# Patient Record
Sex: FEMALE | Race: WHITE | NOT HISPANIC OR LATINO | Employment: OTHER | URBAN - METROPOLITAN AREA
[De-identification: names, ages, dates, MRNs, and addresses within clinical notes are randomized per-mention and may not be internally consistent; named-entity substitution may affect disease eponyms.]

---

## 2017-01-03 DIAGNOSIS — J44.89 CHRONIC AIRWAY OBSTRUCTION, NOT ELSEWHERE CLASSIFIED: ICD-10-CM

## 2017-01-03 DIAGNOSIS — R09.89 OTHER DYSPNEA AND RESPIRATORY ABNORMALITY: ICD-10-CM

## 2017-01-03 DIAGNOSIS — R06.09 OTHER DYSPNEA AND RESPIRATORY ABNORMALITY: ICD-10-CM

## 2017-01-03 RX ORDER — LEVALBUTEROL INHALATION SOLUTION 0.31 MG/3ML
1 SOLUTION RESPIRATORY (INHALATION) 4 TIMES DAILY
Qty: 300 ML | Refills: 12 | Status: ON HOLD | OUTPATIENT
Start: 2017-01-03 | End: 2017-06-24 | Stop reason: HOSPADM

## 2017-01-26 ENCOUNTER — OFFICE VISIT (OUTPATIENT)
Dept: INTERNAL MEDICINE | Facility: CLINIC | Age: 82
End: 2017-01-26
Payer: MEDICARE

## 2017-01-26 VITALS
HEART RATE: 83 BPM | HEIGHT: 65 IN | WEIGHT: 117.06 LBS | DIASTOLIC BLOOD PRESSURE: 60 MMHG | SYSTOLIC BLOOD PRESSURE: 110 MMHG | RESPIRATION RATE: 16 BRPM | BODY MASS INDEX: 19.5 KG/M2 | TEMPERATURE: 97 F | OXYGEN SATURATION: 96 %

## 2017-01-26 DIAGNOSIS — I70.234 ATHEROSCLEROSIS OF NATIVE ARTERY OF RIGHT LOWER EXTREMITY WITH ULCERATION OF HEEL: Primary | ICD-10-CM

## 2017-01-26 PROCEDURE — 99499 UNLISTED E&M SERVICE: CPT | Mod: S$GLB,,, | Performed by: INTERNAL MEDICINE

## 2017-01-26 NOTE — MR AVS SNAPSHOT
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  Call for this free service at [phone].             Medications           Message regarding Medications     Verify the changes and/or additions to your medication regime listed below are the same as discussed with your clinician today.  If any of these changes or additions are incorrect, please notify your healthcare provider.        STOP taking these medications     acetaminophen (TYLENOL) 325 MG tablet Take 1 tablet (325 mg total) by mouth every 6 (six) hours as needed for Pain or Temperature greater than (100.5).    doxycycline (MONODOX) 100 MG capsule Take 1 capsule (100 mg total) by mouth 2 (two) times daily.    furosemide (LASIX) 20 MG tablet Take 1 tablet (20 mg total) by mouth 2 (two) times daily.           Verify that the below list of medications is an accurate representation of the medications you are currently taking.  If none reported, the list may be blank. If incorrect, please contact your healthcare provider. Carry this list with you in case of emergency.           Current  "Medications     albuterol 90 mcg/actuation inhaler Inhale 2 puffs into the lungs every 6 (six) hours as needed for Wheezing or Shortness of Breath.    alprazolam (XANAX) 0.25 MG tablet Take 1 tablet (0.25 mg total) by mouth nightly as needed for Anxiety.    ascorbic acid (VITAMIN C) 500 MG tablet Take 500 mg by mouth 2 (two) times daily.    aspirin (ECOTRIN) 81 MG EC tablet Take 1 tablet (81 mg total) by mouth once daily.    citalopram (CELEXA) 20 MG tablet TAKE 1 TABLET BY MOUTH ONCE DAILY    cyanocobalamin (VITAMIN B-12) 100 MCG tablet Take 1 tablet (100 mcg total) by mouth once daily.    cyproheptadine (PERIACTIN) 4 mg tablet Take 1 tablet (4 mg total) by mouth 3 (three) times daily as needed.    fluticasone-vilanterol (BREO) 100-25 mcg/dose diskus inhaler Inhale 1 puff into the lungs once daily.    guaifenesin (MUCINEX) 600 mg 12 hr tablet Take 2 tablets (1,200 mg total) by mouth 2 (two) times daily. Okay to dispense box of medication  as prepackaged by .    levalbuterol (XOPENEX) 0.31 mg/3 mL nebulizer solution Take 3 mLs (0.31 mg total) by nebulization 4 (four) times daily.    lisinopril (PRINIVIL,ZESTRIL) 2.5 MG tablet Take 1 tablet (2.5 mg total) by mouth once daily.    multivitamin (ONE DAILY MULTIVITAMIN) per tablet Take 1 tablet by mouth once daily.    tiotropium (SPIRIVA) 18 mcg inhalation capsule Inhale 1 capsule (18 mcg total) into the lungs once daily.    metoprolol succinate (TOPROL-XL) 25 MG 24 hr tablet Take 1 tablet (25 mg total) by mouth once daily.           Clinical Reference Information           Vital Signs - Last Recorded  Most recent update: 1/26/2017  3:14 PM by Radha Botello MA    BP Pulse Temp Resp Ht Wt    110/60 (BP Location: Right arm, Patient Position: Sitting, BP Method: Manual) 83 97.1 °F (36.2 °C) (Oral) 16 5' 5" (1.651 m) 53.1 kg (117 lb 1 oz)    LMP SpO2 BMI          (LMP Unknown) 96% 19.48 kg/m2        Blood Pressure          Most Recent Value    BP  110/60    "   Allergies as of 1/26/2017     Advair Diskus  [Fluticasone-salmeterol]    Pravastatin      Immunizations Administered on Date of Encounter - 1/26/2017     None      Orders Placed During Today's Visit      Normal Orders This Visit    WOUND SUPPLIES FOR HOME USE

## 2017-01-31 ENCOUNTER — PATIENT MESSAGE (OUTPATIENT)
Dept: FAMILY MEDICINE | Facility: CLINIC | Age: 82
End: 2017-01-31

## 2017-02-06 ENCOUNTER — HOSPITAL ENCOUNTER (OUTPATIENT)
Dept: RADIOLOGY | Facility: HOSPITAL | Age: 82
Discharge: HOME | End: 2017-02-06
Attending: PODIATRIST
Payer: MEDICARE

## 2017-02-06 ENCOUNTER — OFFICE VISIT (OUTPATIENT)
Dept: PODIATRY | Facility: CLINIC | Age: 82
End: 2017-02-06
Payer: MEDICARE

## 2017-02-06 VITALS
SYSTOLIC BLOOD PRESSURE: 113 MMHG | BODY MASS INDEX: 19.49 KG/M2 | HEART RATE: 83 BPM | DIASTOLIC BLOOD PRESSURE: 64 MMHG | HEIGHT: 65 IN | WEIGHT: 117 LBS

## 2017-02-06 DIAGNOSIS — G89.29 CHRONIC HEEL PAIN, RIGHT: ICD-10-CM

## 2017-02-06 DIAGNOSIS — M79.671 CHRONIC HEEL PAIN, RIGHT: ICD-10-CM

## 2017-02-06 DIAGNOSIS — R60.0 BILATERAL LOWER EXTREMITY EDEMA: ICD-10-CM

## 2017-02-06 DIAGNOSIS — L97.411 ULCER OF HEEL, RIGHT, LIMITED TO BREAKDOWN OF SKIN: ICD-10-CM

## 2017-02-06 DIAGNOSIS — L97.411 ULCER OF HEEL, RIGHT, LIMITED TO BREAKDOWN OF SKIN: Primary | ICD-10-CM

## 2017-02-06 PROCEDURE — 73650 X-RAY EXAM OF HEEL: CPT | Mod: TC,PO,RT

## 2017-02-06 PROCEDURE — 1157F ADVNC CARE PLAN IN RCRD: CPT | Mod: S$GLB,,, | Performed by: PODIATRIST

## 2017-02-06 PROCEDURE — 3078F DIAST BP <80 MM HG: CPT | Mod: S$GLB,,, | Performed by: PODIATRIST

## 2017-02-06 PROCEDURE — 97597 DBRDMT OPN WND 1ST 20 CM/<: CPT | Mod: RT,S$GLB,, | Performed by: PODIATRIST

## 2017-02-06 PROCEDURE — 1160F RVW MEDS BY RX/DR IN RCRD: CPT | Mod: S$GLB,,, | Performed by: PODIATRIST

## 2017-02-06 PROCEDURE — 99213 OFFICE O/P EST LOW 20 MIN: CPT | Mod: 25,S$GLB,, | Performed by: PODIATRIST

## 2017-02-06 PROCEDURE — 1125F AMNT PAIN NOTED PAIN PRSNT: CPT | Mod: S$GLB,,, | Performed by: PODIATRIST

## 2017-02-06 PROCEDURE — 3074F SYST BP LT 130 MM HG: CPT | Mod: S$GLB,,, | Performed by: PODIATRIST

## 2017-02-06 PROCEDURE — 99499 UNLISTED E&M SERVICE: CPT | Mod: S$GLB,,, | Performed by: PODIATRIST

## 2017-02-06 PROCEDURE — 1159F MED LIST DOCD IN RCRD: CPT | Mod: S$GLB,,, | Performed by: PODIATRIST

## 2017-02-06 PROCEDURE — 99999 PR PBB SHADOW E&M-EST. PATIENT-LVL III: CPT | Mod: PBBFAC,,, | Performed by: PODIATRIST

## 2017-02-06 PROCEDURE — 73650 X-RAY EXAM OF HEEL: CPT | Mod: 26,RT,, | Performed by: RADIOLOGY

## 2017-02-06 NOTE — PROGRESS NOTES
"Subjective:      Patient ID: Kaity Rebolledo is a 83 y.o. female.    Chief Complaint: Foot Ulcer (Right Heel)    Kaity is a 83 y.o. female who presents to the clinic for evaluation and treatment of high risk feet. Kaity has a past medical history of CAD (coronary artery disease) (3/14/2016); Cardiomyopathy (9/24/2015); CHF (congestive heart failure) (10/14/2015); COPD (chronic obstructive pulmonary disease); COPD exacerbation (3/14/2016); Fall; Hyperlipidemia; Hypertension; Osteoporosis; Pneumonia; and Rotator cuff injury. The patient's chief complaint is foot ulcer, right. This patient has documented high risk feet requiring routine maintenance secondary to peripheral vascular disease. Acconpanied by her daughter. She is a nursing home resident. Wound treated per nursing staff for past few weeks without significant improvement. Denies trauma.    12/8/16: Presents for wound check. Dressing remained C/D/I. No new complaints. Pain has resolved.    2/6/17: Returns complaining of persistent pain to right lateral heel overlying previous wound site. Denies trauma. Says she is able to walk short distances 10-15 feet without significant pain but if she tries to walk a lot she has moderate pain to the heel. Ambulating with slippers. NH is applying antibiotic ointment and border dressing to the foot.    PCP: Samuel Soriano MD    Date Last Seen by PCP:     Current shoe gear:  Affected Foot: Slip-on shoes     Unaffected Foot: Slip-on shoes    History of Trauma: negative  Sign of Infection: none    Hemoglobin A1C   Date Value Ref Range Status   02/28/2015 5.5 4.5 - 6.2 % Final     Vitals:    02/06/17 1526   BP: 113/64   Pulse: 83   Weight: 53.1 kg (117 lb)   Height: 5' 5" (1.651 m)   PainSc:   4   PainLoc: Foot      Past Medical History   Diagnosis Date    CAD (coronary artery disease) 3/14/2016    Cardiomyopathy 9/24/2015    CHF (congestive heart failure) 10/14/2015    COPD (chronic obstructive pulmonary disease)     COPD " exacerbation 3/14/2016    Fall      patient  in  wheel  chair    Hyperlipidemia     Hypertension     Osteoporosis     Pneumonia     Rotator cuff injury      R s/p therapy       Past Surgical History   Procedure Laterality Date    Leg surgery      Cataract extraction bilateral w/ anterior vitrectomy      Eye surgery      Fracture surgery         No family history on file.    Social History     Social History    Marital status:      Spouse name: N/A    Number of children: N/A    Years of education: N/A     Social History Main Topics    Smoking status: Former Smoker     Packs/day: 1.00     Years: 50.00     Types: Cigarettes     Quit date: 3/13/2003    Smokeless tobacco: Never Used    Alcohol use No    Drug use: No    Sexual activity: Not Currently     Birth control/ protection: Post-menopausal     Other Topics Concern    None     Social History Narrative       Current Outpatient Prescriptions   Medication Sig Dispense Refill    albuterol 90 mcg/actuation inhaler Inhale 2 puffs into the lungs every 6 (six) hours as needed for Wheezing or Shortness of Breath. 6.7 g 4    alprazolam (XANAX) 0.25 MG tablet Take 1 tablet (0.25 mg total) by mouth nightly as needed for Anxiety. 20 tablet 0    ascorbic acid (VITAMIN C) 500 MG tablet Take 500 mg by mouth 2 (two) times daily.      aspirin (ECOTRIN) 81 MG EC tablet Take 1 tablet (81 mg total) by mouth once daily.  0    citalopram (CELEXA) 20 MG tablet TAKE 1 TABLET BY MOUTH ONCE DAILY 30 tablet 2    cyanocobalamin (VITAMIN B-12) 100 MCG tablet Take 1 tablet (100 mcg total) by mouth once daily. 90 tablet 3    cyproheptadine (PERIACTIN) 4 mg tablet Take 1 tablet (4 mg total) by mouth 3 (three) times daily as needed. 30 tablet 2    fluticasone-vilanterol (BREO) 100-25 mcg/dose diskus inhaler Inhale 1 puff into the lungs once daily. 60 each 12    guaifenesin (MUCINEX) 600 mg 12 hr tablet Take 2 tablets (1,200 mg total) by mouth 2 (two) times daily.  Okay to dispense box of medication  as prepackaged by . 60 tablet 6    levalbuterol (XOPENEX) 0.31 mg/3 mL nebulizer solution Take 3 mLs (0.31 mg total) by nebulization 4 (four) times daily. 300 mL 12    lisinopril (PRINIVIL,ZESTRIL) 2.5 MG tablet Take 1 tablet (2.5 mg total) by mouth once daily. 90 tablet 3    metoprolol succinate (TOPROL-XL) 25 MG 24 hr tablet Take 1 tablet (25 mg total) by mouth once daily. 90 tablet 3    multivitamin (ONE DAILY MULTIVITAMIN) per tablet Take 1 tablet by mouth once daily.      tiotropium (SPIRIVA) 18 mcg inhalation capsule Inhale 1 capsule (18 mcg total) into the lungs once daily. 90 capsule 3     Current Facility-Administered Medications   Medication Dose Route Frequency Provider Last Rate Last Dose    zoledronic acid-mannitol & water 5 mg/100 mL infusion 5 mg  5 mg Intravenous 1 time in Clinic/HOD Yun Guillory MD           Review of patient's allergies indicates:   Allergen Reactions    Advair diskus  [fluticasone-salmeterol]      Other reaction(s): Nausea    Pravastatin      Other reaction(s): Muscle pain       Review of Systems   Constitution: Negative for chills, fever, weakness and malaise/fatigue.   Cardiovascular: Negative for chest pain, claudication and leg swelling.   Respiratory: Negative for cough and shortness of breath.    Skin: Positive for color change and poor wound healing.   Musculoskeletal: Negative for back pain, joint pain, muscle cramps and muscle weakness.   Gastrointestinal: Negative for nausea and vomiting.   Neurological: Negative for numbness and paresthesias.   Psychiatric/Behavioral: Negative for altered mental status.           Objective:      Physical Exam   Constitutional: She is oriented to person, place, and time. No distress.   Cardiovascular:   Pulses:       Dorsalis pedis pulses are 2+ on the right side, and 2+ on the left side.        Posterior tibial pulses are 2+ on the right side, and 2+ on the left side.   CFT< 3  secs all toes bilateral foot, skin temp warm bilateral foot, no digital hair growth bilateral foot, moderate pitting lower extremity edema right lower extremity and mild left lower extremity.     Musculoskeletal:        Feet:    Adequate joint range of motion without pain, limitation, nor crepitation Bilateral feet and ankle joints. Muscle strength is 5/5 in all groups bilaterally.    Cavus foot structure bilateral foot.       Neurological: She is alert and oriented to person, place, and time. She has normal strength. No sensory deficit.   Skin: Skin is warm and dry. No ecchymosis and no rash noted. She is not diaphoretic. No cyanosis or erythema. Nails show no clubbing.   Wound right lateral heel with overlying hyperkeratosis. Post debridement extends to level of dermis measuring 0.2x0.2x0.1cm with overlying biofilm, does not probe to bone or undermine or track. No active drainage, no surrounding erythema.             Assessment:       Encounter Diagnoses   Name Primary?    Ulcer of heel, right, limited to breakdown of skin Yes    Bilateral lower extremity edema     Chronic heel pain, right          Plan:       Kaity was seen today for foot ulcer.    Diagnoses and all orders for this visit:    Ulcer of heel, right, limited to breakdown of skin  -     US Lower Extremity Veins Right; Future  -     X-Ray Calcaneus 2 View Right; Future  -     Debridement    Bilateral lower extremity edema  -     US Lower Extremity Veins Right; Future  -     X-Ray Calcaneus 2 View Right; Future    Chronic heel pain, right  -     US Lower Extremity Veins Right; Future  -     X-Ray Calcaneus 2 View Right; Future      I counseled the patient on her conditions, their implications and medical management.    Debridement and dressing per attached note.Instructions for nursing to change daily with triple antibiotic ointment and wide band aid.    Discussed floating heels while in bed by placing 1-2 pillows under the leg.    Recommended  supportive, well cushioned shoes such as SAS shoes.     Venous ultrasound to assess for DVT due to increased swelling in right lower extremity.    Xray to assess for underlying osseous changes.    MRI at next visit if no significant improvement in her pain.    RTC 1 week or prn.  .

## 2017-02-06 NOTE — PROCEDURES
"Wound Debridement  Date/Time: 2/6/2017 4:42 PM  Performed by: EVELYN LARRY  Authorized by: EVELYN LARRY     Time out: Immediately prior to procedure a "time out" was called to verify the correct patient, procedure, equipment, support staff and site/side marked as required.    Consent Done?:  Yes (Verbal)    Preparation: Patient was prepped and draped in usual sterile fashion    Local anesthesia used?: No      Wound Details:    Location:  Right foot    Location:  Right Heel (Lateral)    Type of Debridement:  Excisional       Length (cm):  0.2       Area (sq cm):  0.04       Width (cm):  0.2       Percent Debrided (%):  100       Depth (cm):  0.1       Total Area Debrided (sq cm):  0.04    Depth of debridement:  Epidermis/Dermis    Tissue debrided:  Epidermis and Dermis    Devitalized tissue debrided:  Biofilm and Callus    Instruments:  Blade    Bleeding:  None  Patient tolerance:  Patient tolerated the procedure well with no immediate complications     Applied mepilex border to wound site.      "

## 2017-02-06 NOTE — MR AVS SNAPSHOT
[facility] Podiat[facility]   [facility] [address]  Phone: [phone]                  Kaity Rebolledo   2017 3:15 PM   Office Visit    Description:  Female : 1933   Provider:  Fawad Poole DPM   Department:  [facility] Podiatry           Reason for Visit     Foot Ulcer           Diagnoses this Visit        Comments    Ulcer of heel, right, limited to breakdown of skin    -  Primary     Bilateral lower extremity edema         Chronic heel pain, right                To Do List           Future Appointments        Provider Department Dept Phone    2017 4:00 PM [facility] XR1 300 LB LIMIT [facility] [phone]    2017 10:00 AM Fawad Poole DPM [facility] [phone]    2017 8:15 AM [facility] US1 [facility] [phone]      Goals (5 Years of Data)     None      Follow-Up and Disposition     Return in about 1 week (around 2017).      [facility]     [facility] On [facility] Nurse Care Line - 24/7 Assistance  Registered nurses in the [facility] On Call Center provide clinical advisement, health education, appointment booking, and other advisory services.  Call for this free service at [phone].             Medications           Message regarding Medications     Verify the changes and/or additions to your medication regime listed below are the same as discussed with your clinician today.  If any of these changes or additions are incorrect, please notify your healthcare provider.             Verify that the below list of medications is an accurate representation of the medications you are currently taking.  If none reported, the list may be blank. If incorrect, please contact your healthcare provider. Carry this list with you in case of emergency.           Current Medications     albuterol 90 mcg/actuation inhaler Inhale 2 puffs into the lungs every 6 (six) hours as needed for Wheezing or Shortness of Breath.    alprazolam  "(XANAX) 0.25 MG tablet Take 1 tablet (0.25 mg total) by mouth nightly as needed for Anxiety.    ascorbic acid (VITAMIN C) 500 MG tablet Take 500 mg by mouth 2 (two) times daily.    aspirin (ECOTRIN) 81 MG EC tablet Take 1 tablet (81 mg total) by mouth once daily.    citalopram (CELEXA) 20 MG tablet TAKE 1 TABLET BY MOUTH ONCE DAILY    cyanocobalamin (VITAMIN B-12) 100 MCG tablet Take 1 tablet (100 mcg total) by mouth once daily.    cyproheptadine (PERIACTIN) 4 mg tablet Take 1 tablet (4 mg total) by mouth 3 (three) times daily as needed.    fluticasone-vilanterol (BREO) 100-25 mcg/dose diskus inhaler Inhale 1 puff into the lungs once daily.    guaifenesin (MUCINEX) 600 mg 12 hr tablet Take 2 tablets (1,200 mg total) by mouth 2 (two) times daily. Okay to dispense box of medication  as prepackaged by .    levalbuterol (XOPENEX) 0.31 mg/3 mL nebulizer solution Take 3 mLs (0.31 mg total) by nebulization 4 (four) times daily.    lisinopril (PRINIVIL,ZESTRIL) 2.5 MG tablet Take 1 tablet (2.5 mg total) by mouth once daily.    metoprolol succinate (TOPROL-XL) 25 MG 24 hr tablet Take 1 tablet (25 mg total) by mouth once daily.    multivitamin (ONE DAILY MULTIVITAMIN) per tablet Take 1 tablet by mouth once daily.    tiotropium (SPIRIVA) 18 mcg inhalation capsule Inhale 1 capsule (18 mcg total) into the lungs once daily.           Clinical Reference Information           Your Vitals Were     BP Pulse Height Weight Last Period BMI    113/64 83 5' 5" (1.651 m) 53.1 kg (117 lb) (LMP Unknown) 19.47 kg/m2      Blood Pressure          Most Recent Value    BP  113/64      Allergies as of 2/6/2017     Advair Diskus  [Fluticasone-salmeterol]    Pravastatin      Immunizations Administered on Date of Encounter - 2/6/2017     None      Orders Placed During Today's Visit     Future Labs/Procedures Expected by Expires    US Lower Extremity Veins Right  2/6/2017 2/6/2018    X-Ray Calcaneus 2 View Right  2/6/2017 2/6/2018    "   Language Assistance Services     ATTENTION: Language assistance services are available, free of charge. Please call [phone].      ATENCIÓN: Si habla lo, tiene a spears disposición servicios gratuitos de asistencia lingüística. Llame al [phone].     CHÚ Ý: N?u b?n nói Ti?ng Vi?t, có các d?ch v? h? tr? ngôn ng? mi?n phí dành cho b?n. G?i s? [phone].         [facility] complies with applicable Federal civil rights laws and does not discriminate on the basis of race, color, national origin, age, disability, or sex.

## 2017-02-12 ENCOUNTER — PATIENT MESSAGE (OUTPATIENT)
Dept: PODIATRY | Facility: CLINIC | Age: 82
End: 2017-02-12

## 2017-02-21 ENCOUNTER — HOSPITAL ENCOUNTER (OUTPATIENT)
Dept: RADIOLOGY | Facility: HOSPITAL | Age: 82
Discharge: HOME | End: 2017-02-21
Attending: PODIATRIST
Payer: MEDICARE

## 2017-02-21 DIAGNOSIS — G89.29 CHRONIC HEEL PAIN, RIGHT: ICD-10-CM

## 2017-02-21 DIAGNOSIS — M79.671 CHRONIC HEEL PAIN, RIGHT: ICD-10-CM

## 2017-02-21 DIAGNOSIS — R60.0 BILATERAL LOWER EXTREMITY EDEMA: ICD-10-CM

## 2017-02-21 DIAGNOSIS — L97.411 ULCER OF HEEL, RIGHT, LIMITED TO BREAKDOWN OF SKIN: ICD-10-CM

## 2017-02-21 PROCEDURE — 93971 EXTREMITY STUDY: CPT | Mod: TC,RT

## 2017-02-21 PROCEDURE — 93971 EXTREMITY STUDY: CPT | Mod: 26,RT,, | Performed by: RADIOLOGY

## 2017-02-22 NOTE — PROGRESS NOTES
"Subjective:      Patient ID: Kaity Rebolledo is a 83 y.o. female.    Chief Complaint: FOOT (Pt c/o sore on heel of right foot for about eight weeks with lots of pain)    HPI: 83y/oWF presents with > 2 months of a non healing ulcer of the right heel, lateral aspect.  She had been on steroids recently for an exaccerbation of COPD, but is known to have CAD, atherosclerosis.  She has never been given a protective barrier for this tiny ulcer, nor had further work up of such.    Review of Systems   HENT: Positive for hearing loss.    Respiratory: Positive for chest tightness, shortness of breath and wheezing.    Cardiovascular: Positive for chest pain, palpitations and leg swelling.   Gastrointestinal: Negative.    Genitourinary: Positive for frequency and urgency.   Skin: Positive for wound.   Neurological: Positive for light-headedness.   Psychiatric/Behavioral: Negative.        Objective:     /60 (BP Location: Right arm, Patient Position: Sitting, BP Method: Manual)  Pulse 83  Temp 97.1 °F (36.2 °C) (Oral)   Resp 16  Ht 5' 5" (1.651 m)  Wt 53.1 kg (117 lb 1 oz)  LMP  (LMP Unknown)  SpO2 96%  BMI 19.48 kg/m2    Physical Exam   Constitutional: She appears well-developed and well-nourished.   HENT:   Head: Normocephalic and atraumatic.   Eyes: Conjunctivae are normal.   Neck: Normal range of motion. No JVD present.   Cardiovascular: S1 normal and S2 normal.  Exam reveals gallop and S4.    Murmur heard.   Systolic murmur is present with a grade of 2/6   Pulses:       Carotid pulses are 1+ on the right side, and 1+ on the left side.       Radial pulses are 1+ on the right side, and 1+ on the left side.        Femoral pulses are 1+ on the right side, and 1+ on the left side.       Popliteal pulses are 0 on the right side, and 0 on the left side.        Dorsalis pedis pulses are 0 on the right side, and 0 on the left side.        Posterior tibial pulses are 0 on the right side, and 0 on the left side.   Warm " extremities, but dusky toes   Neurological: She is alert.   Skin: Skin is warm and dry.        Nursing note and vitals reviewed.      Assessment:     1. 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  Otherwise this is not an infectious disease issue,.

## 2017-02-23 ENCOUNTER — PATIENT MESSAGE (OUTPATIENT)
Dept: PODIATRY | Facility: CLINIC | Age: 82
End: 2017-02-23

## 2017-03-02 PROBLEM — A41.9 SEPSIS: Status: ACTIVE | Noted: 2017-03-02

## 2017-03-04 PROBLEM — E87.6 HYPOKALEMIA: Status: ACTIVE | Noted: 2017-03-04

## 2017-03-04 PROBLEM — I48.91 ATRIAL FIBRILLATION WITH RVR: Status: ACTIVE | Noted: 2017-03-04

## 2017-03-07 ENCOUNTER — OUTPATIENT CASE MANAGEMENT (OUTPATIENT)
Dept: ADMINISTRATIVE | Facility: OTHER | Age: 82
End: 2017-03-07

## 2017-03-07 PROBLEM — A41.9 SEPSIS: Status: RESOLVED | Noted: 2017-03-02 | Resolved: 2017-03-07

## 2017-03-07 PROBLEM — E87.6 HYPOKALEMIA: Status: RESOLVED | Noted: 2017-03-04 | Resolved: 2017-03-07

## 2017-03-08 ENCOUNTER — OUTPATIENT CASE MANAGEMENT (OUTPATIENT)
Dept: ADMINISTRATIVE | Facility: OTHER | Age: 82
End: 2017-03-08

## 2017-03-08 NOTE — PROGRESS NOTES
Please note the following patient has been assigned to Zehra Wall RN,  with Outpatient Complex Care Mgmt for screening.    Please contact \[facility]\"" at ext [phone] with questions.    Thank you    Jing Urban ,SSC

## 2017-03-08 NOTE — PROGRESS NOTES
Patient was referred to [facility] on 3/7/2017.  This RN received referral on 3/8/2017.  CARLYN Wall, O[facility]-RN

## 2017-03-09 ENCOUNTER — TELEPHONE (OUTPATIENT)
Dept: PRIMARY CARE CLINIC | Facility: CLINIC | Age: 82
End: 2017-03-09

## 2017-03-09 ENCOUNTER — PATIENT OUTREACH (OUTPATIENT)
Dept: ADMINISTRATIVE | Facility: CLINIC | Age: 82
End: 2017-03-09
Payer: MEDICARE

## 2017-03-09 NOTE — Clinical Note
Please forward this important TCC information to your provider in order to maximize the post discharge care delivery of this patient.  C3 nurse attempted to contact Kaity NASH for a TCC post hospital discharge follow up call. No answer. C3 nurse left a voice message and OOC # given. The patient does not have a HOSFU appointment with a Provider within 7-14 days post hospital discharge date 3/7. Please contact patient and schedule follow up appointment using HOSFU visit type on or before 3/14.  Respectfully, Kayla Romero RN  [facility] C3 [email or web address]

## 2017-03-09 NOTE — TELEPHONE ENCOUNTER
Rec'd referral from Dr. Soriano's office for HOSPFU appt. Called pt and left msg for dtr Maryjane to call back to schedule appt.

## 2017-03-09 NOTE — PROGRESS NOTES
C3 nurse attempted to contact patient. The following occurred:   C3 nurse attempted to contact Kaity Rebolledo  for a TCC post hospital discharge follow up call. The patient is unable to conduct the call @ this time. The patient requested a callback.    The patient does not have a scheduled HOSFU appointment within 7-14 days post hospital discharge date 3/7. Message sent to Physician staff to assist with HOSFU appointment scheduling.

## 2017-03-09 NOTE — PATIENT INSTRUCTIONS
Discharge Instructions for Pneumonia  You have been diagnosed with pneumonia, a serious lung infection. Most cases of pneumonia are caused by bacteria. Pneumonia most often occurs in older adults, young children, and people with chronic health problems.  Home Care  Take your medication exactly as directed. Dont skip doses. Continue taking your antibiotics as directed until they are all gone--even if you start to feel better. This will prevent the pneumonia from coming back.  Drink at least 8 glasses of water daily, unless directed otherwise. This helps to loosen and thin secretions so that you can cough them up.  Use a cool-mist humidifier in your bedroom. Be sure to clean the humidifier daily.  Coughing up mucus is normal. Dont use medications to suppress your cough unless your cough is dry, painful, or interferes with your sleep. You may use an expectorant if ordered by your doctor.  Warm compresses or a moist heating pad on the lowest setting can be used to relieve chest discomfort. Use several times a day for 15-20 minutes at a time. (To prevent injuring your skin, be sure the temperature of the compress or heating pad is warm, not hot.)  Get plenty of rest until your fever, shortness of breath, and chest pain go away.  Plan to get a flu shot every year.  Ask your doctor about a pneumonia vaccination.  Follow-Up  Make a follow-up appointment as directed by our staff.    When to Seek Medical Attention  Call 911 right away if you have any of the following:  Chest pain  Trouble breathing  Blue lips or fingernails  Otherwise, call your doctor if you have any of the following:  Fever above 100.4°F  Yellow, green, bloody, or smelly sputum  More than normal mucus production  Vomiting   © 8290-2645 [employer], [address] [place] [address], [place] [address]. All rights reserved. This information is not intended as a substitute for professional medical care. Always follow your healthcare professional's instructions.

## 2017-03-09 NOTE — PROGRESS NOTES
C3 nurse contacted [facility] and pt was discharged in Dec 2016. Patient's daughter thought it had been renewed.  C3 nurse left message regarding above info on daughter's voice mail.

## 2017-03-09 NOTE — TELEPHONE ENCOUNTER
----- Message from Ed Alves LPN sent at 3/8/2017  2:22 PM CST -----  Contact: Maryjane Chan [phone]  [facility]?    ----- Message -----     From: Kia Horton     Sent: 3/8/2017  12:15 PM       To: [facility] Staff    Patient is calling to schedule a hospital follow up. Please advice

## 2017-03-10 ENCOUNTER — OUTPATIENT CASE MANAGEMENT (OUTPATIENT)
Dept: ADMINISTRATIVE | Facility: OTHER | Age: 82
End: 2017-03-10

## 2017-03-10 NOTE — PROGRESS NOTES
Talked to daughter Maryjane Chan to perform initial assesment for OCPM.  Maryjane stated that her mother is currently a resident with [facility] in [place], [place].  Maryjane stated that all of her mother's needs are being met at this time and denies OPCM services at this time.   Encouraged Maryjane to call this RN if have any questions/concerns.  CARLYN Wall, OPCM-RN

## 2017-03-13 ENCOUNTER — TELEPHONE (OUTPATIENT)
Dept: PRIMARY CARE CLINIC | Facility: CLINIC | Age: 82
End: 2017-03-13

## 2017-03-13 NOTE — TELEPHONE ENCOUNTER
Call placed to daughter to try to schedule [facility] appt. 2nd attempt. Left message for daughter Severino on mobile phone, left clinic phone number for a return phone call.

## 2017-03-27 ENCOUNTER — HOSPITAL ENCOUNTER (INPATIENT)
Facility: HOSPITAL | Age: 82
LOS: 8 days | Discharge: NURSING, REHAB OR LONG-TERM CARE | DRG: 189 | End: 2017-04-05
Attending: EMERGENCY MEDICINE | Admitting: INTERNAL MEDICINE
Payer: MEDICARE

## 2017-03-27 DIAGNOSIS — J44.9 COPD, VERY SEVERE: ICD-10-CM

## 2017-03-27 DIAGNOSIS — J44.1 COPD EXACERBATION: ICD-10-CM

## 2017-03-27 DIAGNOSIS — R06.02 SOB (SHORTNESS OF BREATH): Primary | ICD-10-CM

## 2017-03-27 DIAGNOSIS — F41.8 DEPRESSION WITH ANXIETY: ICD-10-CM

## 2017-03-27 DIAGNOSIS — R79.89 ELEVATED TROPONIN: ICD-10-CM

## 2017-03-27 DIAGNOSIS — J96.22 ACUTE ON CHRONIC RESPIRATORY FAILURE WITH HYPOXIA AND HYPERCAPNIA: ICD-10-CM

## 2017-03-27 DIAGNOSIS — I27.23 PULMONARY HYPERTENSION DUE TO LUNG DISEASE: ICD-10-CM

## 2017-03-27 DIAGNOSIS — D63.8 ANEMIA, CHRONIC DISEASE: ICD-10-CM

## 2017-03-27 DIAGNOSIS — I50.30 (HFPEF) HEART FAILURE WITH PRESERVED EJECTION FRACTION: ICD-10-CM

## 2017-03-27 DIAGNOSIS — I48.0 PAROXYSMAL ATRIAL FIBRILLATION: ICD-10-CM

## 2017-03-27 DIAGNOSIS — Z86.79 HISTORY OF ATRIAL FIBRILLATION: ICD-10-CM

## 2017-03-27 DIAGNOSIS — I27.20 PULMONARY HYPERTENSION: ICD-10-CM

## 2017-03-27 DIAGNOSIS — J44.1 COPD WITH RESPIRATORY FAILURE, ACUTE: ICD-10-CM

## 2017-03-27 DIAGNOSIS — R33.9 URINARY RETENTION: ICD-10-CM

## 2017-03-27 DIAGNOSIS — I10 ESSENTIAL HYPERTENSION: ICD-10-CM

## 2017-03-27 DIAGNOSIS — J96.21 ACUTE ON CHRONIC RESPIRATORY FAILURE WITH HYPOXIA AND HYPERCAPNIA: ICD-10-CM

## 2017-03-27 DIAGNOSIS — J96.00 COPD WITH RESPIRATORY FAILURE, ACUTE: ICD-10-CM

## 2017-03-27 DIAGNOSIS — E87.1 HYPONATREMIA: ICD-10-CM

## 2017-03-27 DIAGNOSIS — I25.10 CORONARY ARTERY DISEASE INVOLVING NATIVE CORONARY ARTERY OF NATIVE HEART WITHOUT ANGINA PECTORIS: ICD-10-CM

## 2017-03-27 DIAGNOSIS — E78.5 DYSLIPIDEMIA: ICD-10-CM

## 2017-03-27 DIAGNOSIS — F32.A ANXIETY AND DEPRESSION: ICD-10-CM

## 2017-03-27 DIAGNOSIS — J44.9 STAGE 4 VERY SEVERE COPD BY GOLD CLASSIFICATION: ICD-10-CM

## 2017-03-27 DIAGNOSIS — I50.32 CHRONIC DIASTOLIC HEART FAILURE: ICD-10-CM

## 2017-03-27 DIAGNOSIS — D63.8 ANEMIA OF CHRONIC DISEASE: ICD-10-CM

## 2017-03-27 DIAGNOSIS — F41.9 ANXIETY AND DEPRESSION: ICD-10-CM

## 2017-03-27 LAB
ALBUMIN SERPL BCP-MCNC: 3.6 G/DL
ALP SERPL-CCNC: 55 U/L
ALT SERPL W/O P-5'-P-CCNC: 11 U/L
ANION GAP SERPL CALC-SCNC: 12 MMOL/L
AST SERPL-CCNC: 22 U/L
BASOPHILS # BLD AUTO: 0.01 K/UL
BASOPHILS NFR BLD: 0.1 %
BILIRUB SERPL-MCNC: 0.2 MG/DL
BNP SERPL-MCNC: 576 PG/ML
BUN SERPL-MCNC: 12 MG/DL
CALCIUM SERPL-MCNC: 8.8 MG/DL
CHLORIDE SERPL-SCNC: 89 MMOL/L
CO2 SERPL-SCNC: 31 MMOL/L
CREAT SERPL-MCNC: 0.9 MG/DL
DIFFERENTIAL METHOD: ABNORMAL
EOSINOPHIL # BLD AUTO: 0 K/UL
EOSINOPHIL NFR BLD: 0 %
ERYTHROCYTE [DISTWIDTH] IN BLOOD BY AUTOMATED COUNT: 13.3 %
EST. GFR  (AFRICAN AMERICAN): >60 ML/MIN/1.73 M^2
EST. GFR  (NON AFRICAN AMERICAN): 59 ML/MIN/1.73 M^2
GLUCOSE SERPL-MCNC: 145 MG/DL
HCT VFR BLD AUTO: 27.1 %
HGB BLD-MCNC: 8.5 G/DL
LYMPHOCYTES # BLD AUTO: 1.1 K/UL
LYMPHOCYTES NFR BLD: 14.3 %
MCH RBC QN AUTO: 30.2 PG
MCHC RBC AUTO-ENTMCNC: 31.4 %
MCV RBC AUTO: 96 FL
MONOCYTES # BLD AUTO: 0.2 K/UL
MONOCYTES NFR BLD: 3.1 %
NEUTROPHILS # BLD AUTO: 6.2 K/UL
NEUTROPHILS NFR BLD: 82.4 %
PLATELET # BLD AUTO: 239 K/UL
PMV BLD AUTO: 10 FL
POTASSIUM SERPL-SCNC: 3.8 MMOL/L
PROT SERPL-MCNC: 6.7 G/DL
RBC # BLD AUTO: 2.81 M/UL
SODIUM SERPL-SCNC: 132 MMOL/L
TROPONIN I SERPL DL<=0.01 NG/ML-MCNC: 0.01 NG/ML
WBC # BLD AUTO: 7.53 K/UL

## 2017-03-27 PROCEDURE — 96365 THER/PROPH/DIAG IV INF INIT: CPT

## 2017-03-27 PROCEDURE — 27000190 HC CPAP FULL FACE MASK W/VALVE

## 2017-03-27 PROCEDURE — 84484 ASSAY OF TROPONIN QUANT: CPT | Mod: 91

## 2017-03-27 PROCEDURE — 85025 COMPLETE CBC W/AUTO DIFF WBC: CPT | Mod: 91

## 2017-03-27 PROCEDURE — 99291 CRITICAL CARE FIRST HOUR: CPT | Mod: 25

## 2017-03-27 PROCEDURE — 94660 CPAP INITIATION&MGMT: CPT

## 2017-03-27 PROCEDURE — 93005 ELECTROCARDIOGRAM TRACING: CPT

## 2017-03-27 PROCEDURE — 27000221 HC OXYGEN, UP TO 24 HOURS

## 2017-03-27 PROCEDURE — 93010 ELECTROCARDIOGRAM REPORT: CPT | Mod: ,,, | Performed by: INTERNAL MEDICINE

## 2017-03-27 PROCEDURE — 96366 THER/PROPH/DIAG IV INF ADDON: CPT

## 2017-03-27 PROCEDURE — 83880 ASSAY OF NATRIURETIC PEPTIDE: CPT

## 2017-03-27 PROCEDURE — 83735 ASSAY OF MAGNESIUM: CPT

## 2017-03-27 PROCEDURE — 80053 COMPREHEN METABOLIC PANEL: CPT | Mod: 91

## 2017-03-27 PROCEDURE — G0378 HOSPITAL OBSERVATION PER HR: HCPCS

## 2017-03-27 RX ORDER — IPRATROPIUM BROMIDE AND ALBUTEROL SULFATE 2.5; .5 MG/3ML; MG/3ML
3 SOLUTION RESPIRATORY (INHALATION) EVERY 4 HOURS
Status: DISCONTINUED | OUTPATIENT
Start: 2017-03-28 | End: 2017-04-05 | Stop reason: HOSPADM

## 2017-03-27 RX ORDER — ALPRAZOLAM 0.25 MG/1
0.25 TABLET ORAL ONCE
Status: COMPLETED | OUTPATIENT
Start: 2017-03-28 | End: 2017-03-27

## 2017-03-27 RX ADMIN — IPRATROPIUM BROMIDE AND ALBUTEROL SULFATE 3 ML: .5; 3 SOLUTION RESPIRATORY (INHALATION) at 11:03

## 2017-03-27 RX ADMIN — ALPRAZOLAM 0.25 MG: 0.25 TABLET ORAL at 11:03

## 2017-03-27 NOTE — IP AVS SNAPSHOT
[facility]  [address]  [place] [address]  Phone: [phone]           Patient Discharge Instructions   Our goal is to set you up for success. This packet includes information on your condition, medications, and your home care.  It will help you care for yourself to prevent having to return to the hospital.     Please ask your nurse if you have any questions.      There are many details to remember when preparing to leave the hospital. Here is what you will need to do:    1. Take your medicine. If you are prescribed medications, review your Medication List on the following pages. You may have new medications to  at the pharmacy and others that you'll need to stop taking. Review the instructions for how and when to take your medications. Talk with your doctor or nurses if you are unsure of what to do.     2. Go to your follow-up appointments. Specific follow-up information is listed in the following pages. Your may be contacted by a nurse or clinical provider about future appointments. Be sure we have all of the phone numbers to reach you. Please contact your provider's office if you are unable to make an appointment.     3. Watch for warning signs. Your doctor or nurse will give you detailed warning signs to watch for and when to call for assistance. These instructions may also include educational information about your condition. If you experience any of warning signs to your health, call your doctor.               ** Verify the list of medication(s) below is accurate and up to date. Carry this with you in case of emergency. If your medications have changed, please notify your healthcare provider.             Medication List      START taking these medications        Additional Info                      lisinopril 2.5 MG tablet   Commonly known as:  PRINIVIL,ZESTRIL   Quantity:  30 tablet   Refills:  4   Dose:  2.5 mg    Last time this was given:  2.5 mg on 4/5/2017  8:59 AM    Instructions:  Take 1 tablet (2.5 mg total) by mouth once daily.     Begin Date    AM    Noon    PM    Bedtime         CONTINUE taking these medications        Additional Info                      albuterol 90 mcg/actuation inhaler   Quantity:  6.7 g   Refills:  4   Dose:  2 puff   Comments:  OK FOR GENERIC    Instructions:  Inhale 2 puffs into the lungs every 6 (six) hours as needed for Wheezing or Shortness of Breath.     Begin Date    AM    Noon    PM    Bedtime       alprazolam 0.25 MG tablet   Commonly known as:  XANAX   Quantity:  20 tablet   Refills:  0   Dose:  0.25 mg    Last time this was given:  0.25 mg on 3/28/2017 11:20 AM   Instructions:  Take 1 tablet (0.25 mg total) by mouth nightly as needed for Anxiety.     Begin Date    AM    Noon    PM    Bedtime       apixaban 2.5 mg Tab   Quantity:  60 tablet   Refills:  1   Dose:  2.5 mg    Last time this was given:  2.5 mg on 4/5/2017  8:59 AM   Instructions:  Take 1 tablet (2.5 mg total) by mouth 2 (two) times daily.     Begin Date    AM    Noon    PM    Bedtime       aspirin 81 MG EC tablet   Commonly known as:  ECOTRIN   Refills:  0   Dose:  81 mg    Last time this was given:  81 mg on 4/5/2017  8:58 AM   Instructions:  Take 1 tablet (81 mg total) by mouth once daily.     Begin Date    AM    Noon    PM    Bedtime       citalopram 20 MG tablet   Commonly known as:  CELEXA   Quantity:  30 tablet   Refills:  2   Comments:  This prescription was filled today. Any refills authorized will be placed on file.    Last time this was given:  20 mg on 4/5/2017  8:58 AM   Instructions:  TAKE 1 TABLET BY MOUTH ONCE DAILY     Begin Date    AM    Noon    PM    Bedtime       cyanocobalamin 100 MCG tablet   Commonly known as:  VITAMIN B-12   Quantity:  90 tablet   Refills:  3   Dose:  100 mcg    Last time this was given:  100 mcg on 4/5/2017  8:59 AM   Instructions:  Take 1 tablet (100 mcg total) by mouth once daily.     Begin Date    AM    Noon    PM    Bedtime        cyproheptadine 4 mg tablet   Commonly known as:  PERIACTIN   Quantity:  30 tablet   Refills:  2   Dose:  4 mg   Comments:  This prescription was filled today. Any refills authorized will be placed on file.    Instructions:  Take 1 tablet (4 mg total) by mouth 3 (three) times daily as needed.     Begin Date    AM    Noon    PM    Bedtime       fluticasone-vilanterol 100-25 mcg/dose diskus inhaler   Commonly known as:  BREO   Quantity:  60 each   Refills:  12   Dose:  1 puff    Last time this was given:  1 puff on 4/5/2017  8:43 AM   Instructions:  Inhale 1 puff into the lungs once daily.     Begin Date    AM    Noon    PM    Bedtime       furosemide 20 MG tablet   Commonly known as:  LASIX   Quantity:  30 tablet   Refills:  1   Dose:  20 mg    Last time this was given:  20 mg on 4/5/2017  8:58 AM   Instructions:  Take 1 tablet (20 mg total) by mouth once daily.     Begin Date    AM    Noon    PM    Bedtime       guaifenesin 600 mg 12 hr tablet   Commonly known as:  MUCINEX   Quantity:  60 tablet   Refills:  6   Dose:  1200 mg    Last time this was given:  600 mg on 4/5/2017  8:59 AM   Instructions:  Take 2 tablets (1,200 mg total) by mouth 2 (two) times daily. Okay to dispense box of medication  as prepackaged by .     Begin Date    AM    Noon    PM    Bedtime       levalbuterol 0.31 mg/3 mL nebulizer solution   Commonly known as:  XOPENEX   Quantity:  300 mL   Refills:  12   Dose:  1 ampule    Instructions:  Take 3 mLs (0.31 mg total) by nebulization 4 (four) times daily.     Begin Date    AM    Noon    PM    Bedtime       metoprolol succinate 50 MG 24 hr tablet   Commonly known as:  TOPROL-XL   Quantity:  90 tablet   Refills:  0   Dose:  50 mg    Last time this was given:  50 mg on 4/5/2017  8:58 AM   Instructions:  Take 1 tablet (50 mg total) by mouth once daily.     Begin Date    AM    Noon    PM    Bedtime       ONE DAILY MULTIVITAMIN per tablet   Refills:  0   Dose:  1 tablet   Generic drug:   multivitamin    Instructions:  Take 1 tablet by mouth once daily.     Begin Date    AM    Noon    PM    Bedtime       VITAMIN C 500 MG tablet   Refills:  0   Dose:  500 mg   Generic drug:  ascorbic acid (vitamin C)    Instructions:  Take 500 mg by mouth 2 (two) times daily.     Begin Date    AM    Noon    PM    Bedtime         STOP taking these medications     levoFLOXacin 750 MG tablet   Commonly known as:  LEVAQUIN       predniSONE 20 MG tablet   Commonly known as:  DELTASONE         ASK your doctor about these medications        Additional Info                      oseltamivir 30 MG capsule   Commonly known as:  TAMIFLU   Quantity:  4 capsule   Refills:  0   Dose:  30 mg   Ask about: Should I take this medication?    Last time this was given:  30 mg on 4/2/2017  9:48 PM   Instructions:  Take 1 capsule (30 mg total) by mouth 2 (two) times daily.     Begin Date    AM    Noon    PM    Bedtime            Where to Get Your Medications      You can get these medications from any pharmacy     Bring a paper prescription for each of these medications     lisinopril 2.5 MG tablet    oseltamivir 30 MG capsule                  Please bring to all follow up appointments:    1. A copy of your discharge instructions.  2. All medicines you are currently taking in their original bottles.  3. Identification and insurance card.    Please arrive 15 minutes ahead of scheduled appointment time.    Please call 24 hours in advance if you must reschedule your appointment and/or time.        Your Scheduled Appointments     Apr 12, 2017  1:00 PM T   Hospital Follow Up with Fernanda Saldana MD   [place] [facility]    [address]  [facility] [id number] [phone]              Follow-up Information     Follow up with Fernanda Saldana MD On 4/12/2017.    Specialty:  Hospitalist    Why:  @12:30 for lab appointment the with Dr. Saldana.    Contact information:    [address] [place] [address]  [address]  [address]  [place] [address]  [phone]          Follow up with Samuel Soriano MD.    Specialty:  Internal Medicine    Contact information:    [address]  [address]  [place] [address]  [phone]          Follow up with [facility].    Specialties:  Nursing Home Agency, SNF Agency    Why:  SNF    Contact information:    [address]  [place] [address]  [phone]        Referrals     Future Orders    Ambulatory referral to Outpatient Case Management     Questions:    Does the patient have a chronic or uncontrolled disease process?:      Does the patient have a new diagnosis of a catastrophic or life altering illness/treatment?:      Does the patient have any psycho-social issues that may affect their ability to adhere to treatment plan?:      Does patient have any behaviors or circumstances that may impede ability to adhere to treatment plan?:      Is patient at risk for admission/readmission?:          Discharge References/Attachments     HEART FAILURE, DISCHARGE INSTRUCTIONS FOR (ENGLISH)    HEART FAILURE, WHAT IS (ENGLISH)    HEART FAILURE: EVALUATING YOUR HEART (ENGLISH)    HEART FAILURE: MAKING CHANGES TO YOUR DIET (ENGLISH)    HEART FAILURE: TRACKING YOUR WEIGHT (ENGLISH)    HEART FAILURE: WARNING SIGNS OF A FLARE-UP (ENGLISH)    SHORTNESS OF BREATH (DYSPNEA) (ENGLISH)    OXYGEN, USING AT HOME (ENGLISH)    OXYGEN, USING AT HOME: DISCHARGE INSTRUCTIONS (ENGLISH)        Primary Diagnosis     Your primary diagnosis was:  Acute On Chronic Respiratory Failure With Hypoxia And Hypercapnia      Admission Information     Date & Time Provider Department CSN    3/27/2017  8:05 PM Rolly Conway MD [facility] [id number]      Care Providers     Provider Role Specialty Primary office phone    Rolly Conway MD Attending Provider Internal Medicine [phone]      Important Medicare Message          Most Recent Value    Important Message from Medicare Regarding Discharge Appeal  "Rights  Given to patient/caregiver, Explained to patient/caregiver, Signed/date by patient/caregiver yes 04/04/2017 1503      Your Vitals Were     BP Pulse Temp Resp Height Weight    121/61 (BP Location: Right arm, Patient Position: Lying, BP Method: Automatic) 85 98.9 °F (37.2 °C) (Oral) 18 5' 5" (1.651 m) 52.5 kg (115 lb 11.9 oz)    Last Period SpO2 BMI          (LMP Unknown) 95% 19.26 kg/m2        Recent Lab Values        2/28/2015 3/28/2017                        5:06 AM  2:13 AM          A1C 5.5 5.3          Comment for A1C at  2:13 AM on 3/28/2017:  According to ADA guidelines, hemoglobin A1C <7.0% represents  optimal control in non-pregnant diabetic patients.  Different  metrics may apply to specific populations.   Standards of Medical Care in Diabetes - 2016.  For the purpose of screening for the presence of diabetes:  <5.7%     Consistent with the absence of diabetes  5.7-6.4%  Consistent with increasing risk for diabetes   (prediabetes)  >or=6.5%  Consistent with diabetes  Currently no consensus exists for use of hemoglobin A1C  for diagnosis of diabetes for children.        Allergies as of 4/5/2017        Reactions    Advair Diskus  [Fluticasone-salmeterol]     Other reaction(s): Nausea    Morphine Hallucinations    Pravastatin     Other reaction(s): Muscle pain      [facility]     [facility] Nurse Care Line - 24/7 Assistance  Unless otherwise directed by your provider, please contact [facility], our nurse care line that is available for 24/7 assistance.     Registered nurses in the [facility] provide clinical advisement, health education, appointment booking, and other advisory services.  Call for this free service at [phone].        Advance Directives     An advance directive is a document which, in the event you are no longer able to make decisions for yourself, tells your healthcare team what kind of treatment you do or do not want to receive, or who you would like to " make those decisions for you.  If you do not currently have an advance directive, [facility] encourages you to create one.  For more information call:  [phone] ([phone]), [phone] ([phone]),  or log on to [email or web address].        Smoking Cessation     If you would like to quit smoking:   You may be eligible for free services if you are a [place] resident and started smoking cigarettes before September 1, 1988.  Call the Smoking Cessation Trust (SCT) toll free at (841) 567-5595 or (656) 477-5191.   Call 4-052-QUIT-NOW if you do not meet the above criteria.   Contact us via email: [email or web address]   View our website for more information: [email or web address]        Language Assistance Services     ATTENTION: Language assistance services are available, free of charge. Please call [phone].      ATENCIÓN: Si habla español, tiene a spears disposición servicios gratuitos de asistencia lingüística. Llame al [phone].     CHÚ Ý: N?u b?n nói Ti?ng Vi?t, có các d?ch v? h? tr? ngôn ng? mi?n phí dành cho b?n. G?i s? [phone].        Heart Failure Education       Heart Failure: Being Active  You have a condition called heart failure. Being active doesnt mean that you have to wear yourself out. Even a little movement each day helps to strengthen your heart. If you cant get out to exercise, you can do simple stretching and strengthening exercises at home. These are good ways to keep you well-conditioned and prevent you and your heart from becoming excessively weak.    Ideas to get you started  · Add a little movement to things you do now. Walk to mail letters. Park your car at the far end of the parking lot and walk to the store. Walk up a flight of stairs instead of taking the elevator.  · Choose activities you enjoy. You might walk, swim, or ride an exercise bike. Things like gardening and washing the car count, too. Other possibilities include: washing dishes, walking  the dog, walking around the mall, and doing aerobic activities with friends.  · Join a group exercise program at a [facility] or [facility], a senior center, or a community center. Or look into a hospital cardiac rehabilitation program. Ask your doctor if you qualify.  Tips to keep you going  · Get up and get dressed each day. Go to a coffee shop and read a newspaper or go somewhere that you'll be in the presence of other active people. Youll feel more like being active.  · Make a plan. Choose one or more activities that you enjoy and that you can easily do. Then plan to do at least one each day. You might write your plan on a calendar.  · Go with a friend or a group if you like company. This can help you feel supported and stay motivated, too.  · Plan social events that you enjoy. This will keep you mentally engaged as well as physically motivated to do things you find pleasure in.  For your safety  · Talk with your healthcare provider before starting an exercise program.  · Exercise indoors when its too hot or too cold outside, or when the air quality is poor. Try walking at a shopping mall.  · Wear socks and sturdy shoes to maintain your balance and prevent falls.  · Start slowly. Do a few minutes several times a day at first. Increase your time and speed little by little.  · Stop and rest whenever you feel tired or get short of breath.  · Dont push yourself on days when you dont feel well.  Date Last Reviewed: 3/20/2016  © 5113-7333 [employer]. [address] [place] [address], [place] [address]. All rights reserved. This information is not intended as a substitute for professional medical care. Always follow your healthcare professional's instructions.              Heart Failure: Evaluating Your Heart  You have a condition called heart failure. To evaluate your condition, your doctor will examine you, ask questions, and do some tests. Along with looking for signs of heart failure, the doctor looks for any other  health problems that may have led to heart failure. The results of your evaluation will help your doctor form a treatment plan.  Health history and physical exam  Your visit will start with a health history. Tell the doctor about any symptoms youve noticed and about all medicines you take. Then youll have a physical exam. This includes listening to your heartbeat and breathing. Youll also be checked for swelling (edema) in your legs and neck. When you have fluid buildup or fluid in the lungs, it may be called congestive heart failure.  Diagnosing heart failure     During an echocardiogram, sound waves bounce off the heart. These are converted into a picture on the screen.   The following may be done to help your doctor form a diagnosis:  · X-rays show the size and shape of your heart. These pictures can also show fluid in your lungs.  · An electrocardiogram (ECG or EKG) shows the pattern of your heartbeat. Small pads (electrodes) are placed on your chest, arms, and legs. Wires connect the pads to the ECG machine, which records your hearts electrical signals. This can give the doctor information about heart function.  · An echocardiogram uses ultrasound waves to show the structure and movement of your heart muscle. This shows how well the heart pumps. It also shows the thickness of the heart walls, and if the heart is enlarged. It is one of the most useful, non-invasive tests as it provides information about the heart's general function. This helps your doctor make treatment decisions.  · Lab tests evaluate small amounts of blood or urine for signs of problems. A BNP lab test can help diagnose and evaluate heart failure. BNP stands for B-type natriuretic peptide. The ventricles secrete more BNP when heart failure worsens. Lab tests can also provide information about metabolic dysfunction or heart dysfunction.  Your treatment plan  Based on the results of your evaluation and tests, your doctor will develop a  treatment plan. This plan is designed to relieve some of your heart failure symptoms and help make you more comfortable. Your treatment plan may include:  · Medicine to help your heart work better and improve your quality of life  · Changes in what you eat and drink to help prevent fluid from backing up in your body  · Daily monitoring of your weight and heart failure symptoms to see how well your treatment plan is working  · Exercise to help you stay healthy  · Help with quitting smoking  · Emotional and psychological support to help adjust to the changes  · Referrals to other specialists to make sure you are being treated comprehensively  Date Last Reviewed: 3/21/2016  © 1785-5841 [employer]. [address] [place] [address], [place] [address]. All rights reserved. This information is not intended as a substitute for professional medical care. Always follow your healthcare professional's instructions.              Heart Failure: Making Changes to Your Diet  You have a condition called heart failure. When you have heart failure, excess fluid is more likely to build up in your body because your heart isn't working well. This makes the heart work harder to pump blood. Fluid buildup causes symptoms such as shortness of breath and swelling (edema). This is often referred to as congestive heart failure or CHF. Controlling the amount of salt (sodium) you eat may help stop fluid from building up. Your doctor may also tell you to reduce the amount of fluid you drink.  Reading food labels    Your healthcare provider will tell you how much sodium you can eat each day. Read food labels to keep track. Keep in mind that certain foods are high in salt. These include canned, frozen, and processed foods. Check the amount of sodium in each serving. Watch out for high-sodium ingredients. These include MSG (monosodium glutamate), baking soda, and sodium phosphate.   Eating less salt  Give yourself time to get used to eating less  salt. It may take a little while. Here are some tips to help:  · Take the saltshaker off the table. Replace it with salt-free herb mixes and spices.  · Eat fresh or plain frozen vegetables. These have much less salt than canned vegetables.  · Choose low-sodium snacks like sodium-free pretzels, crackers, or air-popped popcorn.  · Dont add salt to your food when youre cooking. Instead, season your foods with pepper, lemon, garlic, or onion.  · When you eat out, ask that your food be cooked without added salt.  · Avoid eating fried foods as these often have a great deal of salt.  If youre told to limit fluids  You may need to limit how much fluid you have to help prevent swelling. This includes anything that is liquid at room temperature, such as ice cream and soup. If your doctor tells you to limit fluid, try these tips:  · Measure drinks in a measuring cup before you drink them. This will help you meet daily goals.  · Chill drinks to make them more refreshing.  · Suck on frozen lemon wedges to quench thirst.  · Only drink when youre thirsty.  · Chew sugarless gum or suck on hard candy to keep your mouth moist.  · Weigh yourself daily to know if your body's fluid content is rising.  My sodium goal  Your healthcare provider may give you a sodium goal to meet each day. This includes sodium found in food as well as salt that you add. My goal is to eat no more than ___________ mg of sodium per day.     When to call your doctor  Call your doctor right away if you have any symptoms of worsening heart failure. These can include:  · Sudden weight gain  · Increased swelling of your legs or ankles  · Trouble breathing when youre resting or at night  · Increase in the number of pillows you have to sleep on  · Chest pain, pressure, discomfort, or pain in the jaw, neck, or back   Date Last Reviewed: 3/21/2016  © 4889-7235 [employer]. [address] [place] [address], [place] [address]. All rights reserved. This  information is not intended as a substitute for professional medical care. Always follow your healthcare professional's instructions.              Heart Failure: Medicines to Help Your Heart    You have a condition called heart failure (also known as congestive heart failure, or CHF). Your doctor will likely prescribe medicines for heart failure and any underlying health problems you have. Most heart failure patients take one or more types of medicinen. Your healthcare provider will work to find the combination of medicines that works best for you.  Heart failure medicines  Here are the most common heart failure medicines:  · ACE inhibitors lower blood pressure and decrease strain on the heart. This makes it easier for the heart to pump. Angiotensin receptor blockers have similar effects. These are prescribed for some patients instead of ACE inhibitors.  · Beta-blockers relieve stress on the heart. They also improve symptoms. They may also improve the heart's pumping action over time.  · Diuretics (also called water pills) help rid your body of excess water. This can help rid your body of swelling (edema). Having less fluid to pump means your heart doesnt have to work as hard. Some diuretics make your body lose a mineral called potassium. Your doctor will tell you if you need to take supplements or eat more foods high in potassium.  · Digoxin helps your heart pump with more strength. This helps your heart pump more blood with each beat. So, more oxygen-rich blood travels to the rest of the body.  · Aldosterone antagonists help alter hormones and decrease strain on the heart.  · Hydralazine and nitrates are two separate medicines used together to treat heart failure. They may come in one combination pill. They lower blood pressure and decrease how hard the heart has to pump.  Medicines for related conditions  Controlling other heart problems helps keep heart failure under control, too. Depending on other heart  problems you have, medicines may be prescribed to:  · Lower blood pressure (antihypertensives).  · Lower cholesterol levels (statins).  · Prevent blood clots (anticoagulants or aspirin).  · Keep the heartbeat steady (antiarrhythmics).  Date Last Reviewed: 3/5/2016  © 9588-5237 [employer]. [address] [place] [address], [place] [address]. All rights reserved. This information is not intended as a substitute for professional medical care. Always follow your healthcare professional's instructions.              Heart Failure: Procedures That May Help    The heart is a muscle that pumps oxygen-rich blood to all parts of the body. When you have heart failure, the heart is not able to pump as well as it should. Blood and fluid may back up into the lungs (congestive heart failure), and some parts of the body dont get enough oxygen-rich blood to work normally. These problems lead to the symptoms of heart failure.     Certain procedures may help the heart pump better in some cases of heart failure. Some procedures are done to treat health problems that may have caused the heart failure such as coronary artery disease or heart rhythm problems. For more serious heart failure, other options are available.  Treating artery and valve problems  If you have coronary artery disease or valve disease, procedures may be done to improve blood flow. This helps the heart pump better, which can improve heart failure symptoms. First, your doctor may do a cardiac catheterization to help detect clogged blood vessels or valve damage. During this procedure, a  thin tube (catheter) in inserted into a blood vessel and guided to the heart. There a dye is injected and a special type of X-ray (angiogram) is taken of the blood vessels. Procedures to open a blocked artery or fix damaged valves can also be done using catheterization.  · Angioplasty uses a balloon-tipped instrument at the end of the catheter. The balloon is inflated to widen the  narrowed artery. In many cases, a stent is expanded to further support the narrowed artery. A stent is a metal mesh tube.  · Valve surgery repairs or replacement of faulty valves can also be done during catheterization so blood can flow properly through the chambers of the heart.  Bypass surgery is another option to help treat blocked arteries. It uses a healthy blood vessel from elsewhere in the body. The healthy blood vessel is attached above and below the blocked area so that blood can flow around the blocked artery.  Treating heart rhythm problems  A device may be placed in the chest to help a weak heart maintain a healthy, heartbeat so the heart can pump more effectively:  · Pacemaker. A pacemaker is an implanted device that regulates your heartbeat electronically. It monitors your heart's rhythm and generates a painless electric impulse that helps the heart beat in a regular rhythm. A pacemaker is programmed to meet your specific heart rhythm needs.  · Biventricular pacing/cardiac resynchronization therapy. A type of pacemaker that paces both pumping chambers of the heart at the same time to coordinate contractions and to improve the heart's function. Some people with heart failure are candidates for this therapy.  · Implantable cardioverter defibrillator. A device similar to a pacemaker that senses when the heart is beating too fast and delivers an electrical shock to convert the fast rhythm to a normal rhythm. This can be a life saving device.  In severe cases  In more serious cases of heart failure when other treatments no longer work, other options may include:  · Ventricular assist devices (VADs). These are mechanical devices used to take over the pumping function for one or both of the heart's ventricles, or pumping chambers. A VAD may be necessary when heart failure progresses to the point that medicines and other treatments no longer help. In some cases, a VAD may be used as a bridge to  transplant.  · Heart transplant. This is replacing the diseased heart with a healthy one from a donor. This is an option for a few people who are very sick. A heart transplant is very serious and not an option for all patients. Your doctor can tell you more.  Date Last Reviewed: 3/20/2016  © 9803-7796 [employer]. [address] [place] [address], [place] [address]. All rights reserved. This information is not intended as a substitute for professional medical care. Always follow your healthcare professional's instructions.              Heart Failure: Tracking Your Weight  You have a condition called heart failure. When you have heart failure, a sudden weight gain or a steady rise in weight is a warning sign that your body is retaining too much water and salt. This could mean your heart failure is getting worse. If left untreated, it can cause problems for your lungs and result in shortness of breath. Weighing yourself each day is the best way to know if youre retaining water. If your weight goes up quickly, call your doctor. You will be given instructions on how to get rid of the excess water. You will likely need medicines and to avoid salt. This will help your heart work better.  Call your doctor if you gain more than 2 pounds in 1 day, more than 5 pounds in 1 week, or whatever weight gain you were told to report by your doctor. This is often a sign of worsening heart failure and needs to be evaluated and treated. Your doctor will tell you what to do next.   Tips for weighing yourself    · Weigh yourself at the same time each morning, wearing the same clothes. Weigh yourself after urinating and before eating.  · Use the same scale each day. Make sure the numbers are easy to read. Put the scale on a flat, hard surface -- not on a rug or carpet.  · Do not stop weighing yourself. If you forget one day, weigh again the next morning.  How to use your weight chart  · Keep your weight chart near the scale. Write  your weight on the chart as soon as you get off the scale.  · Fill in the month and the start date on the chart. Then write down your weight each day. Your chart will look like this:    · If you miss a day, leave the space blank. Weigh yourself the next day and write your weight in the next space.  · Take your weight chart with you when you go to see your doctor.  Date Last Reviewed: 3/20/2016  © 9223-7954 [employer]. [address] [place] [address]. All rights reserved. This information is not intended as a substitute for professional medical care. Always follow your healthcare professional's instructions.              Heart Failure: Warning Signs of a Flare-Up  You have a condition called heart failure. Once you have heart failure, flare-ups can happen. Below are signs that can mean your heart failure is getting worse. If you notice any of these warning signs, call your healthcare provider.  Swelling    · Your feet, ankles, or lower legs get puffier.  · You notice skin changes on your lower legs.  · Your shoes feel too tight.  · Your clothes are tighter in the waist.  · You have trouble getting rings on or off your fingers.  Shortness of breath  · You have to breathe harder even when youre doing your normal activities or when youre resting.  · You are short of breath walking up stairs or even short distances.  · You wake up at night short of breath or coughing.  · You need to use more pillows or sit up to sleep.  · You wake up tired or restless.  Other warning signs  · You feel weaker, dizzy, or more tired.  · You have chest pain or changes in your heartbeat.  · You have a cough that wont go away.  · You cant remember things or dont feel like eating.  Tracking your weight  Gaining weight is often the first warning sign that heart failure is getting worse. Gaining even a few pounds can be a sign that your body is retaining excess water and salt. Weighing yourself each day in the  morning after you urinate and before you eat, is the best way to know if you're retaining water. Get a scale that is easy to read and make sure you wear the same clothes and use the same scale every time you weigh. Your healthcare provider will show you how to track your weight. Call your doctor if you gain more than 2 pounds in 1 day, 5 pounds in 1 week, or whatever weight gain you were told to report by your doctor. This is often a sign of worsening heart failure and needs to be evaluated and treated before it compromises your breathing. Your doctor will tell you what to do next.    Date Last Reviewed: 3/15/2016  © 7725-2910 [employer]. [address] [place] [address], [place] [address]. All rights reserved. This information is not intended as a substitute for professional medical care. Always follow your healthcare professional's instructions.              Pneumonmia Discharge Instructions                [facility] complies with applicable Federal civil rights laws and does not discriminate on the basis of race, color, national origin, age, disability, or sex.

## 2017-03-28 PROBLEM — I27.20 PULMONARY HYPERTENSION: Status: ACTIVE | Noted: 2017-03-28

## 2017-03-28 PROBLEM — Z86.79 HISTORY OF ATRIAL FIBRILLATION: Status: ACTIVE | Noted: 2017-03-28

## 2017-03-28 PROBLEM — J44.9 COPD, VERY SEVERE: Status: ACTIVE | Noted: 2017-03-27

## 2017-03-28 PROBLEM — E87.1 HYPONATREMIA: Status: ACTIVE | Noted: 2017-03-28

## 2017-03-28 PROBLEM — R06.02 SOB (SHORTNESS OF BREATH): Status: ACTIVE | Noted: 2017-03-28

## 2017-03-28 PROBLEM — J96.21 ACUTE ON CHRONIC RESPIRATORY FAILURE WITH HYPOXIA AND HYPERCAPNIA: Status: ACTIVE | Noted: 2017-03-28

## 2017-03-28 PROBLEM — R33.9 URINARY RETENTION: Status: ACTIVE | Noted: 2017-03-28

## 2017-03-28 PROBLEM — J96.22 ACUTE ON CHRONIC RESPIRATORY FAILURE WITH HYPOXIA AND HYPERCAPNIA: Status: ACTIVE | Noted: 2017-03-28

## 2017-03-28 LAB
ALBUMIN SERPL BCP-MCNC: 3.4 G/DL
ALLENS TEST: ABNORMAL
ALP SERPL-CCNC: 52 U/L
ALT SERPL W/O P-5'-P-CCNC: 15 U/L
ANION GAP SERPL CALC-SCNC: 12 MMOL/L
AST SERPL-CCNC: 33 U/L
BASOPHILS # BLD AUTO: 0.01 K/UL
BASOPHILS NFR BLD: 0.1 %
BILIRUB SERPL-MCNC: 0.2 MG/DL
BUN SERPL-MCNC: 16 MG/DL
CALCIUM SERPL-MCNC: 9 MG/DL
CHLORIDE SERPL-SCNC: 90 MMOL/L
CHOLEST/HDLC SERPL: 2.7 {RATIO}
CO2 SERPL-SCNC: 32 MMOL/L
CREAT SERPL-MCNC: 1 MG/DL
DELSYS: ABNORMAL
DIFFERENTIAL METHOD: ABNORMAL
EOSINOPHIL # BLD AUTO: 0 K/UL
EOSINOPHIL NFR BLD: 0 %
ERYTHROCYTE [DISTWIDTH] IN BLOOD BY AUTOMATED COUNT: 13.5 %
EST. GFR  (AFRICAN AMERICAN): >60 ML/MIN/1.73 M^2
EST. GFR  (NON AFRICAN AMERICAN): 52 ML/MIN/1.73 M^2
ESTIMATED AVG GLUCOSE: 105 MG/DL
FERRITIN SERPL-MCNC: 123 NG/ML
FLUAV AG SPEC QL IA: NEGATIVE
FLUBV AG SPEC QL IA: NEGATIVE
FOLATE SERPL-MCNC: 18.1 NG/ML
GLUCOSE SERPL-MCNC: 93 MG/DL
HBA1C MFR BLD HPLC: 5.3 %
HCO3 UR-SCNC: 37.9 MMOL/L (ref 24–28)
HCT VFR BLD AUTO: 28.1 %
HDL/CHOLESTEROL RATIO: 36.8 %
HDLC SERPL-MCNC: 223 MG/DL
HDLC SERPL-MCNC: 82 MG/DL
HGB BLD-MCNC: 8.7 G/DL
IRON SERPL-MCNC: 21 UG/DL
LDLC SERPL CALC-MCNC: 132 MG/DL
LYMPHOCYTES # BLD AUTO: 0.7 K/UL
LYMPHOCYTES NFR BLD: 8.7 %
MAGNESIUM SERPL-MCNC: 1.7 MG/DL
MCH RBC QN AUTO: 30.3 PG
MCHC RBC AUTO-ENTMCNC: 31 %
MCV RBC AUTO: 98 FL
MONOCYTES # BLD AUTO: 0.8 K/UL
MONOCYTES NFR BLD: 8.9 %
NEUTROPHILS # BLD AUTO: 6.9 K/UL
NEUTROPHILS NFR BLD: 81.9 %
NONHDLC SERPL-MCNC: 141 MG/DL
PCO2 BLDA: 59.6 MMHG (ref 35–45)
PH SMN: 7.41 [PH] (ref 7.35–7.45)
PLATELET # BLD AUTO: 232 K/UL
PMV BLD AUTO: 10.4 FL
PO2 BLDA: 61 MMHG (ref 80–100)
POC BE: 13 MMOL/L
POC SATURATED O2: 91 % (ref 95–100)
POC TCO2: 40 MMOL/L (ref 23–27)
POTASSIUM SERPL-SCNC: 4.7 MMOL/L
PROCALCITONIN SERPL IA-MCNC: <0.09 NG/ML
PROT SERPL-MCNC: 7 G/DL
RBC # BLD AUTO: 2.87 M/UL
SAMPLE: ABNORMAL
SATURATED IRON: 6 %
SITE: ABNORMAL
SODIUM SERPL-SCNC: 134 MMOL/L
SPECIMEN SOURCE: NORMAL
TOTAL IRON BINDING CAPACITY: 330 UG/DL
TRANSFERRIN SERPL-MCNC: 223 MG/DL
TRIGL SERPL-MCNC: 45 MG/DL
TROPONIN I SERPL DL<=0.01 NG/ML-MCNC: 0.05 NG/ML
TROPONIN I SERPL DL<=0.01 NG/ML-MCNC: 0.36 NG/ML
TROPONIN I SERPL DL<=0.01 NG/ML-MCNC: 0.44 NG/ML
TROPONIN I SERPL DL<=0.01 NG/ML-MCNC: 0.45 NG/ML
TSH SERPL DL<=0.005 MIU/L-ACNC: 2.07 UIU/ML
VIT B12 SERPL-MCNC: 810 PG/ML
WBC # BLD AUTO: 8.41 K/UL

## 2017-03-28 PROCEDURE — 93005 ELECTROCARDIOGRAM TRACING: CPT

## 2017-03-28 PROCEDURE — 63600175 PHARM REV CODE 636 W HCPCS: Performed by: HOSPITALIST

## 2017-03-28 PROCEDURE — 83540 ASSAY OF IRON: CPT

## 2017-03-28 PROCEDURE — 84484 ASSAY OF TROPONIN QUANT: CPT | Mod: 91

## 2017-03-28 PROCEDURE — 82803 BLOOD GASES ANY COMBINATION: CPT

## 2017-03-28 PROCEDURE — 93010 ELECTROCARDIOGRAM REPORT: CPT | Mod: ,,, | Performed by: INTERNAL MEDICINE

## 2017-03-28 PROCEDURE — 80061 LIPID PANEL: CPT

## 2017-03-28 PROCEDURE — 93010 ELECTROCARDIOGRAM REPORT: CPT | Mod: 76,,, | Performed by: INTERNAL MEDICINE

## 2017-03-28 PROCEDURE — 63600175 PHARM REV CODE 636 W HCPCS: Performed by: STUDENT IN AN ORGANIZED HEALTH CARE EDUCATION/TRAINING PROGRAM

## 2017-03-28 PROCEDURE — 83036 HEMOGLOBIN GLYCOSYLATED A1C: CPT

## 2017-03-28 PROCEDURE — 25000242 PHARM REV CODE 250 ALT 637 W/ HCPCS: Performed by: STUDENT IN AN ORGANIZED HEALTH CARE EDUCATION/TRAINING PROGRAM

## 2017-03-28 PROCEDURE — 20000000 HC ICU ROOM

## 2017-03-28 PROCEDURE — 82728 ASSAY OF FERRITIN: CPT

## 2017-03-28 PROCEDURE — 25000003 PHARM REV CODE 250: Performed by: HOSPITALIST

## 2017-03-28 PROCEDURE — 94761 N-INVAS EAR/PLS OXIMETRY MLT: CPT

## 2017-03-28 PROCEDURE — 94640 AIRWAY INHALATION TREATMENT: CPT

## 2017-03-28 PROCEDURE — 84145 PROCALCITONIN (PCT): CPT

## 2017-03-28 PROCEDURE — 82746 ASSAY OF FOLIC ACID SERUM: CPT

## 2017-03-28 PROCEDURE — 84443 ASSAY THYROID STIM HORMONE: CPT

## 2017-03-28 PROCEDURE — 84484 ASSAY OF TROPONIN QUANT: CPT

## 2017-03-28 PROCEDURE — 87400 INFLUENZA A/B EACH AG IA: CPT | Mod: 59

## 2017-03-28 PROCEDURE — 82607 VITAMIN B-12: CPT

## 2017-03-28 PROCEDURE — 94664 DEMO&/EVAL PT USE INHALER: CPT

## 2017-03-28 PROCEDURE — 27000221 HC OXYGEN, UP TO 24 HOURS

## 2017-03-28 PROCEDURE — 84466 ASSAY OF TRANSFERRIN: CPT

## 2017-03-28 PROCEDURE — 80053 COMPREHEN METABOLIC PANEL: CPT

## 2017-03-28 PROCEDURE — 36415 COLL VENOUS BLD VENIPUNCTURE: CPT

## 2017-03-28 PROCEDURE — 25000003 PHARM REV CODE 250: Performed by: STUDENT IN AN ORGANIZED HEALTH CARE EDUCATION/TRAINING PROGRAM

## 2017-03-28 PROCEDURE — 93010 ELECTROCARDIOGRAM REPORT: CPT | Mod: 77,,, | Performed by: INTERNAL MEDICINE

## 2017-03-28 PROCEDURE — 85025 COMPLETE CBC W/AUTO DIFF WBC: CPT

## 2017-03-28 PROCEDURE — 36600 WITHDRAWAL OF ARTERIAL BLOOD: CPT

## 2017-03-28 RX ORDER — GUAIFENESIN 600 MG/1
600 TABLET, EXTENDED RELEASE ORAL 2 TIMES DAILY
Status: DISCONTINUED | OUTPATIENT
Start: 2017-03-28 | End: 2017-04-05 | Stop reason: HOSPADM

## 2017-03-28 RX ORDER — PNV NO.95/FERROUS FUM/FOLIC AC 28MG-0.8MG
100 TABLET ORAL DAILY
Status: DISCONTINUED | OUTPATIENT
Start: 2017-03-28 | End: 2017-04-05 | Stop reason: HOSPADM

## 2017-03-28 RX ORDER — MOXIFLOXACIN HYDROCHLORIDE 400 MG/250ML
400 INJECTION, SOLUTION INTRAVENOUS
Status: DISCONTINUED | OUTPATIENT
Start: 2017-03-28 | End: 2017-03-29

## 2017-03-28 RX ORDER — PREDNISONE 20 MG/1
40 TABLET ORAL DAILY
Status: DISCONTINUED | OUTPATIENT
Start: 2017-03-28 | End: 2017-04-05

## 2017-03-28 RX ORDER — LORAZEPAM 2 MG/ML
1 INJECTION INTRAMUSCULAR ONCE
Status: COMPLETED | OUTPATIENT
Start: 2017-03-28 | End: 2017-03-28

## 2017-03-28 RX ORDER — IBUPROFEN 200 MG
16 TABLET ORAL
Status: DISCONTINUED | OUTPATIENT
Start: 2017-03-28 | End: 2017-04-05 | Stop reason: HOSPADM

## 2017-03-28 RX ORDER — MOXIFLOXACIN HYDROCHLORIDE 400 MG/250ML
400 INJECTION, SOLUTION INTRAVENOUS
Status: DISCONTINUED | OUTPATIENT
Start: 2017-03-29 | End: 2017-03-28

## 2017-03-28 RX ORDER — FUROSEMIDE 20 MG/1
20 TABLET ORAL DAILY
Status: DISCONTINUED | OUTPATIENT
Start: 2017-03-28 | End: 2017-04-05 | Stop reason: HOSPADM

## 2017-03-28 RX ORDER — LISINOPRIL 2.5 MG/1
2.5 TABLET ORAL DAILY
Status: DISCONTINUED | OUTPATIENT
Start: 2017-03-28 | End: 2017-04-05 | Stop reason: HOSPADM

## 2017-03-28 RX ORDER — LORAZEPAM 2 MG/ML
1 INJECTION INTRAMUSCULAR EVERY 6 HOURS PRN
Status: DISCONTINUED | OUTPATIENT
Start: 2017-03-28 | End: 2017-03-29

## 2017-03-28 RX ORDER — CITALOPRAM 20 MG/1
20 TABLET, FILM COATED ORAL DAILY
Status: DISCONTINUED | OUTPATIENT
Start: 2017-03-28 | End: 2017-04-05 | Stop reason: HOSPADM

## 2017-03-28 RX ORDER — METOPROLOL SUCCINATE 50 MG/1
50 TABLET, EXTENDED RELEASE ORAL DAILY
Status: DISCONTINUED | OUTPATIENT
Start: 2017-03-28 | End: 2017-04-05 | Stop reason: HOSPADM

## 2017-03-28 RX ORDER — ALPRAZOLAM 0.25 MG/1
0.5 TABLET ORAL ONCE
Status: COMPLETED | OUTPATIENT
Start: 2017-03-28 | End: 2017-03-28

## 2017-03-28 RX ORDER — GLUCAGON 1 MG
1 KIT INJECTION
Status: DISCONTINUED | OUTPATIENT
Start: 2017-03-28 | End: 2017-04-05 | Stop reason: HOSPADM

## 2017-03-28 RX ORDER — ACETAMINOPHEN 500 MG
500 TABLET ORAL EVERY 6 HOURS PRN
Status: DISCONTINUED | OUTPATIENT
Start: 2017-03-28 | End: 2017-04-02

## 2017-03-28 RX ORDER — IBUPROFEN 200 MG
24 TABLET ORAL
Status: DISCONTINUED | OUTPATIENT
Start: 2017-03-28 | End: 2017-04-05 | Stop reason: HOSPADM

## 2017-03-28 RX ORDER — DEXMEDETOMIDINE HYDROCHLORIDE 4 UG/ML
0.2 INJECTION, SOLUTION INTRAVENOUS CONTINUOUS
Status: DISCONTINUED | OUTPATIENT
Start: 2017-03-28 | End: 2017-03-28

## 2017-03-28 RX ORDER — ASPIRIN 81 MG/1
81 TABLET ORAL DAILY
Status: DISCONTINUED | OUTPATIENT
Start: 2017-03-28 | End: 2017-04-05 | Stop reason: HOSPADM

## 2017-03-28 RX ORDER — OSELTAMIVIR PHOSPHATE 75 MG/1
75 CAPSULE ORAL 2 TIMES DAILY
Status: DISCONTINUED | OUTPATIENT
Start: 2017-03-28 | End: 2017-03-29

## 2017-03-28 RX ADMIN — IPRATROPIUM BROMIDE AND ALBUTEROL SULFATE 3 ML: .5; 3 SOLUTION RESPIRATORY (INHALATION) at 07:03

## 2017-03-28 RX ADMIN — IPRATROPIUM BROMIDE AND ALBUTEROL SULFATE 3 ML: .5; 3 SOLUTION RESPIRATORY (INHALATION) at 08:03

## 2017-03-28 RX ADMIN — APIXABAN 2.5 MG: 2.5 TABLET, FILM COATED ORAL at 09:03

## 2017-03-28 RX ADMIN — ALPRAZOLAM 0.25 MG: 0.25 TABLET ORAL at 11:03

## 2017-03-28 RX ADMIN — ACETAMINOPHEN 500 MG: 500 TABLET ORAL at 07:03

## 2017-03-28 RX ADMIN — IPRATROPIUM BROMIDE AND ALBUTEROL SULFATE 3 ML: .5; 3 SOLUTION RESPIRATORY (INHALATION) at 04:03

## 2017-03-28 RX ADMIN — CITALOPRAM HYDROBROMIDE 20 MG: 20 TABLET ORAL at 09:03

## 2017-03-28 RX ADMIN — IPRATROPIUM BROMIDE AND ALBUTEROL SULFATE 3 ML: .5; 3 SOLUTION RESPIRATORY (INHALATION) at 11:03

## 2017-03-28 RX ADMIN — MOXIFLOXACIN HYDROCHLORIDE 400 MG: 400 INJECTION, SOLUTION INTRAVENOUS at 09:03

## 2017-03-28 RX ADMIN — PANTOPRAZOLE SODIUM 600 MG: 40 TABLET, DELAYED RELEASE ORAL at 09:03

## 2017-03-28 RX ADMIN — FUROSEMIDE 20 MG: 20 TABLET ORAL at 09:03

## 2017-03-28 RX ADMIN — METOPROLOL SUCCINATE 50 MG: 50 TABLET, EXTENDED RELEASE ORAL at 09:03

## 2017-03-28 RX ADMIN — ASPIRIN 81 MG: 81 TABLET, COATED ORAL at 09:03

## 2017-03-28 RX ADMIN — PREDNISONE 40 MG: 20 TABLET ORAL at 09:03

## 2017-03-28 RX ADMIN — IPRATROPIUM BROMIDE AND ALBUTEROL SULFATE 3 ML: .5; 3 SOLUTION RESPIRATORY (INHALATION) at 03:03

## 2017-03-28 RX ADMIN — ACETAMINOPHEN 500 MG: 500 TABLET ORAL at 02:03

## 2017-03-28 RX ADMIN — LORAZEPAM 1 MG: 2 INJECTION, SOLUTION INTRAMUSCULAR; INTRAVENOUS at 07:03

## 2017-03-28 NOTE — ED NOTES
Pt presents to ed with c/o shortness of breath starting this morning. HX of COPD, pt was seen at [facility] today and discharged, SOB has worsened since. Pt is tachypnic and anxious upon arrival, sinus tach on monitor. Inspiratory and expiratory wheezes auscultated in all lung fields. Pt is aaox4, neuro intact, family at bedside. Will continue to monitor.

## 2017-03-28 NOTE — CONSULTS
Consult Note  Pulmonary & Critical Care Medicine    SUBJECTIVE     Reason for consult: COPD requiring BiPAP    History of Present Illness  Patient is a 84yo woman with PMH of anxiety, COPD, recent dx of Afib, HTN, HLD, CAD and osteoporosis who was admitted for COPD exacerbation. At baseline, she has COPD, requiring 3L Ox, wheelchair bound and dependent on all her ADLs at assisted-living facility. Patient reports that over the last 2 years, she has been having recurrent episodes of PNAs, 4 total, with the last one in beginning of Mar. She was admitted for 5 days for R lobe PNA and discharged to complete 7-day course of Levaquin. Per her report, she never completely recovered to baseline but did feel better. Two days ago, SOB worsened and visited ED at [facility] and discharged from ED with prednisone. However, that evening, patient felt SOB did not improve and returned to ED for this admission. Endorses chronic dry cough but has been having clear sputum with some green intermittently in the past 2 days. Denies fevers, chills, N/V, dysuria, CP. Also reports sick contacts at assisted living facility as residents there were getting sick in the last 2 weeks. Patient usually has COPD exacerbation due to PNAs in the past but never been intubated.      In ED, patient required BiPAP, tachycardic in 110s, tachypneic RR 20-30. ABG on BiPAP 7.411/59.6/61, initial procal negative, trop 0.047-0.442, normal WBC, CXR showed hyperextended lungs with emphysematous changes. Pulm was consulted for recs on COPD requiring BiPAP.        Past Medical History:   Diagnosis Date    Atrial fibrillation with RVR 3/4/2017    CAD (coronary artery disease) 3/14/2016    Cardiomyopathy 9/24/2015    CHF (congestive heart failure) 10/14/2015    COPD (chronic obstructive pulmonary disease)     COPD exacerbation 3/14/2016    Fall     patient  in  wheel  chair    Hyperlipidemia     Hypertension     Osteoporosis     Pneumonia      Rotator cuff injury     R s/p therapy       Past Surgical History:   Procedure Laterality Date    CATARACT EXTRACTION BILATERAL W/ ANTERIOR VITRECTOMY      EYE SURGERY      FRACTURE SURGERY      LEG SURGERY         History reviewed. No pertinent family history.    Social History     Social History    Marital status:      Spouse name: N/A    Number of children: N/A    Years of education: N/A     Social History Main Topics    Smoking status: Former Smoker     Packs/day: 1.00     Years: 50.00     Types: Cigarettes     Quit date: 3/13/2003    Smokeless tobacco: Never Used    Alcohol use No    Drug use: No    Sexual activity: Not Currently     Birth control/ protection: Post-menopausal     Other Topics Concern    None     Social History Narrative       Current Facility-Administered Medications   Medication Dose Route Frequency Provider Last Rate Last Dose    albuterol-ipratropium 2.5mg-0.5mg/3mL nebulizer solution 3 mL  3 mL Nebulization Q4H Sheela Cabezas MD   3 mL at 03/28/17 0803    apixaban tablet 2.5 mg  2.5 mg Oral BID Sheela Cabezas MD   2.5 mg at 03/28/17 0939    aspirin EC tablet 81 mg  81 mg Oral Daily Sheela Cabezas MD   81 mg at 03/28/17 0940    citalopram tablet 20 mg  20 mg Oral Daily Sheela Cabezas MD   20 mg at 03/28/17 0940    cyanocobalamin tablet 100 mcg  100 mcg Oral Daily Sheela Cabezas MD        dextrose 50% injection 12.5 g  12.5 g Intravenous PRN Sheela Cabezas MD        dextrose 50% injection 25 g  25 g Intravenous PRN Sheela Cabezas MD        furosemide tablet 20 mg  20 mg Oral Daily Sheela Cabezas MD   20 mg at 03/28/17 0940    glucagon (human recombinant) injection 1 mg  1 mg Intramuscular PRN Sheela Cabezas MD        glucose chewable tablet 16 g  16 g Oral PRN Sheela Cabezas MD        glucose chewable tablet 24 g  24 g Oral PRN Sheela Cabezas MD        guaifenesin 12 hr  tablet 600 mg  600 mg Oral BID Sheela Cabezas MD   600 mg at 03/28/17 0939    lisinopril tablet 2.5 mg  2.5 mg Oral Daily Sheela Cabezas MD        metoprolol succinate (TOPROL-XL) 24 hr tablet 50 mg  50 mg Oral Daily Sheela Cabezas MD   50 mg at 03/28/17 0940    moxifloxacin 400 mg/250 mL IVPB 400 mg  400 mg Intravenous Q24H Sheela Cabezas  mL/hr at 03/28/17 0941 400 mg at 03/28/17 0941    oseltamivir capsule 75 mg  75 mg Oral BID Sheela Cabezas MD        predniSONE tablet 40 mg  40 mg Oral Daily Sheela Cabezas MD   40 mg at 03/28/17 0939     Current Outpatient Prescriptions   Medication Sig Dispense Refill    albuterol 90 mcg/actuation inhaler Inhale 2 puffs into the lungs every 6 (six) hours as needed for Wheezing or Shortness of Breath. 6.7 g 4    alprazolam (XANAX) 0.25 MG tablet Take 1 tablet (0.25 mg total) by mouth nightly as needed for Anxiety. 20 tablet 0    apixaban 2.5 mg Tab Take 1 tablet (2.5 mg total) by mouth 2 (two) times daily. 60 tablet 1    ascorbic acid (VITAMIN C) 500 MG tablet Take 500 mg by mouth 2 (two) times daily.      aspirin (ECOTRIN) 81 MG EC tablet Take 1 tablet (81 mg total) by mouth once daily.  0    citalopram (CELEXA) 20 MG tablet TAKE 1 TABLET BY MOUTH ONCE DAILY 30 tablet 2    cyanocobalamin (VITAMIN B-12) 100 MCG tablet Take 1 tablet (100 mcg total) by mouth once daily. 90 tablet 3    cyproheptadine (PERIACTIN) 4 mg tablet Take 1 tablet (4 mg total) by mouth 3 (three) times daily as needed. 30 tablet 2    fluticasone-vilanterol (BREO) 100-25 mcg/dose diskus inhaler Inhale 1 puff into the lungs once daily. 60 each 12    furosemide (LASIX) 20 MG tablet Take 1 tablet (20 mg total) by mouth once daily. 30 tablet 1    guaifenesin (MUCINEX) 600 mg 12 hr tablet Take 2 tablets (1,200 mg total) by mouth 2 (two) times daily. Okay to dispense box of medication  as prepackaged by . 60 tablet 6    levalbuterol  (XOPENEX) 0.31 mg/3 mL nebulizer solution Take 3 mLs (0.31 mg total) by nebulization 4 (four) times daily. 300 mL 12    levoFLOXacin (LEVAQUIN) 750 MG tablet Take 1 tablet (750 mg total) by mouth once daily. 4 tablet 0    metoprolol succinate (TOPROL-XL) 50 MG 24 hr tablet Take 1 tablet (50 mg total) by mouth once daily. 90 tablet 0    multivitamin (ONE DAILY MULTIVITAMIN) per tablet Take 1 tablet by mouth once daily.      predniSONE (DELTASONE) 20 MG tablet Take 2 tablets (40 mg total) by mouth once daily. 8 tablet 0       Review of patient's allergies indicates:   Allergen Reactions    Advair diskus  [fluticasone-salmeterol]      Other reaction(s): Nausea    Morphine Hallucinations    Pravastatin      Other reaction(s): Muscle pain         Review of Systems  As above. Otherwise all other systems negative.       OBJECTIVE:     Vital Signs (Most Recent)  Vitals:    03/28/17 0950   BP: 118/64   Pulse: (!) 115   Resp: (!) 26   Temp:        Physical Exam  GEN: thin/frail woman, mild increased WOB when talking, on BiPAP  HENT: NCAT, PERRLA, EOMI, unable to fully assess OP due to BiPAP  Lungs: diffuse expiratory wheezes, bibasilar crackles  CV: tachycardic in 110s, regular rhythm, difficult to appreicate heart sounds due to BiPAP  Abd: soft, NT/ND, active BS  Ext: No cyanosis/edema  Neuro: A&Ox3, moving all extremities, normal sensation b/l to light touch     Laboratory  No results for input(s): INR in the last 168 hours.    Recent Labs  Lab 03/27/17  0808 03/27/17 2023 03/28/17  0740   WBC 7.69 7.53 8.41   HGB 9.7* 8.5* 8.7*   HCT 31.7* 27.1* 28.1*    239 232     Lab Results   Component Value Date    TSH 2.071 03/28/2017     Recent Labs  Lab 03/27/17  0808 03/27/17 2023 03/28/17  0740    132* 134*   K 4.0 3.8 4.7   CL 95 89* 90*   CO2 43* 31* 32*   BUN 11 12 16   CREATININE 0.77 0.9 1.0   CALCIUM 9.3 8.8 9.0   PROT 7.6 6.7 7.0   BILITOT 0.5 0.2 0.2   ALKPHOS 75 55 52*   ALT 21 11 15   AST 27 22 33      Recent Labs  Lab 03/28/17  0740   TROPONINI 0.442*   ABG  Hemoglobin A1C   Date Value Ref Range Status   03/28/2017 5.3 4.5 - 6.2 % Final     Comment:     According to ADA guidelines, hemoglobin A1C <7.0% represents  optimal control in non-pregnant diabetic patients.  Different  metrics may apply to specific populations.   Standards of Medical Care in Diabetes - 2016.  For the purpose of screening for the presence of diabetes:  <5.7%     Consistent with the absence of diabetes  5.7-6.4%  Consistent with increasing risk for diabetes   (prediabetes)  >or=6.5%  Consistent with diabetes  Currently no consensus exists for use of hemoglobin A1C  for diagnosis of diabetes for children.     02/28/2015 5.5 4.5 - 6.2 % Final       Recent Labs  Lab 03/28/17  0845   PH 7.411   PO2 61*   PCO2 59.6*   HCO3 37.9*   BE 13         Diagnostic Results  Reviewed      Assessment     Patient is a 84yo woman with PMH of anxiety, COPD, recent dx of Afib, HTN, HLD, CAD and osteoporosis who was admitted for COPD exacerbation. Pulm was consulted for COPD requiring BiPAP.    Recommendations     COPD exacerbation   - likely from sick contacts at assisted living, recently treated with PNAs, mild increased with BNP  - possibly component of anxiety   - repeat CXR, keep Ox sat >88%, wean off BiPAP as patient full sat on BiPAP  - Flu negative, pending Resp Cx  On  Lasix, agree with Moxifloxacin/Tamiflu/prednisone    H/o of Afib   - recently dx on Toprol and Eliquis  - consider card eval given troponemia       Patient seen and discussed with staff and fellow.     Naseem Bonner, PGY-I  [facility] - Pulmonary Service Team

## 2017-03-28 NOTE — PROGRESS NOTES
"[facility] Resident HO-III Progress Note    Subjective:      Last night, attempted trial off of BiPAP yet patient developed acute worsening of her shortness of breath with respiratory distress.  Continued to repeat "help me" and appeared restless in bed.  At the time, she reported difficulty breathing and leg pain.  Required Xanax 0.25mg (home prn dose) and replaced BiPAP with significant improvement in respiratory status.  Daughter remained at bedside overnight and states patient slept comfortably from midnight until this morning.  Patient states she feels much better this morning and denies chest pain, shortness of breath, leg pain, fever, chills, abdominal pain, or nausea.  Again attempted trial off of BiPAP and she developed acute shortness of breath with difficulty completing sentences.  BiPAP restarted, patient reported improvement, and appeared comfortable.            Objective:   Last 24 Hour Vital Signs:  BP  Min: 96/60  Max: 154/72  Temp  Av.3 °F (36.8 °C)  Min: 98.3 °F (36.8 °C)  Max: 98.3 °F (36.8 °C)  Pulse  Av.1  Min: 89  Max: 118  Resp  Av.6  Min: 19  Max: 30  SpO2  Av.3 %  Min: 96 %  Max: 100 %       Physical Examination:  General:  Resting comfortably with BiPAP mask in place, alert, cooperative  HEENT:  NCAT, EOMI, PERRL, dry lips, neck supple  CV:  RRR, normal S1/S2, no audible murmur  Resp:  Decreased breath sounds with expiratory wheezing throughout, increased work of breathing while on nasal cannula but improved with BiPAP, difficulty completing sentences while on NC but also improved with BiPAP  Abd:  Soft, NT/ND, normoactive bowel sounds  Ext:  No edema, 2+ pulses bilatearlly  Skin:  Warm and dry, no rashes         Laboratory:  Laboratory Data Reviewed: yes  Pertinent Findings:    Recent Labs  Lab 17  0808 17  2023 03/28/17  0740   WBC 7.69 7.53 8.41   HGB 9.7* 8.5* 8.7*   HCT 31.7* 27.1* 28.1*    239 232   * 96 98   RDW 13.2 13.3 13.5 "    132*  --    K 4.0 3.8  --    CL 95 89*  --    CO2 43* 31*  --    BUN 11 12  --    CREATININE 0.77 0.9  --    * 145*  --    PROT 7.6 6.7  --    ALBUMIN 4.2 3.6  --    BILITOT 0.5 0.2  --    AST 27 22  --    ALKPHOS 75 55  --    ALT 21 11  --      Troponin: 0.009 > 0.047     Microbiology Data Reviewed: yes  Pertinent Findings:  None     Other Results:  EKG (my interpretation): sinus tachycardia, right atrial enlargement, ST depression in II, III, aVF    Radiology Data Reviewed: yes  Pertinent Findings:  No new imaging     Current Medications:     Infusions:        Scheduled:   albuterol-ipratropium 2.5mg-0.5mg/3mL  3 mL Nebulization Q4H    apixaban  2.5 mg Oral BID    aspirin  81 mg Oral Daily    citalopram  20 mg Oral Daily    cyanocobalamin  100 mcg Oral Daily    furosemide  20 mg Oral Daily    guaifenesin  600 mg Oral BID    lisinopril  2.5 mg Oral Daily    metoprolol succinate  50 mg Oral Daily    predniSONE  40 mg Oral Daily        PRN:  dextrose 50%, dextrose 50%, glucagon (human recombinant), glucose, glucose    Antibiotics and Day Number of Therapy:  Moxifloxacin 400mg IV daily (started 3/28)  s/p Levaquin 3/27    Lines and Day Number of Therapy:  PIV    Assessment:     Kaity Rebolledo is a 83 y.o.female with  Patient Active Problem List    Diagnosis Date Noted    Pulmonary hypertension 03/28/2017    History of atrial fibrillation 03/28/2017    (HFpEF) heart failure with preserved ejection fraction 03/27/2017    COPD with respiratory failure, acute 03/27/2017    Atrial fibrillation with RVR 03/04/2017    Low oxygen saturation 12/27/2016    Iron deficiency anemia due to chronic blood loss 10/10/2016    Pneumonia 09/02/2016    Chronic diastolic CHF (congestive heart failure), NYHA class 2 09/02/2016    Chronic obstructive pulmonary disease 06/16/2016    Coronary artery disease involving native coronary artery without angina pectoris 06/16/2016    Greater trochanter fracture  05/15/2016    Hip fracture 05/15/2016    Closed bilateral fracture of pubic rami 03/18/2016    Osteoporosis 03/18/2016    Arthralgia of left hip 03/17/2016    Hip pain 03/17/2016    Anemia, chronic disease 03/17/2016    HLD (hyperlipidemia) 03/17/2016    Uterine prolapse 03/16/2016    CAD (coronary artery disease) 03/14/2016    Chronic obstructive pulmonary disease with acute exacerbation 03/14/2016    Macrocytic anemia 03/14/2016    Closed fracture of ramus of right pubis 02/06/2016    Chronic hypoxemic respiratory failure 02/06/2016    Anemia of chronic disease 12/16/2015    Cardiomyopathy 09/24/2015    Chronic systolic heart failure 09/10/2015    Chronic anemia 08/04/2015    Physical deconditioning 03/05/2015    Cystocele 08/06/2013    Essential hypertension 08/06/2013    OP (osteoporosis) 08/06/2013    Dyslipidemia 08/06/2013    COPD (chronic obstructive pulmonary disease) 09/11/2012        Plan:     Acute Hypoxic Respiratory Failure 2/2 COPD Exacerbation  - SOB despite IV steroids and multiple Duo-nebs treatments  - On arrival, desats to low 80s on 4L NC while talking but appeared comfortable on BiPAP with sats in the mid 90s  - CXR suggestive of COPD/emphysema, no focal consolidation  - Patient remained on BiPAP overnight.  Attempted trial off of BiPAP this morning yet patient developed acute worsening of respiratory distress with difficulty completing sentences.  Replaced BiPAP and appeared more comfortable.   - Continue Duo-nebs q4 hours with Mucinex 600mg BID and Prednisone 40mg daily.  - Procal negative at 0.09.  Will continue Moxifloxacin 400mg IV daily as patient with no improvement overnight.  Will repeat procal in AM.  - Family requesting Pulmonary consult as she has not seen her Pulmonologist in many years.    - Will order ABG this morning as patient continues to require BiPAP.    CAD  - Cath done 10/2015 - RCA 40% stenosis, LAD 20% stenosis  - Continue ASA 81mg daily and  Toprol-xl 50mg daily  - Lipid panel:  Chol 223, TG 45, HDL 82,   - Hgb A1c 5.3%  - Trop 0.009 > 0.047 and EKG with some ST depression in inferior leads.  Third EKG with no significant changes and repeat troponin pending.  Currently without chest pain.     Chronic Diastolic Congestive Heart Failure / Pulmonary Hypertension  - TTE 3/3/17: EF 55%, diastolic dysfunction present, PAP 51  - Wheezes appreciated but no crackles on exam  -  but patient appears euvolemic  - Continue home lasix 20 mg daily     HTN  - Remains normotensive   - Continue toprol-xl and lisinopril     Dyslipidemia  - Lipid panel:  Chol 223, TG 45, HDL 82,   - Patient not currently on lipid lowering therapy.  Pravastatin previously discontinued due to muscle pain.      Anemia of Chronic Disease  - Hgb 8.5, MCV 96 on admission, at baseline  - iron and TIBC pending, ferritin 123, folate 18, vit B12 810    - Repeat H/H remains stable at 8.7/28.1     Hx of A-fib with RVR  - Diagnosed as new onset in recent hospitalization 3/2017  - Cardiology consulted at that hospitalization: patient started on eliquis and metoprolol increased for better rate control  - Patient currently in sinus rhythm  - Continue Eliquis 2.5 mg BID and Metoprolol   - will need outpatient Cardiology follow up      Anxiety / Depression  - No acute issues  - Continue home celexa, hold home xanax and order as needed     F: None  E: Hypochloremic and hyponatremic, stable on AM labs, will continue to monitor  N: Cardiac Diet     PPX: Eliquis    Code:  Full - discussed with patient and family at time of admission     Dispo:  Pending improvement in respiratory status    Sheela Cabezas  [facility] Internal [facility] HO-III  [facility] Service Team A    [facility] Medicine Hospitalist Pager numbers:   [facility] Hospitalist Medicine Team A (Man/Shashank): [phone]  [facility] Hospitalist Medicine Team B (Zak/[place]):  [phone]

## 2017-03-28 NOTE — PLAN OF CARE
Pt transferred from [facility] and was also recently discharged from there with PNA. Pt lives at [facility] and is current with hh but can not recall name of agency at this time. Pt also has home oxygen ([employer]) , RW, and W/C. [place] to continue to follow for needs.     03/28/17 1302   Discharge Assessment   Assessment Type Discharge Planning Assessment   Confirmed/corrected address and phone number on facesheet? Yes   Assessment information obtained from? Patient   Communicated expected length of stay with patient/caregiver yes   Type of Healthcare Directive Received Durable power of  for health care   Prior to hospitilization cognitive status: Alert/Oriented   Prior to hospitalization functional status: Independent   Current cognitive status: Alert/Oriented   Current Functional Status: Needs Assistance   Arrived From assisted living;home health  ([facility])   Lives With facility resident   Able to Return to Prior Arrangements yes   Is patient able to care for self after discharge? Unable to determine at this time (comments)   Does the patient have family/friends to help with healtcare needs after discharge? yes   How many people do you have in your home that can help with your care after discharge? 1   Who are your caregiver(s) and their phone number(s)? Maryjane Chan (daughter/POA) [phone]   Patient's perception of discharge disposition assisted living;home or selfcare   Patient currently being followed by outpatient case management? No   Patient currently receives home health services? Yes   Does the patient currently use HME? Yes   Name and contact number for HME provider: Nini   Patient currently receives private duty nursing? No   Patient currently receives any other outside agency services? No   Equipment Currently Used at Home oxygen;walker, rolling;wheelchair   Do you have any problems affording any of your prescribed medications? No   Is the patient taking  medications as prescribed? yes   Do you have any financial concerns preventing you from receiving the healthcare you need? No   Does the patient have transportation to healthcare appointments? Yes   Transportation Available family or friend will provide   On Dialysis? No   Does the patient receive services at the [facility]? No   Are there any open cases? No   Discharge Plan A Assisted Living;Home Health   Discharge Plan B Skilled Nursing Facility   Patient/Family In Agreement With Plan yes

## 2017-03-28 NOTE — H&P
[facility] Internal Medicine History and Physical - Resident Note    Admitting Team: Team A  Attending Physician: Dr. Encarnacion  Resident: Raulito  Interns: Tha    Date of Admit: 3/27/2017    Chief Complaint     SOB for one day.    Subjective:      History of Present Illness:  Kaity Rebolledo is a 83 y.o.  female who  has a past medical history of Atrial fibrillation with RVR (3/4/2017); CAD (coronary artery disease) (3/14/2016); Cardiomyopathy (9/24/2015); CHF (congestive heart failure) (10/14/2015); COPD (chronic obstructive pulmonary disease); COPD exacerbation (3/14/2016); Fall; Hyperlipidemia; Hypertension; Osteoporosis; Pneumonia; and Rotator cuff injury..     The patient was in their usual state of health until this morning when she started feeling more short of breath. She called EMS and was transported to [facility] ED where she received IV steroids and breathing treatments, and was then discharged home. Her SOB worsened, so she called EMS again and was transported to [facility]. In the ED was continued to be SOB, so was placed on BiPAP, and her breathing much improved.     She endorses cough productive of white/clear sputum, and endorses feeling more weak. She also endorses a brief episode of non-radiating chest tightness she believes is secondary to coughing and working to breath, no other recent chest pain, no nausea, no diaphoresis. She denies recent fevers, weight loss, night sweats, chills, and body aches. She denies sinus congestion or rhinorrhea. She denies, vomiting, diarrhea or blood in stool.     She is on 3L O2 NC at home, and has never been on BiPAP and has never been intubated. She was discharged from [facility] after a 5 day stay for pneumonia on 3/7/17, and never completely returned to baseline. She endorses numerous sick contacts at her assisted living facility.    Past Medical History:  Past Medical History:   Diagnosis Date    Atrial fibrillation with RVR 3/4/2017    CAD  (coronary artery disease) 3/14/2016    Cardiomyopathy 9/24/2015    CHF (congestive heart failure) 10/14/2015    COPD (chronic obstructive pulmonary disease)     COPD exacerbation 3/14/2016    Fall     patient  in  wheel  chair    Hyperlipidemia     Hypertension     Osteoporosis     Pneumonia     Rotator cuff injury     R s/p therapy       Past Surgical History:  Past Surgical History:   Procedure Laterality Date    CATARACT EXTRACTION BILATERAL W/ ANTERIOR VITRECTOMY      EYE SURGERY      FRACTURE SURGERY      LEG SURGERY         Allergies:  Review of patient's allergies indicates:   Allergen Reactions    Advair diskus  [fluticasone-salmeterol]      Other reaction(s): Nausea    Morphine Hallucinations    Pravastatin      Other reaction(s): Muscle pain       Home Medications:  Prior to Admission medications    Medication Sig Start Date End Date Taking? Authorizing Provider   albuterol 90 mcg/actuation inhaler Inhale 2 puffs into the lungs every 6 (six) hours as needed for Wheezing or Shortness of Breath. 1/28/16   Samuel Soriano MD   alprazolam (XANAX) 0.25 MG tablet Take 1 tablet (0.25 mg total) by mouth nightly as needed for Anxiety. 6/22/16   Samuel Soriano MD   apixaban 2.5 mg Tab Take 1 tablet (2.5 mg total) by mouth 2 (two) times daily. 3/7/17   Pallavi Sunkara, MD   ascorbic acid (VITAMIN C) 500 MG tablet Take 500 mg by mouth 2 (two) times daily.    Historical Provider, MD   aspirin (ECOTRIN) 81 MG EC tablet Take 1 tablet (81 mg total) by mouth once daily. 9/10/15   Brenden Morrell MD   citalopram (CELEXA) 20 MG tablet TAKE 1 TABLET BY MOUTH ONCE DAILY 12/17/15   Samuel Soriano MD   cyanocobalamin (VITAMIN B-12) 100 MCG tablet Take 1 tablet (100 mcg total) by mouth once daily. 6/22/16   Samuel Soriano MD   cyproheptadine (PERIACTIN) 4 mg tablet Take 1 tablet (4 mg total) by mouth 3 (three) times daily as needed. 12/13/16   Samuel Soriano MD   fluticasone-vilanterol (BREO) 100-25 mcg/dose  diskus inhaler Inhale 1 puff into the lungs once daily. 4/26/16   Fernanda Saldana MD   furosemide (LASIX) 20 MG tablet Take 1 tablet (20 mg total) by mouth once daily. 3/7/17 3/7/18  Pallavi Sunkara, MD   guaifenesin (MUCINEX) 600 mg 12 hr tablet Take 2 tablets (1,200 mg total) by mouth 2 (two) times daily. Okay to dispense box of medication  as prepackaged by . 10/20/16   Fernanda Saldana MD   levalbuterol (XOPENEX) 0.31 mg/3 mL nebulizer solution Take 3 mLs (0.31 mg total) by nebulization 4 (four) times daily. 1/3/17 1/3/18  Samuel Soriano MD   levoFLOXacin (LEVAQUIN) 750 MG tablet Take 1 tablet (750 mg total) by mouth once daily. 3/28/17 4/1/17  Gabriele Russell MD   metoprolol succinate (TOPROL-XL) 50 MG 24 hr tablet Take 1 tablet (50 mg total) by mouth once daily. 3/7/17 3/7/18  Pallavi Sunkara, MD   multivitamin (ONE DAILY MULTIVITAMIN) per tablet Take 1 tablet by mouth once daily.    Historical Provider, MD   predniSONE (DELTASONE) 20 MG tablet Take 2 tablets (40 mg total) by mouth once daily. 3/28/17 4/1/17  Gabriele Russell MD       Family History:  History reviewed. No pertinent family history.    Social History:  Social History   Substance Use Topics    Smoking status: Former Smoker     Packs/day: 1.00     Years: 50.00     Types: Cigarettes     Quit date: 3/13/2003    Smokeless tobacco: Never Used    Alcohol use No   Lives at [facility].On 3L O2. Uses a wheelchair to get around due to having O2 tank, but is able to walk. Former smoker, 30 pack years.     Review of Systems:  Pertinent items are noted in HPI. All other systems are reviewed and are negative.    Health Maintaince :   Primary Care Physician: Dr. Soriano  Immunizations:   TDap is up to date,  Influenza is up to date,   Pneumovax is up to date,   Cancer Screening:  PAP: is up to date.   Mammogram: is up to date.   Colonoscopy: is up to date. No concerns last C-scope < 10 years ago.     Objective:  "  Last 24 Hour Vital Signs:  BP  Min: 111/54  Max: 148/76  Temp  Av.3 °F (36.8 °C)  Min: 98.2 °F (36.8 °C)  Max: 98.3 °F (36.8 °C)  Pulse  Av.9  Min: 89  Max: 117  Resp  Av.8  Min: 19  Max: 30  SpO2  Av.3 %  Min: 94 %  Max: 100 %  Height  Av' 5" (165.1 cm)  Min: 5' 5" (165.1 cm)  Max: 5' 5" (165.1 cm)  Weight  Av.8 kg (112 lb)  Min: 50.8 kg (112 lb)  Max: 50.8 kg (112 lb)  There is no height or weight on file to calculate BMI.       Physical Examination:    BP (!) 111/54  Pulse (!) 117  Temp 98.3 °F (36.8 °C) (Oral)   Resp (!) 25  LMP  (LMP Unknown)  SpO2 100%    General Appearance:    Alert, cooperative, appears stated age, no distress initially just after removing BiPAP, but developed respiratory difficulty after interview and exam   Head:    Normocephalic, without obvious abnormality, atraumatic   Eyes:    PERRL, conjunctiva/corneas clear, EOM's intact   Nose:   Nares normal, septum midline, mucosa normal, no drainage    or sinus tenderness   Throat:   Lips, mucosa, and tongue normal   Neck:   Supple, symmetrical, trachea midline, no adenopathy;     thyroid:  no enlargement/tenderness/nodules; no JVD   Back:     Symmetric, ROM normal, no CVA tenderness   Lungs:     Diffuse wheezes throughout all lung fields bilaterally, anteriorly and posteriorly. Wheezing inferior > superior shah, L > R.   Chest Wall:    No tenderness or deformity    Heart:    Sinus tachycardic, no murmur, rub or gallop   Abdomen:     Soft, non-tender, bowel sounds active all four quadrants,     no masses, no organomegaly   Extremities:   Extremities normal, atraumatic, no cyanosis or edema   Pulses:   2+ and symmetric all extremities   Skin:   Skin color, texture, turgor normal, no rashes or lesions   Lymph nodes:   Cervical, supraclavicular, and axillary nodes normal   Neurologic:   CNII-XII intact, normal strength, sensation throughout       Laboratory:  Most Recent Data:  CBC:   Lab Results   Component " Value Date    WBC 7.53 03/27/2017    HGB 8.5 (L) 03/27/2017    HCT 27.1 (L) 03/27/2017     03/27/2017    MCV 96 03/27/2017    RDW 13.3 03/27/2017     BMP:   Lab Results   Component Value Date     (L) 03/27/2017    K 3.8 03/27/2017    CL 89 (L) 03/27/2017    CO2 31 (H) 03/27/2017    BUN 12 03/27/2017    CREATININE 0.9 03/27/2017     (H) 03/27/2017    CALCIUM 8.8 03/27/2017    MG 1.7 03/27/2017    PHOS 4.5 05/17/2016     LFTs:   Lab Results   Component Value Date    PROT 6.7 03/27/2017    ALBUMIN 3.6 03/27/2017    BILITOT 0.2 03/27/2017    AST 22 03/27/2017    ALKPHOS 55 03/27/2017    ALT 11 03/27/2017     Coags:   Lab Results   Component Value Date    INR 0.9 05/15/2016     FLP:   Lab Results   Component Value Date    CHOL 245 (H) 05/15/2016    HDL 68 05/15/2016    LDLCALC 153.6 05/15/2016    TRIG 117 05/15/2016    CHOLHDL 27.8 05/15/2016     DM:   Lab Results   Component Value Date    HGBA1C 5.5 02/28/2015    LDLCALC 153.6 05/15/2016    CREATININE 0.9 03/27/2017     Thyroid:   Lab Results   Component Value Date    TSH 0.775 09/02/2016     Anemia:   Lab Results   Component Value Date    IRON 63 12/08/2016    TIBC 280 12/08/2016    FERRITIN 123 03/28/2017    LPDEAKPV76 731 10/03/2016    FOLATE 15.3 10/03/2016     Cardiac:   Lab Results   Component Value Date    TROPONINI 0.009 03/27/2017     (H) 03/27/2017     Urinalysis:   Lab Results   Component Value Date    LABURIN No significant growth 03/02/2017    COLORU Yellow 03/02/2017    SPECGRAV 1.015 03/02/2017    NITRITE Negative 03/02/2017    KETONESU Negative 03/02/2017    UROBILINOGEN Negative 03/02/2017    WBCUA 3 03/02/2017       Trended Lab Data:    Recent Labs  Lab 03/27/17  0808 03/27/17 2023   WBC 7.69 7.53   HGB 9.7* 8.5*   HCT 31.7* 27.1*    239   * 96   RDW 13.2 13.3    132*   K 4.0 3.8   CL 95 89*   CO2 43* 31*   BUN 11 12   CREATININE 0.77 0.9   * 145*   PROT 7.6 6.7   ALBUMIN 4.2 3.6   BILITOT 0.5 0.2    AST 27 22   ALKPHOS 75 55   ALT 21 11       Trended Cardiac Data:    Recent Labs  Lab 03/27/17  0808 03/27/17 2023   TROPONINI <0.012 0.009   BNP  --  576*       Other Results:  EKG (my interpretation): Sinus tachycardia, atrial enlargement, right heart strain pattern  Radiology:  Imaging Results         X-Ray Chest 1 View (Final result) Result time:  03/27/17 21:57:59    Final result by Barrington David MD (03/27/17 21:57:59)    Impression:      Findings suggesting COPD/emphysema, with possible superimposed mild interstitial edema.    Rounded focus projected over the right upper lung zone is likely external to the patient however correlation of the site clinically is recommended to exclude nodule.        Electronically signed by: BARRINGTON DAVID MD  Date:     03/27/17  Time:    21:57     Narrative:    Chest AP portable    Indication:Shortness of breath    Comparison:3/27/2017    Findings: Patient's chin obscures view of the bilateral apices. The cardiomediastinal silhouette is not enlarged, noting calcified tortuous aorta, and mild prominence of the right hilar region, similar to the previous exam.  There is no pleural effusion.  The trachea is midline.  The lungs are symmetrically hyperexpanded, suggesting underlying COPD/emphysema, with coarse interstitial attenuation bilaterally, grossly similar to the previous exam.  A rounded focus projects over the right upper lung zone, suspected to be external to the patient however underlying nodule is not excluded, correlation of the site clinically is recommended. No large focal consolidation seen.  There is no pneumothorax.  There is bilateral basilar subsegmental atelectasis or scarring The osseous structures are unremarkable.                 Assessment:     Kaity Rebolledo is a 83 y.o. female with:   Acute hypoxic Respiratory Failure 2/2 COPD Exacerbation  CAD  Chronic Diastolic Congestive Heart Failure  Pulmonary Hypertension  Hypertension  Dyslipidemia  Anemia  of Chronic Disease  Hx of A-fib with RVR  Anxiety / Depression    Plan:     Acute Hypoxic Respiratory Failure 2/2 COPD Exacerbation  Hx:  - SOB despite IV steroids, multiple Duo-nebs treatments  - De-sats to low 80s on 4L NC while talking  - Comfortable on BiPAP, sats mid 90s  Tx:  - Admit to ICU  - BiPAP overnight, will reassess in AM  - Duo-nebs q4 hours  - Procal ordered, TSH ordered  - Trop negative X1, will order 1 more w/ Hx chest tightness and d/c if normal  - Mucinex 600 mg BID  - Prednisone 40 mg  - Moxifloxacin, will discontinue if procal negative    CAD  - Cath done 10/2015 - RCA 40% stenosis, LAD 20% stenosis  - Continue ASA 81, Toprol-xl 50  - Lipids, Hgb A1C ordered    Chronic Diastolic Congestive Heart Failure / Pulmonary Hypertension  - TTE 3/3/17: EF 55%, diastolic dysfunction present, PAP 51  - Wheezes but no crackles on exam  -  but patient euvolemic on exam  - Continue home lasix 20 mg daily    HTN  - Normotensive in ED  - Continue toprol-xl, lisinopril    Dyslipidemia  - Cholesterol 245 5/2016  - Repeat lipid panel ordered  - Patient not currently on lipid lowering therapy    Anemia of Chronic Disease  - Hgb 8.5, MCV 96 on admission, at baseline  - 12/8/16: Iron 63, ferritin 101, TIBC 280  - Will repeat iron panel, B12, folate    Hx of A-fib with RVR  - Diagnosed as new onset in recent hospitalization 3/2017  - Cardiology consulted at that hospitalization: patient started on eliquis  - Patient currently in sinus rhythm  - Continue eliquis 2.5 mg BID, will instruct patient to follow up as outpatient to discuss    Anxiety / Depression  - No acute issues  - Continue home celexa, hold home xanax      F: None  E: Hypochloremic, hyponatremic, will monitor  N: Cardiac Diet    PPX: eliquis      Code Status:     FULL    Jerson Blackburn  [facility]ospit[facility] Internal Medicine HO-1  [facility]ospit[facility] Hospital Medicine Service    hospitals Medicine Hospitalist Pager numbers:   hospitals Hospitalist Medicine Team A  (Man/Shashank): [phone]  [facility] Hospitalist Medicine Team B (Zak/[place]):  [phone]

## 2017-03-28 NOTE — ED PROVIDER NOTES
Encounter Date: 3/27/2017       History     Chief Complaint   Patient presents with    Shortness of Breath     worsening sob all day. seen earlier at [facility] hosp. earlier and discharged aftger treatment. pt. was given duoneb by ems en route.  pt. is in moderate resp. distress on arrival.      Review of patient's allergies indicates:   Allergen Reactions    Advair diskus  [fluticasone-salmeterol]      Other reaction(s): Nausea    Morphine Hallucinations    Pravastatin      Other reaction(s): Muscle pain     HPI Comments: 83F with HTN, CAD, CHF, afib, and COPD was brought in by EMS for SOB.  Her SOB started this morning and she went to [facility] ER for evaluation.  She was given IV steroids, breathing treatment and had labs and CXR.  She was discharged with levaquin and steroids.  Since going home, she has done 2 breathing treatments.  Her SOB has gotten worse.  Associated cough.  No chest pain or fever.  She called EMS tonight when her symptoms got worse.  They gave her a neb in route and she feels no improvement.  EMRs show she was admitted here earlier this month for pneumonia.    The history is provided by the patient and the EMS personnel.     Past Medical History:   Diagnosis Date    Atrial fibrillation with RVR 3/4/2017    CAD (coronary artery disease) 3/14/2016    Cardiomyopathy 9/24/2015    CHF (congestive heart failure) 10/14/2015    COPD (chronic obstructive pulmonary disease)     COPD exacerbation 3/14/2016    Fall     patient  in  wheel  chair    Hyperlipidemia     Hypertension     Osteoporosis     Pneumonia     Rotator cuff injury     R s/p therapy     Past Surgical History:   Procedure Laterality Date    CATARACT EXTRACTION BILATERAL W/ ANTERIOR VITRECTOMY      EYE SURGERY      FRACTURE SURGERY      LEG SURGERY       History reviewed. No pertinent family history.  Social History   Substance Use Topics    Smoking status: Former Smoker     Packs/day: 1.00     Years: 50.00      Types: Cigarettes     Quit date: 3/13/2003    Smokeless tobacco: Never Used    Alcohol use No     Review of Systems   Constitutional: Negative for fever.   Respiratory: Positive for cough and shortness of breath.    Cardiovascular: Negative for chest pain.   Gastrointestinal: Negative for nausea and vomiting.   All other systems reviewed and are negative.      Physical Exam   Initial Vitals   BP Pulse Resp Temp SpO2   03/27/17 2014 03/27/17 2014 03/27/17 2014 03/27/17 2014 03/27/17 2028   132/57 113 30 98.3 °F (36.8 °C) 100 %     Physical Exam    Nursing note and vitals reviewed.  Constitutional: She appears well-developed and well-nourished. No distress.   HENT:   Head: Normocephalic and atraumatic.   Mouth/Throat: Oropharynx is clear and moist.   Eyes: Conjunctivae are normal.   Neck: Normal range of motion.   Cardiovascular: Tachycardia present.    No murmur heard.  Pulmonary/Chest: Accessory muscle usage present. Tachypnea noted. She is in respiratory distress. She has wheezes.   Abdominal: Soft. Bowel sounds are normal. She exhibits no distension. There is no tenderness.   Musculoskeletal: Normal range of motion. She exhibits no edema.   Neurological: She is alert and oriented to person, place, and time.   Skin: Skin is warm and dry.   Psychiatric: She has a normal mood and affect. Her behavior is normal.         ED Course   Procedures  Labs Reviewed   CBC W/ AUTO DIFFERENTIAL - Abnormal; Notable for the following:        Result Value    RBC 2.81 (*)     Hemoglobin 8.5 (*)     Hematocrit 27.1 (*)     MCHC 31.4 (*)     Mono # 0.2 (*)     Gran% 82.4 (*)     Lymph% 14.3 (*)     Mono% 3.1 (*)     All other components within normal limits   COMPREHENSIVE METABOLIC PANEL - Abnormal; Notable for the following:     Sodium 132 (*)     Chloride 89 (*)     CO2 31 (*)     Glucose 145 (*)     eGFR if non  59 (*)     All other components within normal limits   B-TYPE NATRIURETIC PEPTIDE - Abnormal; Notable  for the following:      (*)     All other components within normal limits   TROPONIN I     EKG Readings: (Independently Interpreted)   Initial Reading: No STEMI. Rhythm: Sinus Tachycardia. Heart Rate: 108. Ectopy: PVCs. Conduction: Normal. Clinical Impression: Sinus Tachycardia with PVCs          Medical Decision Making:   History:   I obtained history from: someone other than patient and EMS provider.  Old Medical Records: I decided to obtain old medical records.  Old Records Summarized: records from another hospital.       <> Summary of Records: As per HPI  Independently Interpreted Test(s):   I have ordered and independently interpreted EKG Reading(s) - see prior notes  Clinical Tests:   Lab Tests: Ordered and Reviewed  Radiological Study: Ordered and Reviewed  Medical Tests: Ordered and Reviewed  ED Management:  Worsening SOB - no improvement with steroids/nebs/abx at home.  Arrived in respiratory distress - placed on BiPap and looks much more comfortable.    No acute lab abnormalities.  BNP slightly elevated.  Last EF 55%.    I will admit to [facility] for SOB due to COPD exacerbation.    Family arrived and told me pt is supposed to do neb treatments at home 4 times a day but they do not think she does them correctly.  She has not seen her pulmonologist, Dr Cornejo in quite a while.  Other:   I have discussed this case with another health care provider.                   ED Course     Clinical Impression:   The primary encounter diagnosis was SOB (shortness of breath). A diagnosis of COPD exacerbation was also pertinent to this visit.          Christi Smart MD  03/27/17 2151       Christi Smart MD  03/27/17 2152

## 2017-03-28 NOTE — PLAN OF CARE
Pt having trouble urinating placed on bedpan x's 2 with no OP, bladder scanned showed 506 urine in bladder, notified Dr Cabezas, new orders for guzman, guzman placed with immediate clear yellow urine, tolerated well, will continue to monitor, family and pt educated on need for guzman

## 2017-03-28 NOTE — NURSING
Transferred from ED via stretcher & RN, placed in ICU bed and on ICU monitor, SOB noted, family updated on transfer, no c/o pain/discomfort, safety maintianed side rails up x's 3, call light in reach, bed low & locked will continue to monitor

## 2017-03-28 NOTE — PLAN OF CARE
Nursing reported poor urinary output. Bladder scan showed 506 cc of urine. Ordered guzman catheter.

## 2017-03-28 NOTE — ED NOTES
Removed pts bipap to possibly wean. Pt did not tolerate 4 LPM NC well. Pt became very SOB and increased RR 30's with pursed lip breathing noted. O2 sats noted to be 100%. Attending resident Dr. Cabezas at bedside. Bipap replaced and pt began to slow her breathing and became unlabored. NAD noted. Will cont to monitor.

## 2017-03-29 PROBLEM — R79.89 ELEVATED TROPONIN: Status: ACTIVE | Noted: 2017-03-29

## 2017-03-29 LAB
ALBUMIN SERPL BCP-MCNC: 3.4 G/DL
ALP SERPL-CCNC: 50 U/L
ALT SERPL W/O P-5'-P-CCNC: 27 U/L
ANION GAP SERPL CALC-SCNC: 9 MMOL/L
AST SERPL-CCNC: 45 U/L
BASOPHILS # BLD AUTO: 0 K/UL
BASOPHILS NFR BLD: 0 %
BILIRUB SERPL-MCNC: 0.2 MG/DL
BUN SERPL-MCNC: 21 MG/DL
CALCIUM SERPL-MCNC: 9.1 MG/DL
CHLORIDE SERPL-SCNC: 88 MMOL/L
CO2 SERPL-SCNC: 34 MMOL/L
CREAT SERPL-MCNC: 1 MG/DL
DIFFERENTIAL METHOD: ABNORMAL
EOSINOPHIL # BLD AUTO: 0 K/UL
EOSINOPHIL NFR BLD: 0 %
ERYTHROCYTE [DISTWIDTH] IN BLOOD BY AUTOMATED COUNT: 13.3 %
EST. GFR  (AFRICAN AMERICAN): >60 ML/MIN/1.73 M^2
EST. GFR  (NON AFRICAN AMERICAN): 52 ML/MIN/1.73 M^2
GLUCOSE SERPL-MCNC: 95 MG/DL
HCT VFR BLD AUTO: 27.8 %
HGB BLD-MCNC: 8.5 G/DL
LYMPHOCYTES # BLD AUTO: 0.9 K/UL
LYMPHOCYTES NFR BLD: 16.4 %
MCH RBC QN AUTO: 29.9 PG
MCHC RBC AUTO-ENTMCNC: 30.6 %
MCV RBC AUTO: 98 FL
MONOCYTES # BLD AUTO: 0.7 K/UL
MONOCYTES NFR BLD: 13.5 %
NEUTROPHILS # BLD AUTO: 3.6 K/UL
NEUTROPHILS NFR BLD: 69.9 %
PLATELET # BLD AUTO: 216 K/UL
PMV BLD AUTO: 11.1 FL
POTASSIUM SERPL-SCNC: 4.5 MMOL/L
PROCALCITONIN SERPL IA-MCNC: 0.68 NG/ML
PROT SERPL-MCNC: 6.7 G/DL
RBC # BLD AUTO: 2.84 M/UL
SODIUM SERPL-SCNC: 131 MMOL/L
WBC # BLD AUTO: 5.19 K/UL

## 2017-03-29 PROCEDURE — 63600175 PHARM REV CODE 636 W HCPCS: Performed by: STUDENT IN AN ORGANIZED HEALTH CARE EDUCATION/TRAINING PROGRAM

## 2017-03-29 PROCEDURE — 93010 ELECTROCARDIOGRAM REPORT: CPT | Mod: ,,, | Performed by: INTERNAL MEDICINE

## 2017-03-29 PROCEDURE — 20000000 HC ICU ROOM

## 2017-03-29 PROCEDURE — 94640 AIRWAY INHALATION TREATMENT: CPT

## 2017-03-29 PROCEDURE — 27000221 HC OXYGEN, UP TO 24 HOURS

## 2017-03-29 PROCEDURE — 94660 CPAP INITIATION&MGMT: CPT

## 2017-03-29 PROCEDURE — 25000003 PHARM REV CODE 250: Performed by: STUDENT IN AN ORGANIZED HEALTH CARE EDUCATION/TRAINING PROGRAM

## 2017-03-29 PROCEDURE — 25000003 PHARM REV CODE 250: Performed by: HOSPITALIST

## 2017-03-29 PROCEDURE — 85025 COMPLETE CBC W/AUTO DIFF WBC: CPT

## 2017-03-29 PROCEDURE — 80053 COMPREHEN METABOLIC PANEL: CPT

## 2017-03-29 PROCEDURE — 93005 ELECTROCARDIOGRAM TRACING: CPT

## 2017-03-29 PROCEDURE — 25000003 PHARM REV CODE 250: Performed by: INTERNAL MEDICINE

## 2017-03-29 PROCEDURE — 84145 PROCALCITONIN (PCT): CPT

## 2017-03-29 PROCEDURE — 25000242 PHARM REV CODE 250 ALT 637 W/ HCPCS: Performed by: STUDENT IN AN ORGANIZED HEALTH CARE EDUCATION/TRAINING PROGRAM

## 2017-03-29 PROCEDURE — 36415 COLL VENOUS BLD VENIPUNCTURE: CPT

## 2017-03-29 PROCEDURE — 93010 ELECTROCARDIOGRAM REPORT: CPT | Mod: 76,,, | Performed by: INTERNAL MEDICINE

## 2017-03-29 PROCEDURE — 94761 N-INVAS EAR/PLS OXIMETRY MLT: CPT

## 2017-03-29 RX ORDER — LORAZEPAM 2 MG/ML
INJECTION INTRAMUSCULAR
Status: DISPENSED
Start: 2017-03-29 | End: 2017-03-30

## 2017-03-29 RX ORDER — OSELTAMIVIR PHOSPHATE 30 MG/1
30 CAPSULE ORAL 2 TIMES DAILY
Status: COMPLETED | OUTPATIENT
Start: 2017-03-29 | End: 2017-04-02

## 2017-03-29 RX ORDER — LORAZEPAM 2 MG/ML
1 INJECTION INTRAMUSCULAR ONCE
Status: COMPLETED | OUTPATIENT
Start: 2017-03-29 | End: 2017-03-29

## 2017-03-29 RX ORDER — RAMELTEON 8 MG/1
8 TABLET ORAL NIGHTLY PRN
Status: DISCONTINUED | OUTPATIENT
Start: 2017-03-30 | End: 2017-04-05 | Stop reason: HOSPADM

## 2017-03-29 RX ORDER — MOXIFLOXACIN HYDROCHLORIDE 400 MG/1
400 TABLET ORAL DAILY
Status: COMPLETED | OUTPATIENT
Start: 2017-03-30 | End: 2017-03-31

## 2017-03-29 RX ADMIN — LORAZEPAM 1 MG: 2 INJECTION, SOLUTION INTRAMUSCULAR; INTRAVENOUS at 11:03

## 2017-03-29 RX ADMIN — ACETAMINOPHEN 500 MG: 500 TABLET ORAL at 10:03

## 2017-03-29 RX ADMIN — IPRATROPIUM BROMIDE AND ALBUTEROL SULFATE 3 ML: .5; 3 SOLUTION RESPIRATORY (INHALATION) at 12:03

## 2017-03-29 RX ADMIN — PANTOPRAZOLE SODIUM 600 MG: 40 TABLET, DELAYED RELEASE ORAL at 07:03

## 2017-03-29 RX ADMIN — LISINOPRIL 2.5 MG: 2.5 TABLET ORAL at 07:03

## 2017-03-29 RX ADMIN — LORAZEPAM 1 MG: 2 INJECTION, SOLUTION INTRAMUSCULAR; INTRAVENOUS at 06:03

## 2017-03-29 RX ADMIN — OSELTAMIVIR PHOSPHATE 75 MG: 75 CAPSULE ORAL at 07:03

## 2017-03-29 RX ADMIN — OSELTAMIVIR PHOSPHATE 30 MG: 30 CAPSULE ORAL at 09:03

## 2017-03-29 RX ADMIN — IPRATROPIUM BROMIDE AND ALBUTEROL SULFATE 3 ML: .5; 3 SOLUTION RESPIRATORY (INHALATION) at 07:03

## 2017-03-29 RX ADMIN — IPRATROPIUM BROMIDE AND ALBUTEROL SULFATE 3 ML: .5; 3 SOLUTION RESPIRATORY (INHALATION) at 03:03

## 2017-03-29 RX ADMIN — ASPIRIN 81 MG: 81 TABLET, COATED ORAL at 07:03

## 2017-03-29 RX ADMIN — PREDNISONE 40 MG: 20 TABLET ORAL at 07:03

## 2017-03-29 RX ADMIN — IPRATROPIUM BROMIDE AND ALBUTEROL SULFATE 3 ML: .5; 3 SOLUTION RESPIRATORY (INHALATION) at 11:03

## 2017-03-29 RX ADMIN — APIXABAN 2.5 MG: 2.5 TABLET, FILM COATED ORAL at 09:03

## 2017-03-29 RX ADMIN — VITAM B12 100 MCG: 100 TAB at 07:03

## 2017-03-29 RX ADMIN — CITALOPRAM HYDROBROMIDE 20 MG: 20 TABLET ORAL at 07:03

## 2017-03-29 RX ADMIN — MOXIFLOXACIN HYDROCHLORIDE 400 MG: 400 INJECTION, SOLUTION INTRAVENOUS at 07:03

## 2017-03-29 RX ADMIN — PANTOPRAZOLE SODIUM 600 MG: 40 TABLET, DELAYED RELEASE ORAL at 09:03

## 2017-03-29 RX ADMIN — FUROSEMIDE 20 MG: 20 TABLET ORAL at 07:03

## 2017-03-29 RX ADMIN — METOPROLOL SUCCINATE 50 MG: 50 TABLET, EXTENDED RELEASE ORAL at 07:03

## 2017-03-29 RX ADMIN — IPRATROPIUM BROMIDE AND ALBUTEROL SULFATE 3 ML: .5; 3 SOLUTION RESPIRATORY (INHALATION) at 08:03

## 2017-03-29 RX ADMIN — APIXABAN 2.5 MG: 2.5 TABLET, FILM COATED ORAL at 07:03

## 2017-03-29 NOTE — PLAN OF CARE
Problem: Patient Care Overview  Goal: Plan of Care Review  Outcome: Ongoing (interventions implemented as appropriate)  Pt on 35%  BiPAP with 95% SATs. Patient given aerosol treatment with no adverse reactions noted.  Patient instructed on proper use. Will continue to monitor.

## 2017-03-29 NOTE — PLAN OF CARE
PLAN OF CARE    Had family meeting with entire family at around 10pm on 3/28 regarding patient's current situation, patient's desires, code status and plan of care. After answering all questions, family asked me to step aside for further discussion regarding code status for Mrs. Rebolledo.    At 110AM, called to bedside by daughter to update team regarding patient's code status following extensive discussion with family. Decision made by POA (Mrs. Maryjane Chan) and daughter (Joy Gramajo) to change code status to partial code (ONLY INTUBATION, no chemical resuscitation, no chest compressions, no external pacing, no cardioversion/defibrillation)    Informed family that code status can be changed at any time.    Please call with questions.    Say Salomon, DO  [facility] Internal Medicine-Pediatrics -II

## 2017-03-29 NOTE — PROGRESS NOTES
Lady with resp is here with Resp treatment and reassurance and informed the family on the care given.

## 2017-03-29 NOTE — PROGRESS NOTES
Resp called to come and assess patient with NC. Patient have become more anxious and SOB, reassurance given. BiPap placed, patient was only

## 2017-03-29 NOTE — PROGRESS NOTES
Received care from off going nurse, Ranjana. Patient sitting High Nguyễn position in bed with her daughterat bs with support and comfort. Patient is moving around and appear to be very anxious. All VS monitoring noted O2 Sats are on the low side. Patient and daughter informed on the need to replace BiPap, patient agrees to place the BiPap on . All detailed assessment per flow record.  1855 Patient is beginning to get uncomfortable and requesting to have it removed. Her daughter is  Becoming anxious as well. Reassurance  Given and BiPap is removed and NC replaced at 3.5 Liter. Joshua is draining clear urine yet it is a small amount.

## 2017-03-29 NOTE — PROGRESS NOTES
Progress Note  Pulmonary & Critical Care Medicine      SUBJECTIVE:     Reason for consult: COPD requiring BiPAP    LOS: 1 days    Interval history  In the evening, patient had anxiety requiring a dose of Ativan. Patient reports still feeling SOB with exertion and anxious with BiPAP. Denies fevers, chills, N/V, CP, diarrhea.     Scheduled Medications   albuterol-ipratropium 2.5mg-0.5mg/3mL  3 mL Nebulization Q4H    apixaban  2.5 mg Oral BID    aspirin  81 mg Oral Daily    citalopram  20 mg Oral Daily    cyanocobalamin  100 mcg Oral Daily    furosemide  20 mg Oral Daily    guaifenesin  600 mg Oral BID    lisinopril  2.5 mg Oral Daily    metoprolol succinate  50 mg Oral Daily    moxifloxacin  400 mg Intravenous Q24H    oseltamivir  75 mg Oral BID    predniSONE  40 mg Oral Daily       Medications PRN  acetaminophen, dextrose 50%, dextrose 50%, glucagon (human recombinant), glucose, glucose, lorazepam      OBJECTIVE:     Temp:  [97.2 °F (36.2 °C)-98.9 °F (37.2 °C)]   Pulse:  []   Resp:  [16-55]   BP: ()/(44-86)   SpO2:  [72 %-100 %]     Vitals:    03/29/17 0743   BP:    Pulse: 101   Resp: (!) 26   Temp:          I & O (Last 24H):  I/O last 3 completed shifts:  In: 475 [P.O.:225; IV Piggyback:250]  Out: 1205 [Urine:1205]       Physical Exam:  GEN: thin/frail woman, still with increased WOB when talking  HENT: NCAT, PERRLA, EOMI, OP clear when BiPAP taken off and on NC  Lungs: still with basilar crackles, no wheezes appreciated today, better air movement   CV: tachycardic in 100s, regular rhythm, no murmurs/gallops appreciated  Abd: soft, NT/ND, active BS  Ext: No cyanosis/edema  Neuro: A&Ox3, moving all extremities, normal sensation b/l to light touch     Laboratory:  No results for input(s): INR in the last 168 hours.    Recent Labs  Lab 03/27/17 2023 03/28/17  0740 03/29/17  0405   WBC 7.53 8.41 5.19   HGB 8.5* 8.7* 8.5*   HCT 27.1* 28.1* 27.8*    232 216     Lab Results   Component Value  Date    TSH 2.071 03/28/2017     Recent Labs  Lab 03/27/17  2023 03/28/17  0740 03/29/17  0404   * 134* 131*   K 3.8 4.7 4.5   CL 89* 90* 88*   CO2 31* 32* 34*   BUN 12 16 21   CREATININE 0.9 1.0 1.0   CALCIUM 8.8 9.0 9.1   PROT 6.7 7.0 6.7   BILITOT 0.2 0.2 0.2   ALKPHOS 55 52* 50*   ALT 11 15 27   AST 22 33 45*       Recent Labs  Lab 03/27/17 2023   MG 1.7     Lab Results   Component Value Date    CALCIUM 9.1 03/29/2017    PHOS 4.5 05/17/2016     Hemoglobin A1C   Date Value Ref Range Status   03/28/2017 5.3 4.5 - 6.2 % Final     Comment:     According to ADA guidelines, hemoglobin A1C <7.0% represents  optimal control in non-pregnant diabetic patients.  Different  metrics may apply to specific populations.   Standards of Medical Care in Diabetes - 2016.  For the purpose of screening for the presence of diabetes:  <5.7%     Consistent with the absence of diabetes  5.7-6.4%  Consistent with increasing risk for diabetes   (prediabetes)  >or=6.5%  Consistent with diabetes  Currently no consensus exists for use of hemoglobin A1C  for diagnosis of diabetes for children.     02/28/2015 5.5 4.5 - 6.2 % Final         Recent Labs  Lab 03/28/17  1807   TROPONINI 0.359*       ABG    Recent Labs  Lab 03/28/17  0845   PH 7.411   PO2 61*   PCO2 59.6*   HCO3 37.9*   BE 13       Diagnostic Results:  Reviewed      Assessment     Patient is a 82yo woman with PMH of anxiety, COPD, recent dx of Afib, HTN, HLD, CAD and osteoporosis who was admitted for COPD exacerbation. Pulm was consulted for COPD requiring BiPAP.    Recommendations     COPD exacerbation   - likely from sick contacts at assisted living, recently treated with PNAs, mild increased with BNP  - possibly component of anxiety   - repeat CXR unchanged  keep Ox sat >88%, ok to wean off BiPAP as patient full sat on BiPAP   - Flu negative, pending Resp Cx  On Lasix, agree with Moxifloxacin/Tamiflu/prednisone  - would avoid benzo's due to resp depression  would rec  trial of duoneb, and either low-dose fentanyl IV or precedex gtt      H/o of Afib   - recently dx on Toprol and Eliquis  - troponin trended down, rate controlled and on NOAC         Discussed with staff and fellow.      Naseem Bonner, PGY-I  [facility] IM - Pulmonary Service Team

## 2017-03-29 NOTE — PROGRESS NOTES
Many family members are here and asking to speak to the Doctors concerning the patient .  2110 MD Deal is here to have a discussion with the family in the waiting Room.

## 2017-03-29 NOTE — PLAN OF CARE
Problem: Patient Care Overview  Goal: Plan of Care Review  Outcome: Ongoing (interventions implemented as appropriate)  Pt c/o pain 5/10, resolved with PRN tylenol. Lorazepam given for anxiety while on BIPAP. Placed on nasal canula this a.m., tolerated for 2 hours. 02 sats remained good, but Respiratory work increased, RR 35-39. Resting comfortable at this time. Daughters are at bedside. Patient has a poor appetite, has not consumed any countable portion of meals this shift. Repositioning frequently to avoid pressure injury.

## 2017-03-29 NOTE — PROGRESS NOTES
"[facility] Internal Medicine Resident HO-III Progress Note    Subjective:      Overnight, patient required Ativan due to anxiety and for compliance with BiPAP.  She was transitioned to nasal cannula last night and this morning but did not tolerate it well and was placed back on BiPAP.  BiPAP was started ~10 minutes ago and patient still complaining of shortness of breath, but it is improving with the BiPAP.  Shortness of breath worse with any movement and with speaking.  Daughter at bedside states patient complained of some leg pain overnight but continues to deny chest pain.  Denies fever, nausea, or abdominal pain.  Of note, guzman catheter placed yesterday for urinary retention.           Objective:   Last 24 Hour Vital Signs:  BP  Min: 81/49  Max: 151/68  Temp  Av.8 °F (36.6 °C)  Min: 97.2 °F (36.2 °C)  Max: 98.9 °F (37.2 °C)  Pulse  Av.8  Min: 75  Max: 115  Resp  Av.9  Min: 16  Max: 55  SpO2  Av.1 %  Min: 72 %  Max: 100 %  Height  Av' 5" (165.1 cm)  Min: 5' 5" (165.1 cm)  Max: 5' 5" (165.1 cm)  Weight  Av.6 kg (115 lb 15.4 oz)  Min: 50.8 kg (112 lb)  Max: 54.4 kg (119 lb 14.9 oz)  I/O last 3 completed shifts:  In: 475 [P.O.:225; IV Piggyback:250]  Out: 1205 [Urine:1205]    Physical Examination:  General:  BiPAP mask in place, alert, cooperative, difficulty completing full sentences   HEENT:  NCAT, EOMI, PERRL  CV:  Mild tachycardia, normal S1/S2, no audible murmur  Resp:  Improved breath sounds since previous exam but remains decreased, faint expiratory wheezing appreciated, increased work of breathing with use of accessory muscles   Abd:  Soft, NT/ND, normoactive bowel sounds  Ext:  No edema, 2+ pulses bilatearlly  Skin:  Warm and dry, no rashes         Laboratory:  Laboratory Data Reviewed: yes  Pertinent Findings:    Recent Labs  Lab 17  0808 17  0740 17  0405   WBC 7.69 7.53 8.41 5.19   HGB 9.7* 8.5* 8.7* 8.5*   HCT 31.7* 27.1* 28.1* 27.8*    239 " 232 216   * 96 98 98   RDW 13.2 13.3 13.5 13.3    132* 134*  --    K 4.0 3.8 4.7  --    CL 95 89* 90*  --    CO2 43* 31* 32*  --    BUN 11 12 16  --    CREATININE 0.77 0.9 1.0  --    * 145* 93  --    PROT 7.6 6.7 7.0  --    ALBUMIN 4.2 3.6 3.4*  --    BILITOT 0.5 0.2 0.2  --    AST 27 22 33  --    ALKPHOS 75 55 52*  --    ALT 21 11 15  --      Troponin: 0.009 > 0.047 > 0.442 > 0.445 > 0.359    Microbiology Data Reviewed: yes  Pertinent Findings:  None     Other Results:  Radiology Data Reviewed: yes  Pertinent Findings:  CXR:  Flattening of diaphragms suggesting COPD, mild interstitial prominence of upper lungs     Current Medications:     Infusions:        Scheduled:   albuterol-ipratropium 2.5mg-0.5mg/3mL  3 mL Nebulization Q4H    apixaban  2.5 mg Oral BID    aspirin  81 mg Oral Daily    citalopram  20 mg Oral Daily    cyanocobalamin  100 mcg Oral Daily    furosemide  20 mg Oral Daily    guaifenesin  600 mg Oral BID    lisinopril  2.5 mg Oral Daily    metoprolol succinate  50 mg Oral Daily    moxifloxacin  400 mg Intravenous Q24H    oseltamivir  75 mg Oral BID    predniSONE  40 mg Oral Daily        PRN:  acetaminophen, dextrose 50%, dextrose 50%, glucagon (human recombinant), glucose, glucose, lorazepam    Antibiotics and Day Number of Therapy:  Moxifloxacin 400mg IV daily (started 3/28)  s/p Levaquin 3/27    Lines and Day Number of Therapy:  PIV    Assessment:     Kaity Rebolledo is a 83 y.o.female with  Patient Active Problem List    Diagnosis Date Noted    Elevated troponin 03/29/2017    Pulmonary hypertension 03/28/2017    History of atrial fibrillation 03/28/2017    SOB (shortness of breath) 03/28/2017    Acute on chronic respiratory failure with hypoxia and hypercapnia 03/28/2017    Hyponatremia 03/28/2017    Urinary retention 03/28/2017    Paroxysmal atrial fibrillation     Depression with anxiety     (HFpEF) heart failure with preserved ejection fraction 03/27/2017     COPD, very severe 03/27/2017    Atrial fibrillation with RVR 03/04/2017    Low oxygen saturation 12/27/2016    Iron deficiency anemia due to chronic blood loss 10/10/2016    Pneumonia 09/02/2016    Chronic diastolic CHF (congestive heart failure), NYHA class 2 09/02/2016    Chronic obstructive pulmonary disease 06/16/2016    Coronary artery disease involving native coronary artery without angina pectoris 06/16/2016    Greater trochanter fracture 05/15/2016    Hip fracture 05/15/2016    Closed bilateral fracture of pubic rami 03/18/2016    Osteoporosis 03/18/2016    Arthralgia of left hip 03/17/2016    Hip pain 03/17/2016    Anemia, chronic disease 03/17/2016    HLD (hyperlipidemia) 03/17/2016    Uterine prolapse 03/16/2016    CAD (coronary artery disease) 03/14/2016    COPD exacerbation 03/14/2016    Macrocytic anemia 03/14/2016    Closed fracture of ramus of right pubis 02/06/2016    Chronic hypoxemic respiratory failure 02/06/2016    Anemia of chronic disease 12/16/2015    Cardiomyopathy 09/24/2015    Chronic systolic heart failure 09/10/2015    Chronic anemia 08/04/2015    Physical deconditioning 03/05/2015    Cystocele 08/06/2013    Essential hypertension 08/06/2013    OP (osteoporosis) 08/06/2013    Dyslipidemia 08/06/2013    COPD (chronic obstructive pulmonary disease) 09/11/2012        Plan:     Acute Hypoxic Respiratory Failure 2/2 COPD Exacerbation  - Presented with SOB despite IV steroids and multiple Duo-nebs treatments  - On arrival, desats to low 80s on 4L NC while talking but appeared comfortable on BiPAP with sats in the mid 90s  - CXR suggestive of COPD/emphysema, no focal consolidation  - Patient has remained on BiPAP throughout majority of her stay.  When placed on BiPAP, develops acute dyspnea and increased work of breathing.  Likely component of anxiety, with improvement noted after benzodiazepines.  Will continue to try transitioning to nasal cannula throughout  the day.  - Continue Duo-nebs q4 hours with Mucinex 600mg BID and Prednisone 40mg daily.  - Procal negative at 0.09, repeat pending.  Will continue Moxifloxacin 400mg IV daily at this time.    - ABG 7.411/59/61/37.9/13, similar to previous ABG 1 year ago.  - Pulmonary consulted, appreciate recommendations.      CAD  - Cath done 10/2015 - RCA 40% stenosis, LAD 20% stenosis  - Continue ASA 81mg daily and Toprol-xl 50mg daily  - Lipid panel:  Chol 223, TG 45, HDL 82,   - Hgb A1c 5.3%  - Trop peaked at 0.445 and has since trended down. EKG with some ST depression in inferior leads, likely secondary to demand ischemia.  Continues to deny chest pain.     Chronic Diastolic Congestive Heart Failure / Pulmonary Hypertension  - TTE 3/3/17: EF 55%, diastolic dysfunction present, PAP 51  - Wheezes appreciated but no crackles on exam  -  but patient appears euvolemic  - Continue home lasix 20 mg daily.  Will continue to monitor and if patient develops crackles or appears volume overloaded, will attempt IV diuresis.     HTN  - BP remains well controlled  - Continue toprol-xl and lisinopril     Dyslipidemia  - Lipid panel:  Chol 223, TG 45, HDL 82,   - Patient not currently on lipid lowering therapy.  Pravastatin previously discontinued due to muscle pain.      Anemia of Chronic Disease  - Hgb 8.5, MCV 96 on admission, at baseline  - iron and TIBC pending, ferritin 123, folate 18, vit B12 810    - Repeat H/H remains stable at 8.7/28.1     Hx of A-fib with RVR  - Diagnosed as new onset in recent hospitalization 3/2017  - Cardiology consulted at that hospitalization: patient started on eliquis and metoprolol increased for better rate control  - Remains in sinus rhythm  - Continue Eliquis 2.5 mg BID and Metoprolol   - Will need outpatient Cardiology follow up      Anxiety / Depression  - No acute issues  - Continue home celexa, hold home xanax and order as needed     F: None  E: AM CMP penidng  N: Cardiac  Diet     PPX: Eliquis    Code:  Partial - no compressions or chemical resuscitation, intubation only     Dispo:  Pending improvement in respiratory status, continue ICU care at this time as patient is requiring BiPAP    Sheela Cabezas  [facility] Internal Medicine HO-III  [facility] Internal Medicine Service Team A    [facility] Medicine Hospitalist Pager numbers:   [facility] Hospitalist Medicine Team A (Man/Shashank): [phone]  [facility] Hospitalist Medicine Team B (Zak/[place]):  [phone]

## 2017-03-29 NOTE — PROGRESS NOTES
Patient is resting with  Family at bedside, resp Lady is at bs with treatments. Patient daughter informs Mrs. Narayanan on her Code status plans for her mother, which include only Intubation. The patient Code status as of now is a Full status. Team paged to inform them of her wishes. The daughter is requesting to sign some paper to display her wishes.   0053 Return call received, I was informed by MD Cabezas that he will notify MD Salomon .  0103 MD Salomon is here talking with staff on the outcome on the conversation that was held earlier in the waiting room.   0108 The family member Joy  here with her is calling her sister, Maryjane who is (POA) to confirm their mother's wishes.The two of them decided and form  was filled out , with the openness to make adjustments as needed. Code Status changed to PARTIAL Status.

## 2017-03-29 NOTE — PROGRESS NOTES
MD Cabezas at bedside with assessment.  1929 MD encouraged patient to wear the BiPap. 1930 BiPap replaced, pt is keeping it on. MD Cabezas informed patient and daughter without it, the patient will not be able to breathe. They agree and it is replaced.  1933 Patient is beginning to get anxious and agitated and requesting that it be remove. MD Cabezas made aware of it. Suggested for him to order something to help her relax.  1940 Ativan ordered, for the patient , and is given by my Charge Nurse, Sandra.  1950 Medication is effective, pt is relaxed and keeping on the BiPap, daughter is at the bedside with support.  2006 Patient is resting at present .

## 2017-03-30 PROBLEM — I27.23 PULMONARY HYPERTENSION DUE TO LUNG DISEASE: Status: ACTIVE | Noted: 2017-03-30

## 2017-03-30 LAB
ALBUMIN SERPL BCP-MCNC: 3.3 G/DL
ALP SERPL-CCNC: 47 U/L
ALT SERPL W/O P-5'-P-CCNC: 37 U/L
ANION GAP SERPL CALC-SCNC: 6 MMOL/L
AST SERPL-CCNC: 52 U/L
BASOPHILS # BLD AUTO: 0.01 K/UL
BASOPHILS NFR BLD: 0.1 %
BILIRUB SERPL-MCNC: 0.2 MG/DL
BUN SERPL-MCNC: 21 MG/DL
CALCIUM SERPL-MCNC: 9 MG/DL
CHLORIDE SERPL-SCNC: 83 MMOL/L
CO2 SERPL-SCNC: 36 MMOL/L
CREAT SERPL-MCNC: 0.9 MG/DL
DIFFERENTIAL METHOD: ABNORMAL
EOSINOPHIL # BLD AUTO: 0 K/UL
EOSINOPHIL NFR BLD: 0 %
ERYTHROCYTE [DISTWIDTH] IN BLOOD BY AUTOMATED COUNT: 12.9 %
EST. GFR  (AFRICAN AMERICAN): >60 ML/MIN/1.73 M^2
EST. GFR  (NON AFRICAN AMERICAN): 59 ML/MIN/1.73 M^2
GLUCOSE SERPL-MCNC: 94 MG/DL
HCT VFR BLD AUTO: 26.3 %
HGB BLD-MCNC: 8.5 G/DL
LYMPHOCYTES # BLD AUTO: 1.5 K/UL
LYMPHOCYTES NFR BLD: 20.7 %
MCH RBC QN AUTO: 30.6 PG
MCHC RBC AUTO-ENTMCNC: 32.3 %
MCV RBC AUTO: 95 FL
MONOCYTES # BLD AUTO: 1.1 K/UL
MONOCYTES NFR BLD: 14.5 %
NEUTROPHILS # BLD AUTO: 4.8 K/UL
NEUTROPHILS NFR BLD: 64.4 %
PLATELET # BLD AUTO: 228 K/UL
PMV BLD AUTO: 10.4 FL
POTASSIUM SERPL-SCNC: 4.3 MMOL/L
PROT SERPL-MCNC: 6.4 G/DL
RBC # BLD AUTO: 2.78 M/UL
SODIUM SERPL-SCNC: 125 MMOL/L
UUN UR-MCNC: 237 MG/DL
WBC # BLD AUTO: 7.44 K/UL

## 2017-03-30 PROCEDURE — 25000003 PHARM REV CODE 250: Performed by: STUDENT IN AN ORGANIZED HEALTH CARE EDUCATION/TRAINING PROGRAM

## 2017-03-30 PROCEDURE — 20000000 HC ICU ROOM

## 2017-03-30 PROCEDURE — 63600175 PHARM REV CODE 636 W HCPCS: Performed by: STUDENT IN AN ORGANIZED HEALTH CARE EDUCATION/TRAINING PROGRAM

## 2017-03-30 PROCEDURE — 36415 COLL VENOUS BLD VENIPUNCTURE: CPT

## 2017-03-30 PROCEDURE — 84540 ASSAY OF URINE/UREA-N: CPT

## 2017-03-30 PROCEDURE — 25000003 PHARM REV CODE 250: Performed by: HOSPITALIST

## 2017-03-30 PROCEDURE — 25000003 PHARM REV CODE 250: Performed by: INTERNAL MEDICINE

## 2017-03-30 PROCEDURE — 94761 N-INVAS EAR/PLS OXIMETRY MLT: CPT

## 2017-03-30 PROCEDURE — 27000221 HC OXYGEN, UP TO 24 HOURS

## 2017-03-30 PROCEDURE — 80053 COMPREHEN METABOLIC PANEL: CPT

## 2017-03-30 PROCEDURE — 85025 COMPLETE CBC W/AUTO DIFF WBC: CPT

## 2017-03-30 PROCEDURE — 25000242 PHARM REV CODE 250 ALT 637 W/ HCPCS: Performed by: STUDENT IN AN ORGANIZED HEALTH CARE EDUCATION/TRAINING PROGRAM

## 2017-03-30 PROCEDURE — 63600175 PHARM REV CODE 636 W HCPCS

## 2017-03-30 PROCEDURE — 94640 AIRWAY INHALATION TREATMENT: CPT

## 2017-03-30 RX ORDER — SODIUM CHLORIDE 9 MG/ML
INJECTION, SOLUTION INTRAVENOUS CONTINUOUS
Status: DISCONTINUED | OUTPATIENT
Start: 2017-03-30 | End: 2017-03-31

## 2017-03-30 RX ORDER — FENTANYL 12.5 UG/1
1 PATCH TRANSDERMAL
Status: DISCONTINUED | OUTPATIENT
Start: 2017-03-30 | End: 2017-03-30

## 2017-03-30 RX ORDER — FENTANYL CITRATE 50 UG/ML
10 INJECTION, SOLUTION INTRAMUSCULAR; INTRAVENOUS ONCE
Status: COMPLETED | OUTPATIENT
Start: 2017-03-30 | End: 2017-03-30

## 2017-03-30 RX ORDER — TIOTROPIUM BROMIDE 18 UG/1
1 CAPSULE ORAL; RESPIRATORY (INHALATION) DAILY
Status: DISCONTINUED | OUTPATIENT
Start: 2017-03-30 | End: 2017-04-05 | Stop reason: HOSPADM

## 2017-03-30 RX ORDER — ONDANSETRON 2 MG/ML
4 INJECTION INTRAMUSCULAR; INTRAVENOUS EVERY 8 HOURS PRN
Status: DISCONTINUED | OUTPATIENT
Start: 2017-03-30 | End: 2017-04-05 | Stop reason: HOSPADM

## 2017-03-30 RX ORDER — FLUTICASONE FUROATE AND VILANTEROL 100; 25 UG/1; UG/1
1 POWDER RESPIRATORY (INHALATION) DAILY
Status: DISCONTINUED | OUTPATIENT
Start: 2017-03-30 | End: 2017-04-05 | Stop reason: HOSPADM

## 2017-03-30 RX ORDER — FENTANYL CITRATE 50 UG/ML
INJECTION, SOLUTION INTRAMUSCULAR; INTRAVENOUS
Status: COMPLETED
Start: 2017-03-30 | End: 2017-03-30

## 2017-03-30 RX ORDER — TRAMADOL HYDROCHLORIDE 50 MG/1
50 TABLET ORAL ONCE
Status: COMPLETED | OUTPATIENT
Start: 2017-03-30 | End: 2017-03-30

## 2017-03-30 RX ADMIN — APIXABAN 2.5 MG: 2.5 TABLET, FILM COATED ORAL at 09:03

## 2017-03-30 RX ADMIN — CITALOPRAM HYDROBROMIDE 20 MG: 20 TABLET ORAL at 08:03

## 2017-03-30 RX ADMIN — MOXIFLOXACIN HYDROCHLORIDE 400 MG: 400 TABLET, FILM COATED ORAL at 08:03

## 2017-03-30 RX ADMIN — ACETAMINOPHEN 500 MG: 500 TABLET ORAL at 12:03

## 2017-03-30 RX ADMIN — IPRATROPIUM BROMIDE AND ALBUTEROL SULFATE 3 ML: .5; 3 SOLUTION RESPIRATORY (INHALATION) at 03:03

## 2017-03-30 RX ADMIN — IPRATROPIUM BROMIDE AND ALBUTEROL SULFATE 3 ML: .5; 3 SOLUTION RESPIRATORY (INHALATION) at 04:03

## 2017-03-30 RX ADMIN — LISINOPRIL 2.5 MG: 2.5 TABLET ORAL at 08:03

## 2017-03-30 RX ADMIN — SODIUM CHLORIDE: 0.9 INJECTION, SOLUTION INTRAVENOUS at 02:03

## 2017-03-30 RX ADMIN — PREDNISONE 40 MG: 20 TABLET ORAL at 08:03

## 2017-03-30 RX ADMIN — PANTOPRAZOLE SODIUM 600 MG: 40 TABLET, DELAYED RELEASE ORAL at 08:03

## 2017-03-30 RX ADMIN — FENTANYL CITRATE 10 MCG: 50 INJECTION, SOLUTION INTRAMUSCULAR; INTRAVENOUS at 05:03

## 2017-03-30 RX ADMIN — ASPIRIN 81 MG: 81 TABLET, COATED ORAL at 08:03

## 2017-03-30 RX ADMIN — FUROSEMIDE 20 MG: 20 TABLET ORAL at 08:03

## 2017-03-30 RX ADMIN — APIXABAN 2.5 MG: 2.5 TABLET, FILM COATED ORAL at 08:03

## 2017-03-30 RX ADMIN — TRAMADOL HYDROCHLORIDE 50 MG: 50 TABLET, FILM COATED ORAL at 08:03

## 2017-03-30 RX ADMIN — RAMELTEON 8 MG: 8 TABLET, FILM COATED ORAL at 12:03

## 2017-03-30 RX ADMIN — PANTOPRAZOLE SODIUM 600 MG: 40 TABLET, DELAYED RELEASE ORAL at 09:03

## 2017-03-30 RX ADMIN — IPRATROPIUM BROMIDE AND ALBUTEROL SULFATE 3 ML: .5; 3 SOLUTION RESPIRATORY (INHALATION) at 11:03

## 2017-03-30 RX ADMIN — VITAM B12 100 MCG: 100 TAB at 08:03

## 2017-03-30 RX ADMIN — OSELTAMIVIR PHOSPHATE 30 MG: 30 CAPSULE ORAL at 08:03

## 2017-03-30 RX ADMIN — OSELTAMIVIR PHOSPHATE 30 MG: 30 CAPSULE ORAL at 09:03

## 2017-03-30 RX ADMIN — IPRATROPIUM BROMIDE AND ALBUTEROL SULFATE 3 ML: .5; 3 SOLUTION RESPIRATORY (INHALATION) at 12:03

## 2017-03-30 RX ADMIN — IPRATROPIUM BROMIDE AND ALBUTEROL SULFATE 3 ML: .5; 3 SOLUTION RESPIRATORY (INHALATION) at 07:03

## 2017-03-30 RX ADMIN — IPRATROPIUM BROMIDE AND ALBUTEROL SULFATE 3 ML: .5; 3 SOLUTION RESPIRATORY (INHALATION) at 08:03

## 2017-03-30 RX ADMIN — METOPROLOL SUCCINATE 50 MG: 50 TABLET, EXTENDED RELEASE ORAL at 08:03

## 2017-03-30 NOTE — PROGRESS NOTES
Patient restless, anxious, tachypneic again. She is on Bipap, calling out for help, writhing in the bed, attempts to reassure her unsuccessful so far. Dr Blackburn notified, MD to come see patient.     04:13- Dr Blackburn at bedside.

## 2017-03-30 NOTE — PROGRESS NOTES
"Progress Note  Pulmonary & Critical Care Medicine      SUBJECTIVE:     Reason for consult: COPD requiring BiPAP    LOS: 2 days    Interval history  Early in AM, patient became agitated, tachypneic. Responded to fentanyl x1 per nurse and daughter. Patient reports this AM that her breathing is still same, slept "ok," reporting that her arm R arm is hurting when the blood pressure cuff went off. Patient also reports not eating well, otherwise no complaints.        Scheduled Medications   albuterol-ipratropium 2.5mg-0.5mg/3mL  3 mL Nebulization Q4H    apixaban  2.5 mg Oral BID    aspirin  81 mg Oral Daily    citalopram  20 mg Oral Daily    cyanocobalamin  100 mcg Oral Daily    furosemide  20 mg Oral Daily    guaifenesin  600 mg Oral BID    lisinopril  2.5 mg Oral Daily    metoprolol succinate  50 mg Oral Daily    moxifloxacin  400 mg Oral Daily    oseltamivir  30 mg Oral BID    predniSONE  40 mg Oral Daily       Medications PRN  acetaminophen, dextrose 50%, dextrose 50%, glucagon (human recombinant), glucose, glucose, ramelteon      OBJECTIVE:     Temp:  [97.8 °F (36.6 °C)-99.3 °F (37.4 °C)]   Pulse:  []   Resp:  [18-51]   BP: ()/()   SpO2:  [90 %-100 %]     Vitals:    03/30/17 0741   BP:    Pulse: 98   Resp: (!) 27   Temp:          I & O (Last 24H):  I/O last 3 completed shifts:  In: 685 [P.O.:485; I.V.:200]  Out: 1892 [Urine:1892]       Physical Exam:  GEN: thin/frail woman, nurse and daughter at bedside  HENT: NCAT, PERRLA, EOMI, OP clear  Lungs: still with bibasilar crackles, no wheezes appreciated   CV: RRR, no murmurs/gallops appreciated  Abd: soft, NT/ND, active BS  Ext: No cyanosis/edema  Neuro: A&Ox3, moving all extremities, normal sensation b/l to light touch     Laboratory:  No results for input(s): INR in the last 168 hours.    Recent Labs  Lab 03/28/17  0740 03/29/17  0405 03/30/17  0332   WBC 8.41 5.19 7.44   HGB 8.7* 8.5* 8.5*   HCT 28.1* 27.8* 26.3*    216 228     Lab " Results   Component Value Date    TSH 2.071 03/28/2017       Recent Labs  Lab 03/28/17  0740 03/29/17  0404 03/30/17  0332   * 131* 125*   K 4.7 4.5 4.3   CL 90* 88* 83*   CO2 32* 34* 36*   BUN 16 21 21   CREATININE 1.0 1.0 0.9   CALCIUM 9.0 9.1 9.0   PROT 7.0 6.7 6.4   BILITOT 0.2 0.2 0.2   ALKPHOS 52* 50* 47*   ALT 15 27 37   AST 33 45* 52*       Recent Labs  Lab 03/27/17  2023   MG 1.7     Lab Results   Component Value Date    CALCIUM 9.0 03/30/2017    PHOS 4.5 05/17/2016     Hemoglobin A1C   Date Value Ref Range Status   03/28/2017 5.3 4.5 - 6.2 % Final     Comment:     According to ADA guidelines, hemoglobin A1C <7.0% represents  optimal control in non-pregnant diabetic patients.  Different  metrics may apply to specific populations.   Standards of Medical Care in Diabetes - 2016.  For the purpose of screening for the presence of diabetes:  <5.7%     Consistent with the absence of diabetes  5.7-6.4%  Consistent with increasing risk for diabetes   (prediabetes)  >or=6.5%  Consistent with diabetes  Currently no consensus exists for use of hemoglobin A1C  for diagnosis of diabetes for children.     02/28/2015 5.5 4.5 - 6.2 % Final         Recent Labs  Lab 03/28/17  1807   TROPONINI 0.359*       ABG    Recent Labs  Lab 03/28/17  0845   PH 7.411   PO2 61*   PCO2 59.6*   HCO3 37.9*   BE 13       Diagnostic Results:  Reviewed      Assessment     Patient is a 84yo woman with PMH of anxiety, COPD, recent dx of Afib, HTN, HLD, CAD and osteoporosis who was admitted for COPD exacerbation. Pulm was consulted for COPD requiring BiPAP.    Recommendations     COPD exacerbation   - likely from sick contacts at assisted living, recently treated with PNAs, mild increased with BNP  - possibly component of anxiety when in resp distress   - repeat CXR unchanged  keep Ox sat >88%, ok to wean off BiPAP as patient full sat on BiPAP   - Flu negative, Resp Cx pending collection  On Lasix, agree with  Moxifloxacin/Tamiflu/prednisone  - would avoid benzo's due to resp depression  would rec trial of duoneb, and either low-dose fentanyl IV or precedex gtt   - will try tramadol today to see response       H/o of Afib   - recently dx on Toprol and Eliquis  - troponin trended down, rate controlled and on NOAC         Will discuss with staff and fellow.      Naseem Bonner, PGY-I  [facility] - Pulmonary Service Team

## 2017-03-30 NOTE — PROGRESS NOTES
She is very anxious, working hard to breathe, RR 30s-40s. She is pulling on Bipap mask and needs to be frequently redirected. Spo2 93-97% on Bipap. Dr Arora updated on pt's status, no further orders at this time, MD to come see patient.     2010-Family is at bedside, patient is resting comfortably at this time. Lung sounds with stridor and coarse crackles in lower bases. Spo2 99% on Bipap, RR 19, HR NSR 85 with infrequent PVCs, /61.

## 2017-03-30 NOTE — PLAN OF CARE
Recommendations     Recommendation/Intervention:  1. Boost plus at lunch   2. [place] patient preferences as able   3. Monitor weight weekly   4. RD to monitor     Goals: Patient to meet >85% EEN  Nutrition Goal Status: new  Communication of RD Recs: reviewed with RN     Continuum of Care Plan     Referral to Outpatient Services: (D/C planning: Cardiac diet with supplements as needed)

## 2017-03-30 NOTE — PLAN OF CARE
Problem: Patient Care Overview  Goal: Plan of Care Review  Outcome: Ongoing (interventions implemented as appropriate)  Monitor SpO2, adjust oxygen to keep saturation greater than or equal to 92%. Encourage periodic deep breathing. Continue BIPAP as ordered.

## 2017-03-30 NOTE — PROGRESS NOTES
[facility] Resident HO-III Progress Note    Subjective:      Patient remained anxious and restless overnight.  At times, she was redirectable but eventually received Fentanyl 10mcg IV x1.  Daughter states the Fentanyl worked well and she was able to remove the BiPAP mask this morning.  Has been on nasal cannula x ~15 minutes.  Currently, patient denies chest pain, leg pain, or abdominal pain.  She reports nausea and states she is hungry.  Also notes that she is exhausted and worn out.     Objective:   Last 24 Hour Vital Signs:  BP  Min: 96/53  Max: 165/88  Temp  Av.4 °F (36.9 °C)  Min: 97.6 °F (36.4 °C)  Max: 99.3 °F (37.4 °C)  Pulse  Av.1  Min: 73  Max: 108  Resp  Av  Min: 18  Max: 51  SpO2  Av.5 %  Min: 90 %  Max: 100 %  Weight  Av.9 kg (121 lb 0.5 oz)  Min: 54.9 kg (121 lb 0.5 oz)  Max: 54.9 kg (121 lb 0.5 oz)  I/O last 3 completed shifts:  In: 685 [P.O.:485; I.V.:200]  Out:  [Urine:189]    Physical Examination:  General:  alert, cooperative, speaking in full sentences   HEENT:  NCAT, EOMI, PERRL, MMM, 3L NC in place  CV:  tachycardia, regular rhythm, normal S1/S2, no audible murmur  Resp:  Relatively unchanged exam, continues to have faint expiratory wheezes throughout with increased dyspnea on exertion/moving in bed    Abd:  Soft, NT/ND, normoactive bowel sounds  Ext:  No edema, 2+ pulses bilatearlly  Skin:  Warm and dry, no rashes         Laboratory:  Laboratory Data Reviewed: yes  Pertinent Findings:    Recent Labs  Lab 17  0740 17  0404 17  0405 17  0332   WBC 8.41  --  5.19 7.44   HGB 8.7*  --  8.5* 8.5*   HCT 28.1*  --  27.8* 26.3*     --  216 228   MCV 98  --  98 95   RDW 13.5  --  13.3 12.9   * 131*  --  125*   K 4.7 4.5  --  4.3   CL 90* 88*  --  83*   CO2 32* 34*  --  36*   BUN 16 21  --  21   CREATININE 1.0 1.0  --  0.9   GLU 93 95  --  94   PROT 7.0 6.7  --  6.4   ALBUMIN 3.4* 3.4*  --  3.3*   BILITOT 0.2 0.2  --  0.2   AST  33 45*  --  52*   ALKPHOS 52* 50*  --  47*   ALT 15 27  --  37     Troponin: 0.009 > 0.047 > 0.442 > 0.445 > 0.359    Microbiology Data Reviewed: yes  Pertinent Findings:  Respiratory culture pending     Other Results:  Radiology Data Reviewed: yes  Pertinent Findings:  No new imaging     Current Medications:     Infusions:        Scheduled:   albuterol-ipratropium 2.5mg-0.5mg/3mL  3 mL Nebulization Q4H    apixaban  2.5 mg Oral BID    aspirin  81 mg Oral Daily    citalopram  20 mg Oral Daily    cyanocobalamin  100 mcg Oral Daily    furosemide  20 mg Oral Daily    guaifenesin  600 mg Oral BID    lisinopril  2.5 mg Oral Daily    metoprolol succinate  50 mg Oral Daily    moxifloxacin  400 mg Oral Daily    oseltamivir  30 mg Oral BID    predniSONE  40 mg Oral Daily        PRN:  acetaminophen, dextrose 50%, dextrose 50%, glucagon (human recombinant), glucose, glucose, ramelteon    Antibiotics and Day Number of Therapy:  Moxifloxacin 400mg IV daily (started 3/28)  s/p Levaquin 3/27    Lines and Day Number of Therapy:  PIV    Assessment:     Kaity Rebolledo is a 83 y.o.female with  Patient Active Problem List    Diagnosis Date Noted    Elevated troponin 03/29/2017    Pulmonary hypertension 03/28/2017    History of atrial fibrillation 03/28/2017    SOB (shortness of breath) 03/28/2017    Acute on chronic respiratory failure with hypoxia and hypercapnia 03/28/2017    Hyponatremia 03/28/2017    Urinary retention 03/28/2017    Paroxysmal atrial fibrillation     Depression with anxiety     (HFpEF) heart failure with preserved ejection fraction 03/27/2017    COPD, very severe 03/27/2017    Atrial fibrillation with RVR 03/04/2017    Low oxygen saturation 12/27/2016    Iron deficiency anemia due to chronic blood loss 10/10/2016    Pneumonia 09/02/2016    Chronic diastolic CHF (congestive heart failure), NYHA class 2 09/02/2016    Chronic obstructive pulmonary disease 06/16/2016    Coronary artery  disease involving native coronary artery without angina pectoris 06/16/2016    Greater trochanter fracture 05/15/2016    Hip fracture 05/15/2016    Closed bilateral fracture of pubic rami 03/18/2016    Osteoporosis 03/18/2016    Arthralgia of left hip 03/17/2016    Hip pain 03/17/2016    Anemia, chronic disease 03/17/2016    HLD (hyperlipidemia) 03/17/2016    Uterine prolapse 03/16/2016    Coronary artery disease involving native coronary artery of native heart without angina pectoris 03/14/2016    COPD exacerbation 03/14/2016    Macrocytic anemia 03/14/2016    Closed fracture of ramus of right pubis 02/06/2016    Chronic hypoxemic respiratory failure 02/06/2016    Anemia of chronic disease 12/16/2015    Cardiomyopathy 09/24/2015    Chronic systolic heart failure 09/10/2015    Chronic anemia 08/04/2015    Physical deconditioning 03/05/2015    Cystocele 08/06/2013    Essential hypertension 08/06/2013    OP (osteoporosis) 08/06/2013    Dyslipidemia 08/06/2013    COPD (chronic obstructive pulmonary disease) 09/11/2012        Plan:     Acute Hypoxic Respiratory Failure 2/2 COPD Exacerbation  - Presented with SOB despite IV steroids and multiple Duo-nebs treatments  - On arrival, desats to low 80s on 4L NC while talking but appeared comfortable on BiPAP with sats in the mid 90s  - CXR suggestive of COPD/emphysema, no focal consolidation  - Continue Duo-nebs q4 hours with Mucinex 600mg BID and Prednisone 40mg daily.  - Procal negative at 0.09 yet repeat elevated at 0.68.  Will continue Moxifloxacin 400mg IV daily for at least 5 days.    - Influenza screen negative but will continue empiric Tamiflu due to sick contacts.   - ABG 7.411/59/61/37.9/13, similar to previous ABG 1 year ago.  - Pulmonary consulted, appreciate recommendations.    - Patient has remained on BiPAP throughout majority of her stay.  Currently on baseline 3L NC.      - When transitioned to nasal cannula, she develops increased  dyspnea and anxiety.  Responded well to both Xanax and Fentanyl.  Discussed anxiety with Pulmonary team and will try Tramadol this morning.  Can also consider Fentanyl patch as patient previously had an adverse reaction to Morphine.     Hyponatremia:  -Sodium has continued to trend down to 125, likely secondary to poor PO intake during her stay.    -If patient remains on nasal cannula today, diet will likely improve.  If not, will consider starting IVF and continue to closely monitor.     CAD  - Cath done 10/2015 - RCA 40% stenosis, LAD 20% stenosis  - Continue ASA 81mg daily and Toprol-xl 50mg daily  - Lipid panel:  Chol 223, TG 45, HDL 82,   - Hgb A1c 5.3%  - Trop peaked at 0.445 and has since trended down. EKG with some ST depression in inferior leads, likely secondary to demand ischemia.  Continues to deny chest pain.     Chronic Diastolic Congestive Heart Failure / Pulmonary Hypertension  - TTE 3/3/17: EF 55%, diastolic dysfunction present, PAP 51  - Wheezes appreciated but no crackles on exam  -  but patient appeared euvolemic at time of admission.  Low suspicion for hypervolemic hyponatremia at this time.     - Continue home lasix 20 mg daily.  Net negative 1.5L since admission.         HTN  - BP elevated this morning, likely related to agitation/anxiety.  - Continue toprol-xl and lisinopril.  Will titrate doses if needed.     Dyslipidemia  - Lipid panel:  Chol 223, TG 45, HDL 82,   - Patient not currently on lipid lowering therapy.  Pravastatin previously discontinued due to muscle pain.      Anemia of Chronic Disease  - Hgb 8.5, MCV 96 on admission, at baseline  - iron and TIBC pending, ferritin 123, folate 18, vit B12 810    - Repeat H/H remains stable at 8.7/28.1     Hx of A-fib with RVR  - Diagnosed as new onset in recent hospitalization 3/2017  - Cardiology consulted at that hospitalization: patient started on eliquis and metoprolol increased for better rate control  - Remains in  sinus rhythm  - Continue Eliquis 2.5 mg BID and Metoprolol   - Will need outpatient Cardiology follow up      Anxiety / Depression  - No acute issues  - Continue home celexa, hold home xanax and order as needed     F: None  E: hyponatremia, hypochloremia likely secondary to poor PO intake   N: Cardiac Diet     PPX: Eliquis    Code:  Partial - no compressions or chemical resuscitation, intubation only     Dispo:  Pending improvement in respiratory status and ability to wean to home 3L NC, continue ICU care at this time as patient is requiring BiPAP    Sheela Cabezas  [facility] Internal Medicine -III  [facility] Internal Medicine Service Team A    [facility] Medicine Hospitalist Pager numbers:   [facility] Hospitalist Medicine Team A (Man/Shashank): [phone]  [facility] Hospitalist Medicine Team B (Zak/[place]):  [phone]

## 2017-03-30 NOTE — PROGRESS NOTES
22:25-Patient Awake, requesting that Bipap be taken off. Placed on O2 at 3L/NC.    23:05- Patient is very anxious, repeatedly asking for help, states she cant breathe.   Lung sounds are diminished with crackles. Placed back on Bipap. VS as below.   Also UOP for last 2 consecutive hrs 25 ccs and 10 ccs respectively. Urine is pink tinged.      03/29/17 2305   Vital Signs   Pulse 101   Resp (!) 36   SpO2 99 %   BP (!) 153/88   MAP (mmHg) 114     Dr Blackburn notified of pt's status; MD to come see patient.     23:30-Dr Hurley and Dr Blackburn at bedside to assess patient. She is anxious, restless, states she cant breathe. Verbal order for 8mg PO ramelteon PRN QHS. Patient states she takes advil at home to help her relax, verbal order per MD to give her PRN Tylenol.     MD to come back to see patient.

## 2017-03-30 NOTE — PLAN OF CARE
Problem: Patient Care Overview  Goal: Plan of Care Review  Outcome: Ongoing (interventions implemented as appropriate)  Mrs Rebolledo remains free of falls/trauma/injuries this shift.  Neuro- She is frequently anxious, complaining of being unable to breathe.  Managed with fentanyl, ramelteon, tylenol and breathing treatments overnight.   She remains afebrile.   Resp- Maintained on BiPAP, episodes of dyspnea, breathlessness and respiratory distress.   Spo2 %.    Cardio-Remained in NSR/ST with PVCs.   BP 100s-150s/70s-100s.   GI- BS  Hypoactive, no BMs overnight.   - Joshua maintained, total of 342 ccs UOP overnight.   Skin- General skin remains intact, PIV intact x1.   All other interventions ongoing.   Pulmonary and primary care updated on pt's status this am.   General POC reviewed with patient and daughter, they verbalized understanding.

## 2017-03-30 NOTE — CONSULTS
[facility]  Adult Nutrition  Consult Note    SUMMARY     Recommendations    Recommendation/Intervention:  1. Boost plus at lunch   2. [place] patient preferences as able   3. Monitor weight weekly   4. RD to monitor    Goals: Patient to meet >85% EEN  Nutrition Goal Status: new  Communication of RD Recs: reviewed with RN    Continuum of Care Plan    Referral to Outpatient Services:  (D/C planning: Cardiac diet with supplements as needed)    Reason for Assessment    Reason for Assessment: nurse/nurse practitioner consult  Diagnosis:  (acute on chronic resp failure)  Relevent Medical History: HLD, COPD, HTN, CHF, CAD   Interdisciplinary Rounds: did not attend     General Information Comments: Spoke with daughter. Reports patient weight was stable PTA. No issues chewing or swallowing. currently with poor appetite. Normally does not like supplements but daughter said to add one with lunch to try and encourage po intake (chocolate). Encouraged daughter to aid with menu selection to help with increased po intake. Obtained preferences.    Nutrition Prescription Ordered    Current Diet Order: Cardiac     Evaluation of Received Nutrients/Fluid Intake        % Intake of Estimated needs: 0-25%   % Intake of Meals: 25%    I/O: 660/1257      Nutrition Risk Screen     Nutrition Risk Screen: no indicators present    Nutrition/Diet History     Food Preferences: No Rastafarian, ethnic related food preferences.      Factors Affecting Nutritional Intake: decreased appetite     Labs/Tests/Procedures/Meds     Pertinent Labs Reviewed: reviewed  Pertinent Medications Reviewed: reviewed       Physical Findings    Overall Physical Appearance:  (thin)  Oral/Mouth Cavity: WDL  Skin: intact (Carlos 13)    Anthropometrics     Height (inches): 65 in  Weight Method: Bed Scale  Weight (kg): 54.9 kg     Ideal Body Weight (IBW), Female: 125 lb     % Ideal Body Weight, Female (lb): 96.82 lb  BMI (kg/m2): 20.14  BMI Grade: 17 - 18.4  protein-energy malnutrition grade I     Estimated/Assessed Needs    Weight Used For Calorie Calculations: 54.9 kg (121 lb 0.5 oz)   Height (cm): 165.1 cm     Energy Need Method: [place]-St Jeor (6940-1386 kcal)     RMR ([place]-St. Jeor Equation): 1010.97      Weight Used For Protein Calculations: 54.9 kg (121 lb 0.5 oz)  Protein Requirements: 55-65 g    Fluid Need Method: RDA Method     Nutrition Diagnosis    Problem: Inadequate Energy Intake  Etiology: SOB  As Evidenced by: 25% intake with meals  Nutrition Diagnosis Status: New     Monitor and Evaluation    Food and Nutrient Intake: energy intake, food and beverage intake  Food and Nutrient Adminstration: diet order  Anthropometric Measurements: weight, weight change  Biochemical Data, Medical Tests and Procedures: electrolyte and renal panel, lipid profile  Nutrition-Focused Physical Findings: overall appearance    Nutrition Risk    Level of Risk:  (2 x week)    Nutrition Follow-Up    RD Follow-up?: Yes    Assessment and Plan    No new Assessment & Plan notes have been filed under this hospital service since the last note was generated.  Service: Nutrition

## 2017-03-31 ENCOUNTER — TELEPHONE (OUTPATIENT)
Dept: ADMINISTRATIVE | Facility: HOSPITAL | Age: 82
End: 2017-03-31

## 2017-03-31 LAB
ALBUMIN SERPL BCP-MCNC: 3.1 G/DL
ALP SERPL-CCNC: 47 U/L
ALT SERPL W/O P-5'-P-CCNC: 32 U/L
ANION GAP SERPL CALC-SCNC: 8 MMOL/L
AST SERPL-CCNC: 35 U/L
BASOPHILS # BLD AUTO: 0.01 K/UL
BASOPHILS NFR BLD: 0.2 %
BILIRUB SERPL-MCNC: 0.2 MG/DL
BUN SERPL-MCNC: 19 MG/DL
CALCIUM SERPL-MCNC: 8.8 MG/DL
CHLORIDE SERPL-SCNC: 88 MMOL/L
CO2 SERPL-SCNC: 35 MMOL/L
CREAT SERPL-MCNC: 0.9 MG/DL
DIFFERENTIAL METHOD: ABNORMAL
EOSINOPHIL # BLD AUTO: 0 K/UL
EOSINOPHIL NFR BLD: 0 %
ERYTHROCYTE [DISTWIDTH] IN BLOOD BY AUTOMATED COUNT: 12.9 %
EST. GFR  (AFRICAN AMERICAN): >60 ML/MIN/1.73 M^2
EST. GFR  (NON AFRICAN AMERICAN): 59 ML/MIN/1.73 M^2
GLUCOSE SERPL-MCNC: 100 MG/DL
HCT VFR BLD AUTO: 28.1 %
HGB BLD-MCNC: 9 G/DL
LYMPHOCYTES # BLD AUTO: 1.2 K/UL
LYMPHOCYTES NFR BLD: 22.5 %
MCH RBC QN AUTO: 29.9 PG
MCHC RBC AUTO-ENTMCNC: 32 %
MCV RBC AUTO: 93 FL
MONOCYTES # BLD AUTO: 1 K/UL
MONOCYTES NFR BLD: 20.3 %
NEUTROPHILS # BLD AUTO: 2.9 K/UL
NEUTROPHILS NFR BLD: 56.8 %
PLATELET # BLD AUTO: 237 K/UL
PMV BLD AUTO: 10.3 FL
POTASSIUM SERPL-SCNC: 4 MMOL/L
PROT SERPL-MCNC: 6.1 G/DL
RBC # BLD AUTO: 3.01 M/UL
SODIUM SERPL-SCNC: 131 MMOL/L
WBC # BLD AUTO: 5.12 K/UL

## 2017-03-31 PROCEDURE — 85025 COMPLETE CBC W/AUTO DIFF WBC: CPT

## 2017-03-31 PROCEDURE — 25000003 PHARM REV CODE 250: Performed by: HOSPITALIST

## 2017-03-31 PROCEDURE — 27000221 HC OXYGEN, UP TO 24 HOURS

## 2017-03-31 PROCEDURE — 80053 COMPREHEN METABOLIC PANEL: CPT

## 2017-03-31 PROCEDURE — 25000003 PHARM REV CODE 250: Performed by: STUDENT IN AN ORGANIZED HEALTH CARE EDUCATION/TRAINING PROGRAM

## 2017-03-31 PROCEDURE — 94640 AIRWAY INHALATION TREATMENT: CPT

## 2017-03-31 PROCEDURE — 25000003 PHARM REV CODE 250: Performed by: INTERNAL MEDICINE

## 2017-03-31 PROCEDURE — 11000001 HC ACUTE MED/SURG PRIVATE ROOM

## 2017-03-31 PROCEDURE — 25000242 PHARM REV CODE 250 ALT 637 W/ HCPCS: Performed by: STUDENT IN AN ORGANIZED HEALTH CARE EDUCATION/TRAINING PROGRAM

## 2017-03-31 PROCEDURE — 63600175 PHARM REV CODE 636 W HCPCS: Performed by: STUDENT IN AN ORGANIZED HEALTH CARE EDUCATION/TRAINING PROGRAM

## 2017-03-31 PROCEDURE — 94660 CPAP INITIATION&MGMT: CPT

## 2017-03-31 PROCEDURE — 94761 N-INVAS EAR/PLS OXIMETRY MLT: CPT

## 2017-03-31 PROCEDURE — 36415 COLL VENOUS BLD VENIPUNCTURE: CPT

## 2017-03-31 RX ORDER — PREDNISONE 20 MG/1
40 TABLET ORAL DAILY
Qty: 2 TABLET | Refills: 0 | Status: SHIPPED | OUTPATIENT
Start: 2017-03-31 | End: 2017-03-31 | Stop reason: HOSPADM

## 2017-03-31 RX ORDER — DOCUSATE SODIUM 100 MG/1
100 CAPSULE, LIQUID FILLED ORAL DAILY
Status: DISCONTINUED | OUTPATIENT
Start: 2017-03-31 | End: 2017-04-05 | Stop reason: HOSPADM

## 2017-03-31 RX ORDER — LISINOPRIL 2.5 MG/1
2.5 TABLET ORAL DAILY
Qty: 30 TABLET | Refills: 4 | Status: ON HOLD | OUTPATIENT
Start: 2017-03-31 | End: 2017-05-01 | Stop reason: HOSPADM

## 2017-03-31 RX ORDER — OSELTAMIVIR PHOSPHATE 30 MG/1
30 CAPSULE ORAL 2 TIMES DAILY
Qty: 4 CAPSULE | Refills: 0 | Status: SHIPPED | OUTPATIENT
Start: 2017-03-31 | End: 2017-04-02

## 2017-03-31 RX ADMIN — PANTOPRAZOLE SODIUM 600 MG: 40 TABLET, DELAYED RELEASE ORAL at 08:03

## 2017-03-31 RX ADMIN — IPRATROPIUM BROMIDE AND ALBUTEROL SULFATE 3 ML: .5; 3 SOLUTION RESPIRATORY (INHALATION) at 03:03

## 2017-03-31 RX ADMIN — OSELTAMIVIR PHOSPHATE 30 MG: 30 CAPSULE ORAL at 09:03

## 2017-03-31 RX ADMIN — MOXIFLOXACIN HYDROCHLORIDE 400 MG: 400 TABLET, FILM COATED ORAL at 09:03

## 2017-03-31 RX ADMIN — IPRATROPIUM BROMIDE AND ALBUTEROL SULFATE 3 ML: .5; 3 SOLUTION RESPIRATORY (INHALATION) at 08:03

## 2017-03-31 RX ADMIN — FLUTICASONE FUROATE AND VILANTEROL TRIFENATATE 1 PUFF: 100; 25 POWDER RESPIRATORY (INHALATION) at 08:03

## 2017-03-31 RX ADMIN — LISINOPRIL 2.5 MG: 2.5 TABLET ORAL at 09:03

## 2017-03-31 RX ADMIN — ACETAMINOPHEN 500 MG: 500 TABLET ORAL at 06:03

## 2017-03-31 RX ADMIN — PREDNISONE 40 MG: 20 TABLET ORAL at 09:03

## 2017-03-31 RX ADMIN — IPRATROPIUM BROMIDE AND ALBUTEROL SULFATE 3 ML: .5; 3 SOLUTION RESPIRATORY (INHALATION) at 12:03

## 2017-03-31 RX ADMIN — CITALOPRAM HYDROBROMIDE 20 MG: 20 TABLET ORAL at 09:03

## 2017-03-31 RX ADMIN — APIXABAN 2.5 MG: 2.5 TABLET, FILM COATED ORAL at 08:03

## 2017-03-31 RX ADMIN — ACETAMINOPHEN 500 MG: 500 TABLET ORAL at 09:03

## 2017-03-31 RX ADMIN — VITAM B12 100 MCG: 100 TAB at 09:03

## 2017-03-31 RX ADMIN — IPRATROPIUM BROMIDE AND ALBUTEROL SULFATE 3 ML: .5; 3 SOLUTION RESPIRATORY (INHALATION) at 11:03

## 2017-03-31 RX ADMIN — FUROSEMIDE 20 MG: 20 TABLET ORAL at 09:03

## 2017-03-31 RX ADMIN — PANTOPRAZOLE SODIUM 600 MG: 40 TABLET, DELAYED RELEASE ORAL at 09:03

## 2017-03-31 RX ADMIN — OSELTAMIVIR PHOSPHATE 30 MG: 30 CAPSULE ORAL at 08:03

## 2017-03-31 RX ADMIN — TIOTROPIUM BROMIDE 18 MCG: 18 CAPSULE ORAL; RESPIRATORY (INHALATION) at 08:03

## 2017-03-31 RX ADMIN — ASPIRIN 81 MG: 81 TABLET, COATED ORAL at 09:03

## 2017-03-31 RX ADMIN — IPRATROPIUM BROMIDE AND ALBUTEROL SULFATE 3 ML: .5; 3 SOLUTION RESPIRATORY (INHALATION) at 04:03

## 2017-03-31 RX ADMIN — IPRATROPIUM BROMIDE AND ALBUTEROL SULFATE 3 ML: .5; 3 SOLUTION RESPIRATORY (INHALATION) at 07:03

## 2017-03-31 RX ADMIN — DOCUSATE SODIUM 100 MG: 100 CAPSULE, LIQUID FILLED ORAL at 08:03

## 2017-03-31 RX ADMIN — APIXABAN 2.5 MG: 2.5 TABLET, FILM COATED ORAL at 09:03

## 2017-03-31 RX ADMIN — METOPROLOL SUCCINATE 50 MG: 50 TABLET, EXTENDED RELEASE ORAL at 09:03

## 2017-03-31 RX ADMIN — ACETAMINOPHEN 500 MG: 500 TABLET ORAL at 03:03

## 2017-03-31 NOTE — PLAN OF CARE
Problem: Patient Care Overview  Goal: Plan of Care Review  Outcome: Ongoing (interventions implemented as appropriate)  Mrs Rebolledo remains free of falls/trauma/injuries this shift.  Neuro- No anxiety episodes overnight, able to communicate needs calmly and clearly.   Pain managed with PRN Tylenol with full relief.   She remains afebrile.   Resp- Spo2 % on 3L/NC.   Cardio-Remained in NSR/ST with frequent PVCs (occasionally bigeminy/trigeminy, unsustained).   BP 90s-140s/60s-70s.  GI- no BMs overnight, tolerated oral liquids well.   - Joshua maintained, total of 1085 UOP overnight.   Skin- General skin remains intact, PIV intact x1.   All other interventions ongoing.   Patient pending possible transfer to floor this am.   General POC reviewed with patient and daughter at bedside, they verbalized understanding.

## 2017-03-31 NOTE — PROGRESS NOTES
Patient arrived to  via w/c with ICU RN and escort service. Patient AAO x4. Slight EASON noted with transfer to bed. O2 at 3l/nc in use. IV site c/d/i. Tele monitor applied as per orders. Breath sounds coarse with crackles. Patient has productive loose cough. Will cont to monitor pt and intervene prn.

## 2017-03-31 NOTE — PROGRESS NOTES
At 1330:  1st void post-cath removal=200cc clear yellow urine via bedside commode. No difficulties noted. Will cont to monitor pt and intervene prn.

## 2017-03-31 NOTE — PLAN OF CARE
Problem: Patient Care Overview  Goal: Plan of Care Review  Outcome: Ongoing (interventions implemented as appropriate)  Pt's SpO2 100% on 3 lpm NC. No adverse reactions to aerosol tx or MDI. No respiratory distress noted. Continue to monitor SpO2.

## 2017-03-31 NOTE — NURSING
Patient's daughter came to the unit and informed her that her mother had just been transferred to  484 on the fourth floor.

## 2017-03-31 NOTE — NURSING TRANSFER
Nursing Transfer Note      3/31/2017     Transfer To:[facility]  484     Transfer via wheelchair    Transfer with  to O2, cardiac monitoring    Transported by Transport and Nurse      Medicines sent: no    Chart send with patient: Yes    Notified: daughter    Patient reassessed at: 3/31/17 @ 1009    Upon arrival to floor: cardiac monitor applied, patient oriented to room, call bell in reach and bed in lowest position    Report called to Belinda on 4B for Rm 484, waiting on transport top transfer pt.

## 2017-03-31 NOTE — PROGRESS NOTES
"Progress Note  Pulmonary & Critical Care Medicine      SUBJECTIVE:     Reason for consult: COPD requiring BiPAP    LOS: 3 days    Interval history  Nurse, family and patient report that patient has been doing better, less agitation and respiratory with much improvement. This AM, patient reports her breathing is better and "I feel better" and she is in great mood. Some improvement in PO intake, "urinating a lot." Otherwise, she is without other complaints.     Scheduled Medications   albuterol-ipratropium 2.5mg-0.5mg/3mL  3 mL Nebulization Q4H    apixaban  2.5 mg Oral BID    aspirin  81 mg Oral Daily    citalopram  20 mg Oral Daily    cyanocobalamin  100 mcg Oral Daily    docusate sodium  100 mg Oral Daily    fluticasone-vilanterol  1 puff Inhalation Daily    furosemide  20 mg Oral Daily    guaifenesin  600 mg Oral BID    lisinopril  2.5 mg Oral Daily    metoprolol succinate  50 mg Oral Daily    moxifloxacin  400 mg Oral Daily    oseltamivir  30 mg Oral BID    predniSONE  40 mg Oral Daily    tiotropium  1 capsule Inhalation Daily       Medications PRN  acetaminophen, dextrose 50%, dextrose 50%, glucagon (human recombinant), glucose, glucose, ondansetron, ramelteon      OBJECTIVE:     Temp:  [97.2 °F (36.2 °C)-98.3 °F (36.8 °C)]   Pulse:  []   Resp:  [15-40]   BP: ()/(51-88)   SpO2:  [92 %-100 %]     Vitals:    03/31/17 0700   BP: (!) 147/68   Pulse: 82   Resp: 17   Temp:          I & O (Last 24H):  I/O last 3 completed shifts:  In: 2940 [P.O.:1920; I.V.:1020]  Out: 3917 [Urine:3917]       Physical Exam:  GEN: thin/frail woman, nurse and daughter at bedside  HENT: NCAT, PERRLA, EOMI, OP clear  Lungs: minimal crackles, diffused expiratory wheezes with much better movement   CV: RRR, no murmurs/gallops appreciated  Abd: soft, NT/ND, active BS  : guzman in place with light yellow urine  Ext: No cyanosis/edema  Neuro: A&Ox3, moving all extremities, normal sensation b/l to light touch "     Laboratory:  No results for input(s): INR in the last 168 hours.    Recent Labs  Lab 03/29/17  0405 03/30/17  0332 03/31/17  0315   WBC 5.19 7.44 5.12   HGB 8.5* 8.5* 9.0*   HCT 27.8* 26.3* 28.1*    228 237     Lab Results   Component Value Date    TSH 2.071 03/28/2017       Recent Labs  Lab 03/29/17  0404 03/30/17  0332 03/31/17  0315   * 125* 131*   K 4.5 4.3 4.0   CL 88* 83* 88*   CO2 34* 36* 35*   BUN 21 21 19   CREATININE 1.0 0.9 0.9   CALCIUM 9.1 9.0 8.8   PROT 6.7 6.4 6.1   BILITOT 0.2 0.2 0.2   ALKPHOS 50* 47* 47*   ALT 27 37 32   AST 45* 52* 35       Recent Labs  Lab 03/27/17 2023   MG 1.7     Lab Results   Component Value Date    CALCIUM 8.8 03/31/2017    PHOS 4.5 05/17/2016     Hemoglobin A1C   Date Value Ref Range Status   03/28/2017 5.3 4.5 - 6.2 % Final     Comment:     According to ADA guidelines, hemoglobin A1C <7.0% represents  optimal control in non-pregnant diabetic patients.  Different  metrics may apply to specific populations.   Standards of Medical Care in Diabetes - 2016.  For the purpose of screening for the presence of diabetes:  <5.7%     Consistent with the absence of diabetes  5.7-6.4%  Consistent with increasing risk for diabetes   (prediabetes)  >or=6.5%  Consistent with diabetes  Currently no consensus exists for use of hemoglobin A1C  for diagnosis of diabetes for children.     02/28/2015 5.5 4.5 - 6.2 % Final         Recent Labs  Lab 03/28/17  1807   TROPONINI 0.359*       ABG    Recent Labs  Lab 03/28/17  0845   PH 7.411   PO2 61*   PCO2 59.6*   HCO3 37.9*   BE 13       Diagnostic Results:  Reviewed      Assessment     Patient is a 82yo woman with PMH of anxiety, COPD, recent dx of Afib, HTN, HLD, CAD and osteoporosis who was admitted for COPD exacerbation. Pulm was consulted for COPD requiring BiPAP.    Recommendations     COPD exacerbation   - likely from sick contacts at assisted living, recently treated with PNAs, mild increased with BNP  - possibly component  of anxiety when in resp distress   - repeat CXR unchanged  keep Ox sat >88%, ok to wean off BiPAP as patient full sat on BiPAP   - Flu negative, Resp Cx pending collection  On Lasix, agree with Moxifloxacin/Tamiflu/prednisone  - would avoid benzo's due to resp depression  would rec trial of duoneb, and either low-dose fentanyl IV or precedex gtt, consider low-dose fentanyl patch if needed   - patient clinically much better today, can step-down from pulmonary standpoint       H/o of Afib   - recently dx on Toprol and Eliquis  - troponin trended down, rate controlled and on NOAC         Will discuss with staff and fellow. Will sign off.       Naseem Bonner, PGY-I  [facility] - Pulmonary Service Team

## 2017-03-31 NOTE — PROGRESS NOTES
U Internal Medicine Resident HO-II Progress Note    Subjective:   Patient did well overnight. Did not require bipap or any anxiolytics.   Denies any issues this am, besides some chronic leg pain.       Objective:   Last 24 Hour Vital Signs:  BP  Min: 93/51  Max: 155/84  Temp  Av.9 °F (36.6 °C)  Min: 97.2 °F (36.2 °C)  Max: 98.3 °F (36.8 °C)  Pulse  Av.1  Min: 73  Max: 105  Resp  Av.2  Min: 15  Max: 40  SpO2  Av.5 %  Min: 92 %  Max: 100 %  Weight  Av.5 kg (124 lb 9 oz)  Min: 56.4 kg (124 lb 5.4 oz)  Max: 56.6 kg (124 lb 12.5 oz)  I/O last 3 completed shifts:  In: 2940 [P.O.:1920; I.V.:1020]  Out: 3917 [Urine:3917]    Physical Examination:  General:  alert, cooperative, speaking in full sentences   HEENT:  NCAT, EOMI, PERRL, MMM, 3L NC in place  CV:  tachycardia, regular rhythm, normal S1/S2, no audible murmur  Resp:  Relatively unchanged exam, continues to have faint expiratory wheezes throughout with increased dyspnea on exertion/moving in bed    Abd:  Soft, NT/ND, normoactive bowel sounds  Ext:  No edema, 2+ pulses bilatearlly  Skin:  Warm and dry, no rashes         Laboratory:  Laboratory Data Reviewed: yes  Pertinent Findings:    Recent Labs  Lab 17  0404 17  0405 17  0332 17  0315   WBC  --  5.19 7.44 5.12   HGB  --  8.5* 8.5* 9.0*   HCT  --  27.8* 26.3* 28.1*   PLT  --  216 228 237   MCV  --  98 95 93   RDW  --  13.3 12.9 12.9   *  --  125* 131*   K 4.5  --  4.3 4.0   CL 88*  --  83* 88*   CO2 34*  --  36* 35*   BUN 21  --  21 19   CREATININE 1.0  --  0.9 0.9   GLU 95  --  94 100   PROT 6.7  --  6.4 6.1   ALBUMIN 3.4*  --  3.3* 3.1*   BILITOT 0.2  --  0.2 0.2   AST 45*  --  52* 35   ALKPHOS 50*  --  47* 47*   ALT 27  --  37 32     Troponin: 0.009 > 0.047 > 0.442 > 0.445 > 0.359    Microbiology Data Reviewed: yes  Pertinent Findings:  Respiratory culture - not collected     Other Results:  Radiology Data Reviewed: yes  Pertinent Findings:  No new imaging      Current Medications:     Infusions:        Scheduled:   albuterol-ipratropium 2.5mg-0.5mg/3mL  3 mL Nebulization Q4H    apixaban  2.5 mg Oral BID    aspirin  81 mg Oral Daily    citalopram  20 mg Oral Daily    cyanocobalamin  100 mcg Oral Daily    docusate sodium  100 mg Oral Daily    fluticasone-vilanterol  1 puff Inhalation Daily    furosemide  20 mg Oral Daily    guaifenesin  600 mg Oral BID    lisinopril  2.5 mg Oral Daily    metoprolol succinate  50 mg Oral Daily    moxifloxacin  400 mg Oral Daily    oseltamivir  30 mg Oral BID    predniSONE  40 mg Oral Daily    tiotropium  1 capsule Inhalation Daily        PRN:  acetaminophen, dextrose 50%, dextrose 50%, glucagon (human recombinant), glucose, glucose, ondansetron, ramelteon    Antibiotics and Day Number of Therapy:  Moxifloxacin 400mg IV daily (started 3/28)  s/p Levaquin 3/27    Lines and Day Number of Therapy:  PIV    Assessment:     Kaity Rebolledo is a 83 y.o.female with  Patient Active Problem List    Diagnosis Date Noted    Pulmonary hypertension due to lung disease 03/30/2017    Elevated troponin 03/29/2017    Pulmonary hypertension 03/28/2017    History of atrial fibrillation 03/28/2017    SOB (shortness of breath) 03/28/2017    Acute on chronic respiratory failure with hypoxia and hypercapnia 03/28/2017    Hyponatremia 03/28/2017    Urinary retention 03/28/2017    Paroxysmal atrial fibrillation     Anxiety and depression     (HFpEF) heart failure with preserved ejection fraction 03/27/2017    Stage 4 very severe COPD by GOLD classification 03/27/2017    Atrial fibrillation with RVR 03/04/2017    Low oxygen saturation 12/27/2016    Iron deficiency anemia due to chronic blood loss 10/10/2016    Pneumonia 09/02/2016    Chronic diastolic heart failure 09/02/2016    Chronic obstructive pulmonary disease 06/16/2016    Coronary artery disease involving native coronary artery without angina pectoris 06/16/2016    Greater  trochanter fracture 05/15/2016    Hip fracture 05/15/2016    Closed bilateral fracture of pubic rami 03/18/2016    Osteoporosis 03/18/2016    Arthralgia of left hip 03/17/2016    Hip pain 03/17/2016    Anemia, chronic disease 03/17/2016    HLD (hyperlipidemia) 03/17/2016    Uterine prolapse 03/16/2016    Coronary artery disease involving native coronary artery of native heart without angina pectoris 03/14/2016    COPD exacerbation 03/14/2016    Macrocytic anemia 03/14/2016    Closed fracture of ramus of right pubis 02/06/2016    Chronic hypoxemic respiratory failure 02/06/2016    Anemia of chronic disease 12/16/2015    Cardiomyopathy 09/24/2015    Chronic systolic heart failure 09/10/2015    Chronic anemia 08/04/2015    Physical deconditioning 03/05/2015    Cystocele 08/06/2013    Essential hypertension 08/06/2013    OP (osteoporosis) 08/06/2013    Dyslipidemia 08/06/2013    COPD (chronic obstructive pulmonary disease) 09/11/2012        Plan:     Acute Hypoxic Respiratory Failure 2/2 COPD Exacerbation  - Presented with SOB despite IV steroids and multiple Duo-nebs treatments  - On arrival, desats to low 80s on 4L NC while talking but appeared comfortable on BiPAP with sats in the mid 90s  - CXR suggestive of COPD/emphysema, no focal consolidation  - Continue Duo-nebs q4 hours with Mucinex 600mg BID and Prednisone 40mg daily.  - Procal negative at 0.09 yet repeat elevated at 0.68.  Will continue Moxifloxacin 400mg IV daily for at least 5 days.    - Influenza screen negative but will continue empiric Tamiflu due to sick contacts.   - ABG 7.411/59/61/37.9/13, similar to previous ABG 1 year ago.  - Pulmonary consulted, appreciate recommendations.    - Patient previously required frequent or continuous bipap ; now on 3L NC and has remained stable overnight   - anxiety component may be playing into her respiratory issues as well; previously responded well to xanax and fentanyl ; did not require  anxiolytics overnight.      Hyponatremia, resolved   -125 yesterday, 131 today; s/p IVF yesterday; Na normalized today     CAD  - Cath done 10/2015 - RCA 40% stenosis, LAD 20% stenosis  - Continue ASA 81mg daily and Toprol-xl 50mg daily  - Lipid panel:  Chol 223, TG 45, HDL 82,   - Hgb A1c 5.3%  - Trop peaked at 0.445 and has since trended down. EKG with some ST depression in inferior leads, likely secondary to demand ischemia.  Continues to deny chest pain.     Chronic Diastolic Congestive Heart Failure / Pulmonary Hypertension  - TTE 3/3/17: EF 55%, diastolic dysfunction present, PAP 51  - Wheezes appreciated but no crackles on exam  -  but patient appeared euvolemic at time of admission.  Low suspicion for hypervolemic hyponatremia at this time.     - Continue home lasix 20 mg daily.  Net negative 2.8L since admission.         HTN  - BP normotensive this morning; higher BP's likely related to agitation/anxiety.  - Continue toprol-xl and lisinopril.  Will titrate doses if needed.     Dyslipidemia  - Lipid panel:  Chol 223, TG 45, HDL 82,   - Patient not currently on lipid lowering therapy.  Pravastatin previously discontinued due to muscle pain.      Anemia of Chronic Disease  - Hgb 8.5, MCV 96 on admission, at baseline  - iron 21, TIBC 330, ferritin 123, folate 18, vit B12 810    - Repeat H/H remains stable at 8.7/28.1     Hx of A-fib with RVR  - Diagnosed as new onset in recent hospitalization 3/2017  - Cardiology consulted at that hospitalization: patient started on eliquis and metoprolol increased for better rate control  - Remains in sinus rhythm  - Continue Eliquis 2.5 mg BID and Metoprolol   - Will need outpatient Cardiology follow up      Anxiety / Depression  - No acute issues  - Continue home celexa, hold home xanax and order as needed     F: None  E: hyponatremia, hypochloremia likely secondary to poor PO intake   N: Cardiac Diet     PPX: Eliquis (2.5)    Code:  Partial - no  compressions or chemical resuscitation, intubation only     Dispo:  Pending improvement in respiratory status ; may need home O2    Nilson Lynn  [facility] Internal Medicine HO-II  U Internal Medicine Service Team A    [facility] Medicine Hospitalist Pager numbers:   U Hospitalist Medicine Team A (Man/Shashank): [phone]  [facility] Hospitalist Medicine Team B (Zak/[place]):  [phone]

## 2017-03-31 NOTE — PHYSICIAN QUERY
PT Name: Kaity Rebolledo  MR #: [id number]    Physician Query Form - Nutrition Clarification     CDS/: Gisel Shultz               Contact information: [email or web address]    This form is a permanent document in the medical record.     Query Date: March 31, 2017    By submitting this query, we are merely seeking further clarification of documentation.. Please utilize your independent clinical judgment when addressing the question(s) below.    The Medical record contains the following:   Indicators  Supporting Clinical Findings Location in Medical Record   x % of Estimated Energy Intake over a time frame from p.o., TF, or TPN % Intake of Estimated needs: 0-25%   Nutrition CN 3/30    Weight Status over a time frame      Subcutaneous Fat and/or Muscle Loss      Fluid Accumulation or Edema      Reduced  Strength     x Wt / BMI / Usual Body Weight BMI = 20    WT = 56.4kg  (124lb)   Anthropometrics 3/28    Delayed Wound Healing / Failure to Thrive     x Acute or Chronic Illness Acute Hypoxic Respiratory Failure 2/2 COPD Exacerbation    Chronic Diastolic Congestive Heart Failure / Pulmonary Hypertension       PN 3/31    Medication      Treatment     x Other 1. Boost plus at lunch   2. [place] patient preferences as able   Nutrition CN 3/30     AND / ASPEN Clinical Characteristics (October 2011)  A minimum of two characteristics is recommended for diagnosing either moderate or severe malnutrition   Mild Malnutrition Moderate Malnutrition Severe Malnutrition   Energy Intake from p.o., TF or TPN. < 75% intake of estimated energy needs for less than 7 days < 75% intake of estimated energy needs for greater than 7 days < 50% intake of estimated energy needs for > 5 days   Weight Loss 1-2% in 1 month  5% in 3 months  7.5% in 6 months  10% in 1 year 1-2 % in 1 week  5% in 1 month  7.5% in 3 months  10% in 6 months  20% in 1 year > 2% in 1 week  > 5% in 1 month  > 7.5% in 3 months  > 10% in 6 months  > 20% in 1 year    Physical Findings     None *Mild subcutaneous fat and/or muscle loss  *Mild fluid accumulation  *Stage II decubitus  *Surgical wound or non-healing wound *Mod/severe subcutaneous fat and/or muscle loss  *Mod/severe fluid accumulation  *Stage III or IV decubitus  *Non-healing surgical wound     Provider, please specify diagnosis or diagnoses associated with above clinical findings.    [x ] Mild Protein-Calorie Malnutrition  [ ] Cachexia  [ ] Anorexia  [ ] Abnormal Weight Loss  [ ] Underweight  [ ] Other Nutritional Diagnosis (please specify): ____________________________________  [ ] Other: ________________________________  [ ] Clinically Undetermined    Please document in your progress notes daily for the duration of treatment until resolved and include in your discharge summary.

## 2017-03-31 NOTE — TELEPHONE ENCOUNTER
----- Message from Julia Villasenor sent at 3/31/2017 12:03 PM CDT -----  Jesus, case management, called.   No.  [phone]   Patient needs a hospital follow up.  He is being discharged on Saturday, 4/1/17.  He was in the hospital with respiratory failure.   Please call Jesus to schedule.

## 2017-03-31 NOTE — PLAN OF CARE
Follow-up With  Details  Why  Contact Info   Fernanda Saldana  On 4/12/2017  @12:30 for lab appointment the with Dr. Saldana.  [address]  [address]  [place] [id number] [phone]          TN sent notes/clinicals to [facility], daughter at bedside, state they will have her portable O2 ready for when she is ready for DC tomorrow.  Expect family will bring her in vehicle to her Assisted [facility].       03/31/17 1248   Final Note   Assessment Type Final Discharge Note   Discharge Disposition Home   Discharge planning education complete? Yes   What phone number can be called within the next 1-3 days to see how you are doing after discharge? [phone]   Hospital Follow Up  Appt(s) scheduled? Yes   Discharge plans and expectations educations in teach back method with documentation complete? Yes   Offered [facility] -- Bedside Delivery? Yes   Discharge/Hospital Encounter Summary to (non-[facility]) PCP Yes   Referral to Outpatient Case Management complete? Yes   Referral to / orders for Home Health Complete? n/a   30 day supply of medicines given at discharge, if documented non-compliance / non-adherence? No   Any social issues identified prior to discharge? No   Did you assess the readiness or willingness of the family or caregiver to support self management of care? Yes   Right Care Referral Info   Post Acute Recommendation No Care   Jared Miles RN  Transitional Navigator/Case Management  [phone]

## 2017-03-31 NOTE — PROGRESS NOTES
TN faxed scripts to [facility] and notified daughter this was done.  Fax .  Jared Miles RN  Transitional Navigator/Case Management  [phone]

## 2017-03-31 NOTE — PROGRESS NOTES
[facility]    FACILITY TRANSFER ORDERS      Patient Name: Kaity Rebolledo  YOB: 1933    PCP: Samuel Soriano MD   PCP Address: [address] / [place] [address]  PCP Phone Number: [phone]  PCP Fax: [phone]    Encounter Date: 03/31/2017    Admit to: [facility]    Vital Signs:  Routine    Diagnoses:   Active Hospital Problems    Diagnosis  POA    *Acute on chronic respiratory failure with hypoxia and hypercapnia [J96.21, J96.22]  Yes    Pulmonary hypertension due to lung disease [I27.2, J98.4]  Yes    Elevated troponin [R79.89]  Yes    Pulmonary hypertension [I27.2]  Yes    History of atrial fibrillation [Z86.79]  Not Applicable    SOB (shortness of breath) [R06.02]  Yes    Hyponatremia [E87.1]  Yes    Urinary retention [R33.9]  Yes    (HFpEF) heart failure with preserved ejection fraction [I50.30]  Yes    Stage 4 very severe COPD by GOLD classification [J44.9]  Yes    Coronary artery disease involving native coronary artery of native heart without angina pectoris [I25.10]  Yes    COPD exacerbation [J44.1]  Yes    Anemia of chronic disease [D63.8]  Yes    Essential hypertension [I10]  Yes    Dyslipidemia [E78.5]  Yes      Resolved Hospital Problems    Diagnosis Date Resolved POA   No resolved problems to display.       Allergies:  Review of patient's allergies indicates:   Allergen Reactions    Advair diskus  [fluticasone-salmeterol]      Other reaction(s): Nausea    Morphine Hallucinations    Pravastatin      Other reaction(s): Muscle pain       Diet: cardiac diet; Boost/Ensure supplements PRN    Activities: Activity as tolerated    Nursing: Vital as protocol     CONSULTS:    None    MISCELLANEOUS CARE:  None    WOUND CARE ORDERS  None    Medications: Review discharge medications with patient and family and provide education.      Current Discharge Medication List      START taking these medications    Details   lisinopril (PRINIVIL,ZESTRIL) 2.5  MG tablet Take 1 tablet (2.5 mg total) by mouth once daily.  Qty: 30 tablet, Refills: 4      oseltamivir (TAMIFLU) 30 MG capsule Take 1 capsule (30 mg total) by mouth 2 (two) times daily.  Qty: 4 capsule, Refills: 0         CONTINUE these medications which have NOT CHANGED    Details   albuterol 90 mcg/actuation inhaler Inhale 2 puffs into the lungs every 6 (six) hours as needed for Wheezing or Shortness of Breath.  Qty: 6.7 g, Refills: 4    Comments: OK FOR GENERIC  Associated Diagnoses: Chronic obstructive pulmonary disease, unspecified COPD type      alprazolam (XANAX) 0.25 MG tablet Take 1 tablet (0.25 mg total) by mouth nightly as needed for Anxiety.  Qty: 20 tablet, Refills: 0    Associated Diagnoses: Anxiety      apixaban 2.5 mg Tab Take 1 tablet (2.5 mg total) by mouth 2 (two) times daily.  Qty: 60 tablet, Refills: 1      ascorbic acid (VITAMIN C) 500 MG tablet Take 500 mg by mouth 2 (two) times daily.      aspirin (ECOTRIN) 81 MG EC tablet Take 1 tablet (81 mg total) by mouth once daily.  Refills: 0    Associated Diagnoses: Chronic systolic heart failure      citalopram (CELEXA) 20 MG tablet TAKE 1 TABLET BY MOUTH ONCE DAILY  Qty: 30 tablet, Refills: 2    Comments: This prescription was filled today. Any refills authorized will be placed on file.      cyanocobalamin (VITAMIN B-12) 100 MCG tablet Take 1 tablet (100 mcg total) by mouth once daily.  Qty: 90 tablet, Refills: 3    Associated Diagnoses: Macrocytic anemia      cyproheptadine (PERIACTIN) 4 mg tablet Take 1 tablet (4 mg total) by mouth 3 (three) times daily as needed.  Qty: 30 tablet, Refills: 2    Comments: This prescription was filled today. Any refills authorized will be placed on file.      fluticasone-vilanterol (BREO) 100-25 mcg/dose diskus inhaler Inhale 1 puff into the lungs once daily.  Qty: 60 each, Refills: 12    Associated Diagnoses: Chronic obstructive pulmonary disease, unspecified COPD type      furosemide (LASIX) 20 MG tablet Take 1  tablet (20 mg total) by mouth once daily.  Qty: 30 tablet, Refills: 1      guaifenesin (MUCINEX) 600 mg 12 hr tablet Take 2 tablets (1,200 mg total) by mouth 2 (two) times daily. Okay to dispense box of medication  as prepackaged by .  Qty: 60 tablet, Refills: 6    Associated Diagnoses: Thick sputum      levalbuterol (XOPENEX) 0.31 mg/3 mL nebulizer solution Take 3 mLs (0.31 mg total) by nebulization 4 (four) times daily.  Qty: 300 mL, Refills: 12    Associated Diagnoses: Chronic airway obstruction, not elsewhere classified; Other dyspnea and respiratory abnormality      metoprolol succinate (TOPROL-XL) 50 MG 24 hr tablet Take 1 tablet (50 mg total) by mouth once daily.  Qty: 90 tablet, Refills: 0    Associated Diagnoses: Chronic systolic heart failure      multivitamin (ONE DAILY MULTIVITAMIN) per tablet Take 1 tablet by mouth once daily.         STOP taking these medications       levoFLOXacin (LEVAQUIN) 750 MG tablet Comments:   Reason for Stopping:         predniSONE (DELTASONE) 20 MG tablet Comments:   Reason for Stopping:                    _________________________________  Harrison Rivera MD  03/31/2017

## 2017-03-31 NOTE — PLAN OF CARE
Problem: Patient Care Overview  Goal: Plan of Care Review  Outcome: Ongoing (interventions implemented as appropriate)  Monitor SpO2, adjust oxygen to keep saturation greater than or equal to 92%. Encourage periodic deep breathing. Continue treatments as ordered.

## 2017-03-31 NOTE — TELEPHONE ENCOUNTER
Returned Jesus's call, lm advising that the pt is scheduled to see priority clinic on 4.12.17 as hospital f/u

## 2017-04-01 PROBLEM — J44.1 COPD WITH RESPIRATORY FAILURE, ACUTE: Status: ACTIVE | Noted: 2017-04-01

## 2017-04-01 PROBLEM — J96.00 COPD WITH RESPIRATORY FAILURE, ACUTE: Status: ACTIVE | Noted: 2017-04-01

## 2017-04-01 PROBLEM — F41.8 DEPRESSION WITH ANXIETY: Status: ACTIVE | Noted: 2017-04-01

## 2017-04-01 LAB
ALBUMIN SERPL BCP-MCNC: 3.1 G/DL
ALP SERPL-CCNC: 42 U/L
ALT SERPL W/O P-5'-P-CCNC: 28 U/L
ANION GAP SERPL CALC-SCNC: 7 MMOL/L
AST SERPL-CCNC: 29 U/L
BASOPHILS # BLD AUTO: 0.01 K/UL
BASOPHILS NFR BLD: 0.1 %
BILIRUB SERPL-MCNC: 0.2 MG/DL
BUN SERPL-MCNC: 14 MG/DL
CALCIUM SERPL-MCNC: 9.1 MG/DL
CHLORIDE SERPL-SCNC: 91 MMOL/L
CO2 SERPL-SCNC: 39 MMOL/L
CREAT SERPL-MCNC: 0.8 MG/DL
DIFFERENTIAL METHOD: ABNORMAL
EOSINOPHIL # BLD AUTO: 0 K/UL
EOSINOPHIL NFR BLD: 0 %
ERYTHROCYTE [DISTWIDTH] IN BLOOD BY AUTOMATED COUNT: 13.2 %
EST. GFR  (AFRICAN AMERICAN): >60 ML/MIN/1.73 M^2
EST. GFR  (NON AFRICAN AMERICAN): >60 ML/MIN/1.73 M^2
GLUCOSE SERPL-MCNC: 86 MG/DL
HCT VFR BLD AUTO: 28.4 %
HGB BLD-MCNC: 9.1 G/DL
LYMPHOCYTES # BLD AUTO: 1.9 K/UL
LYMPHOCYTES NFR BLD: 26.9 %
MCH RBC QN AUTO: 30.1 PG
MCHC RBC AUTO-ENTMCNC: 32 %
MCV RBC AUTO: 94 FL
MONOCYTES # BLD AUTO: 1.4 K/UL
MONOCYTES NFR BLD: 19.8 %
NEUTROPHILS # BLD AUTO: 3.7 K/UL
NEUTROPHILS NFR BLD: 52.9 %
PLATELET # BLD AUTO: 248 K/UL
PMV BLD AUTO: 10.4 FL
POTASSIUM SERPL-SCNC: 3.5 MMOL/L
PROT SERPL-MCNC: 5.9 G/DL
RBC # BLD AUTO: 3.02 M/UL
SODIUM SERPL-SCNC: 137 MMOL/L
WBC # BLD AUTO: 6.92 K/UL

## 2017-04-01 PROCEDURE — 94761 N-INVAS EAR/PLS OXIMETRY MLT: CPT

## 2017-04-01 PROCEDURE — 27000221 HC OXYGEN, UP TO 24 HOURS

## 2017-04-01 PROCEDURE — 80053 COMPREHEN METABOLIC PANEL: CPT

## 2017-04-01 PROCEDURE — G8979 MOBILITY GOAL STATUS: HCPCS | Mod: CJ | Performed by: PHYSICAL THERAPIST

## 2017-04-01 PROCEDURE — 25000003 PHARM REV CODE 250: Performed by: HOSPITALIST

## 2017-04-01 PROCEDURE — 97116 GAIT TRAINING THERAPY: CPT | Performed by: PHYSICAL THERAPIST

## 2017-04-01 PROCEDURE — 97162 PT EVAL MOD COMPLEX 30 MIN: CPT | Performed by: PHYSICAL THERAPIST

## 2017-04-01 PROCEDURE — G8978 MOBILITY CURRENT STATUS: HCPCS | Mod: CK | Performed by: PHYSICAL THERAPIST

## 2017-04-01 PROCEDURE — 11000001 HC ACUTE MED/SURG PRIVATE ROOM

## 2017-04-01 PROCEDURE — 25000003 PHARM REV CODE 250: Performed by: INTERNAL MEDICINE

## 2017-04-01 PROCEDURE — 25000003 PHARM REV CODE 250: Performed by: STUDENT IN AN ORGANIZED HEALTH CARE EDUCATION/TRAINING PROGRAM

## 2017-04-01 PROCEDURE — 94640 AIRWAY INHALATION TREATMENT: CPT

## 2017-04-01 PROCEDURE — 85025 COMPLETE CBC W/AUTO DIFF WBC: CPT

## 2017-04-01 PROCEDURE — 36415 COLL VENOUS BLD VENIPUNCTURE: CPT

## 2017-04-01 PROCEDURE — 25000242 PHARM REV CODE 250 ALT 637 W/ HCPCS: Performed by: STUDENT IN AN ORGANIZED HEALTH CARE EDUCATION/TRAINING PROGRAM

## 2017-04-01 PROCEDURE — 63600175 PHARM REV CODE 636 W HCPCS: Performed by: STUDENT IN AN ORGANIZED HEALTH CARE EDUCATION/TRAINING PROGRAM

## 2017-04-01 RX ADMIN — ACETAMINOPHEN 500 MG: 500 TABLET ORAL at 01:04

## 2017-04-01 RX ADMIN — ASPIRIN 81 MG: 81 TABLET, COATED ORAL at 09:04

## 2017-04-01 RX ADMIN — IPRATROPIUM BROMIDE AND ALBUTEROL SULFATE 3 ML: .5; 3 SOLUTION RESPIRATORY (INHALATION) at 03:04

## 2017-04-01 RX ADMIN — IPRATROPIUM BROMIDE AND ALBUTEROL SULFATE 3 ML: .5; 3 SOLUTION RESPIRATORY (INHALATION) at 08:04

## 2017-04-01 RX ADMIN — TIOTROPIUM BROMIDE 18 MCG: 18 CAPSULE ORAL; RESPIRATORY (INHALATION) at 09:04

## 2017-04-01 RX ADMIN — METOPROLOL SUCCINATE 50 MG: 50 TABLET, EXTENDED RELEASE ORAL at 09:04

## 2017-04-01 RX ADMIN — FUROSEMIDE 20 MG: 20 TABLET ORAL at 09:04

## 2017-04-01 RX ADMIN — LISINOPRIL 2.5 MG: 2.5 TABLET ORAL at 09:04

## 2017-04-01 RX ADMIN — DOCUSATE SODIUM 100 MG: 100 CAPSULE, LIQUID FILLED ORAL at 09:04

## 2017-04-01 RX ADMIN — FLUTICASONE FUROATE AND VILANTEROL TRIFENATATE 1 PUFF: 100; 25 POWDER RESPIRATORY (INHALATION) at 09:04

## 2017-04-01 RX ADMIN — IPRATROPIUM BROMIDE AND ALBUTEROL SULFATE 3 ML: .5; 3 SOLUTION RESPIRATORY (INHALATION) at 02:04

## 2017-04-01 RX ADMIN — APIXABAN 2.5 MG: 2.5 TABLET, FILM COATED ORAL at 09:04

## 2017-04-01 RX ADMIN — APIXABAN 2.5 MG: 2.5 TABLET, FILM COATED ORAL at 08:04

## 2017-04-01 RX ADMIN — OSELTAMIVIR PHOSPHATE 30 MG: 30 CAPSULE ORAL at 09:04

## 2017-04-01 RX ADMIN — IPRATROPIUM BROMIDE AND ALBUTEROL SULFATE 3 ML: .5; 3 SOLUTION RESPIRATORY (INHALATION) at 12:04

## 2017-04-01 RX ADMIN — VITAM B12 100 MCG: 100 TAB at 09:04

## 2017-04-01 RX ADMIN — PREDNISONE 40 MG: 20 TABLET ORAL at 09:04

## 2017-04-01 RX ADMIN — PANTOPRAZOLE SODIUM 600 MG: 40 TABLET, DELAYED RELEASE ORAL at 08:04

## 2017-04-01 RX ADMIN — CITALOPRAM HYDROBROMIDE 20 MG: 20 TABLET ORAL at 09:04

## 2017-04-01 RX ADMIN — PANTOPRAZOLE SODIUM 600 MG: 40 TABLET, DELAYED RELEASE ORAL at 09:04

## 2017-04-01 RX ADMIN — ACETAMINOPHEN 500 MG: 500 TABLET ORAL at 07:04

## 2017-04-01 RX ADMIN — OSELTAMIVIR PHOSPHATE 30 MG: 30 CAPSULE ORAL at 08:04

## 2017-04-01 RX ADMIN — ACETAMINOPHEN 500 MG: 500 TABLET ORAL at 08:04

## 2017-04-01 NOTE — PLAN OF CARE
Problem: Patient Care Overview  Goal: Plan of Care Review  Outcome: Ongoing (interventions implemented as appropriate)  Reviewed plan of care with patient and daughters at bedside. Patient verbalized understanding. AAOx3. Pt on 3L nasal cannula; sats 95-97%. Up to bedside commode w/ one person assist. Pt on cardiac diet; tolerates PO meds well. PT evaluation done this afternoon. PRN tylenol given throughout shift per orders for complaints of pain to bilateral lower extremities. Mild relied obtained. Patient on continuous tele monitoring; NSR-ST; HR 80s - low 100s. No true red alarms or ectopy noted. Bed alarm set, bed in lowest position, call bell in reach. Daughter at bedside. Will continue to monitor.

## 2017-04-01 NOTE — PLAN OF CARE
Problem: Patient Care Overview  Goal: Plan of Care Review  Outcome: Ongoing (interventions implemented as appropriate)  No true red alarms noted on telemetry.NSR noted with frequent PVC's. HR noted in the 70's- 80's. Pt on O23L/nc. SpO2 94%. Use Bi-Pap for a short time last night.

## 2017-04-01 NOTE — PLAN OF CARE
Problem: Patient Care Overview  Goal: Plan of Care Review  Outcome: Ongoing (interventions implemented as appropriate)  Pt received on NC at 3 LPM. Pt SpO2 99%. No respiratory distress. Will continue to monitor.

## 2017-04-01 NOTE — PLAN OF CARE
Problem: Breathing Pattern Ineffective (Adult)  Goal: Effective Oxygenation/Ventilation  Patient will demonstrate the desired outcomes by discharge/transition of care.   Outcome: Ongoing (interventions implemented as appropriate)  O2 3L/nc in use. SpO2 94% will continue to monitor.

## 2017-04-01 NOTE — PT/OT/SLP EVAL
Physical Therapy  Evaluation    Kaity Rebolledo   MRN: [id number]   Admitting Diagnosis: Acute on chronic respiratory failure with hypoxia and hypercapnia    PT Received On: 04/01/17  PT Start Time: 1333     PT Stop Time: 1404    PT Total Time (min): 31 min       Billable Minutes:  Evaluation 15 and Gait [id number]    Diagnosis: Acute on chronic respiratory failure with hypoxia and hypercapnia.The primary encounter diagnosis was SOB (shortness of breath). Diagnoses of COPD exacerbation, COPD with respiratory failure, acute, Acute on chronic respiratory failure with hypoxia and hypercapnia, COPD, very severe, Pulmonary hypertension, Hyponatremia, (HFpEF) heart failure with preserved ejection fraction, Anemia of chronic disease, Coronary artery disease involving native coronary artery of native heart without angina pectoris, Dyslipidemia, Essential hypertension, Paroxysmal atrial fibrillation, Depression with anxiety, Urinary retention, Elevated troponin, Stage 4 very severe COPD by GOLD classification, Pulmonary hypertension due to lung disease, Chronic diastolic heart failure, Anemia, chronic disease, Anxiety and depression, and History of atrial fibrillation were also pertinent to this visit.    Chief Complaint: difficulty breathing while walking        Past Medical History:   Diagnosis Date    Atrial fibrillation with RVR 3/4/2017    CAD (coronary artery disease) 3/14/2016    Cardiomyopathy 9/24/2015    CHF (congestive heart failure) 10/14/2015    COPD (chronic obstructive pulmonary disease)     COPD exacerbation 3/14/2016    Fall     patient  in  wheel  chair    Hyperlipidemia     Hypertension     Osteoporosis     Pneumonia     Rotator cuff injury     R s/p therapy      Past Surgical History:   Procedure Laterality Date    CATARACT EXTRACTION BILATERAL W/ ANTERIOR VITRECTOMY      EYE SURGERY      FRACTURE SURGERY      LEG SURGERY         Referring physician: Dr. Conway  Date referred to PT:  2017      General Precautions: Standard, fall  Orthopedic Precautions: Full weight bearing   Braces:         Do you have any cultural, spiritual, Religion conflicts, given your current situation?:  (none)    Patient History:  Lives With:  ([facility] in [place])  Living Arrangements:  (Ambulated with and without RW and 2 Lit O2 in Apartment. Was pushed in w/c to dining area, 24/7 O2.)  Transportation Available: family or friend will provide  Equipment Currently Used at Home: walker, rolling, walker, standard, bedside commode  DME owned (not currently used): rolling walker and wheelchair    Previous Level of Function:  Ambulation Skills: needs device  Transfer Skills: needs device  ADL Skills: needs device  Work/Leisure Activity: needs device (Had assistance for bathing from care partner)    Subjective:  Communicated with nurse prior to session.  Chief Complaint: difficulty breathing while ambulating  Patient goals: get strong enough to go back to assisted living    Pain Ratin10   Location - Side: Left     Location: hip  Pain Addressed: Pre-medicate for activity  Pain Rating Post-Intervention: 1/10    Objective:         Cognitive Exam:  Oriented to: Person, Place, Time and Situation    Follows Commands/attention: Follows one-step commands  Communication: clear/fluent  Safety awareness/insight to disability: intact    Physical Exam:  Postural examination/scapula alignment: Rounded shoulder, Head forward, Posterior pelvic tilt and Kyphosis    Skin integrity: Visible skin intact  Edema: None noted     Sensation:   Intact    Upper Extremity Range of Motion:  Right Upper Extremity: WFL  Left Upper Extremity: WFL    Upper Extremity Strength:  Right Upper Extremity: WFL  Left Upper Extremity: WFL    Lower Extremity Range of Motion:  Right Lower Extremity: WFL  Left Lower Extremity: WFL    Lower Extremity Strength:  Right Lower Extremity: WFL except 3+/5 at hips  Left Lower Extremity: WFL except 3+/5 at  hips     Fine motor coordination:  NT    Gross motor coordination: WFL    Functional Mobility:  Bed Mobility:  Rolling/Turning to Left: Stand by assistance  Rolling/Turning Right: Stand by assistance  Supine to Sit: Stand by Assistance  Sit to Supine: Stand by Assistance    Transfers:  Sit <> Stand Assistance: Stand By Assistance  Sit <> Stand Assistive Device: Rolling Walker  Bed <> Chair Technique: Stand Pivot    Gait:   Gait Distance: Pt ambulated 8' x 3 with RW on 2 Lit O2 with short steps, limited by SOB, wheezing.      Stairs:  NT    Balance:   Static Sit: GOOD-: Takes MODERATE challenges from all directions but inconsistently  Dynamic Sit: FAIR+: Maintains balance through MINIMAL excursions of active trunk motion  Static Stand: FAIR+: Takes MINIMAL challenges from all directions with RW  Dynamic stand: FAIR: Needs CONTACT GUARD during gait with RW    AM-PAC 6 CLICK MOBILITY  How much help from another person does this patient currently need?   1 = Unable, Total/Dependent Assistance  2 = A lot, Maximum/Moderate Assistance  3 = A little, Minimum/Contact Guard/Supervision  4 = None, Modified [place]/Independent    Turning over in bed (including adjusting bedclothes, sheets and blankets)?: 4  Sitting down on and standing up from a chair with arms (e.g., wheelchair, bedside commode, etc.): 3  Moving from lying on back to sitting on the side of the bed?: 3  Moving to and from a bed to a chair (including a wheelchair)?: 3  Need to walk in hospital room?: 3  Climbing 3-5 steps with a railing?: 3  Total Score: 19     AM-PAC Raw Score CMS G-Code Modifier Level of Impairment Assistance   6 % Total / Unable   7 - 9 CM 80 - 100% Maximal Assist   10 - 14 CL 60 - 80% Moderate Assist   15 - 19 CK 40 - 60% Moderate Assist   20 - 22 CJ 20 - 40% Minimal Assist   23 CI 1-20% SBA / CGA   24 CH 0% Independent/ Mod I     Patient left HOB elevated with call button in reach, bed alarm on and nurse notified and  present.    Assessment:   Kaity Rebolledo is a 83 y.o. female with a medical diagnosis of Acute on chronic respiratory failure with hypoxia and hypercapnia and presents with decreased endurance and strength with resulting decline in functional mobility.    Rehab identified problem list/impairments: Rehab identified problem list/impairments: weakness, gait instability, impaired cardiopulmonary response to activity, impaired endurance, impaired balance, impaired self care skills, pain, impaired functional mobilty    Rehab potential is fair.    Activity tolerance: Fair    Discharge recommendations: Discharge Facility/Level Of Care Needs:  (SNF to get stronger prior to return to [facility].)     Barriers to discharge:      Equipment recommendations: Equipment Needed After Discharge: none     GOALS:   Physical Therapy Goals        Problem: Physical Therapy Goal    Goal Priority Disciplines Outcome Goal Variances Interventions   Physical Therapy Goal     PT/OT, PT      Description:  1.  Ambulate 40' with RW with 2 Lit O2.  2.  Sit in chair 1 1/2 hours.  3.  Perform 10 x 3 BLE exercises sitting.              PLAN:    Patient to be seen 6 x/week to address the above listed problems via gait training, therapeutic activities, therapeutic exercises, neuromuscular re-education  Plan of Care expires: 05/01/17  Plan of Care reviewed with: patient (daughter, Sheela Michele)    Functional Assessment Tool Used: Ampac  Score: 19  Functional Limitation: Mobility: Walking and moving around  Mobility: Walking and Moving Around Current Status (): CK  Mobility: Walking and Moving Around Goal Status (): CARLOS Leiva, PT  04/01/2017

## 2017-04-01 NOTE — PLAN OF CARE
Problem: Fall Risk (Adult)  Goal: Absence of Falls  Patient will demonstrate the desired outcomes by discharge/transition of care.   Outcome: Ongoing (interventions implemented as appropriate)  Bed alarm on and bed in lowest position. Call bell in reach. Instructed to call for assistance before trying to get out of bed. Verbalized understanding. Daughter at bedside.

## 2017-04-02 LAB
ALBUMIN SERPL BCP-MCNC: 3.2 G/DL
ALP SERPL-CCNC: 41 U/L
ALT SERPL W/O P-5'-P-CCNC: 24 U/L
ANION GAP SERPL CALC-SCNC: 8 MMOL/L
AST SERPL-CCNC: 22 U/L
BASOPHILS # BLD AUTO: 0.01 K/UL
BASOPHILS NFR BLD: 0.1 %
BILIRUB SERPL-MCNC: 0.2 MG/DL
BUN SERPL-MCNC: 12 MG/DL
CALCIUM SERPL-MCNC: 8.9 MG/DL
CHLORIDE SERPL-SCNC: 90 MMOL/L
CO2 SERPL-SCNC: 40 MMOL/L
CREAT SERPL-MCNC: 0.8 MG/DL
DIFFERENTIAL METHOD: ABNORMAL
EOSINOPHIL # BLD AUTO: 0 K/UL
EOSINOPHIL NFR BLD: 0.1 %
ERYTHROCYTE [DISTWIDTH] IN BLOOD BY AUTOMATED COUNT: 13.6 %
EST. GFR  (AFRICAN AMERICAN): >60 ML/MIN/1.73 M^2
EST. GFR  (NON AFRICAN AMERICAN): >60 ML/MIN/1.73 M^2
GLUCOSE SERPL-MCNC: 87 MG/DL
HCT VFR BLD AUTO: 26.8 %
HGB BLD-MCNC: 8.6 G/DL
LYMPHOCYTES # BLD AUTO: 2 K/UL
LYMPHOCYTES NFR BLD: 28.8 %
MCH RBC QN AUTO: 30.6 PG
MCHC RBC AUTO-ENTMCNC: 32.1 %
MCV RBC AUTO: 95 FL
MONOCYTES # BLD AUTO: 1.3 K/UL
MONOCYTES NFR BLD: 18 %
NEUTROPHILS # BLD AUTO: 3.7 K/UL
NEUTROPHILS NFR BLD: 52.7 %
PLATELET # BLD AUTO: 243 K/UL
PMV BLD AUTO: 10.3 FL
POTASSIUM SERPL-SCNC: 3.6 MMOL/L
PROT SERPL-MCNC: 5.9 G/DL
RBC # BLD AUTO: 2.81 M/UL
SODIUM SERPL-SCNC: 138 MMOL/L
WBC # BLD AUTO: 7.05 K/UL

## 2017-04-02 PROCEDURE — 25000003 PHARM REV CODE 250: Performed by: STUDENT IN AN ORGANIZED HEALTH CARE EDUCATION/TRAINING PROGRAM

## 2017-04-02 PROCEDURE — 94640 AIRWAY INHALATION TREATMENT: CPT

## 2017-04-02 PROCEDURE — 25000242 PHARM REV CODE 250 ALT 637 W/ HCPCS: Performed by: STUDENT IN AN ORGANIZED HEALTH CARE EDUCATION/TRAINING PROGRAM

## 2017-04-02 PROCEDURE — 36415 COLL VENOUS BLD VENIPUNCTURE: CPT

## 2017-04-02 PROCEDURE — G8988 SELF CARE GOAL STATUS: HCPCS | Mod: CJ

## 2017-04-02 PROCEDURE — 27000221 HC OXYGEN, UP TO 24 HOURS

## 2017-04-02 PROCEDURE — 25000003 PHARM REV CODE 250: Performed by: INTERNAL MEDICINE

## 2017-04-02 PROCEDURE — G8987 SELF CARE CURRENT STATUS: HCPCS | Mod: CK

## 2017-04-02 PROCEDURE — 80053 COMPREHEN METABOLIC PANEL: CPT

## 2017-04-02 PROCEDURE — 25000003 PHARM REV CODE 250

## 2017-04-02 PROCEDURE — 11000001 HC ACUTE MED/SURG PRIVATE ROOM

## 2017-04-02 PROCEDURE — 94761 N-INVAS EAR/PLS OXIMETRY MLT: CPT

## 2017-04-02 PROCEDURE — 85025 COMPLETE CBC W/AUTO DIFF WBC: CPT

## 2017-04-02 PROCEDURE — 97530 THERAPEUTIC ACTIVITIES: CPT

## 2017-04-02 PROCEDURE — 63600175 PHARM REV CODE 636 W HCPCS: Performed by: STUDENT IN AN ORGANIZED HEALTH CARE EDUCATION/TRAINING PROGRAM

## 2017-04-02 PROCEDURE — 25000003 PHARM REV CODE 250: Performed by: HOSPITALIST

## 2017-04-02 PROCEDURE — 97165 OT EVAL LOW COMPLEX 30 MIN: CPT

## 2017-04-02 RX ORDER — TRAMADOL HYDROCHLORIDE 50 MG/1
50 TABLET ORAL EVERY 6 HOURS PRN
Status: DISCONTINUED | OUTPATIENT
Start: 2017-04-02 | End: 2017-04-05 | Stop reason: HOSPADM

## 2017-04-02 RX ORDER — ACETAMINOPHEN 500 MG
1000 TABLET ORAL 3 TIMES DAILY
Status: DISCONTINUED | OUTPATIENT
Start: 2017-04-02 | End: 2017-04-05 | Stop reason: HOSPADM

## 2017-04-02 RX ADMIN — PREDNISONE 40 MG: 20 TABLET ORAL at 08:04

## 2017-04-02 RX ADMIN — ACETAMINOPHEN 1000 MG: 500 TABLET ORAL at 09:04

## 2017-04-02 RX ADMIN — ACETAMINOPHEN 1000 MG: 500 TABLET ORAL at 11:04

## 2017-04-02 RX ADMIN — CITALOPRAM HYDROBROMIDE 20 MG: 20 TABLET ORAL at 08:04

## 2017-04-02 RX ADMIN — PANTOPRAZOLE SODIUM 600 MG: 40 TABLET, DELAYED RELEASE ORAL at 09:04

## 2017-04-02 RX ADMIN — DOCUSATE SODIUM 100 MG: 100 CAPSULE, LIQUID FILLED ORAL at 08:04

## 2017-04-02 RX ADMIN — ACETAMINOPHEN 500 MG: 500 TABLET ORAL at 06:04

## 2017-04-02 RX ADMIN — FUROSEMIDE 20 MG: 20 TABLET ORAL at 08:04

## 2017-04-02 RX ADMIN — IPRATROPIUM BROMIDE AND ALBUTEROL SULFATE 3 ML: .5; 3 SOLUTION RESPIRATORY (INHALATION) at 11:04

## 2017-04-02 RX ADMIN — OSELTAMIVIR PHOSPHATE 30 MG: 30 CAPSULE ORAL at 08:04

## 2017-04-02 RX ADMIN — PANTOPRAZOLE SODIUM 600 MG: 40 TABLET, DELAYED RELEASE ORAL at 08:04

## 2017-04-02 RX ADMIN — VITAM B12 100 MCG: 100 TAB at 08:04

## 2017-04-02 RX ADMIN — APIXABAN 2.5 MG: 2.5 TABLET, FILM COATED ORAL at 09:04

## 2017-04-02 RX ADMIN — OSELTAMIVIR PHOSPHATE 30 MG: 30 CAPSULE ORAL at 09:04

## 2017-04-02 RX ADMIN — FLUTICASONE FUROATE AND VILANTEROL TRIFENATATE 1 PUFF: 100; 25 POWDER RESPIRATORY (INHALATION) at 07:04

## 2017-04-02 RX ADMIN — METOPROLOL SUCCINATE 50 MG: 50 TABLET, EXTENDED RELEASE ORAL at 08:04

## 2017-04-02 RX ADMIN — IPRATROPIUM BROMIDE AND ALBUTEROL SULFATE 3 ML: .5; 3 SOLUTION RESPIRATORY (INHALATION) at 08:04

## 2017-04-02 RX ADMIN — TRAMADOL HYDROCHLORIDE 50 MG: 50 TABLET, COATED ORAL at 03:04

## 2017-04-02 RX ADMIN — IPRATROPIUM BROMIDE AND ALBUTEROL SULFATE 3 ML: .5; 3 SOLUTION RESPIRATORY (INHALATION) at 12:04

## 2017-04-02 RX ADMIN — LISINOPRIL 2.5 MG: 2.5 TABLET ORAL at 08:04

## 2017-04-02 RX ADMIN — IPRATROPIUM BROMIDE AND ALBUTEROL SULFATE 3 ML: .5; 3 SOLUTION RESPIRATORY (INHALATION) at 07:04

## 2017-04-02 RX ADMIN — TIOTROPIUM BROMIDE 18 MCG: 18 CAPSULE ORAL; RESPIRATORY (INHALATION) at 08:04

## 2017-04-02 RX ADMIN — IPRATROPIUM BROMIDE AND ALBUTEROL SULFATE 3 ML: .5; 3 SOLUTION RESPIRATORY (INHALATION) at 04:04

## 2017-04-02 RX ADMIN — ASPIRIN 81 MG: 81 TABLET, COATED ORAL at 08:04

## 2017-04-02 RX ADMIN — IPRATROPIUM BROMIDE AND ALBUTEROL SULFATE 3 ML: .5; 3 SOLUTION RESPIRATORY (INHALATION) at 03:04

## 2017-04-02 RX ADMIN — APIXABAN 2.5 MG: 2.5 TABLET, FILM COATED ORAL at 08:04

## 2017-04-02 NOTE — PLAN OF CARE
Problem: Occupational Therapy Goal  Goal: Occupational Therapy Goal  Goals to be met by: 5/2/17     Patient will increase functional independence with ADLs by performing:    LE Dressing with Modified [place].  Grooming with Modified [place].  Toileting from toilet with supervision for hygiene and clothing management.   Stand pivot transfers with supervision.  Toilet transfer to toilet with Supervision.  Increased functional strength to WFL for self care skills and functional .  Upper extremity exercise program x10 reps per handout, with independence.  Outcome: Ongoing (interventions implemented as appropriate)  Pt would benefit from cont OT services in order to maximize functional independence. Recommending SNF at d/c

## 2017-04-02 NOTE — PLAN OF CARE
Problem: Patient Care Overview  Goal: Plan of Care Review  SpO2   97% on  3 lpm NC. No apparent distress noted. Will continue to monitor.

## 2017-04-02 NOTE — PLAN OF CARE
Problem: Patient Care Overview  Goal: Plan of Care Review  Outcome: Ongoing (interventions implemented as appropriate)  Bed alarm on to maintain safety. Assist with shifting weight Q 2 hrs. No SOB noted. Remains on O2@ 3l/nc SpO2 between 92-94%.Tylenol 500 mg given Q6 hrs PRN for leg pain. No true red alarm noted on telemetry. Sinus Tach- Sinus Rhythm noted 104-90bpm. Will continue to monitor.

## 2017-04-02 NOTE — PLAN OF CARE
Problem: Patient Care Overview  Goal: Plan of Care Review  Outcome: Ongoing (interventions implemented as appropriate)  Reviewed plan of care with patient and family members. Patient verbalized understanding. AAOx3. Pt up in chair for meals. Pt complains of pain to bilateral lower extremities and right arm pain; relief obtained with PRN pain medication. Pt on 3L NC; sats 97%. Pt worked with OT this afternoon. Pt on cardiac diet; tolerates PO meds well. Seen by MD on rounds; POC to be d/c to SNF.  Patient on continuous tele monitoring; NSR-ST; 90s-low 100s. No true red alarms or ectopy noted. Slip resistant socks on, call bell in reach. Will continue to monitor.

## 2017-04-02 NOTE — PT/OT/SLP EVAL
Occupational Therapy  Evaluation/Treatment     Kaity Rebolledo   MRN: [id number]   Admitting Diagnosis: Acute on chronic respiratory failure with hypoxia and hypercapnia    OT Date of Treatment: 04/02/17   OT Start Time: 1120  OT Stop Time: 1141  OT Total Time (min): 21 min    Billable Minutes:  Evaluation 10  Therapeutic Activity 11    Diagnosis: Acute on chronic respiratory failure with hypoxia and hypercapnia   The primary encounter diagnosis was SOB (shortness of breath). Diagnoses of COPD exacerbation, COPD with respiratory failure, acute, Acute on chronic respiratory failure with hypoxia and hypercapnia, COPD, very severe, Pulmonary hypertension, Hyponatremia, (HFpEF) heart failure with preserved ejection fraction, Anemia of chronic disease, Coronary artery disease involving native coronary artery of native heart without angina pectoris, Dyslipidemia, Essential hypertension, Paroxysmal atrial fibrillation, Depression with anxiety, Urinary retention, Elevated troponin, Stage 4 very severe COPD by GOLD classification, Pulmonary hypertension due to lung disease, Chronic diastolic heart failure, Anemia, chronic disease, Anxiety and depression, and History of atrial fibrillation were also pertinent to this visit.      Past Medical History:   Diagnosis Date    Atrial fibrillation with RVR 3/4/2017    CAD (coronary artery disease) 3/14/2016    Cardiomyopathy 9/24/2015    CHF (congestive heart failure) 10/14/2015    COPD (chronic obstructive pulmonary disease)     COPD exacerbation 3/14/2016    Fall     patient  in  wheel  chair    Hyperlipidemia     Hypertension     Osteoporosis     Pneumonia     Rotator cuff injury     R s/p therapy      Past Surgical History:   Procedure Laterality Date    CATARACT EXTRACTION BILATERAL W/ ANTERIOR VITRECTOMY      EYE SURGERY      FRACTURE SURGERY      LEG SURGERY           General Precautions: Standard, fall, respiratory  Orthopedic Precautions: N/A  Braces:         "    Patient History:  Living Environment  Lives With: alone  Living Arrangements: assisted living  Living Environment Comment: Resident of assisted living facility; ambulates with RW PRN in apartment, w/c to dining hallway with assist to propel; assist bathing from caretaker with shower chair; Has BSc near bed at night; uses regular toilet during the day; Mod I dsg/toielting/grooming; 3L o2 a yulissa   Equipment Currently Used at Home: walker, rolling, bedside commode, shower chair, wheelchair, oxygen    Prior level of function:   Bed Mobility/Transfers: needs device  Grooming: independent  Bathing: needs device and assist  Upper Body Dressing: independent  Lower Body Dressing: independent  Toileting: needs device  Driving License: No     Dominant hand: right    Subjective:  Communicated with nsg prior to session.  Pt states, ' I don't know how much I could do right now."   Chief Complaint: SOB  Patient/Family stated goals: return to PLOF    Pain Ratin/10        Location:  (reports generalized body pain )  Pain Addressed: Pre-medicate for activity       Objective:       Cognitive Exam:  Oriented to: Person, Place, Time and Situation  Follows Commands/attention: Follows multistep  commands  Communication: clear/fluent  Memory:  No Deficits noted  Safety awareness/insight to disability: intact  Coping skills/emotional control: Appropriate to situation    Visual/perceptual:  Intact    Physical Exam:  Postural examination/scapula alignment: Rounded shoulder and Head forward  Skin integrity: Visible skin intact  Edema: Mild BLEs    Sensation:   Intact    Upper Extremity Range of Motion:  Right Upper Extremity: Deficits: limtied shoulder function 2/2 previous torn rotator cuff   Left Upper Extremity: WFL    Upper Extremity Strength:  Right Upper Extremity: Deficits: decreased throughout   Left Upper Extremity: Deficits: 3+/5    Strength: good     Fine motor coordination:   Intact    Gross motor coordination: " "WFL    Functional Mobility:  Bed Mobility: Pt found UIC        Transfers:  Sit <> Stand Assistance: Minimum Assistance  Sit <> Stand Assistive Device: Rolling Walker    Functional Ambulation: CGA/Min A with RW     Activities of Daily Living:    LE Dressing Level of Assistance: Maximum assistance      Balance:   Static Sit: GOOD: Takes MODERATE challenges from all directions  Dynamic Sit: GOOD: Maintains balance through MODERATE excursions of active trunk movement  Static Stand: FAIR: Maintains without assist but unable to take challenges  Dynamic stand: FAIR: Needs CONTACT GUARD during gait    Therapeutic Activities and Exercises:  Pt requiring increased time for all axs due to SOB with minimal exertion; performance of pursed lip breathing while seated and LBD; educated on figure 4 dsg technique rather than bending at waist to melinda/doff socks; Functional transition form seated position(b/s chair) and short distance ambulation     AM-PAC 6 CLICK ADL  How much help from another person does this patient currently need?  1 = Unable, Total/Dependent Assistance  2 = A lot, Maximum/Moderate Assistance  3 = A little, Minimum/Contact Guard/Supervision  4 = None, Modified [place]/Independent    Putting on and taking off regular lower body clothing? : 2  Bathing (including washing, rinsing, drying)?: 2  Toileting, which includes using toilet, bedpan, or urinal? : 2  Putting on and taking off regular upper body clothing?: 4  Taking care of personal grooming such as brushing teeth?: 4  Eating meals?: 4  Total Score: 18    AM-PAC Raw Score CMS "G-Code Modifier Level of Impairment Assistance   6 % Total / Unable   7 - 9 CM 80 - 100% Maximal Assist   10 - 14 CL 60 - 80% Moderate Assist   15 - 19 CK 40 - 60% Moderate Assist   20 - 22 CJ 20 - 40% Minimal Assist   23 CI 1-20% SBA / CGA   24 CH 0% Independent/ Mod I       Patient left up in chair with all lines intact, call button in reach, nsg notified and PCT and dghtr  " present    Assessment:  Kiaty Rebolledo is a 83 y.o. female with a medical diagnosis of Acute on chronic respiratory failure with hypoxia and hypercapnia and presents with dyspnea on exertion. Pt would benefit from cont OT services in order to maximize functional independence. Recommending SNF at d/c     Rehab identified problem list/impairments: Rehab identified problem list/impairments: weakness, impaired balance, impaired endurance, pain, impaired cardiopulmonary response to activity, impaired self care skills, impaired functional mobilty, gait instability, decreased upper extremity function    Rehab potential is good.    Activity tolerance: Good    Discharge recommendations: Discharge Facility/Level Of Care Needs: nursing facility, skilled     Barriers to discharge: Barriers to Discharge: None    Equipment recommendations: none     GOALS:   Occupational Therapy Goals        Problem: Occupational Therapy Goal    Goal Priority Disciplines Outcome Interventions   Occupational Therapy Goal     OT, PT/OT Ongoing (interventions implemented as appropriate)    Description:  Goals to be met by: 5/2/17     Patient will increase functional independence with ADLs by performing:    LE Dressing with Modified [place].  Grooming  with Modified [place].  Toileting from toilet with supervision for hygiene and clothing management.   Stand pivot transfers with supervision.  Toilet transfer to toilet with Supervision.  Increased functional strength to WFL for self care skills and functional .  Upper extremity exercise program x10 reps per handout, with independence.                PLAN:  Patient to be seen 5 x/week to address the above listed problems via self-care/home management, therapeutic activities, therapeutic exercises  Plan of Care expires: 05/02/17  Plan of Care reviewed with: patient, daughter    OT G-codes  Functional Assessment Tool Used: am pac   Score: 18  Functional Limitation: Self care  Self Care Current  Status (): ADRIANO  Self Care Goal Status (): CARLOS Fallon, OT  04/02/2017

## 2017-04-02 NOTE — PROGRESS NOTES
[place] spoke with pt and daughter, Lu Sharma, [phone], regarding choices for SNF placement.    1- [facility]  2- [facility]  3- [facility]

## 2017-04-02 NOTE — PROGRESS NOTES
[facility] Internal Medicine Resident BAY Progress Note    Subjective:     Patient without issues overnight. Slept well and respiratory status improved since yesterday.  Denies any issues this am, besides the same weakness and leg pain.  Patient and family aware PT recommends SNF placement.        Objective:   Last 24 Hour Vital Signs:  BP  Min: 116/62  Max: 137/70  Temp  Av.4 °F (36.9 °C)  Min: 98 °F (36.7 °C)  Max: 99.5 °F (37.5 °C)  Pulse  Av.1  Min: 61  Max: 104  Resp  Av.5  Min: 16  Max: 20  SpO2  Av %  Min: 95 %  Max: 97 %  Weight  Av.5 kg (115 lb 11.9 oz)  Min: 52.5 kg (115 lb 11.9 oz)  Max: 52.5 kg (115 lb 11.9 oz)  I/O last 3 completed shifts:  In: 930 [P.O.:930]  Out: 2225 [Urine:2225]    Physical Examination:  General:  alert, cooperative, speaking in full sentences   HEENT:  NCAT, EOMI, PERRL, MMM, 3L NC in place  CV:  regular rate and rhythm, normal S1/S2, no audible murmur  Resp:  Relatively unchanged exam, continues to have faint expiratory wheezes throughout   Abd:  Soft, NT/ND, normoactive bowel sounds  Ext:  No edema, 2+ pulses bilatearlly  Skin:  Warm and dry, no rashes         Laboratory:  Laboratory Data Reviewed: yes  Pertinent Findings:    Recent Labs  Lab 17  0315 17  0658 17  0636   WBC 5.12 6.92 7.05   HGB 9.0* 9.1* 8.6*   HCT 28.1* 28.4* 26.8*    248 243   MCV 93 94 95   RDW 12.9 13.2 13.6   * 137 138   K 4.0 3.5 3.6   CL 88* 91* 90*   CO2 35* 39* 40*   BUN 19 14 12   CREATININE 0.9 0.8 0.8    86 87   PROT 6.1 5.9* 5.9*   ALBUMIN 3.1* 3.1* 3.2*   BILITOT 0.2 0.2 0.2   AST 35 29 22   ALKPHOS 47* 42* 41*   ALT 32 28 24     Troponin: 0.009 > 0.047 > 0.442 > 0.445 > 0.359    Microbiology Data Reviewed: yes  Pertinent Findings:  Respiratory culture - not collected     Other Results:  Radiology Data Reviewed: yes  Pertinent Findings:  No new imaging     Current Medications:     Infusions:        Scheduled:   albuterol-ipratropium  2.5mg-0.5mg/3mL  3 mL Nebulization Q4H    apixaban  2.5 mg Oral BID    aspirin  81 mg Oral Daily    citalopram  20 mg Oral Daily    cyanocobalamin  100 mcg Oral Daily    docusate sodium  100 mg Oral Daily    fluticasone-vilanterol  1 puff Inhalation Daily    furosemide  20 mg Oral Daily    guaifenesin  600 mg Oral BID    lisinopril  2.5 mg Oral Daily    metoprolol succinate  50 mg Oral Daily    oseltamivir  30 mg Oral BID    predniSONE  40 mg Oral Daily    tiotropium  1 capsule Inhalation Daily        PRN:  acetaminophen, dextrose 50%, dextrose 50%, glucagon (human recombinant), glucose, glucose, ondansetron, ramelteon    Antibiotics and Day Number of Therapy:  Moxifloxacin 400mg IV daily (started 3/28)  s/p Levaquin 3/27    Lines and Day Number of Therapy:  PIV    Assessment:     Kaity Rebolledo is a 83 y.o.female with  Patient Active Problem List    Diagnosis Date Noted    COPD with respiratory failure, acute 04/01/2017    Depression with anxiety 04/01/2017    Pulmonary hypertension due to lung disease 03/30/2017    Elevated troponin 03/29/2017    Pulmonary hypertension 03/28/2017    History of atrial fibrillation 03/28/2017    SOB (shortness of breath) 03/28/2017    Acute on chronic respiratory failure with hypoxia and hypercapnia 03/28/2017    Hyponatremia 03/28/2017    Urinary retention 03/28/2017    Paroxysmal atrial fibrillation     Anxiety and depression     (HFpEF) heart failure with preserved ejection fraction 03/27/2017    Stage 4 very severe COPD by GOLD classification 03/27/2017    Atrial fibrillation with RVR 03/04/2017    Low oxygen saturation 12/27/2016    Iron deficiency anemia due to chronic blood loss 10/10/2016    Pneumonia 09/02/2016    Chronic diastolic heart failure 09/02/2016    Chronic obstructive pulmonary disease 06/16/2016    Coronary artery disease involving native coronary artery without angina pectoris 06/16/2016    Greater trochanter fracture  05/15/2016    Hip fracture 05/15/2016    Closed bilateral fracture of pubic rami 03/18/2016    Osteoporosis 03/18/2016    Arthralgia of left hip 03/17/2016    Hip pain 03/17/2016    Anemia, chronic disease 03/17/2016    HLD (hyperlipidemia) 03/17/2016    Uterine prolapse 03/16/2016    Coronary artery disease involving native coronary artery of native heart without angina pectoris 03/14/2016    COPD exacerbation 03/14/2016    Macrocytic anemia 03/14/2016    Closed fracture of ramus of right pubis 02/06/2016    Chronic hypoxemic respiratory failure 02/06/2016    Anemia of chronic disease 12/16/2015    Cardiomyopathy 09/24/2015    Chronic systolic heart failure 09/10/2015    Chronic anemia 08/04/2015    Physical deconditioning 03/05/2015    Cystocele 08/06/2013    Essential hypertension 08/06/2013    OP (osteoporosis) 08/06/2013    Dyslipidemia 08/06/2013    COPD (chronic obstructive pulmonary disease) 09/11/2012        Plan:     Acute Hypoxic Respiratory Failure 2/2 COPD Exacerbation  - Presented with SOB despite IV steroids and multiple Duo-nebs treatments  - On arrival, desats to low 80s on 4L NC while talking but appeared comfortable on BiPAP with sats in the mid 90s  - CXR suggestive of COPD/emphysema, no focal consolidation  - Continue Duo-nebs q4 hours with Mucinex 600mg BID and Prednisone 40mg daily.  - Procal negative at 0.09 yet repeat elevated at 0.68.  Will continue Moxifloxacin 400mg IV daily for at least 5 days.    - Influenza screen negative but will continue empiric Tamiflu due to sick contacts.   - ABG 7.411/59/61/37.9/13, similar to previous ABG 1 year ago.  - Pulmonary consulted, appreciate recommendations.    - Patient previously required frequent or continuous bipap ; now on 3L NC and has remained stable overnight   - anxiety component may be playing into her respiratory issues as well; previously responded well to xanax and fentanyl ; did not require anxiolytics overnight.    - respiratory status continues to improve     Hyponatremia, resolved   -125 yesterday, 131 today; s/p IVF yesterday; Na normalized today     CAD  - Cath done 10/2015 - RCA 40% stenosis, LAD 20% stenosis  - Continue ASA 81mg daily and Toprol-xl 50mg daily  - Lipid panel:  Chol 223, TG 45, HDL 82,   - Hgb A1c 5.3%  - Trop peaked at 0.445 and has since trended down. EKG with some ST depression in inferior leads, likely secondary to demand ischemia.  Continues to deny chest pain.     Chronic Diastolic Congestive Heart Failure / Pulmonary Hypertension  - TTE 3/3/17: EF 55%, diastolic dysfunction present, PAP 51  - Wheezes appreciated but no crackles on exam  -  but patient appeared euvolemic at time of admission.  Low suspicion for hypervolemic hyponatremia at this time.     - Continue home lasix 20 mg daily.        HTN  - BP normotensive this morning; higher BP's likely related to agitation/anxiety.  - Continue toprol-xl and lisinopril.  Will titrate doses if needed.     Dyslipidemia  - Lipid panel:  Chol 223, TG 45, HDL 82,   - Patient not currently on lipid lowering therapy.  Pravastatin previously discontinued due to muscle pain.      Anemia of Chronic Disease  - Hgb 8.5, MCV 96 on admission, at baseline  - iron 21, TIBC 330, ferritin 123, folate 18, vit B12 810    - Repeat H/H remains stable      Hx of A-fib with RVR  - Diagnosed as new onset in recent hospitalization 3/2017  - Cardiology consulted at that hospitalization: patient started on eliquis and metoprolol increased for better rate control  - Remains in sinus rhythm  - Continue Eliquis 2.5 mg BID and Metoprolol   - Will need outpatient Cardiology follow up      Anxiety / Depression  - No acute issues  - Continue home celexa, hold home xanax and order as needed     F: None  E: hyponatremia, hypochloremia likely secondary to poor PO intake   N: Cardiac Diet     PPX: Eliquis (2.5)    Code:  Partial - no compressions or chemical  resuscitation, intubation only     Dispo: SNF placement    Yang A [place]  [facility] Internal Medicine HO-I  [facility] Internal Medicine Service Team A    [facility] Medicine Hospitalist Pager numbers:   [facility] Hospitalist Medicine Team A (Man/Shashank): [phone]  [facility] Hospitalist Medicine Team B (Zak/[place]):  [phone]

## 2017-04-03 ENCOUNTER — OUTPATIENT CASE MANAGEMENT (OUTPATIENT)
Dept: ADMINISTRATIVE | Facility: OTHER | Age: 82
End: 2017-04-03

## 2017-04-03 PROCEDURE — 97116 GAIT TRAINING THERAPY: CPT

## 2017-04-03 PROCEDURE — 97530 THERAPEUTIC ACTIVITIES: CPT

## 2017-04-03 PROCEDURE — 25000003 PHARM REV CODE 250: Performed by: STUDENT IN AN ORGANIZED HEALTH CARE EDUCATION/TRAINING PROGRAM

## 2017-04-03 PROCEDURE — 97110 THERAPEUTIC EXERCISES: CPT

## 2017-04-03 PROCEDURE — 94640 AIRWAY INHALATION TREATMENT: CPT

## 2017-04-03 PROCEDURE — 25000003 PHARM REV CODE 250

## 2017-04-03 PROCEDURE — 99900035 HC TECH TIME PER 15 MIN (STAT)

## 2017-04-03 PROCEDURE — 94060 EVALUATION OF WHEEZING: CPT

## 2017-04-03 PROCEDURE — 94761 N-INVAS EAR/PLS OXIMETRY MLT: CPT

## 2017-04-03 PROCEDURE — 27000221 HC OXYGEN, UP TO 24 HOURS

## 2017-04-03 PROCEDURE — 63600175 PHARM REV CODE 636 W HCPCS: Performed by: STUDENT IN AN ORGANIZED HEALTH CARE EDUCATION/TRAINING PROGRAM

## 2017-04-03 PROCEDURE — 25000242 PHARM REV CODE 250 ALT 637 W/ HCPCS: Performed by: STUDENT IN AN ORGANIZED HEALTH CARE EDUCATION/TRAINING PROGRAM

## 2017-04-03 PROCEDURE — 97535 SELF CARE MNGMENT TRAINING: CPT

## 2017-04-03 PROCEDURE — 11000001 HC ACUTE MED/SURG PRIVATE ROOM

## 2017-04-03 RX ADMIN — PREDNISONE 40 MG: 20 TABLET ORAL at 09:04

## 2017-04-03 RX ADMIN — VITAM B12 100 MCG: 100 TAB at 09:04

## 2017-04-03 RX ADMIN — CITALOPRAM HYDROBROMIDE 20 MG: 20 TABLET ORAL at 09:04

## 2017-04-03 RX ADMIN — APIXABAN 2.5 MG: 2.5 TABLET, FILM COATED ORAL at 08:04

## 2017-04-03 RX ADMIN — METOPROLOL SUCCINATE 50 MG: 50 TABLET, EXTENDED RELEASE ORAL at 09:04

## 2017-04-03 RX ADMIN — IPRATROPIUM BROMIDE AND ALBUTEROL SULFATE 3 ML: .5; 3 SOLUTION RESPIRATORY (INHALATION) at 08:04

## 2017-04-03 RX ADMIN — IPRATROPIUM BROMIDE AND ALBUTEROL SULFATE 3 ML: .5; 3 SOLUTION RESPIRATORY (INHALATION) at 11:04

## 2017-04-03 RX ADMIN — DOCUSATE SODIUM 100 MG: 100 CAPSULE, LIQUID FILLED ORAL at 09:04

## 2017-04-03 RX ADMIN — RAMELTEON 8 MG: 8 TABLET, FILM COATED ORAL at 08:04

## 2017-04-03 RX ADMIN — APIXABAN 2.5 MG: 2.5 TABLET, FILM COATED ORAL at 09:04

## 2017-04-03 RX ADMIN — LISINOPRIL 2.5 MG: 2.5 TABLET ORAL at 09:04

## 2017-04-03 RX ADMIN — ASPIRIN 81 MG: 81 TABLET, COATED ORAL at 09:04

## 2017-04-03 RX ADMIN — PANTOPRAZOLE SODIUM 600 MG: 40 TABLET, DELAYED RELEASE ORAL at 09:04

## 2017-04-03 RX ADMIN — FLUTICASONE FUROATE AND VILANTEROL TRIFENATATE 1 PUFF: 100; 25 POWDER RESPIRATORY (INHALATION) at 09:04

## 2017-04-03 RX ADMIN — IPRATROPIUM BROMIDE AND ALBUTEROL SULFATE 3 ML: .5; 3 SOLUTION RESPIRATORY (INHALATION) at 12:04

## 2017-04-03 RX ADMIN — TRAMADOL HYDROCHLORIDE 50 MG: 50 TABLET, COATED ORAL at 09:04

## 2017-04-03 RX ADMIN — ACETAMINOPHEN 1000 MG: 500 TABLET ORAL at 10:04

## 2017-04-03 RX ADMIN — IPRATROPIUM BROMIDE AND ALBUTEROL SULFATE 3 ML: .5; 3 SOLUTION RESPIRATORY (INHALATION) at 04:04

## 2017-04-03 RX ADMIN — ACETAMINOPHEN 1000 MG: 500 TABLET ORAL at 06:04

## 2017-04-03 RX ADMIN — IPRATROPIUM BROMIDE AND ALBUTEROL SULFATE 3 ML: .5; 3 SOLUTION RESPIRATORY (INHALATION) at 01:04

## 2017-04-03 RX ADMIN — TIOTROPIUM BROMIDE 18 MCG: 18 CAPSULE ORAL; RESPIRATORY (INHALATION) at 09:04

## 2017-04-03 RX ADMIN — PANTOPRAZOLE SODIUM 600 MG: 40 TABLET, DELAYED RELEASE ORAL at 08:04

## 2017-04-03 RX ADMIN — ACETAMINOPHEN 1000 MG: 500 TABLET ORAL at 02:04

## 2017-04-03 RX ADMIN — FUROSEMIDE 20 MG: 20 TABLET ORAL at 09:04

## 2017-04-03 NOTE — PROGRESS NOTES
Noted that patient is currently admitted in the hospital. This RN CCM reviewed the chart. This RN called [facility] at [phone] and asked to speak with Jared Miles. Transferred to IPCM RN phone, voice message left on voice mail to the IPCM RN asking to be notified of discharge plan and also asked for IPCM RN to notify patient/caregiver that this RN will be contacting patient/caregiver once patient is discharged. Partially completed initial assessment for OPCM via chart review. Will follow up with patient/caregiver once discharged from the hospital.  Patient was referred to OPCM on 3/31/2017.  This RN received referral on 4/3/2017.  CARLYN Wall, OPCM-RN

## 2017-04-03 NOTE — PLAN OF CARE
Problem: Occupational Therapy Goal  Goal: Occupational Therapy Goal  Goals to be met by: 5/2/17     Patient will increase functional independence with ADLs by performing:    LE Dressing with Modified [place].  Grooming with Modified [place].  Toileting from toilet with supervision for hygiene and clothing management.   Stand pivot transfers with supervision.  Toilet transfer to toilet with Supervision.  Increased functional strength to WFL for self care skills and functional .  Upper extremity exercise program x10 reps per handout, with independence.   Outcome: Ongoing (interventions implemented as appropriate)  Patient remains limited by decreased strength and endurance and will benefit from skilled OT to improve performance in self-care tasks.

## 2017-04-03 NOTE — PLAN OF CARE
Problem: Physical Therapy Goal  Goal: Physical Therapy Goal  1. Ambulate 40 with RW with 2 Lit O2.  2. Sit in chair 1 1/2 hours.  3. Perform 10 x 3 BLE exercises sitting.   Outcome: Ongoing (interventions implemented as appropriate)  Continue working toward goals.

## 2017-04-03 NOTE — PROGRESS NOTES
[facility] Internal Medicine Resident HO-IV Progress Note    Subjective:     Patient without issues overnight.  Continues to c/o cough.     Objective:   Last 24 Hour Vital Signs:  BP  Min: 112/62  Max: 133/67  Temp  Av.1 °F (36.7 °C)  Min: 97.9 °F (36.6 °C)  Max: 98.4 °F (36.9 °C)  Pulse  Av.6  Min: 20  Max: 104  Resp  Av.5  Min: 18  Max: 20  SpO2  Av.7 %  Min: 96 %  Max: 99 %  Weight  Av.2 kg (117 lb 4.6 oz)  Min: 53.2 kg (117 lb 4.6 oz)  Max: 53.2 kg (117 lb 4.6 oz)  I/O last 3 completed shifts:  In: 1445 [P.O.:1445]  Out: 1415 [Urine:1415]    Physical Examination:  General: Oriented to person, place, and time. Appears well-developed and well-nourished.   Head: Normocephalic and atraumatic.   Eyes: Conjunctivae are pink, moist. Pupils are equal, round, and reactive to light. Extraocular movements intact.  Cardiovascular: Normal rate, regular rhythm. No murmurs, rub, or extra heart sounds appreciated.   Pulmonary/Chest: Effort normal and breath sounds normal with diffuse mild end expiratory wheezing, no crackles or rhonchi.   Abdominal: Soft, bowel sounds are normoactive.  No tenderness, guarding, or rebound.  Musculoskeletal: Normal range of motion, 5/5 strength to gross flexion and extension to upper and lower extremities, bilaterally.  Extremities: 2+ radial pulses, CR <2s, no cyanosis or edema.  Skin: Skin is warm and dry.     Laboratory:  Laboratory Data Reviewed: yes  Pertinent Findings:    Recent Labs  Lab 17  0315 17  0658 17  0636   WBC 5.12 6.92 7.05   HGB 9.0* 9.1* 8.6*   HCT 28.1* 28.4* 26.8*    248 243   MCV 93 94 95   RDW 12.9 13.2 13.6   * 137 138   K 4.0 3.5 3.6   CL 88* 91* 90*   CO2 35* 39* 40*   BUN 19 14 12   CREATININE 0.9 0.8 0.8    86 87   PROT 6.1 5.9* 5.9*   ALBUMIN 3.1* 3.1* 3.2*   BILITOT 0.2 0.2 0.2   AST 35 29 22   ALKPHOS 47* 42* 41*   ALT 32 28 24     Troponin: 0.009 > 0.047 > 0.442 > 0.445 > 0.359    Microbiology Data Reviewed:  yes  Pertinent Findings:  Respiratory culture - not collected     Other Results:  Radiology Data Reviewed: yes  Pertinent Findings:  No new imaging     Current Medications:     Infusions:        Scheduled:   acetaminophen  1,000 mg Oral TID    albuterol-ipratropium 2.5mg-0.5mg/3mL  3 mL Nebulization Q4H    apixaban  2.5 mg Oral BID    aspirin  81 mg Oral Daily    citalopram  20 mg Oral Daily    cyanocobalamin  100 mcg Oral Daily    docusate sodium  100 mg Oral Daily    fluticasone-vilanterol  1 puff Inhalation Daily    furosemide  20 mg Oral Daily    guaifenesin  600 mg Oral BID    lisinopril  2.5 mg Oral Daily    metoprolol succinate  50 mg Oral Daily    predniSONE  40 mg Oral Daily    tiotropium  1 capsule Inhalation Daily        PRN:  dextrose 50%, dextrose 50%, glucagon (human recombinant), glucose, glucose, ondansetron, ramelteon, tramadol    Antibiotics and Day Number of Therapy:  Moxifloxacin 400mg IV daily (started 3/28)  s/p Levaquin 3/27    Lines and Day Number of Therapy:  PIV    Assessment:     Kaity Rebolledo is a 83 y.o.female with  Patient Active Problem List    Diagnosis Date Noted    COPD with respiratory failure, acute 04/01/2017    Depression with anxiety 04/01/2017    Pulmonary hypertension due to lung disease 03/30/2017    Elevated troponin 03/29/2017    Pulmonary hypertension 03/28/2017    History of atrial fibrillation 03/28/2017    SOB (shortness of breath) 03/28/2017    Acute on chronic respiratory failure with hypoxia and hypercapnia 03/28/2017    Hyponatremia 03/28/2017    Urinary retention 03/28/2017    Paroxysmal atrial fibrillation     Anxiety and depression     (HFpEF) heart failure with preserved ejection fraction 03/27/2017    Stage 4 very severe COPD by GOLD classification 03/27/2017    Atrial fibrillation with RVR 03/04/2017    Low oxygen saturation 12/27/2016    Iron deficiency anemia due to chronic blood loss 10/10/2016    Pneumonia 09/02/2016     Chronic diastolic heart failure 09/02/2016    Chronic obstructive pulmonary disease 06/16/2016    Coronary artery disease involving native coronary artery without angina pectoris 06/16/2016    Greater trochanter fracture 05/15/2016    Hip fracture 05/15/2016    Closed bilateral fracture of pubic rami 03/18/2016    Osteoporosis 03/18/2016    Arthralgia of left hip 03/17/2016    Hip pain 03/17/2016    Anemia, chronic disease 03/17/2016    HLD (hyperlipidemia) 03/17/2016    Uterine prolapse 03/16/2016    Coronary artery disease involving native coronary artery of native heart without angina pectoris 03/14/2016    COPD exacerbation 03/14/2016    Macrocytic anemia 03/14/2016    Closed fracture of ramus of right pubis 02/06/2016    Chronic hypoxemic respiratory failure 02/06/2016    Anemia of chronic disease 12/16/2015    Cardiomyopathy 09/24/2015    Chronic systolic heart failure 09/10/2015    Chronic anemia 08/04/2015    Physical deconditioning 03/05/2015    Cystocele 08/06/2013    Essential hypertension 08/06/2013    OP (osteoporosis) 08/06/2013    Dyslipidemia 08/06/2013    COPD (chronic obstructive pulmonary disease) 09/11/2012        Plan:     Acute Hypoxic Respiratory Failure 2/2 COPD Exacerbation  - Presented with SOB despite IV steroids and multiple duo-neb treatments  - CXR suggestive of COPD/emphysema, no evidence of pneumonia  - Continue nebs q4 hours with mucinex 600mg BID and prednisone 40mg daily, moxifloxacin x 5 days.    - Influenza screen negative but will continue empiric Tamiflu due to sick contacts.   - Pulmonary consulted, appreciate recommendations    - Respiratory status continues to improve     CAD  - Cath done 10/2015 - RCA 40% stenosis, LAD 20% stenosis  - Continue ASA 81mg daily and Toprol-xl 50mg daily, off statin 2/2 intolerance, will discuss changing to new statin  - Trop peaked at 0.445 this admission, and has since trended down. EKG with some ST depression in  inferior leads, likely secondary to demand ischemia  - Continues to deny chest pain; monitor     Chronic Diastolic Congestive Heart Failure / Pulmonary Hypertension  - TTE 3/3/17: EF 55%, diastolic dysfunction present, PAP 51  - Wheezes appreciated but no crackles on exam  -  but patient appeared euvolemic at time of admission.  Low suspicion for hypervolemic hyponatremia at this time.     - Continue home lasix 20 mg daily       HTN  - BP normotensive this morning  - Continue metoprolol and lisinopril; titrate as needed     Dyslipidemia  - Lipid panel:  Chol 223, TG 45, HDL 82,   - Patient not currently on lipid lowering therapy; statin previously discontinued due to myopathy      Anemia of Chronic Disease  - Hb at baseline  - iron 21, TIBC 330, ferritin 123, folate 18, vit B12 810    - Monitor     Hx of A-fib with RVR  - Diagnosed as new onset in recent hospitalization 3/2017  - Remains in NSR this admission  - Continue Eliquis 2.5 mg BID and Metoprolol   - Will need outpatient Cardiology follow up at discharge     Anxiety / Depression  - No acute issues  - Continue home celexa     PPx   On Eliquis    Code  Partial - no compressions or chemical resuscitation, intubation only     Dispo: Pending placement to SNF      Margy Marcial  [facility] Internal Medicine-Emergency Medicine HOIV  [facility] Hospitalist Medicine Team A    [facility] Medicine Hospitalist Pager numbers:   [facility] Hospitalist Medicine Team A (Man/Shashank): [phone]  [facility] Hospitalist Medicine Team B (Zak/[place]):  [phone]

## 2017-04-03 NOTE — PT/OT/SLP PROGRESS
Physical Therapy  Treatment    Kaity Rebolledo   MRN: [id number]   Admitting Diagnosis: Acute on chronic respiratory failure with hypoxia and hypercapnia    PT Received On: 17  PT Start Time: 1330     PT Stop Time: 0200    PT Total Time (min): 750 min       Billable Minutes:  Gait Training8, Therapeutic Activity 12 and Therapeutic Exercise 10    Treatment Type: Treatment  PT/PTA: PTA     PTA Visit Number: 1       General Precautions: Standard, fall  Orthopedic Precautions: Full weight bearing   Braces:      Do you have any cultural, spiritual, Spiritism conflicts, given your current situation?:  (none)    Subjective:  Communicated with Rn  prior to session.  Pt sitting up in b/s chair upon entering the room and agreed to tx.    Pain Ratin/10              Pain Rating Post-Intervention: 0/10    Objective:   Patient found with: telemetry, oxygen    Functional Mobility:  Bed Mobility:   Sit to Supine: Stand by Assistance    Transfers:  Sit <> Stand Assistance: Minimum Assistance (from b/s chair, b/s commode, and EOB)  Sit <> Stand Assistive Device: Rolling Walker  Chair<> Mat Technique:  (b/s chair > b/s commode> EOB with 3 steps, RW and close CGA.)    Gait:   Gait Distance: 3 small steps x 2 for t/fing and 24ft x 2, all with RW and close CGA.  Seated rest between bouts of gait secondary to SOB.  Pt was on 3lpm O2 wall unit with extended line.  Assistance 1: Contact Guard Assistance  Gait Assistive Device: Rolling walker  Gait Pattern: reciprocal  Gait Deviation(s): decreased mari, decreased step length, decreased stride length, decreased toe-to-floor clearance    Stairs:    Balance:   Static Sit: GOOD: Takes MODERATE challenges from all directions  Dynamic Sit: GOOD: Maintains balance through MODERATE excursions of active trunk movement  Static Stand: FAIR: Maintains without assist but unable to take challenges  Dynamic stand: FAIR: Needs CONTACT GUARD during gait     Therapeutic Activities and  Exercises:  T/f's and gait training as above.  Seated BLE therex: Ap, LAQ,  Hip flexion/ABD/ADD and glut sets x 15 reps with 3 brief rest breaks secondary to SOB.  Static standing x 2 to 3 mins with Rw and SBA.    AM-PAC 6 CLICK MOBILITY  How much help from another person does this patient currently need?   1 = Unable, Total/Dependent Assistance  2 = A lot, Maximum/Moderate Assistance  3 = A little, Minimum/Contact Guard/Supervision  4 = None, Modified [place]/Independent    Turning over in bed (including adjusting bedclothes, sheets and blankets)?: 4  Sitting down on and standing up from a chair with arms (e.g., wheelchair, bedside commode, etc.): 3  Moving from lying on back to sitting on the side of the bed?: 3  Moving to and from a bed to a chair (including a wheelchair)?: 3  Need to walk in hospital room?: 3  Climbing 3-5 steps with a railing?: 3  Total Score: 19    AM-PAC Raw Score CMS G-Code Modifier Level of Impairment Assistance   6 % Total / Unable   7 - 9 CM 80 - 100% Maximal Assist   10 - 14 CL 60 - 80% Moderate Assist   15 - 19 CK 40 - 60% Moderate Assist   20 - 22 CJ 20 - 40% Minimal Assist   23 CI 1-20% SBA / CGA   24 CH 0% Independent/ Mod I     Patient left supine with all lines intact, call button in reach and Rn notified.    Assessment:  Kaity Rebolledo is a 83 y.o. female with a medical diagnosis of Acute on chronic respiratory failure with hypoxia and hypercapnia and presents with decreased strength, endurance, and SOB on exertion with pt on O2 @ 3 lpm.  Pt would continue to benefit from P.T. To address impairments listed below.    Rehab identified problem list/impairments: Rehab identified problem list/impairments: weakness, impaired endurance, impaired self care skills, impaired functional mobilty, gait instability, decreased lower extremity function, impaired cardiopulmonary response to activity    Rehab potential is good.    Activity tolerance: Good    Discharge recommendations:  Discharge Facility/Level Of Care Needs: nursing facility, skilled     Barriers to discharge: Barriers to Discharge: None    Equipment recommendations: Equipment Needed After Discharge: none     GOALS:   Physical Therapy Goals        Problem: Physical Therapy Goal    Goal Priority Disciplines Outcome Goal Variances Interventions   Physical Therapy Goal     PT/OT, PT Ongoing (interventions implemented as appropriate)     Description:  1.  Ambulate 40' with RW with 2 Lit O2.  2.  Sit in chair 1 1/2 hours.  3.  Perform 10 x 3 BLE exercises sitting.              PLAN:    Patient to be seen 6 x/week  to address the above listed problems via gait training, therapeutic activities, therapeutic exercises, neuromuscular re-education  Plan of Care expires: 05/01/17  Plan of Care reviewed with: patient, daughter         Ara Dugan, PTA  04/03/2017

## 2017-04-03 NOTE — PROGRESS NOTES
For your Information:    Please note the following patient has been assigned to Zehra Wall RN with Outpatient Complex Care Mgmt for screening.    Reason: High Risk; SOB (shortness of breath)  Stage 4 very severe COPD by GOLD classification    Please contact [facility] at ext [phone] with questions.    Thank you,  Meghana De La Garza, SSC

## 2017-04-03 NOTE — PROGRESS NOTES
The Sw spoke to the pt's dtr Celsa and informed her the pt has been accepted to [facility] and [facility] but she really wants [facility]. The Sw informed her the pt has been accepted to [facility] but they have a few concerns that need to be addressed before she can go. The Sw informed her the pt will probably d/c tomorrow to [facility] if she's medically stable. The pt's dtr is in agreement with the d/c plan and states [facility] is their second preference if [facility] can't accommodate the pt.

## 2017-04-03 NOTE — PROGRESS NOTES
TN spoke with Luisana Adam (daughter) and she states that she has spoken to [facility] (Hannah) and would like for her mother to go there today.  TN placed [facility] consult in computer and notified Hannah at [facility].  She stated they MAY have a bed for the patient but that she needs to see how her discharges stack up today prior to accepting this patient.  Per family, she has been at [facility] before.  Jared Miles RN  Transitional Navigator/Case Management  [phone]

## 2017-04-03 NOTE — PROGRESS NOTES
The Sw faxed the PASRR to [facility].     3:00pm The pt has been accepted to [facility] via [facility].

## 2017-04-03 NOTE — PROGRESS NOTES
The Sw faxed the pt's info to [facility] and [facility] via "[employer] Care.      11:15am The Sw spoke to the pt's dtr Celsa([phone])briefly at the nurse's station and she stated they really want the pt to go to [facility]. The Sw informed her a message has been left with Hannah at [facility] to check on bed availability and is awaiting a return call. The Sw informed her the info has been sent to [facility] and [facility] as well. The Sw informed her she will be contacted as soon as she hears something. The Sw gave her contact info to Celsa.      12:06pm The Sw spoke to Hannah at [facility] who stated her medical director has reviewed the pt's info but currently she's not medically stable. She states once the pt's medically stable they can admit her. The Sw paged the team to inform them of this info.

## 2017-04-04 LAB
ALBUMIN SERPL BCP-MCNC: 3 G/DL
ALP SERPL-CCNC: 39 U/L
ALT SERPL W/O P-5'-P-CCNC: 18 U/L
ANION GAP SERPL CALC-SCNC: 8 MMOL/L
AST SERPL-CCNC: 15 U/L
BASOPHILS # BLD AUTO: 0 K/UL
BASOPHILS NFR BLD: 0 %
BILIRUB SERPL-MCNC: 0.2 MG/DL
BUN SERPL-MCNC: 15 MG/DL
CALCIUM SERPL-MCNC: 8.6 MG/DL
CHLORIDE SERPL-SCNC: 89 MMOL/L
CO2 SERPL-SCNC: 38 MMOL/L
CREAT SERPL-MCNC: 0.8 MG/DL
DIFFERENTIAL METHOD: ABNORMAL
EOSINOPHIL # BLD AUTO: 0 K/UL
EOSINOPHIL NFR BLD: 0.4 %
ERYTHROCYTE [DISTWIDTH] IN BLOOD BY AUTOMATED COUNT: 13.8 %
EST. GFR  (AFRICAN AMERICAN): >60 ML/MIN/1.73 M^2
EST. GFR  (NON AFRICAN AMERICAN): >60 ML/MIN/1.73 M^2
GLUCOSE SERPL-MCNC: 87 MG/DL
HCT VFR BLD AUTO: 25.5 %
HGB BLD-MCNC: 8 G/DL
LYMPHOCYTES # BLD AUTO: 2.1 K/UL
LYMPHOCYTES NFR BLD: 19.6 %
MCH RBC QN AUTO: 29.6 PG
MCHC RBC AUTO-ENTMCNC: 31.4 %
MCV RBC AUTO: 94 FL
MONOCYTES # BLD AUTO: 1.3 K/UL
MONOCYTES NFR BLD: 12.2 %
NEUTROPHILS # BLD AUTO: 7.3 K/UL
NEUTROPHILS NFR BLD: 67.5 %
PLATELET # BLD AUTO: 253 K/UL
PMV BLD AUTO: 9.9 FL
POTASSIUM SERPL-SCNC: 3.6 MMOL/L
PROT SERPL-MCNC: 5.5 G/DL
RBC # BLD AUTO: 2.7 M/UL
SODIUM SERPL-SCNC: 135 MMOL/L
WBC # BLD AUTO: 10.8 K/UL

## 2017-04-04 PROCEDURE — 85025 COMPLETE CBC W/AUTO DIFF WBC: CPT

## 2017-04-04 PROCEDURE — 80053 COMPREHEN METABOLIC PANEL: CPT

## 2017-04-04 PROCEDURE — 25000003 PHARM REV CODE 250: Performed by: STUDENT IN AN ORGANIZED HEALTH CARE EDUCATION/TRAINING PROGRAM

## 2017-04-04 PROCEDURE — 97116 GAIT TRAINING THERAPY: CPT

## 2017-04-04 PROCEDURE — 93005 ELECTROCARDIOGRAM TRACING: CPT

## 2017-04-04 PROCEDURE — 36415 COLL VENOUS BLD VENIPUNCTURE: CPT

## 2017-04-04 PROCEDURE — 94761 N-INVAS EAR/PLS OXIMETRY MLT: CPT

## 2017-04-04 PROCEDURE — 11000001 HC ACUTE MED/SURG PRIVATE ROOM

## 2017-04-04 PROCEDURE — 63600175 PHARM REV CODE 636 W HCPCS: Performed by: STUDENT IN AN ORGANIZED HEALTH CARE EDUCATION/TRAINING PROGRAM

## 2017-04-04 PROCEDURE — 97530 THERAPEUTIC ACTIVITIES: CPT

## 2017-04-04 PROCEDURE — 97803 MED NUTRITION INDIV SUBSEQ: CPT

## 2017-04-04 PROCEDURE — 25000003 PHARM REV CODE 250

## 2017-04-04 PROCEDURE — 27000221 HC OXYGEN, UP TO 24 HOURS

## 2017-04-04 PROCEDURE — 97535 SELF CARE MNGMENT TRAINING: CPT

## 2017-04-04 PROCEDURE — 25000242 PHARM REV CODE 250 ALT 637 W/ HCPCS: Performed by: STUDENT IN AN ORGANIZED HEALTH CARE EDUCATION/TRAINING PROGRAM

## 2017-04-04 PROCEDURE — 94640 AIRWAY INHALATION TREATMENT: CPT

## 2017-04-04 PROCEDURE — 25000003 PHARM REV CODE 250: Performed by: HOSPITALIST

## 2017-04-04 PROCEDURE — 97110 THERAPEUTIC EXERCISES: CPT

## 2017-04-04 RX ORDER — ACETAMINOPHEN 325 MG/1
650 TABLET ORAL EVERY 6 HOURS PRN
Status: CANCELLED | OUTPATIENT
Start: 2017-04-04

## 2017-04-04 RX ORDER — PNV NO.95/FERROUS FUM/FOLIC AC 28MG-0.8MG
100 TABLET ORAL DAILY
Status: CANCELLED | OUTPATIENT
Start: 2017-04-04

## 2017-04-04 RX ORDER — GUAIFENESIN 600 MG/1
1200 TABLET, EXTENDED RELEASE ORAL 2 TIMES DAILY
Status: CANCELLED | OUTPATIENT
Start: 2017-04-04

## 2017-04-04 RX ORDER — PREDNISONE 10 MG/1
10 TABLET ORAL
Status: CANCELLED | OUTPATIENT
Start: 2017-04-05

## 2017-04-04 RX ORDER — CITALOPRAM 20 MG/1
20 TABLET, FILM COATED ORAL DAILY
Status: CANCELLED | OUTPATIENT
Start: 2017-04-04

## 2017-04-04 RX ORDER — CALCIUM CARBONATE 200(500)MG
500 TABLET,CHEWABLE ORAL ONCE
Status: COMPLETED | OUTPATIENT
Start: 2017-04-04 | End: 2017-04-04

## 2017-04-04 RX ORDER — LEVALBUTEROL INHALATION SOLUTION 0.63 MG/3ML
0.31 SOLUTION RESPIRATORY (INHALATION) 4 TIMES DAILY
Status: CANCELLED | OUTPATIENT
Start: 2017-04-04

## 2017-04-04 RX ORDER — ALBUTEROL SULFATE 90 UG/1
2 AEROSOL, METERED RESPIRATORY (INHALATION) EVERY 6 HOURS PRN
Status: CANCELLED | OUTPATIENT
Start: 2017-04-04

## 2017-04-04 RX ORDER — CYPROHEPTADINE HYDROCHLORIDE 4 MG/1
4 TABLET ORAL 3 TIMES DAILY PRN
Status: CANCELLED | OUTPATIENT
Start: 2017-04-04

## 2017-04-04 RX ORDER — TIOTROPIUM BROMIDE 18 UG/1
1 CAPSULE ORAL; RESPIRATORY (INHALATION) DAILY
Status: CANCELLED | OUTPATIENT
Start: 2017-04-05

## 2017-04-04 RX ORDER — ASPIRIN 81 MG/1
81 TABLET ORAL DAILY
Status: CANCELLED | OUTPATIENT
Start: 2017-04-04

## 2017-04-04 RX ORDER — FLUTICASONE FUROATE AND VILANTEROL 100; 25 UG/1; UG/1
1 POWDER RESPIRATORY (INHALATION) DAILY
Status: CANCELLED | OUTPATIENT
Start: 2017-04-05

## 2017-04-04 RX ORDER — FLUTICASONE FUROATE AND VILANTEROL 100; 25 UG/1; UG/1
1 POWDER RESPIRATORY (INHALATION) DAILY
Status: CANCELLED | OUTPATIENT
Start: 2017-04-04

## 2017-04-04 RX ORDER — DOCUSATE SODIUM 100 MG/1
100 CAPSULE, LIQUID FILLED ORAL DAILY
Status: CANCELLED | OUTPATIENT
Start: 2017-04-05

## 2017-04-04 RX ORDER — METOPROLOL SUCCINATE 50 MG/1
50 TABLET, EXTENDED RELEASE ORAL DAILY
Status: CANCELLED | OUTPATIENT
Start: 2017-04-04

## 2017-04-04 RX ORDER — LISINOPRIL 2.5 MG/1
2.5 TABLET ORAL DAILY
Status: CANCELLED | OUTPATIENT
Start: 2017-04-05

## 2017-04-04 RX ORDER — ASCORBIC ACID 500 MG
500 TABLET ORAL 2 TIMES DAILY
Status: CANCELLED | OUTPATIENT
Start: 2017-04-04

## 2017-04-04 RX ORDER — FUROSEMIDE 20 MG/1
20 TABLET ORAL DAILY
Status: CANCELLED | OUTPATIENT
Start: 2017-04-04

## 2017-04-04 RX ADMIN — ASPIRIN 81 MG: 81 TABLET, COATED ORAL at 09:04

## 2017-04-04 RX ADMIN — PANTOPRAZOLE SODIUM 600 MG: 40 TABLET, DELAYED RELEASE ORAL at 09:04

## 2017-04-04 RX ADMIN — TIOTROPIUM BROMIDE 18 MCG: 18 CAPSULE ORAL; RESPIRATORY (INHALATION) at 08:04

## 2017-04-04 RX ADMIN — CITALOPRAM HYDROBROMIDE 20 MG: 20 TABLET ORAL at 09:04

## 2017-04-04 RX ADMIN — APIXABAN 2.5 MG: 2.5 TABLET, FILM COATED ORAL at 09:04

## 2017-04-04 RX ADMIN — ACETAMINOPHEN 1000 MG: 500 TABLET ORAL at 02:04

## 2017-04-04 RX ADMIN — IPRATROPIUM BROMIDE AND ALBUTEROL SULFATE 3 ML: .5; 3 SOLUTION RESPIRATORY (INHALATION) at 04:04

## 2017-04-04 RX ADMIN — IPRATROPIUM BROMIDE AND ALBUTEROL SULFATE 3 ML: .5; 3 SOLUTION RESPIRATORY (INHALATION) at 11:04

## 2017-04-04 RX ADMIN — PREDNISONE 40 MG: 20 TABLET ORAL at 09:04

## 2017-04-04 RX ADMIN — LISINOPRIL 2.5 MG: 2.5 TABLET ORAL at 09:04

## 2017-04-04 RX ADMIN — FUROSEMIDE 20 MG: 20 TABLET ORAL at 09:04

## 2017-04-04 RX ADMIN — DOCUSATE SODIUM 100 MG: 100 CAPSULE, LIQUID FILLED ORAL at 09:04

## 2017-04-04 RX ADMIN — IPRATROPIUM BROMIDE AND ALBUTEROL SULFATE 3 ML: .5; 3 SOLUTION RESPIRATORY (INHALATION) at 03:04

## 2017-04-04 RX ADMIN — IPRATROPIUM BROMIDE AND ALBUTEROL SULFATE 3 ML: .5; 3 SOLUTION RESPIRATORY (INHALATION) at 08:04

## 2017-04-04 RX ADMIN — CALCIUM CARBONATE (ANTACID) CHEW TAB 500 MG 500 MG: 500 CHEW TAB at 07:04

## 2017-04-04 RX ADMIN — IPRATROPIUM BROMIDE AND ALBUTEROL SULFATE 3 ML: .5; 3 SOLUTION RESPIRATORY (INHALATION) at 07:04

## 2017-04-04 RX ADMIN — FLUTICASONE FUROATE AND VILANTEROL TRIFENATATE 1 PUFF: 100; 25 POWDER RESPIRATORY (INHALATION) at 08:04

## 2017-04-04 RX ADMIN — ACETAMINOPHEN 1000 MG: 500 TABLET ORAL at 06:04

## 2017-04-04 RX ADMIN — METOPROLOL SUCCINATE 50 MG: 50 TABLET, EXTENDED RELEASE ORAL at 09:04

## 2017-04-04 RX ADMIN — VITAM B12 100 MCG: 100 TAB at 09:04

## 2017-04-04 NOTE — PLAN OF CARE
Problem: Occupational Therapy Goal  Goal: Occupational Therapy Goal  Goals to be met by: 5/2/17     Patient will increase functional independence with ADLs by performing:    LE Dressing with Modified [place].  Grooming with Modified [place].  Toileting from toilet with supervision for hygiene and clothing management.   Stand pivot transfers with supervision.  Toilet transfer to toilet with Supervision.  Increased functional strength to [facility] for self care skills and functional .  Upper extremity exercise program x10 reps per handout, with independence.   Outcome: Ongoing (interventions implemented as appropriate)  Patient with improving mobility this date. OOB to chair multiple times today. Still limited by decreased strength and endurance. Will benefit from skilled OT to address functional deficits.

## 2017-04-04 NOTE — PROGRESS NOTES
[facility]  Adult Nutrition  Consult Note    SUMMARY     Recommendations    Recommendation/Intervention:   1. D/C supplements- patient does not drink or want   2. Encourage po intake    3. Monitor weight weekly   4. RD to monitor    Goals: Patient to meet >85% EEN  Nutrition Goal Status: progressing towards goal  Communication of RD Recs: reviewed with RN    Continuum of Care Plan    Referral to Outpatient Services:  (D/C planning: Cardiac diet with supplements as needed)    Reason for Assessment    Reason for Assessment: RD follow-up  Diagnosis:  (acute on chronic resp failure)  Relevent Medical History: HLD, COPD, HTN, CHF, CAD   Interdisciplinary Rounds: did not attend     General Information Comments: Patient eating 50% of meals. Not drinking supplements- does not like. Encouraged po intake- family at bedside for assistance.    Nutrition Prescription Ordered    Current Diet Order: Cardiac     Evaluation of Received Nutrients/Fluid Intake        % Intake of Estimated Needs: 50-75%   % Intake of Meals: 50%    I/O: 1085/1450       Nutrition Risk Screen     Nutrition Risk Screen: no indicators present    Nutrition/Diet History      Food Preferences: No Adventist, ethnic related food preferences.      Factors Affecting Nutritional Intake: decreased appetite     Labs/Tests/Procedures/Meds     Pertinent Labs Reviewed: reviewed  Pertinent Medications Reviewed: reviewed       Physical Findings    Overall Physical Appearance:  (thin)  Oral/Mouth Cavity: WDL  Skin: intact (Carlos 13)    Anthropometrics     Height (inches): 65 in  Weight Method: Bed Scale  Weight (kg): 54.9 kg     Ideal Body Weight (IBW), Female: 125 lb     % Ideal Body Weight, Female (lb): 96.82 lb  BMI (kg/m2): 20.14  BMI Grade: 17 - 18.4 protein-energy malnutrition grade I     Estimated/Assessed Needs    Weight Used For Calorie Calculations: 54.9 kg (121 lb 0.5 oz)   Height (cm): 165.1 cm     Energy Need Method: [place]-St Jeor  (4475-3872 kcal)     RMR ([place]-St. Jeor Equation): 1010.97      Weight Used For Protein Calculations: 54.9 kg (121 lb 0.5 oz)  Protein Requirements: 55-65 g    Fluid Need Method: RDA Method     Nutrition diagnosis    Problem: Inadequate Energy Intake  Etiology: Decreased appetite  As Evidenced by: 50% po intake with meals  Nutrition Diagnosis Status: Continues     Monitor and Evaluation    Food and Nutrient Intake: energy intake, food and beverage intake  Food and Nutrient Adminstration: diet order  Anthropometric Measurements: weight, weight change  Biochemical Data, Medical Tests and Procedures: electrolyte and renal panel, lipid profile  Nutrition-Focused Physical Findings: overall appearance    Nutrition Risk    Level of Risk:  (1 x week)    Nutrition Follow-Up    RD Follow-up?: Yes    Assessment and Plan    No new Assessment & Plan notes have been filed under this hospital service since the last note was generated.  Service: Nutrition

## 2017-04-04 NOTE — PLAN OF CARE
Problem: Patient Care Overview  Goal: Plan of Care Review  Outcome: Ongoing (interventions implemented as appropriate)  Pt stable, no apparent distress noted, fall and safety precautions maintained, no acute injuries noted during shift, Bed in lowest position, locked , call light within reach, side rails up x's 2, slip resistant socks on. Bed alarm on,will continue to monitor pt.     Pt given scheduled Tylenol and PRN Tramadol for c/o of bilateral leg pain, pt up to chair for lunch and dinner, mobility in bed encouraged to prevent skin breakdown, continue to monitor pt respiratory status, SOB on exertion continued, pt on oxygen , 2L via NC, report given to oncoming nurse.

## 2017-04-04 NOTE — PT/OT/SLP PROGRESS
Occupational Therapy  Treatment    Kaity Rebolledo   MRN: [id number]   Admitting Diagnosis: Acute on chronic respiratory failure with hypoxia and hypercapnia    OT Date of Treatment: 04/04/17   OT Start Time: 1352  OT Stop Time: 1414  OT Total Time (min): 22 min    Billable Minutes:  Self Care/Home Management 10 and Therapeutic Activity 12    General Precautions: Standard, fall, respiratory  Orthopedic Precautions: N/A  Braces: N/A    Subjective:  Communicated with nurseJyotsna prior to session.    Pain Rating:  (did not rate,but c/o pain in BLEs during movement)    Objective:  Patient found with: peripheral IV, telemetry, oxygen     Functional Mobility:  Bed Mobility:  Scooting/Bridging: Stand by Assistance  Supine to Sit: Stand by Assistance    Transfers:   Sit <> Stand Assistance: Contact Guard Assistance  Sit <> Stand Assistive Device: Rolling Walker  Bed <> Chair Transfer Assistance: Contact Guard Assistance  Bed <> Chair Assistive Device: Rolling Walker  Toilet Transfer Assistance: Contact Guard Assistance  Toilet Transfer Assistive Device: Rolling Walker, bedside commode    Functional Ambulation: EOB > bedside commode > bedside chair -- about 3-4 feet total    Activities of Daily Living:  Toileting Where Assessed: Bedside commode  Toileting Level of Assistance: Set-up Assistance    Balance:   Static Sit: GOOD: Takes MODERATE challenges from all directions  Dynamic Sit: FAIR+: Maintains balance through MINIMAL excursions of active trunk motion  Static Stand: FAIR+: Takes MINIMAL challenges from all directions  Dynamic stand: FAIR: Needs CONTACT GUARD during gait    Therapeutic Activities and Exercises:  Patient with bed mobility noted as above. Sit > stand using RW with CGA and VCs for hand placement. Ambulated to [facility] with CGA. Completed linda hygiene with s/u (A). Patient then ambulated to bedside chair CGA using RW. In chair, patient completed pursed lip breathing ex, x 10, with good results. Completed BUE AROM  therex, 1 x 10 reps: bicep curls, sh flexion and hand pumps, taking breathing breaks after each exercise.    AM-PAC 6 CLICK ADL   How much help from another person does this patient currently need?   1 = Unable, Total/Dependent Assistance  2 = A lot, Maximum/Moderate Assistance  3 = A little, Minimum/Contact Guard/Supervision  4 = None, Modified [place]/Independent    Putting on and taking off regular lower body clothing? : 2  Bathing (including washing, rinsing, drying)?: 2  Toileting, which includes using toilet, bedpan, or urinal? : 3  Putting on and taking off regular upper body clothing?: 3  Taking care of personal grooming such as brushing teeth?: 4  Eating meals?: 4  Total Score: 18     AM-PAC Raw Score CMS G-Code Modifier Level of Impairment Assistance   6 % Total / Unable   7 - 9 CM 80 - 100% Maximal Assist   10 - 14 CL 60 - 80% Moderate Assist   15 - 19 CK 40 - 60% Moderate Assist   20 - 22 CJ 20 - 40% Minimal Assist   23 CI 1-20% SBA / CGA   24 CH 0% Independent/ Mod I     Patient left up in chair with all lines intact, call button in reach, Jyotsna notified and daughter present    ASSESSMENT:  Kaity Rebolledo is a 83 y.o. female with a medical diagnosis of Acute on chronic respiratory failure with hypoxia and hypercapnia   Patient with improving mobility this date. OOB to chair multiple times today. Still limited by decreased strength and endurance. Will benefit from skilled OT to address functional deficits.    Rehab identified problem list/impairments: Rehab identified problem list/impairments: weakness, impaired endurance, impaired functional mobilty, impaired self care skills, impaired balance, gait instability, impaired cardiopulmonary response to activity, pain, decreased lower extremity function, decreased upper extremity function, decreased ROM    Rehab potential is good.    Activity tolerance: Good    Discharge recommendations: Discharge Facility/Level Of Care Needs: nursing  facility, skilled     Barriers to discharge: Barriers to Discharge: None    Equipment recommendations: none     GOALS:   Occupational Therapy Goals        Problem: Occupational Therapy Goal    Goal Priority Disciplines Outcome Interventions   Occupational Therapy Goal     OT, PT/OT Ongoing (interventions implemented as appropriate)    Description:  Goals to be met by: 5/2/17     Patient will increase functional independence with ADLs by performing:    LE Dressing with Modified [place].  Grooming  with Modified [place].  Toileting from toilet with supervision for hygiene and clothing management.   Stand pivot transfers with supervision.  Toilet transfer to toilet with Supervision.  Increased functional strength to [facility] for self care skills and functional .  Upper extremity exercise program x10 reps per handout, with independence.                Plan:  Patient to be seen 5 x/week to address the above listed problems via self-care/home management, therapeutic activities, therapeutic exercises  Plan of Care expires: 05/02/17  Plan of Care reviewed with: patient, daughter         Ivana DALLAS JENA Shah  04/04/2017

## 2017-04-04 NOTE — PLAN OF CARE
Problem: Patient Care Overview  Goal: Plan of Care Review  Outcome: Ongoing (interventions implemented as appropriate)  Patient on oxygen with documented flow.  Will attempt to wean per O2 order protocol. Patient given aerosol treatment with no adverse reactions noted.  Patient instructed on proper use. The proper method of use, as well as anticipated side effects, of this metered-dose inhaler are discussed and demonstrated to the patient. Will continue to monitor.

## 2017-04-04 NOTE — PROCEDURES
4/3/17  Spirometry    Severe obstruction is present on spirometry and there is no significant response to bronchodilator challenge. This does not preclude a clinical response to bronchodilator therapy, however.    Анна Peterson MD  4/4/2017  2:33 PM

## 2017-04-04 NOTE — PLAN OF CARE
Problem: Physical Therapy Goal  Goal: Physical Therapy Goal  1. Ambulate 40 with RW with 2 Lit O2.  2. Sit in chair 1 1/2 hours.  3. Perform 10 x 3 BLE exercises sitting.   Outcome: Ongoing (interventions implemented as appropriate)  Pt has achieved goal #2.  Continue working toward remaining goals.

## 2017-04-04 NOTE — PT/OT/SLP PROGRESS
Physical Therapy  Treatment    Kaity Rebolledo   MRN: [id number]   Admitting Diagnosis: Acute on chronic respiratory failure with hypoxia and hypercapnia    PT Received On: 17  PT Start Time: 1505     PT Stop Time: 1544    PT Total Time (min): 39 min       Billable Minutes:  Gait Training8, Therapeutic Activity 23 and Therapeutic Exercise 8    Treatment Type: Treatment  PT/PTA: PTA     PTA Visit Number: 2       General Precautions: Standard, fall  Orthopedic Precautions: Full weight bearing   Braces:      Do you have any cultural, spiritual, Jainism conflicts, given your current situation?:  (none)    Subjective:  Communicated with RN (Jyotsna) prior to session.  Pt agreed to tx. Pt was sitting up in b/s chair on 3lpm O2 NC wall unit upon entering the room.  Pt asked for assistance with getting dressed and to go to the b/s commode.    Pain Ratin/10  Location - Side: Bilateral  Location - Orientation: generalized  Location:  (legs)  Pain Addressed: Reposition, Nurse notified  Pain Rating Post-Intervention: /10    Objective:   Patient found with: telemetry, oxygen    Functional Mobility:  Bed Mobility:   Scooting/Bridging: Supervision (to EOB and edge of chair prior to standing)    Transfers:  Sit <> Stand Assistance: Minimum Assistance  Sit <> Stand Assistive Device: Rolling Walker  Chair<> Mat Technique:  (b/s chair > b/s commode 2 small steps with RW and CGA > EOB 5ft with Rw and CGA)  Chair<>Mat Assistance: Contact Guard Assistance  Chair <> Mat Assistive Device: Rolling Walker    Gait:   Gait Distance: 2 small steps to b/s commode, then 5ft to EOB with RW and CGA.  40ft with RW and close CGA with vc's to stay closer to RW.  Pt on O2 @3lpm wall unit with extended line.  Assistance 1: Contact Guard Assistance  Gait Assistive Device: Rolling walker  Gait Pattern: reciprocal  Gait Deviation(s): decreased amri, decreased step length, decreased stride length, decreased weight-shifting ability, decreased  toe-to-floor clearance    Stairs:    Balance:   Static Sit: FAIR+: Able to take MINIMAL challenges from all directions  Dynamic Sit: FAIR+: Maintains balance through MINIMAL excursions of active trunk motion  Static Stand: FAIR: Maintains without assist but unable to take challenges  Dynamic stand: FAIR: Needs CONTACT GUARD during gait     Therapeutic Activities and Exercises:  T/f b/s chair>b/s commode>EOB as above.  BLE therex seated: AP, LAQ, hip flexion/ABD/ADD x 15 reps with rest breaks between bouts secondary to SOB.  O2 sats monitoring during rest breaks and was 89 to 91%.  Pt instructed in purse lip breathing.  Pt was assisted with dressing:  Required assistance to put underwear and pants on and pull up to knees.  Pt stood with RW and Darling and was then able to pull underwear and pants up completely with CGA.    AMB: as above.  Pt amb as above to perform t/fs and after dressing, pt ambulated 40ft with Rw and close CGA in room on 3lpm with extended line.  Vc's to stay closer to RW and not leave Rw out to the side prior to sitting down.  VC's for increased safety with Rw use. Pt was very SOB after last bout of gait.  Sats  Were 92% and pt needed a minute to a minute and a half to regain normal breathing.    AM-PAC 6 CLICK MOBILITY  How much help from another person does this patient currently need?   1 = Unable, Total/Dependent Assistance  2 = A lot, Maximum/Moderate Assistance  3 = A little, Minimum/Contact Guard/Supervision  4 = None, Modified [place]/Independent    Turning over in bed (including adjusting bedclothes, sheets and blankets)?: 4  Sitting down on and standing up from a chair with arms (e.g., wheelchair, bedside commode, etc.): 3  Moving from lying on back to sitting on the side of the bed?: 3  Moving to and from a bed to a chair (including a wheelchair)?: 3  Need to walk in hospital room?: 3  Climbing 3-5 steps with a railing?: 3  Total Score: 19    AM-PAC Raw Score CMS G-Code Modifier  Level of Impairment Assistance   6 % Total / Unable   7 - 9 CM 80 - 100% Maximal Assist   10 - 14 CL 60 - 80% Moderate Assist   15 - 19 CK 40 - 60% Moderate Assist   20 - 22 CJ 20 - 40% Minimal Assist   23 CI 1-20% SBA / CGA   24 CH 0% Independent/ Mod I     Patient left up in chair with all lines intact, call button in reach and RN notified.    Assessment:  Kaity Rebolledo is a 83 y.o. female with a medical diagnosis of Acute on chronic respiratory failure with hypoxia and hypercapnia and presents with decreased strength, endurance, and SOB with exertion.  .    Rehab identified problem list/impairments: Rehab identified problem list/impairments: weakness, impaired endurance, impaired self care skills, impaired functional mobilty, gait instability, impaired balance, decreased lower extremity function, decreased upper extremity function, impaired cardiopulmonary response to activity, decreased ROM    Rehab potential is good.    Activity tolerance: Fair    Discharge recommendations: Discharge Facility/Level Of Care Needs: nursing facility, skilled     Barriers to discharge: Barriers to Discharge: None    Equipment recommendations: Equipment Needed After Discharge: none     GOALS:   Physical Therapy Goals        Problem: Physical Therapy Goal    Goal Priority Disciplines Outcome Goal Variances Interventions   Physical Therapy Goal     PT/OT, PT Ongoing (interventions implemented as appropriate)     Description:  1.  Ambulate 40' with RW with 2 Lit O2.  2.  Sit in chair 1 1/2 hours.  3.  Perform 10 x 3 BLE exercises sitting.              PLAN:    Patient to be seen 6 x/week  to address the above listed problems via gait training, therapeutic activities, therapeutic exercises, neuromuscular re-education  Plan of Care expires: 05/01/17  Plan of Care reviewed with: patient, daughter         Ara Dugan, PTA  04/04/2017

## 2017-04-04 NOTE — PLAN OF CARE
Pt awaiting SNF auth. Daughter stated that family will not be available in the morning of 4/5/17. If pt is to be discharged in the morning, please call and notify daughter (either Luisana Wesley, or Sheela - numbers listed on facesheet).

## 2017-04-04 NOTE — PROGRESS NOTES
The Sw updated the pt's dtr Celsa that [employer] [facility] were nall day and they're currently working on all the snf auths now. The Sw informed her Hannah at [facility] stated she's waiting on 3 auth's from [employer] and states if she receives the auth before 5pm she can accept the pt's today if not she will not be able to admit until tomorrow.

## 2017-04-04 NOTE — PROGRESS NOTES
[facility] Internal Medicine Resident HO-IV Progress Note    Subjective:     Patient reports feeling better.  Cough, weakness, myalgias improved.  Working with PT/OT; maintains good appetite.     Objective:   Last 24 Hour Vital Signs:  BP  Min: 106/53  Max: 132/62  Temp  Av.3 °F (36.8 °C)  Min: 97.3 °F (36.3 °C)  Max: 99.1 °F (37.3 °C)  Pulse  Av.6  Min: 80  Max: 101  Resp  Av.1  Min: 16  Max: 20  SpO2  Av.8 %  Min: 92 %  Max: 97 %  Weight  Av.5 kg (115 lb 11.9 oz)  Min: 52.5 kg (115 lb 11.9 oz)  Max: 52.5 kg (115 lb 11.9 oz)  I/O last 3 completed shifts:  In: 1205 [P.O.:1205]  Out: 1850 [Urine:1850]    Physical Examination:  General: Oriented to person, place, and time. Appears well-developed and well-nourished.   Head: Normocephalic and atraumatic.   Eyes: Conjunctivae are pink, moist. Pupils are equal, round, and reactive to light. Extraocular movements intact.  Cardiovascular: Normal rate, regular rhythm. No murmurs, rub, or extra heart sounds appreciated.   Pulmonary/Chest: Effort normal, with diffuse mild end expiratory wheezing, no crackles or rhonchi.   Abdominal: Soft, bowel sounds are normoactive.  No tenderness, guarding, or rebound.  Musculoskeletal: Normal range of motion, 5/5 strength to gross flexion and extension to upper and lower extremities, bilaterally.  Extremities: 2+ radial pulses, CR <2s, no cyanosis or edema.  Skin: Skin is warm and dry.     Laboratory:  Laboratory Data Reviewed: yes  Pertinent Findings:    Recent Labs  Lab 17  0658 17  0636 17  0633   WBC 6.92 7.05 10.80   HGB 9.1* 8.6* 8.0*   HCT 28.4* 26.8* 25.5*    243 253   MCV 94 95 94   RDW 13.2 13.6 13.8    138 135*   K 3.5 3.6 3.6   CL 91* 90* 89*   CO2 39* 40* 38*   BUN 14 12 15   CREATININE 0.8 0.8 0.8   GLU 86 87 87   PROT 5.9* 5.9* 5.5*   ALBUMIN 3.1* 3.2* 3.0*   BILITOT 0.2 0.2 0.2   AST 29 22 15   ALKPHOS 42* 41* 39*   ALT 28 24 18     Troponin: 0.009 > 0.047 > 0.442 > 0.445 >  0.359    Microbiology Data Reviewed: yes  Pertinent Findings:  Respiratory culture - not collected     Other Results:  Radiology Data Reviewed: yes  Pertinent Findings:  No new imaging     Current Medications:     Infusions:        Scheduled:   acetaminophen  1,000 mg Oral TID    albuterol-ipratropium 2.5mg-0.5mg/3mL  3 mL Nebulization Q4H    apixaban  2.5 mg Oral BID    aspirin  81 mg Oral Daily    citalopram  20 mg Oral Daily    cyanocobalamin  100 mcg Oral Daily    docusate sodium  100 mg Oral Daily    fluticasone-vilanterol  1 puff Inhalation Daily    furosemide  20 mg Oral Daily    guaifenesin  600 mg Oral BID    lisinopril  2.5 mg Oral Daily    metoprolol succinate  50 mg Oral Daily    predniSONE  40 mg Oral Daily    tiotropium  1 capsule Inhalation Daily        PRN:  dextrose 50%, dextrose 50%, glucagon (human recombinant), glucose, glucose, ondansetron, ramelteon, tramadol    Antibiotics and Day Number of Therapy:  Moxifloxacin 400mg IV daily (started 3/28)  s/p Levaquin 3/27    Lines and Day Number of Therapy:  PIV    Assessment:     Kaity Rebolledo is a 83 y.o.female with  Patient Active Problem List    Diagnosis Date Noted    COPD with respiratory failure, acute 04/01/2017    Depression with anxiety 04/01/2017    Pulmonary hypertension due to lung disease 03/30/2017    Elevated troponin 03/29/2017    Pulmonary hypertension 03/28/2017    History of atrial fibrillation 03/28/2017    SOB (shortness of breath) 03/28/2017    Acute on chronic respiratory failure with hypoxia and hypercapnia 03/28/2017    Hyponatremia 03/28/2017    Urinary retention 03/28/2017    Paroxysmal atrial fibrillation     Anxiety and depression     (HFpEF) heart failure with preserved ejection fraction 03/27/2017    Stage 4 very severe COPD by GOLD classification 03/27/2017    Atrial fibrillation with RVR 03/04/2017    Low oxygen saturation 12/27/2016    Iron deficiency anemia due to chronic blood loss  10/10/2016    Pneumonia 09/02/2016    Chronic diastolic heart failure 09/02/2016    Chronic obstructive pulmonary disease 06/16/2016    Coronary artery disease involving native coronary artery without angina pectoris 06/16/2016    Greater trochanter fracture 05/15/2016    Hip fracture 05/15/2016    Closed bilateral fracture of pubic rami 03/18/2016    Osteoporosis 03/18/2016    Arthralgia of left hip 03/17/2016    Hip pain 03/17/2016    Anemia, chronic disease 03/17/2016    HLD (hyperlipidemia) 03/17/2016    Uterine prolapse 03/16/2016    Coronary artery disease involving native coronary artery of native heart without angina pectoris 03/14/2016    COPD exacerbation 03/14/2016    Macrocytic anemia 03/14/2016    Closed fracture of ramus of right pubis 02/06/2016    Chronic hypoxemic respiratory failure 02/06/2016    Anemia of chronic disease 12/16/2015    Cardiomyopathy 09/24/2015    Chronic systolic heart failure 09/10/2015    Chronic anemia 08/04/2015    Physical deconditioning 03/05/2015    Cystocele 08/06/2013    Essential hypertension 08/06/2013    OP (osteoporosis) 08/06/2013    Dyslipidemia 08/06/2013    COPD (chronic obstructive pulmonary disease) 09/11/2012        Plan:     Acute Hypoxic Respiratory Failure 2/2 COPD Exacerbation  - Presented with SOB despite IV steroids and multiple duo-neb treatments  - CXR suggestive of COPD/emphysema, no evidence of pneumonia  - Flu negative  given tamiflu empirically  - Continue nebs q4 hours with mucinex 600mg BID and prednisone 40mg daily, moxifloxacin x 5 days.  Will receive extended prednisone taper on discharge  - Pulmonary consulted, appreciate recommendations    - Respiratory status continues to improve     CAD  - Cath done 10/2015 - RCA 40% stenosis, LAD 20% stenosis  - Continue ASA 81mg daily and Toprol-xl 50mg daily, off statin 2/2 intolerance, will discuss changing to new statin  - Trop peaked at 0.445 this admission, and has since  trended down. EKG with some ST depression in inferior leads, likely secondary to demand ischemia  - Continues to deny chest pain; monitor     Chronic Diastolic Congestive Heart Failure / Pulmonary Hypertension  - TTE 3/3/17: EF 55%, diastolic dysfunction present, PAP 51  - Wheezes appreciated but no crackles on exam  -  but patient appeared euvolemic at time of admission.   - Continue home lasix 20 mg daily       HTN  - BP normotensive this morning  - Continue metoprolol and lisinopril; titrate as needed     Dyslipidemia  - Lipid panel:  Chol 223, TG 45, HDL 82,   - Patient not currently on lipid lowering therapy; statin previously discontinued due to myopathy      Anemia of Chronic Disease  - Hb at baseline  - iron 21, TIBC 330, ferritin 123, folate 18, vit B12 810       Hx of A-fib with RVR  - Diagnosed as new onset in recent hospitalization 3/2017  - Remains in NSR this admission  - Continue Eliquis 2.5 mg BID and Metoprolol   - Will need outpatient Cardiology follow up at discharge     Anxiety / Depression  - No acute issues  - Continue home celexa     PPx   On Eliquis    Code  Partial - no compressions or chemical resuscitation, intubation only     Dispo: Pending placement to [facility]      Darlene Motta MD  [facility] Internal Medicine, HO-I  [facility] Hospitalist Medicine Team A    [facility] Medicine Hospitalist Pager numbers:   [facility] Hospitalist Medicine Team A (Man/Shashank):   [facility] Hospitalist Medicine Team B (Zak/[place]):  [phone]

## 2017-04-04 NOTE — PROGRESS NOTES
The Sw spoke to Hannah at [facility] and she states she will review the pt's notes and call the Sw back to see if they will be able to accept the pt today.     9:31am The pt's dtr states she's going to tour [facility] today but [facility]. The Sw encouraged her to go tour [facility] b/c [facility] hasn't officially accepted the pt yet.     10:20am The Sw left a message for Hannah at [facility] to return the call in reference to them accepting the pt.     11:21am The Sw spoke to Hannah at [facility] and she states Dr. Boyer has medically accepted the pt and she has submitted to [facility]. The Sw paged the team to inform them to write the d/c readmit orders for [facility]. The Sw left a message for pt's dtr Celsa on her cell phone.     11:32am The Sw spoke to the pt's dtr Celsa and informed her of the pt's acceptance to [facility] but informed her the snf auth is pending and the  pt can't go until it's received. The Sw informed she will keep her informed.     2:11pm The Sw spoke to Hannah at [facility] and she received the orders and they were correct. She states she's waiting on the snf auth from [facility]. The Sw left a message for Colby at [facility] in reference to the snf auth.

## 2017-04-04 NOTE — PLAN OF CARE
Problem: Patient Care Overview  Goal: Plan of Care Review  Outcome: Ongoing (interventions implemented as appropriate)  Reviewed plan of care with patient and family members. Patient verbalized understanding. AAOx3. Pt up in chair during meals. Pt complained of bilateral lower extremity pain during activity; pain controlled with scheduled tylenol. Pt on cardiac diet; tolerates PO meds well. Pt on 6L nasal cannula; sats 94-95%. Patient on continuous tele monitoring; NSR-ST with occasional PVCs; HR 90s - low 100s. No true red alarms or ectopy noted. Pt awaiting SNF authorization. Daughter at bedside. Pt currently up in chair. Will continue to monitor.

## 2017-04-04 NOTE — PLAN OF CARE
Problem: Patient Care Overview  Goal: Plan of Care Review  Patient tolerated therapy well.  No signs of respiratory distress noted.  Will continue to monitor.

## 2017-04-05 ENCOUNTER — HOSPITAL ENCOUNTER (INPATIENT)
Facility: HOSPITAL | Age: 82
LOS: 9 days | Discharge: HOME HEALTH | DRG: 190 | End: 2017-04-14
Attending: INTERNAL MEDICINE | Admitting: INTERNAL MEDICINE
Payer: MEDICARE

## 2017-04-05 ENCOUNTER — OUTPATIENT CASE MANAGEMENT (OUTPATIENT)
Dept: ADMINISTRATIVE | Facility: OTHER | Age: 82
End: 2017-04-05

## 2017-04-05 VITALS
SYSTOLIC BLOOD PRESSURE: 121 MMHG | HEART RATE: 85 BPM | DIASTOLIC BLOOD PRESSURE: 61 MMHG | RESPIRATION RATE: 18 BRPM | TEMPERATURE: 99 F | HEIGHT: 65 IN | BODY MASS INDEX: 19.28 KG/M2 | OXYGEN SATURATION: 95 % | WEIGHT: 115.75 LBS

## 2017-04-05 DIAGNOSIS — M79.605 LEFT LEG PAIN: ICD-10-CM

## 2017-04-05 DIAGNOSIS — J96.21 ACUTE ON CHRONIC RESPIRATORY FAILURE WITH HYPOXIA AND HYPERCAPNIA: Primary | ICD-10-CM

## 2017-04-05 DIAGNOSIS — E78.5 DYSLIPIDEMIA: ICD-10-CM

## 2017-04-05 DIAGNOSIS — J96.22 ACUTE ON CHRONIC RESPIRATORY FAILURE WITH HYPOXIA AND HYPERCAPNIA: Primary | ICD-10-CM

## 2017-04-05 DIAGNOSIS — I42.9 CARDIOMYOPATHY, UNSPECIFIED TYPE: ICD-10-CM

## 2017-04-05 DIAGNOSIS — J44.9 COPD, VERY SEVERE: ICD-10-CM

## 2017-04-05 DIAGNOSIS — D64.9 CHRONIC ANEMIA: ICD-10-CM

## 2017-04-05 DIAGNOSIS — I50.32 CHRONIC DIASTOLIC HEART FAILURE: ICD-10-CM

## 2017-04-05 DIAGNOSIS — R06.02 SOB (SHORTNESS OF BREATH): ICD-10-CM

## 2017-04-05 DIAGNOSIS — I25.10 CORONARY ARTERY DISEASE INVOLVING NATIVE CORONARY ARTERY OF NATIVE HEART WITHOUT ANGINA PECTORIS: ICD-10-CM

## 2017-04-05 DIAGNOSIS — I50.22 CHRONIC SYSTOLIC HEART FAILURE: Chronic | ICD-10-CM

## 2017-04-05 DIAGNOSIS — M81.0 OSTEOPOROSIS, UNSPECIFIED OSTEOPOROSIS TYPE, UNSPECIFIED PATHOLOGICAL FRACTURE PRESENCE: ICD-10-CM

## 2017-04-05 DIAGNOSIS — F32.A ANXIETY AND DEPRESSION: ICD-10-CM

## 2017-04-05 DIAGNOSIS — F41.9 ANXIETY AND DEPRESSION: ICD-10-CM

## 2017-04-05 DIAGNOSIS — Z79.01 ANTICOAGULANT LONG-TERM USE: ICD-10-CM

## 2017-04-05 DIAGNOSIS — E87.6 HYPOKALEMIA: ICD-10-CM

## 2017-04-05 DIAGNOSIS — R07.9 CHEST PAIN: ICD-10-CM

## 2017-04-05 DIAGNOSIS — R53.81 DEBILITY: ICD-10-CM

## 2017-04-05 DIAGNOSIS — I10 ESSENTIAL HYPERTENSION: ICD-10-CM

## 2017-04-05 DIAGNOSIS — I48.0 PAROXYSMAL ATRIAL FIBRILLATION: ICD-10-CM

## 2017-04-05 LAB
ANION GAP SERPL CALC-SCNC: 8 MMOL/L
BASOPHILS # BLD AUTO: 0.01 K/UL
BASOPHILS NFR BLD: 0.1 %
BUN SERPL-MCNC: 13 MG/DL
CALCIUM SERPL-MCNC: 9.2 MG/DL
CHLORIDE SERPL-SCNC: 90 MMOL/L
CO2 SERPL-SCNC: 39 MMOL/L
CREAT SERPL-MCNC: 0.9 MG/DL
DIFFERENTIAL METHOD: ABNORMAL
EOSINOPHIL # BLD AUTO: 0 K/UL
EOSINOPHIL NFR BLD: 0.1 %
ERYTHROCYTE [DISTWIDTH] IN BLOOD BY AUTOMATED COUNT: 13.8 %
EST. GFR  (AFRICAN AMERICAN): >60 ML/MIN/1.73 M^2
EST. GFR  (NON AFRICAN AMERICAN): 59 ML/MIN/1.73 M^2
GLUCOSE SERPL-MCNC: 109 MG/DL
HCT VFR BLD AUTO: 25.8 %
HGB BLD-MCNC: 8.3 G/DL
LYMPHOCYTES # BLD AUTO: 1.6 K/UL
LYMPHOCYTES NFR BLD: 9.4 %
MCH RBC QN AUTO: 30.4 PG
MCHC RBC AUTO-ENTMCNC: 32.2 %
MCV RBC AUTO: 95 FL
MONOCYTES # BLD AUTO: 1.5 K/UL
MONOCYTES NFR BLD: 8.9 %
NEUTROPHILS # BLD AUTO: 13.6 K/UL
NEUTROPHILS NFR BLD: 81.2 %
PLATELET # BLD AUTO: 266 K/UL
PMV BLD AUTO: 9.8 FL
POTASSIUM SERPL-SCNC: 3.1 MMOL/L
RBC # BLD AUTO: 2.73 M/UL
SODIUM SERPL-SCNC: 137 MMOL/L
WBC # BLD AUTO: 16.77 K/UL

## 2017-04-05 PROCEDURE — 27000221 HC OXYGEN, UP TO 24 HOURS

## 2017-04-05 PROCEDURE — 25000003 PHARM REV CODE 250

## 2017-04-05 PROCEDURE — 63600175 PHARM REV CODE 636 W HCPCS: Performed by: STUDENT IN AN ORGANIZED HEALTH CARE EDUCATION/TRAINING PROGRAM

## 2017-04-05 PROCEDURE — 94640 AIRWAY INHALATION TREATMENT: CPT

## 2017-04-05 PROCEDURE — 36415 COLL VENOUS BLD VENIPUNCTURE: CPT

## 2017-04-05 PROCEDURE — 25000242 PHARM REV CODE 250 ALT 637 W/ HCPCS: Performed by: STUDENT IN AN ORGANIZED HEALTH CARE EDUCATION/TRAINING PROGRAM

## 2017-04-05 PROCEDURE — 25000003 PHARM REV CODE 250: Performed by: STUDENT IN AN ORGANIZED HEALTH CARE EDUCATION/TRAINING PROGRAM

## 2017-04-05 PROCEDURE — 85025 COMPLETE CBC W/AUTO DIFF WBC: CPT

## 2017-04-05 PROCEDURE — 25000003 PHARM REV CODE 250: Performed by: HOSPITALIST

## 2017-04-05 PROCEDURE — 80048 BASIC METABOLIC PNL TOTAL CA: CPT

## 2017-04-05 PROCEDURE — 94761 N-INVAS EAR/PLS OXIMETRY MLT: CPT

## 2017-04-05 PROCEDURE — 11000004 HC SNF PRIVATE

## 2017-04-05 RX ORDER — GUAIFENESIN 600 MG/1
1200 TABLET, EXTENDED RELEASE ORAL 2 TIMES DAILY
Status: DISCONTINUED | OUTPATIENT
Start: 2017-04-05 | End: 2017-04-14 | Stop reason: HOSPADM

## 2017-04-05 RX ORDER — POTASSIUM CHLORIDE 20 MEQ/1
20 TABLET, EXTENDED RELEASE ORAL
Status: DISCONTINUED | OUTPATIENT
Start: 2017-04-05 | End: 2017-04-05 | Stop reason: HOSPADM

## 2017-04-05 RX ORDER — PREDNISONE 10 MG/1
10 TABLET ORAL DAILY
Status: DISCONTINUED | OUTPATIENT
Start: 2017-04-12 | End: 2017-04-13

## 2017-04-05 RX ORDER — PREDNISONE 10 MG/1
10 TABLET ORAL 3 TIMES DAILY
Status: COMPLETED | OUTPATIENT
Start: 2017-04-06 | End: 2017-04-08

## 2017-04-05 RX ORDER — PREDNISONE 10 MG/1
10 TABLET ORAL 2 TIMES DAILY
Status: COMPLETED | OUTPATIENT
Start: 2017-04-09 | End: 2017-04-11

## 2017-04-05 RX ORDER — CYPROHEPTADINE HYDROCHLORIDE 4 MG/1
4 TABLET ORAL 3 TIMES DAILY PRN
Status: DISCONTINUED | OUTPATIENT
Start: 2017-04-05 | End: 2017-04-14 | Stop reason: HOSPADM

## 2017-04-05 RX ORDER — CYANOCOBALAMIN (VITAMIN B-12) 250 MCG
250 TABLET ORAL DAILY
Status: DISCONTINUED | OUTPATIENT
Start: 2017-04-06 | End: 2017-04-14 | Stop reason: HOSPADM

## 2017-04-05 RX ORDER — LEVALBUTEROL INHALATION SOLUTION 0.63 MG/3ML
0.31 SOLUTION RESPIRATORY (INHALATION) 4 TIMES DAILY
Status: DISCONTINUED | OUTPATIENT
Start: 2017-04-05 | End: 2017-04-07

## 2017-04-05 RX ORDER — ACETAMINOPHEN 325 MG/1
650 TABLET ORAL EVERY 6 HOURS PRN
Status: DISCONTINUED | OUTPATIENT
Start: 2017-04-05 | End: 2017-04-14 | Stop reason: HOSPADM

## 2017-04-05 RX ORDER — TIOTROPIUM BROMIDE 18 UG/1
1 CAPSULE ORAL; RESPIRATORY (INHALATION) DAILY
Status: DISCONTINUED | OUTPATIENT
Start: 2017-04-06 | End: 2017-04-14 | Stop reason: HOSPADM

## 2017-04-05 RX ORDER — FAMOTIDINE 20 MG/1
20 TABLET, FILM COATED ORAL 2 TIMES DAILY
Status: DISCONTINUED | OUTPATIENT
Start: 2017-04-05 | End: 2017-04-05 | Stop reason: HOSPADM

## 2017-04-05 RX ORDER — METOPROLOL SUCCINATE 50 MG/1
50 TABLET, EXTENDED RELEASE ORAL DAILY
Status: DISCONTINUED | OUTPATIENT
Start: 2017-04-06 | End: 2017-04-14 | Stop reason: HOSPADM

## 2017-04-05 RX ORDER — FUROSEMIDE 20 MG/1
20 TABLET ORAL DAILY
Status: DISCONTINUED | OUTPATIENT
Start: 2017-04-06 | End: 2017-04-14 | Stop reason: HOSPADM

## 2017-04-05 RX ORDER — FLUTICASONE FUROATE AND VILANTEROL 100; 25 UG/1; UG/1
1 POWDER RESPIRATORY (INHALATION) DAILY
Status: DISCONTINUED | OUTPATIENT
Start: 2017-04-06 | End: 2017-04-14 | Stop reason: HOSPADM

## 2017-04-05 RX ORDER — FLUTICASONE FUROATE AND VILANTEROL 100; 25 UG/1; UG/1
1 POWDER RESPIRATORY (INHALATION) DAILY
Status: DISCONTINUED | OUTPATIENT
Start: 2017-04-06 | End: 2017-04-05 | Stop reason: SDUPTHER

## 2017-04-05 RX ORDER — CITALOPRAM 20 MG/1
20 TABLET, FILM COATED ORAL DAILY
Status: DISCONTINUED | OUTPATIENT
Start: 2017-04-06 | End: 2017-04-14 | Stop reason: HOSPADM

## 2017-04-05 RX ORDER — ASCORBIC ACID 500 MG
500 TABLET ORAL 2 TIMES DAILY
Status: DISCONTINUED | OUTPATIENT
Start: 2017-04-05 | End: 2017-04-14 | Stop reason: HOSPADM

## 2017-04-05 RX ORDER — PREDNISONE 10 MG/1
10 TABLET ORAL
Status: DISCONTINUED | OUTPATIENT
Start: 2017-04-05 | End: 2017-04-05 | Stop reason: SDUPTHER

## 2017-04-05 RX ORDER — ASPIRIN 81 MG/1
81 TABLET ORAL DAILY
Status: DISCONTINUED | OUTPATIENT
Start: 2017-04-06 | End: 2017-04-14 | Stop reason: HOSPADM

## 2017-04-05 RX ORDER — LISINOPRIL 2.5 MG/1
2.5 TABLET ORAL DAILY
Status: DISCONTINUED | OUTPATIENT
Start: 2017-04-06 | End: 2017-04-14 | Stop reason: HOSPADM

## 2017-04-05 RX ORDER — ALBUTEROL SULFATE 90 UG/1
2 AEROSOL, METERED RESPIRATORY (INHALATION) EVERY 6 HOURS PRN
Status: DISCONTINUED | OUTPATIENT
Start: 2017-04-05 | End: 2017-04-14 | Stop reason: HOSPADM

## 2017-04-05 RX ORDER — DOCUSATE SODIUM 100 MG/1
100 CAPSULE, LIQUID FILLED ORAL DAILY
Status: DISCONTINUED | OUTPATIENT
Start: 2017-04-06 | End: 2017-04-06

## 2017-04-05 RX ADMIN — ASPIRIN 81 MG: 81 TABLET, COATED ORAL at 08:04

## 2017-04-05 RX ADMIN — FAMOTIDINE 20 MG: 20 TABLET, FILM COATED ORAL at 12:04

## 2017-04-05 RX ADMIN — LEVALBUTEROL 0.63 MG: 0.63 SOLUTION RESPIRATORY (INHALATION) at 11:04

## 2017-04-05 RX ADMIN — VITAM B12 100 MCG: 100 TAB at 08:04

## 2017-04-05 RX ADMIN — TIOTROPIUM BROMIDE 18 MCG: 18 CAPSULE ORAL; RESPIRATORY (INHALATION) at 08:04

## 2017-04-05 RX ADMIN — IPRATROPIUM BROMIDE AND ALBUTEROL SULFATE 3 ML: .5; 3 SOLUTION RESPIRATORY (INHALATION) at 12:04

## 2017-04-05 RX ADMIN — OXYCODONE HYDROCHLORIDE AND ACETAMINOPHEN 500 MG: 500 TABLET ORAL at 09:04

## 2017-04-05 RX ADMIN — FLUTICASONE FUROATE AND VILANTEROL TRIFENATATE 1 PUFF: 100; 25 POWDER RESPIRATORY (INHALATION) at 08:04

## 2017-04-05 RX ADMIN — METOPROLOL SUCCINATE 50 MG: 50 TABLET, EXTENDED RELEASE ORAL at 08:04

## 2017-04-05 RX ADMIN — LEVALBUTEROL 0.63 MG: 0.63 SOLUTION RESPIRATORY (INHALATION) at 06:04

## 2017-04-05 RX ADMIN — CITALOPRAM HYDROBROMIDE 20 MG: 20 TABLET ORAL at 08:04

## 2017-04-05 RX ADMIN — GUAIFENESIN 1200 MG: 600 TABLET, EXTENDED RELEASE ORAL at 09:04

## 2017-04-05 RX ADMIN — APIXABAN 2.5 MG: 2.5 TABLET, FILM COATED ORAL at 08:04

## 2017-04-05 RX ADMIN — FUROSEMIDE 20 MG: 20 TABLET ORAL at 08:04

## 2017-04-05 RX ADMIN — LISINOPRIL 2.5 MG: 2.5 TABLET ORAL at 08:04

## 2017-04-05 RX ADMIN — IPRATROPIUM BROMIDE AND ALBUTEROL SULFATE 3 ML: .5; 3 SOLUTION RESPIRATORY (INHALATION) at 04:04

## 2017-04-05 RX ADMIN — POTASSIUM CHLORIDE 20 MEQ: 20 TABLET, EXTENDED RELEASE ORAL at 12:04

## 2017-04-05 RX ADMIN — IPRATROPIUM BROMIDE AND ALBUTEROL SULFATE 3 ML: .5; 3 SOLUTION RESPIRATORY (INHALATION) at 08:04

## 2017-04-05 RX ADMIN — DOCUSATE SODIUM 100 MG: 100 CAPSULE, LIQUID FILLED ORAL at 08:04

## 2017-04-05 RX ADMIN — PANTOPRAZOLE SODIUM 600 MG: 40 TABLET, DELAYED RELEASE ORAL at 08:04

## 2017-04-05 RX ADMIN — ACETAMINOPHEN 1000 MG: 500 TABLET ORAL at 05:04

## 2017-04-05 RX ADMIN — ACETAMINOPHEN 650 MG: 325 TABLET ORAL at 09:04

## 2017-04-05 RX ADMIN — APIXABAN 2.5 MG: 2.5 TABLET, FILM COATED ORAL at 09:04

## 2017-04-05 RX ADMIN — PREDNISONE 40 MG: 20 TABLET ORAL at 08:04

## 2017-04-05 NOTE — PROGRESS NOTES
[facility] Internal Medicine Resident HO-IV Progress Note    Subjective:     Unable to transfer to SNF due to insurance approval yesterday.  Patient with complaint of indigestion overnight, described as epigastric fullness and discomfort.  Occurred ~1 hour after eating.  Resolved with tums.     Objective:   Last 24 Hour Vital Signs:  BP  Min: 107/58  Max: 139/64  Temp  Av.2 °F (36.8 °C)  Min: 97.4 °F (36.3 °C)  Max: 98.9 °F (37.2 °C)  Pulse  Av.9  Min: 51  Max: 102  Resp  Av  Min: 18  Max: 18  SpO2  Av.5 %  Min: 92 %  Max: 98 %  I/O last 3 completed shifts:  In: 125 [P.O.:125]  Out: 1000 [Urine:1000]    Physical Examination:  General: Oriented to person, place, and time. Appears well-developed and well-nourished.   Head: Normocephalic and atraumatic.   Eyes: Conjunctivae are pink, moist. Pupils are equal, round, and reactive to light. Extraocular movements intact.  Cardiovascular: Normal rate, regular rhythm. No murmurs, rub, or extra heart sounds appreciated.   Pulmonary/Chest: Effort normal, with diffuse mild end expiratory wheezing, no crackles or rhonchi.   Abdominal: Soft, bowel sounds are normoactive.  No tenderness, guarding, or rebound.  Musculoskeletal: Normal range of motion, 5/5 strength to gross flexion and extension to upper and lower extremities, bilaterally.  Extremities: 2+ radial pulses, CR <2s, no cyanosis or edema.  Skin: Skin is warm and dry.     Laboratory:  Laboratory Data Reviewed: yes  Pertinent Findings:  No labs collected this AM    Microbiology Data Reviewed: yes  Pertinent Findings:   BCx (3/2) NG final    Other Results:  Radiology Data Reviewed: yes  Pertinent Findings:  No new imaging     Current Medications:     Infusions:        Scheduled:   acetaminophen  1,000 mg Oral TID    albuterol-ipratropium 2.5mg-0.5mg/3mL  3 mL Nebulization Q4H    apixaban  2.5 mg Oral BID    aspirin  81 mg Oral Daily    citalopram  20 mg Oral Daily    cyanocobalamin  100 mcg Oral Daily     docusate sodium  100 mg Oral Daily    fluticasone-vilanterol  1 puff Inhalation Daily    furosemide  20 mg Oral Daily    guaifenesin  600 mg Oral BID    lisinopril  2.5 mg Oral Daily    metoprolol succinate  50 mg Oral Daily    predniSONE  40 mg Oral Daily    tiotropium  1 capsule Inhalation Daily        PRN:  dextrose 50%, dextrose 50%, glucagon (human recombinant), glucose, glucose, ondansetron, ramelteon, tramadol    Antibiotics and Day Number of Therapy:  Moxifloxacin 400mg IV daily (3/28 - 4/4)    Lines and Day Number of Therapy:  PIV    Assessment:     Kaity Rebolledo is a 83 y.o.female with  Patient Active Problem List    Diagnosis Date Noted    COPD with respiratory failure, acute 04/01/2017    Depression with anxiety 04/01/2017    Pulmonary hypertension due to lung disease 03/30/2017    Elevated troponin 03/29/2017    Pulmonary hypertension 03/28/2017    History of atrial fibrillation 03/28/2017    SOB (shortness of breath) 03/28/2017    Acute on chronic respiratory failure with hypoxia and hypercapnia 03/28/2017    Hyponatremia 03/28/2017    Urinary retention 03/28/2017    Paroxysmal atrial fibrillation     Anxiety and depression     (HFpEF) heart failure with preserved ejection fraction 03/27/2017    COPD, very severe 03/27/2017    Atrial fibrillation with RVR 03/04/2017    Low oxygen saturation 12/27/2016    Iron deficiency anemia due to chronic blood loss 10/10/2016    Pneumonia 09/02/2016    Chronic diastolic heart failure 09/02/2016    Chronic obstructive pulmonary disease 06/16/2016    Coronary artery disease involving native coronary artery without angina pectoris 06/16/2016    Greater trochanter fracture 05/15/2016    Hip fracture 05/15/2016    Closed bilateral fracture of pubic rami 03/18/2016    Osteoporosis 03/18/2016    Arthralgia of left hip 03/17/2016    Hip pain 03/17/2016    Anemia, chronic disease 03/17/2016    HLD (hyperlipidemia) 03/17/2016    Uterine  "prolapse 03/16/2016    Coronary artery disease involving native coronary artery of native heart without angina pectoris 03/14/2016    COPD exacerbation 03/14/2016    Macrocytic anemia 03/14/2016    Closed fracture of ramus of right pubis 02/06/2016    Chronic hypoxemic respiratory failure 02/06/2016    Anemia of chronic disease 12/16/2015    Cardiomyopathy 09/24/2015    Chronic systolic heart failure 09/10/2015    Chronic anemia 08/04/2015    Physical deconditioning 03/05/2015    Cystocele 08/06/2013    Essential hypertension 08/06/2013    OP (osteoporosis) 08/06/2013    Dyslipidemia 08/06/2013    COPD (chronic obstructive pulmonary disease) 09/11/2012        Plan:     Acute Hypoxic Respiratory Failure 2/2 COPD Exacerbation  - Presented with SOB despite IV steroids and multiple duo-neb treatments  - CXR suggestive of COPD/emphysema, no evidence of pneumonia  - Flu negative  given tamiflu empirically  s/p moxifloxacin x 5 days.    - Will receive extended prednisone taper on discharge   - Continues satting well on home 3L O2 via NC     CAD  - [facility] 10/2015 - RCA 40% stenosis, LAD 20% stenosis  - Continue ASA, toprol, no statin 2/2 myopathy  - Trop peaked at 0.445 this admission, and has since trended down. EKG with some ST depression in inferior leads, likely secondary to demand ischemia  - 4/4 reported "indigestion"  EKG unchanged from previous     Chronic Diastolic Congestive Heart Failure / Pulmonary Hypertension  - TTE 3/3/17: EF 55%, diastolic dysfunction present, PAP 51  -  but patient appears euvolemic   - Continue home lasix       HTN  - BP normotensive  continue home meds     Dyslipidemia  - Lipid panel:  Chol 223, TG 45, HDL 82,   - Patient not currently on lipid lowering therapy; statin previously discontinued due to myopathy      Anemia of Chronic Disease  - Hb at baseline  - iron 21, TIBC 330, ferritin 123, folate 18, vit B12 810       Hx of A-fib with RVR  - Diagnosed as " new onset in recent hospitalization 3/2017  - Remains in NSR this admission  - Continue Eliquis 2.5 mg BID and Metoprolol   - Will need outpatient Cardiology follow up at discharge     Anxiety / Depression  - No acute issues  - Continue home celexa     PPx   On Eliquis    Code  Partial - no compressions or chemical resuscitation, intubation only     Dispo: Pending placement to [facility]      Darlene Motta MD  [facility] Internal Medicine, -I  [facility] Hospitalist Medicine Team A    [facility] Medicine Hospitalist Pager numbers:   [facility] Hospitalist Medicine Team A (Man/Shashank): [phone]  [facility] Hospitalist Medicine Team B (Zak/[place]):  [phone]

## 2017-04-05 NOTE — PROGRESS NOTES
The Sw left a message for Hannah at [facility]. The Sw left a message for Colby at [facility] in reference to the [facility] auth for this pt.     9:48am The Sw left a message for the pt's dtr Celsa informing her of the above mentioned info b/c she left the Sw a message in reference to the d/c time.

## 2017-04-05 NOTE — PROGRESS NOTES
The Sw spoke to Colby at [facility] and she states the pt was approved to go to [facility].     12:26pm The Sw spoke to Hannah at [facility] who states the pt has been accepted and can arrive today. The Sw asked the team to update the date for the d/c readmit order. The Sw spoke to the pt's nurse to arrange transport time and gave him the contact info to call report to [phone] to the nurse that will be receiving the pt. The Sw called [facility] w/c [facility]ban([phone])spoke to Fallon and arranged the transport for 2pm to transport with O2 to [facility]. The Sw called the pt's dtr Celsa to inform her of the transport time and she was in agreement with the d/c plan and transport time. The pt choice form has been completed and [placed in the pt's chart.

## 2017-04-05 NOTE — PT/OT/SLP PROGRESS
Physical Therapy      Kaity Rebolledo  MRN: [id number]    Patient not seen today secondary to  (Pt was discharged and had left hospital.).     Ara Dugan, PTA

## 2017-04-05 NOTE — PLAN OF CARE
04/05/17 1432   Final Note   Assessment Type Final Discharge Note   Right Care Referral Info   Post Acute Recommendation SNF   Referral Type snf   Facility Name [facility]

## 2017-04-05 NOTE — PLAN OF CARE
04/05/17 1508   Final Note   Assessment Type Final Discharge Note   Discharge Disposition SNF   Discharge planning education complete? Yes   What phone number can be called within the next 1-3 days to see how you are doing after discharge? [phone]   Hospital Follow Up  Appt(s) scheduled? Yes   Discharge plans and expectations educations in teach back method with documentation complete? Yes   Offered [facility] -- Bedside Delivery? n/a   Discharge/Hospital Encounter Summary to (non-[facility]) PCP No   Referral to Outpatient Case Management complete? No   Referral to / orders for Home Health Complete? n/a   30 day supply of medicines given at discharge, if documented non-compliance / non-adherence? n/a   Any social issues identified prior to discharge? No   Did you assess the readiness or willingness of the family or caregiver to support self management of care? n/a   Right Care Referral Info   Post Acute Recommendation SNF   Jared Miles RN  Transitional Navigator/Case Management  [phone]

## 2017-04-05 NOTE — PLAN OF CARE
Problem: Patient Care Overview  Goal: Plan of Care Review  Outcome: Ongoing (interventions implemented as appropriate)  Pt on 6L NC, no distress noted sats 95%. Will continue to monitor SPO and breathing tx Q4

## 2017-04-05 NOTE — IP AVS SNAPSHOT
[facility]  [address]  [facility] [address]  Phone: [phone]           Patient Discharge Instructions   Our goal is to set you up for success. This packet includes information on your condition, medications, and your home care.  It will help you care for yourself to prevent having to return to the hospital.     Please ask your nurse if you have any questions.      There are many details to remember when preparing to leave the hospital. Here is what you will need to do:    1. Take your medicine. If you are prescribed medications, review your Medication List on the following pages. You may have new medications to  at the pharmacy and others that you'll need to stop taking. Review the instructions for how and when to take your medications. Talk with your doctor or nurses if you are unsure of what to do.     2. Go to your follow-up appointments. Specific follow-up information is listed in the following pages. Your may be contacted by a nurse or clinical provider about future appointments. Be sure we have all of the phone numbers to reach you. Please contact your provider's office if you are unable to make an appointment.     3. Watch for warning signs. Your doctor or nurse will give you detailed warning signs to watch for and when to call for assistance. These instructions may also include educational information about your condition. If you experience any of warning signs to your health, call your doctor.           [facility]  Unless otherwise directed by your provider, please   contact [facility], our nurse care line   that is available for 24/7 assistance.     [phone] (toll-free)     Registered nurses in the [facility]   provide: appointment scheduling, clinical advisement, health education, and other advisory services.                  ** Verify the list of medication(s) below is accurate and up to date. Carry this with you in case of  emergency. If your medications have changed, please notify your healthcare provider.             Medication List      START taking these medications        Additional Info                      doxycycline 100 MG tablet   Commonly known as:  VIBRA-TABS   Quantity:  16 tablet   Refills:  0   Dose:  100 mg   Indications:  bronchitis    Last time this was given:  100 mg on 4/13/2017  9:40 PM   Instructions:  Take 1 tablet (100 mg total) by mouth every 12 (twelve) hours.     Begin Date    AM    Noon    PM    Bedtime       nitroGLYCERIN 0.4 MG SL tablet   Commonly known as:  NITROSTAT   Quantity:  30 tablet   Refills:  1   Dose:  0.4 mg    Last time this was given:  10 mg on 4/11/2017  5:45 PM   Instructions:  Place 1 tablet (0.4 mg total) under the tongue every 5 (five) minutes as needed for Chest pain.     Begin Date    AM    Noon    PM    Bedtime       potassium chloride 10 MEQ Cpsr   Commonly known as:  MICRO-K   Quantity:  30 capsule   Refills:  3   Dose:  10 mEq    Last time this was given:  10 mEq on 4/13/2017  8:31 AM   Instructions:  Take 1 capsule (10 mEq total) by mouth once daily.     Begin Date    AM    Noon    PM    Bedtime       predniSONE 10 MG tablet   Commonly known as:  DELTASONE   Quantity:  15 tablet   Refills:  0    Last time this was given:  10 mg on 4/13/2017  8:32 AM   Instructions:  Take 2 tabs (20 mg) for 5 days, then 1 tab (10 mg) for 5 days then stop     Begin Date    AM    Noon    PM    Bedtime       senna-docusate 8.6-50 mg 8.6-50 mg per tablet   Commonly known as:  PERICOLACE   Refills:  0   Dose:  1 tablet    Last time this was given:  1 tablet on 4/13/2017  9:40 PM   Instructions:  Take 1 tablet by mouth 2 (two) times daily.     Begin Date    AM    Noon    PM    Bedtime         CONTINUE taking these medications        Additional Info                      albuterol 90 mcg/actuation inhaler   Quantity:  6.7 g   Refills:  4   Dose:  2 puff   Comments:  OK FOR GENERIC    Instructions:  Inhale  2 puffs into the lungs every 6 (six) hours as needed for Wheezing or Shortness of Breath.     Begin Date    AM    Noon    PM    Bedtime       alprazolam 0.25 MG tablet   Commonly known as:  XANAX   Quantity:  20 tablet   Refills:  0   Dose:  0.25 mg    Instructions:  Take 1 tablet (0.25 mg total) by mouth nightly as needed for Anxiety.     Begin Date    AM    Noon    PM    Bedtime       apixaban 2.5 mg Tab   Quantity:  60 tablet   Refills:  1   Dose:  2.5 mg    Last time this was given:  2.5 mg on 4/13/2017  9:40 PM   Instructions:  Take 1 tablet (2.5 mg total) by mouth 2 (two) times daily.     Begin Date    AM    Noon    PM    Bedtime       aspirin 81 MG EC tablet   Commonly known as:  ECOTRIN   Refills:  0   Dose:  81 mg    Last time this was given:  81 mg on 4/13/2017  8:32 AM   Instructions:  Take 1 tablet (81 mg total) by mouth once daily.     Begin Date    AM    Noon    PM    Bedtime       citalopram 20 MG tablet   Commonly known as:  CELEXA   Quantity:  30 tablet   Refills:  2   Comments:  This prescription was filled today. Any refills authorized will be placed on file.    Last time this was given:  20 mg on 4/13/2017  8:32 AM   Instructions:  TAKE 1 TABLET BY MOUTH ONCE DAILY     Begin Date    AM    Noon    PM    Bedtime       cyanocobalamin 100 MCG tablet   Commonly known as:  VITAMIN B-12   Quantity:  90 tablet   Refills:  3   Dose:  100 mcg    Last time this was given:  250 mcg on 4/13/2017  8:32 AM   Instructions:  Take 1 tablet (100 mcg total) by mouth once daily.     Begin Date    AM    Noon    PM    Bedtime       cyproheptadine 4 mg tablet   Commonly known as:  PERIACTIN   Quantity:  30 tablet   Refills:  2   Dose:  4 mg   Comments:  This prescription was filled today. Any refills authorized will be placed on file.    Instructions:  Take 1 tablet (4 mg total) by mouth 3 (three) times daily as needed.     Begin Date    AM    Noon    PM    Bedtime       fluticasone-vilanterol 100-25 mcg/dose diskus  inhaler   Commonly known as:  BREO   Quantity:  60 each   Refills:  12   Dose:  1 puff    Last time this was given:  1 puff on 4/13/2017  8:32 AM   Instructions:  Inhale 1 puff into the lungs once daily.     Begin Date    AM    Noon    PM    Bedtime       furosemide 20 MG tablet   Commonly known as:  LASIX   Quantity:  30 tablet   Refills:  1   Dose:  20 mg    Last time this was given:  20 mg on 4/13/2017  8:32 AM   Instructions:  Take 1 tablet (20 mg total) by mouth once daily.     Begin Date    AM    Noon    PM    Bedtime       guaifenesin 600 mg 12 hr tablet   Commonly known as:  MUCINEX   Quantity:  60 tablet   Refills:  6   Dose:  1200 mg    Last time this was given:  1,200 mg on 4/13/2017  9:40 PM   Instructions:  Take 2 tablets (1,200 mg total) by mouth 2 (two) times daily. Okay to dispense box of medication  as prepackaged by .     Begin Date    AM    Noon    PM    Bedtime       levalbuterol 0.31 mg/3 mL nebulizer solution   Commonly known as:  XOPENEX   Quantity:  300 mL   Refills:  12   Dose:  1 ampule    Instructions:  Take 3 mLs (0.31 mg total) by nebulization 4 (four) times daily.     Begin Date    AM    Noon    PM    Bedtime       lisinopril 2.5 MG tablet   Commonly known as:  PRINIVIL,ZESTRIL   Quantity:  30 tablet   Refills:  4   Dose:  2.5 mg    Last time this was given:  2.5 mg on 4/13/2017  8:31 AM   Instructions:  Take 1 tablet (2.5 mg total) by mouth once daily.     Begin Date    AM    Noon    PM    Bedtime       metoprolol succinate 50 MG 24 hr tablet   Commonly known as:  TOPROL-XL   Quantity:  90 tablet   Refills:  0   Dose:  50 mg    Last time this was given:  50 mg on 4/13/2017  8:32 AM   Instructions:  Take 1 tablet (50 mg total) by mouth once daily.     Begin Date    AM    Noon    PM    Bedtime       ONE DAILY MULTIVITAMIN per tablet   Refills:  0   Dose:  1 tablet   Generic drug:  multivitamin    Instructions:  Take 1 tablet by mouth once daily.     Begin Date    AM    Noon     PM    Bedtime       SPIRIVA WITH HANDIHALER 18 mcg inhalation capsule   Refills:  0   Dose:  18 mcg   Generic drug:  tiotropium    Last time this was given:  18 mcg on 4/13/2017  8:32 AM   Instructions:  Inhale 18 mcg into the lungs once daily. Controller     Begin Date    AM    Noon    PM    Bedtime       VITAMIN C 500 MG tablet   Refills:  0   Dose:  500 mg   Generic drug:  ascorbic acid (vitamin C)    Last time this was given:  500 mg on 4/13/2017  9:40 PM   Instructions:  Take 500 mg by mouth 2 (two) times daily.     Begin Date    AM    Noon    PM    Bedtime            Where to Get Your Medications      These medications were sent to [facility] [address]  [address] [address]     Phone:  [phone]     doxycycline 100 MG tablet    nitroGLYCERIN 0.4 MG SL tablet    potassium chloride 10 MEQ Cpsr    predniSONE 10 MG tablet         You can get these medications from any pharmacy     You don't need a prescription for these medications     senna-docusate 8.6-50 mg 8.6-50 mg per tablet                  Please bring to all follow up appointments:    1. A copy of your discharge instructions.  2. All medicines you are currently taking in their original bottles.  3. Identification and insurance card.    Please arrive 15 minutes ahead of scheduled appointment time.    Please call 24 hours in advance if you must reschedule your appointment and/or time.        Your Scheduled Appointments     Apr 27, 2017 10:40 AM CDT   Established Patient Visit with Samuel Soriano MD   [facility]    [address] [place] [address] [address]  [facility] [address] [phone]            Jun 29, 2017  3:00 PM CDT   Established Patient Visit with MD Cleve Luo - [facility] ([facility] )    [address]  [facility] [address] [phone]              Follow-up Information     Follow up with  Samuel Soriano MD. Go on 4/27/2017.    Specialty:  Internal Medicine    Why:  Hospital follow up    Contact information:    [address]  [address]  [facility] [id number] [phone]          Follow up with Mason Baeza MD. Go on 6/29/2017.    Specialty:  Pulmonary Disease    Why:  Follow up    Contact information:    [address]  [facility] [address]  [phone]          Follow up with [facility].    Specialties:  Home Health Services, Hospice and Palliative Medicine    Why:  Home Health Questions/Concerns    Contact information:    [address] [facility] [address]  [facility] [address]  [phone]          Discharge Instructions     Future Orders    Activity as tolerated     Call MD for:  difficulty breathing or increased cough     Call MD for:  increased confusion or weakness     Call MD for:  persistent dizziness, light-headedness, or visual disturbances     Call MD for:  persistent nausea and vomiting or diarrhea     Call MD for:  severe persistent headache     Call MD for:  severe uncontrolled pain     Call MD for:  temperature >100.4     Diet general     Questions:    Total calories:      Fat restriction, if any:      Protein restriction, if any:      Na restriction, if any:      Fluid restriction:      Additional restrictions:  Low Chol/Sat Fat        Primary Diagnosis     Your primary diagnosis was:  Debility      Admission Information     Date & Time Provider Department CSN    4/5/2017  4:04 PM Kirk Martinez MD [facility] [id number]      Care Providers     Provider Role Specialty Primary office phone    Kirk Martinez MD Attending Provider Hospitalist [phone]      Important Medicare Message          Most Recent Value    Important Message from Medicare Regarding Discharge Appeal Rights  Given to patient/caregiver, Explained to patient/caregiver, Signed/date by patient/caregiver yes 04/11/2017 1605      Your Vitals Were     BP Pulse Temp Resp Height Weight    125/65  "(BP Location: Right arm, Patient Position: Lying, BP Method: Automatic) 99 98.3 °F (36.8 °C) (Oral) 18 5' 5" (1.651 m) 48 kg (105 lb 13.1 oz)    Last Period SpO2 BMI          (LMP Unknown) 95% 17.61 kg/m2        Recent Lab Values        2/28/2015 3/28/2017                        5:06 AM  2:13 AM          A1C 5.5 5.3          Comment for A1C at  2:13 AM on 3/28/2017:  According to ADA guidelines, hemoglobin A1C <7.0% represents  optimal control in non-pregnant diabetic patients.  Different  metrics may apply to specific populations.   Standards of Medical Care in Diabetes - 2016.  For the purpose of screening for the presence of diabetes:  <5.7%     Consistent with the absence of diabetes  5.7-6.4%  Consistent with increasing risk for diabetes   (prediabetes)  >or=6.5%  Consistent with diabetes  Currently no consensus exists for use of hemoglobin A1C  for diagnosis of diabetes for children.        Allergies as of 4/14/2017        Reactions    Advair Diskus  [Fluticasone-salmeterol]     Other reaction(s): Nausea    Morphine Hallucinations    Pravastatin     Other reaction(s): Muscle pain      Advance Directives     An advance directive is a document which, in the event you are no longer able to make decisions for yourself, tells your healthcare team what kind of treatment you do or do not want to receive, or who you would like to make those decisions for you.  If you do not currently have an advance directive, [facility] encourages you to create one.  For more information call:  [phone]), [phone] ([phone]),  or log on to [email or web address].        Smoking Cessation     If you would like to quit smoking:   You may be eligible for free services if you are a [place] resident and started smoking cigarettes before September 1, 1988.  Call the Smoking Cessation Trust (SCT) toll free at (184) 826-8686 or (958) 710-4170.   Call 7-779-QUIT-NOW if you do not meet the above " criteria.   Contact us via email: [email or web address]   View our website for more information: [email or web address]        Language Assistance Services     ATTENTION: Language assistance services are available, free of charge. Please call [phone].      ATENCIÓN: Si earl blanchard, tiene a spears disposición servicios gratuitos de asistencia lingüística. Llame al [phone].     CHÚ Ý: N?u b?n nói Ti?ng Vi?t, có các d?ch v? h? tr? ngôn ng? mi?n phí dành cho b?n. G?i s? [phone].        Heart Failure Education       Heart Failure: Being Active  You have a condition called heart failure. Being active doesnt mean that you have to wear yourself out. Even a little movement each day helps to strengthen your heart. If you cant get out to exercise, you can do simple stretching and strengthening exercises at home. These are good ways to keep you well-conditioned and prevent you and your heart from becoming excessively weak.    Ideas to get you started  · Add a little movement to things you do now. Walk to mail letters. Park your car at the far end of the parking lot and walk to the store. Walk up a flight of stairs instead of taking the elevator.  · Choose activities you enjoy. You might walk, swim, or ride an exercise bike. Things like gardening and washing the car count, too. Other possibilities include: washing dishes, walking the dog, walking around the mall, and doing aerobic activities with friends.  · Join a group exercise program at a [facility], a senior center, or a community center. Or look into a hospital cardiac rehabilitation program. Ask your doctor if you qualify.  Tips to keep you going  · Get up and get dressed each day. Go to a coffee shop and read a newspaper or go somewhere that you'll be in the presence of other active people. Youll feel more like being active.  · Make a plan. Choose one or more activities that you enjoy and that you can easily do. Then plan to do at least  one each day. You might write your plan on a calendar.  · Go with a friend or a group if you like company. This can help you feel supported and stay motivated, too.  · Plan social events that you enjoy. This will keep you mentally engaged as well as physically motivated to do things you find pleasure in.  For your safety  · Talk with your healthcare provider before starting an exercise program.  · Exercise indoors when its too hot or too cold outside, or when the air quality is poor. Try walking at a shopping mall.  · Wear socks and sturdy shoes to maintain your balance and prevent falls.  · Start slowly. Do a few minutes several times a day at first. Increase your time and speed little by little.  · Stop and rest whenever you feel tired or get short of breath.  · Dont push yourself on days when you dont feel well.  Date Last Reviewed: 3/20/2016  © 1988-9687 [employer]. [address] [place] [address]. All rights reserved. This information is not intended as a substitute for professional medical care. Always follow your healthcare professional's instructions.              Heart Failure: Evaluating Your Heart  You have a condition called heart failure. To evaluate your condition, your doctor will examine you, ask questions, and do some tests. Along with looking for signs of heart failure, the doctor looks for any other health problems that may have led to heart failure. The results of your evaluation will help your doctor form a treatment plan.  Health history and physical exam  Your visit will start with a health history. Tell the doctor about any symptoms youve noticed and about all medicines you take. Then youll have a physical exam. This includes listening to your heartbeat and breathing. Youll also be checked for swelling (edema) in your legs and neck. When you have fluid buildup or fluid in the lungs, it may be called congestive heart failure.  Diagnosing heart failure     During  an echocardiogram, sound waves bounce off the heart. These are converted into a picture on the screen.   The following may be done to help your doctor form a diagnosis:  · X-rays show the size and shape of your heart. These pictures can also show fluid in your lungs.  · An electrocardiogram (ECG or EKG) shows the pattern of your heartbeat. Small pads (electrodes) are placed on your chest, arms, and legs. Wires connect the pads to the ECG machine, which records your hearts electrical signals. This can give the doctor information about heart function.  · An echocardiogram uses ultrasound waves to show the structure and movement of your heart muscle. This shows how well the heart pumps. It also shows the thickness of the heart walls, and if the heart is enlarged. It is one of the most useful, non-invasive tests as it provides information about the heart's general function. This helps your doctor make treatment decisions.  · Lab tests evaluate small amounts of blood or urine for signs of problems. A BNP lab test can help diagnose and evaluate heart failure. BNP stands for B-type natriuretic peptide. The ventricles secrete more BNP when heart failure worsens. Lab tests can also provide information about metabolic dysfunction or heart dysfunction.  Your treatment plan  Based on the results of your evaluation and tests, your doctor will develop a treatment plan. This plan is designed to relieve some of your heart failure symptoms and help make you more comfortable. Your treatment plan may include:  · Medicine to help your heart work better and improve your quality of life  · Changes in what you eat and drink to help prevent fluid from backing up in your body  · Daily monitoring of your weight and heart failure symptoms to see how well your treatment plan is working  · Exercise to help you stay healthy  · Help with quitting smoking  · Emotional and psychological support to help adjust to the changes  · Referrals to other  specialists to make sure you are being treated comprehensively  Date Last Reviewed: 3/21/2016  © 0825-8937 [employer]. [address] [place] [address], [place] [address]. All rights reserved. This information is not intended as a substitute for professional medical care. Always follow your healthcare professional's instructions.              Heart Failure: Making Changes to Your Diet  You have a condition called heart failure. When you have heart failure, excess fluid is more likely to build up in your body because your heart isn't working well. This makes the heart work harder to pump blood. Fluid buildup causes symptoms such as shortness of breath and swelling (edema). This is often referred to as congestive heart failure or CHF. Controlling the amount of salt (sodium) you eat may help stop fluid from building up. Your doctor may also tell you to reduce the amount of fluid you drink.  Reading food labels    Your healthcare provider will tell you how much sodium you can eat each day. Read food labels to keep track. Keep in mind that certain foods are high in salt. These include canned, frozen, and processed foods. Check the amount of sodium in each serving. Watch out for high-sodium ingredients. These include MSG (monosodium glutamate), baking soda, and sodium phosphate.   Eating less salt  Give yourself time to get used to eating less salt. It may take a little while. Here are some tips to help:  · Take the saltshaker off the table. Replace it with salt-free herb mixes and spices.  · Eat fresh or plain frozen vegetables. These have much less salt than canned vegetables.  · Choose low-sodium snacks like sodium-free pretzels, crackers, or air-popped popcorn.  · Dont add salt to your food when youre cooking. Instead, season your foods with pepper, lemon, garlic, or onion.  · When you eat out, ask that your food be cooked without added salt.  · Avoid eating fried foods as these often have a great deal of  salt.  If youre told to limit fluids  You may need to limit how much fluid you have to help prevent swelling. This includes anything that is liquid at room temperature, such as ice cream and soup. If your doctor tells you to limit fluid, try these tips:  · Measure drinks in a measuring cup before you drink them. This will help you meet daily goals.  · Chill drinks to make them more refreshing.  · Suck on frozen lemon wedges to quench thirst.  · Only drink when youre thirsty.  · Chew sugarless gum or suck on hard candy to keep your mouth moist.  · Weigh yourself daily to know if your body's fluid content is rising.  My sodium goal  Your healthcare provider may give you a sodium goal to meet each day. This includes sodium found in food as well as salt that you add. My goal is to eat no more than ___________ mg of sodium per day.     When to call your doctor  Call your doctor right away if you have any symptoms of worsening heart failure. These can include:  · Sudden weight gain  · Increased swelling of your legs or ankles  · Trouble breathing when youre resting or at night  · Increase in the number of pillows you have to sleep on  · Chest pain, pressure, discomfort, or pain in the jaw, neck, or back   Date Last Reviewed: 3/21/2016  © 2293-9373 [employer]. [address] [place] [address], [place] [address]. All rights reserved. This information is not intended as a substitute for professional medical care. Always follow your healthcare professional's instructions.              Heart Failure: Medicines to Help Your Heart    You have a condition called heart failure (also known as congestive heart failure, or CHF). Your doctor will likely prescribe medicines for heart failure and any underlying health problems you have. Most heart failure patients take one or more types of medicinen. Your healthcare provider will work to find the combination of medicines that works best for you.  Heart failure medicines  Here  are the most common heart failure medicines:  · ACE inhibitors lower blood pressure and decrease strain on the heart. This makes it easier for the heart to pump. Angiotensin receptor blockers have similar effects. These are prescribed for some patients instead of ACE inhibitors.  · Beta-blockers relieve stress on the heart. They also improve symptoms. They may also improve the heart's pumping action over time.  · Diuretics (also called water pills) help rid your body of excess water. This can help rid your body of swelling (edema). Having less fluid to pump means your heart doesnt have to work as hard. Some diuretics make your body lose a mineral called potassium. Your doctor will tell you if you need to take supplements or eat more foods high in potassium.  · Digoxin helps your heart pump with more strength. This helps your heart pump more blood with each beat. So, more oxygen-rich blood travels to the rest of the body.  · Aldosterone antagonists help alter hormones and decrease strain on the heart.  · Hydralazine and nitrates are two separate medicines used together to treat heart failure. They may come in one combination pill. They lower blood pressure and decrease how hard the heart has to pump.  Medicines for related conditions  Controlling other heart problems helps keep heart failure under control, too. Depending on other heart problems you have, medicines may be prescribed to:  · Lower blood pressure (antihypertensives).  · Lower cholesterol levels (statins).  · Prevent blood clots (anticoagulants or aspirin).  · Keep the heartbeat steady (antiarrhythmics).  Date Last Reviewed: 3/5/2016  © 5620-6527 [employer]. [address] [place] [address], [place] [address]. All rights reserved. This information is not intended as a substitute for professional medical care. Always follow your healthcare professional's instructions.              Heart Failure: Procedures That May Help    The heart is a muscle  that pumps oxygen-rich blood to all parts of the body. When you have heart failure, the heart is not able to pump as well as it should. Blood and fluid may back up into the lungs (congestive heart failure), and some parts of the body dont get enough oxygen-rich blood to work normally. These problems lead to the symptoms of heart failure.     Certain procedures may help the heart pump better in some cases of heart failure. Some procedures are done to treat health problems that may have caused the heart failure such as coronary artery disease or heart rhythm problems. For more serious heart failure, other options are available.  Treating artery and valve problems  If you have coronary artery disease or valve disease, procedures may be done to improve blood flow. This helps the heart pump better, which can improve heart failure symptoms. First, your doctor may do a cardiac catheterization to help detect clogged blood vessels or valve damage. During this procedure, a  thin tube (catheter) in inserted into a blood vessel and guided to the heart. There a dye is injected and a special type of X-ray (angiogram) is taken of the blood vessels. Procedures to open a blocked artery or fix damaged valves can also be done using catheterization.  · Angioplasty uses a balloon-tipped instrument at the end of the catheter. The balloon is inflated to widen the narrowed artery. In many cases, a stent is expanded to further support the narrowed artery. A stent is a metal mesh tube.  · Valve surgery repairs or replacement of faulty valves can also be done during catheterization so blood can flow properly through the chambers of the heart.  Bypass surgery is another option to help treat blocked arteries. It uses a healthy blood vessel from elsewhere in the body. The healthy blood vessel is attached above and below the blocked area so that blood can flow around the blocked artery.  Treating heart rhythm problems  A device may be placed in  the chest to help a weak heart maintain a healthy, heartbeat so the heart can pump more effectively:  · Pacemaker. A pacemaker is an implanted device that regulates your heartbeat electronically. It monitors your heart's rhythm and generates a painless electric impulse that helps the heart beat in a regular rhythm. A pacemaker is programmed to meet your specific heart rhythm needs.  · Biventricular pacing/cardiac resynchronization therapy. A type of pacemaker that paces both pumping chambers of the heart at the same time to coordinate contractions and to improve the heart's function. Some people with heart failure are candidates for this therapy.  · Implantable cardioverter defibrillator. A device similar to a pacemaker that senses when the heart is beating too fast and delivers an electrical shock to convert the fast rhythm to a normal rhythm. This can be a life saving device.  In severe cases  In more serious cases of heart failure when other treatments no longer work, other options may include:  · Ventricular assist devices (VADs). These are mechanical devices used to take over the pumping function for one or both of the heart's ventricles, or pumping chambers. A VAD may be necessary when heart failure progresses to the point that medicines and other treatments no longer help. In some cases, a VAD may be used as a bridge to transplant.  · Heart transplant. This is replacing the diseased heart with a healthy one from a donor. This is an option for a few people who are very sick. A heart transplant is very serious and not an option for all patients. Your doctor can tell you more.  Date Last Reviewed: 3/20/2016  © 1634-2874 [employer]. [address] [place] [address], [place] [address]. All rights reserved. This information is not intended as a substitute for professional medical care. Always follow your healthcare professional's instructions.              Heart Failure: Tracking Your Weight  You have a  condition called heart failure. When you have heart failure, a sudden weight gain or a steady rise in weight is a warning sign that your body is retaining too much water and salt. This could mean your heart failure is getting worse. If left untreated, it can cause problems for your lungs and result in shortness of breath. Weighing yourself each day is the best way to know if youre retaining water. If your weight goes up quickly, call your doctor. You will be given instructions on how to get rid of the excess water. You will likely need medicines and to avoid salt. This will help your heart work better.  Call your doctor if you gain more than 2 pounds in 1 day, more than 5 pounds in 1 week, or whatever weight gain you were told to report by your doctor. This is often a sign of worsening heart failure and needs to be evaluated and treated. Your doctor will tell you what to do next.   Tips for weighing yourself    · Weigh yourself at the same time each morning, wearing the same clothes. Weigh yourself after urinating and before eating.  · Use the same scale each day. Make sure the numbers are easy to read. Put the scale on a flat, hard surface -- not on a rug or carpet.  · Do not stop weighing yourself. If you forget one day, weigh again the next morning.  How to use your weight chart  · Keep your weight chart near the scale. Write your weight on the chart as soon as you get off the scale.  · Fill in the month and the start date on the chart. Then write down your weight each day. Your chart will look like this:    · If you miss a day, leave the space blank. Weigh yourself the next day and write your weight in the next space.  · Take your weight chart with you when you go to see your doctor.  Date Last Reviewed: 3/20/2016  © 2846-7412 [employer]. [address] [place] [address], [place] [address]. All rights reserved. This information is not intended as a substitute for professional medical care. Always follow  your healthcare professional's instructions.              Heart Failure: Warning Signs of a Flare-Up  You have a condition called heart failure. Once you have heart failure, flare-ups can happen. Below are signs that can mean your heart failure is getting worse. If you notice any of these warning signs, call your healthcare provider.  Swelling    · Your feet, ankles, or lower legs get puffier.  · You notice skin changes on your lower legs.  · Your shoes feel too tight.  · Your clothes are tighter in the waist.  · You have trouble getting rings on or off your fingers.  Shortness of breath  · You have to breathe harder even when youre doing your normal activities or when youre resting.  · You are short of breath walking up stairs or even short distances.  · You wake up at night short of breath or coughing.  · You need to use more pillows or sit up to sleep.  · You wake up tired or restless.  Other warning signs  · You feel weaker, dizzy, or more tired.  · You have chest pain or changes in your heartbeat.  · You have a cough that wont go away.  · You cant remember things or dont feel like eating.  Tracking your weight  Gaining weight is often the first warning sign that heart failure is getting worse. Gaining even a few pounds can be a sign that your body is retaining excess water and salt. Weighing yourself each day in the morning after you urinate and before you eat, is the best way to know if you're retaining water. Get a scale that is easy to read and make sure you wear the same clothes and use the same scale every time you weigh. Your healthcare provider will show you how to track your weight. Call your doctor if you gain more than 2 pounds in 1 day, 5 pounds in 1 week, or whatever weight gain you were told to report by your doctor. This is often a sign of worsening heart failure and needs to be evaluated and treated before it compromises your breathing. Your doctor will tell you what to do next.    Date Last  Reviewed: 3/15/2016  © 0448-0520 [employer]. [address] [place] [address], [place] [address]. All rights reserved. This information is not intended as a substitute for professional medical care. Always follow your healthcare professional's instructions.              Pneumonmia Discharge Instructions                [facility] complies with applicable Federal civil rights laws and does not discriminate on the basis of race, color, national origin, age, disability, or sex.

## 2017-04-05 NOTE — LETTER
April 5, 2017    Kaity Rebolledo  [address]  [place] [address]             Outpatient Case Management  [address]  [facility] [address] Dear Kaity Rebolledo:    We understand that receiving many services from different doctors and healthcare providers is overwhelming. There are appointments to make, transportation to arrange, dietary instructions to understand, and new medications to obtain.    This is where [facility] can help.     You are eligible to receive Outpatient Case Management services when you have healthcare needs that require the coordination of many providers, treatments, and services. You also qualify if you need assistance with a new treatment plan.     There is no charge for this support. You may have been referred to this program from your doctor(s), hospital staff member(s), or insurance company but you always have a choice to participate or not participate. To participate, you must give us your permission to be enrolled.     When you are enrolled in the [facility] program, the  who is assigned to you is    Zehra Wall, RN  [phone]    Depending on your needs and wishes, your  may speak with you by phone, visit you at your place of living (for example your home, skilled nursing facility, or rehabilitation facility), or meet you at your doctors office.     Your  will tell you why you have been selected to participate in the program and will complete an assessment of your needs. Then a personalized plan of care will be developed with you and or your caregiver.             Here are examples of the services your [facility]  provides.     Coordinate communication among multiple providers.   Arrange for transportation, doctors visits, durable medical equipment, home care services, and special clinics.    Provide coaching on how to manage your health condition.     Answer questions about your health condition.   Help you understand your doctors treatment plan.    Provide additional instruction about your health condition, treatments, and medications.    Help you obtain information about your insurance coverage.    Advocate for your individual needs.    Request a Licensed Clinical  (LCSW) to visit you if you need their services. LCSWs help with long term planning (discussing placement options, advanced planning directives), financial planning, and assistance (for example rent, utilities, medication funding).     Your  will coordinate their activities with other outpatient services you are receiving. All services provided by [facility]  are coordinated with and communicated to your primary care physician.    Our goal is to help you manage your health condition(s) safely within your living environment, whether that is your home or a medical facility. We want to help you function at the healthiest and highest level possible.     Sincerely,      Castro Galo MD  Medical Director    Enclosures:    Frequently Asked Questions  Patient Rights and Responsibilities   Reporting a Grievance or Complaint  Consent/Release of Information  Stamped Addressed Envelope                  Frequently Asked Questions about [facility]    What is [facility]?  Outpatient Case management is not Home healthcare services. [facility]  do not provide hands-on care. [facility]  will work with your doctor to arrange for home health services, if needed. Home health services have a limited duration and there are some restrictions as to who can get these services. There is no prescribed limit to the amount of time you receive [facility] services. [facility]  are not agents of your insurance company. However, [facility]  can help you obtain information from your insurance company.     Who are the [facility] ?  [facility]  are Registered Nurses and Social Workers. It is important to remember that you and your  are a team that works together with your primary care physician to create your individualized plan of care. The ultimate goal of your care plan is to help you implement your doctors treatment plan and to help you function at the highest level of health possible.     What are my rights as a patient?  It is important for you to know and understand your rights and responsibilities while receiving services from the [facility] program. We have enclosed a complete description of your rights and responsibilities. You can help to make your care more effective when you understand your right and responsibilities.     What is needed to be enrolled in the program?  You are only enrolled in the [facility] Program when you give us your consent to participate. You will find enclosed a consent form. You are receiving this letter because you or your caregivers have given us a verbal consent to enroll you in [facility] Program. We ask that you sign and return the enclosed written consent in the stamped self-addressed envelope.                           Patient Rights and Responsibilities    We consider you a partner in your care. When you are well informed, participate in treatment decisions and communicate openly with your doctor and other healthcare professionals, you help make your care more effective.     While you are in the Outpatient Case Management Program, your rights include the following:     You have a right to be provided services in a non-discriminatory manner in accordance with the provisions of Title VI of the Civil Rights Act of 1964, Section 504 of the Rehabilitation Act of  1973, the Age Discrimination Act of 1975, the Americans with Disabilities Act as well as any other applicable Federal and State laws and regulations.     You have the right to a reasonable, timely response to your request or need for care, as well as the right to considerate and respectful care including an environment that preserves dignity and contributes to a positive self-image. You are responsible for being considerate and respectful of our staff.     You have a right to information regarding patient rights, advocacy services and complaint mechanisms, and the right to prompt resolution of any complaint. You or a designee has the right to participate in the resolution of ethical issues surrounding your care. You have a right to file a complaint if you feel that your rights have been infringed, without fear or penalty from [facility] or the federal government. You may file a complaint with the Director of Outpatient Case Management by calling [phone]. At any time, you may lodge a grievance with the [facility] and [facility] by calling [phone], or the Joint Commission on Accreditation of Healthcare Organizations at [phone].     You, or someone acting on your behalf, have the right to understandable information on your health status, treatment and progress in order to make decisions. You have the right to know the nature, risks and alternatives to treatment. You have the right to be informed, when appropriate, regarding the outcome of the care that has been provided. You have the right to refuse treatment to the extent permitted by law, and the right to be informed of the alternatives and consequences of refusing treatment.     You, in collaboration with your physician, have the right to make decisions regarding care and the right to participate in the development and implementation of the plan of care and effective pain management. You have the right to know the name and  professional status of those responsible for the delivery of your care and treatment.       You have a right, within legal guidelines, to have a guardian, next-of-kin or legal designee exercises your patient rights when you are unable to do so. You have the right for your wishes regarding end-of-life decisions to be addressed by the healthcare team through advance directives. You have the right to personal privacy and confidentiality and to expect confidentiality of all records and communications pertaining to your care.      You have the right to receive communications about your health information confidentially. You have the right to request restrictions on the uses and disclosures of your health information. You have the right to inspect, copy, request amendments and receive an accounting of to whom we have disclosed your health information.     You have the right to be provided with interpretation services if you do not speak English; to alternative communication techniques if you are hearing or vision impaired; and to have any other resources needed on your behalf to ensure effective communication. These services are provided free of charge.     You have a right to personal safety (free from mental, physical, sexual and verbal abuse, neglect and exploitation). You have the right to access protective and advocacy services.     Advance Directives  A Patient Advocate is available to meet with patients to answer questions regarding advance directives.    Living Will  A document that outlines what medical treatment the patient does or does not want in the event the patient becomes unable to make those decisions at the appropriate time.    Durable Medical Power of   A document by which the patient designates an individual to be responsible for making medical decisions in the event the patient becomes unable to do so.    HIPAA Notice of Privacy Practices  Your medical information is governed by federal  privacy laws. HIPAA protects private medical information and how that information is disclosed. If you have a question regarding the HIPAA Notice of Privacy Practices, or if you believe your privacy rights have been violated, you may call our designated hotline at [phone].            Quality Improvement  Because we consistently strive to improve the care and service provided to our patients, we welcome your feedback. Your comments are an important part of our quality improvement process, as we like to know what we are doing right and which areas are in need of improvement. Our policy is to listen, be responsive and provide you with an appropriate and timely follow-up to your questions or concerns. Our goal is active patient and family involvement in all aspects of the care process.                                                                                  Reporting a Grievance or Complaint    During your time with the [facility] team you may have a grievance or complaint with our services. Your Patients Bill of Rights gives patients, families, and caregivers the right to express concerns and grievances and the right to expect a reasonable and timely response.     Your presentation of your concerns is not viewed negatively. It is an opportunity for us to improve the quality of our care and services we provide to you.     You may report your concerns directly to your , or you can phone in a complaint to:     Director of Outpatient Case Management  [phone]    You may also send a complaint letter to:    Director of Outpatient Case Management Services  [address] [place] [id number]    Tell us the details of your complaint and provide us with a contact phone number so we can contact you to obtain additional information. We will return a call to you within two business days of our receipt of your complaint, and to request additional information as  needed. If you choose to mail a letter, your complaint may take a few days longer to reach us.     All grievances will be addressed as quickly as possible. A grievance or complaint that involves situations or practices which place patients in immediate danger will be addressed as an urgent matter. We will work to resolve all other complaints within seven days of receipt. By that time, you will receive a phone call with either the resolution of your complaint, or a plan for corrective action. A formal written response will be sent to you within 30 days of receipt of your grievance.     If a resolution cannot be completed within 30 days, a letter will be sent to you or your family member with an estimated time for the final response.    Additionally, all patients have the right to file complaints with external agencies, without exception. Complaints/grievances can be addressed to the following agencies:            Patient Safety or Quality of Care Concerns  Office of Quality Monitoring   [employer] [facility] [place] [place] [address]  [phone] Toll Free    HIPPA Privacy/Security Concerns  Office for Civil Rights Region IV  [place] Department of Health & Human Services  [address] [place] [address], [address]  [place] [address] [phone] Phone  [phone] TDD  [phone] Toll Free    Medicare/Medicaid Billing Concerns  [place] for Medicare & Medicaid Services  Region [address]  [address] [place] [address], [address]  [place] [address] [phone] Phone  [phone] Toll Free    General Concerns  [facility] ([facility])  [phone] Toll Free Complaint Hotline                                                            Consent Form/Release of Information    By signing--     (1) I agree I have read the Outpatient Case Management information provided to me;     (2) I agree to voluntarily participate in the Outpatient Case Management program;     (3) I understand I must  consent to participation in the Outpatient Case Management program during my first interview with my ;    (4) I consent to the discussion and release of my personal health information to my healthcare team (including my personal physician, my medical home care team, any specialty physician(s), and my [facility] team);     (5) I agree my consent is valid for the length of time I am receiving Outpatient Case Management;    (6) I agree to referrals to community resources which my Case Management team recommends for me. I agree to the release of my personal information and personal health information as necessary to referral sources.    ___________________________________________________________________  Patients Printed Name     ___________________________________________________________________  Patients Signature       Date    If patient is in being cared for, please complete this section:     ___________________________________________________________________  Printed Name of Person Caring For Patient   Relationship To Patient    ___________________________________________________________________   Signature of Person Caring For Patient     Date    PLEASE SIGN AND RETURN IN THE ENCLOSED PRE-ADDRESSED ENVELOPE.

## 2017-04-05 NOTE — PLAN OF CARE
04/05/17 1246   Final Note   Assessment Type Final Discharge Note   Right Care Referral Info   Post Acute Recommendation SNF   Referral Type snf   Facility Name [facility]

## 2017-04-05 NOTE — PROGRESS NOTES
An O[facility] welcome Packet and consent form was created and mailed to the patient on 4/5/2017        Thank you,  Meghana De La Garza, SSC

## 2017-04-06 ENCOUNTER — PATIENT OUTREACH (OUTPATIENT)
Dept: ADMINISTRATIVE | Facility: CLINIC | Age: 82
End: 2017-04-06
Payer: MEDICARE

## 2017-04-06 PROBLEM — M79.605 LEFT LEG PAIN: Status: ACTIVE | Noted: 2017-04-06

## 2017-04-06 PROBLEM — R53.81 DEBILITY: Status: ACTIVE | Noted: 2017-04-06

## 2017-04-06 LAB
ANION GAP SERPL CALC-SCNC: 7 MMOL/L
BASOPHILS # BLD AUTO: 0 K/UL
BASOPHILS NFR BLD: 0 %
BUN SERPL-MCNC: 13 MG/DL
CALCIUM SERPL-MCNC: 8.8 MG/DL
CHLORIDE SERPL-SCNC: 92 MMOL/L
CO2 SERPL-SCNC: 39 MMOL/L
CREAT SERPL-MCNC: 0.8 MG/DL
DIFFERENTIAL METHOD: ABNORMAL
EOSINOPHIL # BLD AUTO: 0 K/UL
EOSINOPHIL NFR BLD: 0.3 %
ERYTHROCYTE [DISTWIDTH] IN BLOOD BY AUTOMATED COUNT: 14 %
EST. GFR  (AFRICAN AMERICAN): >60 ML/MIN/1.73 M^2
EST. GFR  (NON AFRICAN AMERICAN): >60 ML/MIN/1.73 M^2
GLUCOSE SERPL-MCNC: 73 MG/DL
HCT VFR BLD AUTO: 26.7 %
HGB BLD-MCNC: 8.1 G/DL
LYMPHOCYTES # BLD AUTO: 1.6 K/UL
LYMPHOCYTES NFR BLD: 16.4 %
MAGNESIUM SERPL-MCNC: 1.9 MG/DL
MCH RBC QN AUTO: 29.8 PG
MCHC RBC AUTO-ENTMCNC: 30.3 %
MCV RBC AUTO: 98 FL
MONOCYTES # BLD AUTO: 1.2 K/UL
MONOCYTES NFR BLD: 12.4 %
NEUTROPHILS # BLD AUTO: 7 K/UL
NEUTROPHILS NFR BLD: 70.9 %
PHOSPHATE SERPL-MCNC: 3.8 MG/DL
PLATELET # BLD AUTO: 284 K/UL
PMV BLD AUTO: 11.4 FL
POTASSIUM SERPL-SCNC: 3.6 MMOL/L
RBC # BLD AUTO: 2.72 M/UL
SODIUM SERPL-SCNC: 138 MMOL/L
WBC # BLD AUTO: 9.82 K/UL

## 2017-04-06 PROCEDURE — 97535 SELF CARE MNGMENT TRAINING: CPT

## 2017-04-06 PROCEDURE — 36415 COLL VENOUS BLD VENIPUNCTURE: CPT

## 2017-04-06 PROCEDURE — 97530 THERAPEUTIC ACTIVITIES: CPT

## 2017-04-06 PROCEDURE — 83735 ASSAY OF MAGNESIUM: CPT

## 2017-04-06 PROCEDURE — 63600175 PHARM REV CODE 636 W HCPCS: Performed by: INTERNAL MEDICINE

## 2017-04-06 PROCEDURE — 25000003 PHARM REV CODE 250: Performed by: HOSPITALIST

## 2017-04-06 PROCEDURE — 11000004 HC SNF PRIVATE

## 2017-04-06 PROCEDURE — 94664 DEMO&/EVAL PT USE INHALER: CPT

## 2017-04-06 PROCEDURE — 94761 N-INVAS EAR/PLS OXIMETRY MLT: CPT

## 2017-04-06 PROCEDURE — 84100 ASSAY OF PHOSPHORUS: CPT

## 2017-04-06 PROCEDURE — 97116 GAIT TRAINING THERAPY: CPT

## 2017-04-06 PROCEDURE — 97161 PT EVAL LOW COMPLEX 20 MIN: CPT

## 2017-04-06 PROCEDURE — 97110 THERAPEUTIC EXERCISES: CPT

## 2017-04-06 PROCEDURE — 25000003 PHARM REV CODE 250: Performed by: INTERNAL MEDICINE

## 2017-04-06 PROCEDURE — 94640 AIRWAY INHALATION TREATMENT: CPT

## 2017-04-06 PROCEDURE — 97165 OT EVAL LOW COMPLEX 30 MIN: CPT

## 2017-04-06 PROCEDURE — 27000221 HC OXYGEN, UP TO 24 HOURS

## 2017-04-06 PROCEDURE — 80048 BASIC METABOLIC PNL TOTAL CA: CPT

## 2017-04-06 PROCEDURE — 85025 COMPLETE CBC W/AUTO DIFF WBC: CPT

## 2017-04-06 PROCEDURE — 99306 1ST NF CARE HIGH MDM 50: CPT | Mod: ,,, | Performed by: INTERNAL MEDICINE

## 2017-04-06 PROCEDURE — 63600175 PHARM REV CODE 636 W HCPCS: Performed by: HOSPITALIST

## 2017-04-06 RX ORDER — POTASSIUM CHLORIDE 750 MG/1
10 CAPSULE, EXTENDED RELEASE ORAL DAILY
Status: DISCONTINUED | OUTPATIENT
Start: 2017-04-07 | End: 2017-04-14 | Stop reason: HOSPADM

## 2017-04-06 RX ORDER — POTASSIUM CHLORIDE 20 MEQ/1
20 TABLET, EXTENDED RELEASE ORAL EVERY 4 HOURS
Status: COMPLETED | OUTPATIENT
Start: 2017-04-06 | End: 2017-04-06

## 2017-04-06 RX ORDER — AMOXICILLIN 250 MG
1 CAPSULE ORAL 2 TIMES DAILY
Status: DISCONTINUED | OUTPATIENT
Start: 2017-04-06 | End: 2017-04-14 | Stop reason: HOSPADM

## 2017-04-06 RX ADMIN — LEVALBUTEROL 0.63 MG: 0.63 SOLUTION RESPIRATORY (INHALATION) at 06:04

## 2017-04-06 RX ADMIN — LEVALBUTEROL 0.63 MG: 0.63 SOLUTION RESPIRATORY (INHALATION) at 11:04

## 2017-04-06 RX ADMIN — APIXABAN 2.5 MG: 2.5 TABLET, FILM COATED ORAL at 08:04

## 2017-04-06 RX ADMIN — OXYCODONE HYDROCHLORIDE AND ACETAMINOPHEN 500 MG: 500 TABLET ORAL at 08:04

## 2017-04-06 RX ADMIN — POTASSIUM CHLORIDE 20 MEQ: 1500 TABLET, EXTENDED RELEASE ORAL at 06:04

## 2017-04-06 RX ADMIN — FLUTICASONE FUROATE AND VILANTEROL TRIFENATATE 1 PUFF: 100; 25 POWDER RESPIRATORY (INHALATION) at 09:04

## 2017-04-06 RX ADMIN — TIOTROPIUM BROMIDE 18 MCG: 18 CAPSULE ORAL; RESPIRATORY (INHALATION) at 09:04

## 2017-04-06 RX ADMIN — ASPIRIN 81 MG: 81 TABLET, COATED ORAL at 09:04

## 2017-04-06 RX ADMIN — THERA TABS 1 TABLET: TAB at 09:04

## 2017-04-06 RX ADMIN — GUAIFENESIN 1200 MG: 600 TABLET, EXTENDED RELEASE ORAL at 08:04

## 2017-04-06 RX ADMIN — DOCUSATE SODIUM 100 MG: 100 CAPSULE, LIQUID FILLED ORAL at 09:04

## 2017-04-06 RX ADMIN — POTASSIUM CHLORIDE 20 MEQ: 1500 TABLET, EXTENDED RELEASE ORAL at 02:04

## 2017-04-06 RX ADMIN — PREDNISONE 10 MG: 10 TABLET ORAL at 06:04

## 2017-04-06 RX ADMIN — LEVALBUTEROL 0.31 MG: 0.63 SOLUTION RESPIRATORY (INHALATION) at 01:04

## 2017-04-06 RX ADMIN — STANDARDIZED SENNA CONCENTRATE AND DOCUSATE SODIUM 1 TABLET: 8.6; 5 TABLET, FILM COATED ORAL at 08:04

## 2017-04-06 RX ADMIN — PREDNISONE 10 MG: 10 TABLET ORAL at 09:04

## 2017-04-06 RX ADMIN — GUAIFENESIN 1200 MG: 600 TABLET, EXTENDED RELEASE ORAL at 09:04

## 2017-04-06 RX ADMIN — OXYCODONE HYDROCHLORIDE AND ACETAMINOPHEN 500 MG: 500 TABLET ORAL at 09:04

## 2017-04-06 RX ADMIN — CYANOCOBALAMIN TAB 250 MCG 250 MCG: 250 TAB at 09:04

## 2017-04-06 RX ADMIN — CITALOPRAM HYDROBROMIDE 20 MG: 20 TABLET ORAL at 09:04

## 2017-04-06 RX ADMIN — PREDNISONE 10 MG: 10 TABLET ORAL at 02:04

## 2017-04-06 RX ADMIN — APIXABAN 2.5 MG: 2.5 TABLET, FILM COATED ORAL at 09:04

## 2017-04-06 NOTE — DISCHARGE SUMMARY
[facility] Discharge Summary    Primary Team: [facility] IM team A  Attending Physician: Man  Resident: Aniket  Intern: Kalia    Date of Admit: 3/27/2017  Date of Discharge: 4/5/17    Discharge to: [facility] (SNF)  Condition: Stable    Discharge Diagnoses     Patient Active Problem List   Diagnosis    COPD (chronic obstructive pulmonary disease)    Cystocele    Essential hypertension    OP (osteoporosis)    Dyslipidemia    Physical deconditioning    Chronic anemia    Chronic systolic heart failure    Cardiomyopathy    Anemia of chronic disease    Closed fracture of ramus of right pubis    Chronic hypoxemic respiratory failure    Coronary artery disease involving native coronary artery of native heart without angina pectoris    COPD exacerbation    Macrocytic anemia    Uterine prolapse    Arthralgia of left hip    Hip pain    Anemia, chronic disease    HLD (hyperlipidemia)    Closed bilateral fracture of pubic rami    Osteoporosis    Greater trochanter fracture    Hip fracture    Chronic obstructive pulmonary disease    Coronary artery disease involving native coronary artery without angina pectoris    Pneumonia    Chronic diastolic heart failure    Iron deficiency anemia due to chronic blood loss    Low oxygen saturation    Hypokalemia    Atrial fibrillation with RVR    (HFpEF) heart failure with preserved ejection fraction    COPD, very severe    Pulmonary hypertension    History of atrial fibrillation    SOB (shortness of breath)    Acute on chronic respiratory failure with hypoxia and hypercapnia    Hyponatremia    Paroxysmal atrial fibrillation    Anxiety and depression    Urinary retention    Elevated troponin    Pulmonary hypertension due to lung disease    COPD with respiratory failure, acute    Depression with anxiety    Debility    Left leg pain       Consultants and Procedures     Consultants:  PT/OT    Procedures:   None    Brief History of  Present Illness      Kaity Rebolledo is a 83 y.o. female who  has a past medical history of Atrial fibrillation with RVR (3/4/2017); CAD (coronary artery disease) (3/14/2016); Cardiomyopathy (9/24/2015); CHF (congestive heart failure) (10/14/2015); COPD (chronic obstructive pulmonary disease); COPD exacerbation (3/14/2016); Fall; Hyperlipidemia; Hypertension; Osteoporosis; Pneumonia; and Rotator cuff injury.  The patient presented to [facility] on 3/27/2017 with a primary complaint of Shortness of Breath.      Ms. Rebolledo initially presented 3/2/17 with c/o cough and dyspnea, was admitted to the family medicine service and treated for pneumonia, discharged on 3/7.  She later presented again to the ED at [facility] 3/27/17 for the same, was treated in the ED for COPD exacerbation with steroids and duonebs and discharged from the ED at that time.  The same day, she felt unimproved and thus re-presented to the ED for evaluation.    For the full HPI please refer to the History & Physical from this admission.    Hospital Course By Problem with Pertinent Findings     1. Acute Hypoxic Respiratory Failure 2/2 COPD Exacerbation  - Presented with SOB despite IV steroids and multiple duo-neb treatments  - CXR with no evidence of pneumonia  - Treated in house with duonebs q4 hours with mucinex 600mg BID and prednisone 40mg daily, moxifloxacin x 5 days, empiric Tamiflu due to sick contacts (flu screen was negative)   - Given frequent readmissions and prolonged course, patient discharged to SNF with 2 week prednisone taper    2. Chronic Diastolic Congestive Heart Failure, Pulmonary Hypertension  - TTE 3/3/17: EF 55%, diastolic dysfunction present, PAP 51  - Pt clinically euvolemic  - Continued on home lasix 20 mg daily     3. HTN  - Remained normotensive on home metoprolol and lisinopril    4. Dyslipidemia  - Lipid panel: Chol 223, TG 45, HDL 82,   - Patient not on home statin (previously  discontinued due to myopathy)    5. Anemia of Chronic Disease  - Hb remained at baseline, no active issues during admission  - iron 21, TIBC 330, ferritin 123, folate 18, vit B12 810     6. Paroxysmal atrial fibrillation  - Diagnosed as new onset in recent hospitalization 3/2017  - Remained in NSR this admission  - Continued on Eliquis 2.5 mg BID and Metoprolol   - Plan for cardiology follow up at discharge    7. CAD, elevated troponin  - [facility] 10/2015 - RCA 40% stenosis, LAD 20% stenosis  - Continued on ASA 81mg daily and Toprol-xl 50mg daily, off statin 2/2 myopathy  - Trop peaked at 0.445 this admission, and has trended down, likely secondary to demand ischemia  - No chest pain during admission    Discharge Medications        Medication List      START taking these medications          lisinopril 2.5 MG tablet   Commonly known as:  PRINIVIL,ZESTRIL   Take 1 tablet (2.5 mg total) by mouth once daily.         CONTINUE taking these medications          albuterol 90 mcg/actuation inhaler   Inhale 2 puffs into the lungs every 6 (six) hours as needed for Wheezing or Shortness of Breath.       alprazolam 0.25 MG tablet   Commonly known as:  XANAX   Take 1 tablet (0.25 mg total) by mouth nightly as needed for Anxiety.       apixaban 2.5 mg Tab   Take 1 tablet (2.5 mg total) by mouth 2 (two) times daily.       aspirin 81 MG EC tablet   Commonly known as:  ECOTRIN   Take 1 tablet (81 mg total) by mouth once daily.       citalopram 20 MG tablet   Commonly known as:  CELEXA   TAKE 1 TABLET BY MOUTH ONCE DAILY       cyanocobalamin 100 MCG tablet   Commonly known as:  VITAMIN B-12   Take 1 tablet (100 mcg total) by mouth once daily.       cyproheptadine 4 mg tablet   Commonly known as:  PERIACTIN   Take 1 tablet (4 mg total) by mouth 3 (three) times daily as needed.       fluticasone-vilanterol 100-25 mcg/dose diskus inhaler   Commonly known as:  BREO   Inhale 1 puff into the lungs once daily.       furosemide 20 MG tablet    Commonly known as:  LASIX   Take 1 tablet (20 mg total) by mouth once daily.       guaifenesin 600 mg 12 hr tablet   Commonly known as:  MUCINEX   Take 2 tablets (1,200 mg total) by mouth 2 (two) times daily. Okay to dispense box of medication  as prepackaged by .       levalbuterol 0.31 mg/3 mL nebulizer solution   Commonly known as:  XOPENEX   Take 3 mLs (0.31 mg total) by nebulization 4 (four) times daily.       metoprolol succinate 50 MG 24 hr tablet   Commonly known as:  TOPROL-XL   Take 1 tablet (50 mg total) by mouth once daily.       ONE DAILY MULTIVITAMIN per tablet   Generic drug:  multivitamin       VITAMIN C 500 MG tablet   Generic drug:  ascorbic acid (vitamin C)         STOP taking these medications          levoFLOXacin 750 MG tablet   Commonly known as:  LEVAQUIN       predniSONE 20 MG tablet   Commonly known as:  DELTASONE         ASK your doctor about these medications          oseltamivir 30 MG capsule   Commonly known as:  TAMIFLU   Take 1 capsule (30 mg total) by mouth 2 (two) times daily.   Ask about: Should I take this medication?            Where to Get Your Medications      You can get these medications from any pharmacy     Bring a paper prescription for each of these medications     lisinopril 2.5 MG tablet    oseltamivir 30 MG capsule             Discharge Information:   Diet:  Cardiac     Physical Activity:  As tolerated, PT/OT assistance    Instructions:  1. Take all medications as prescribed  2. Keep all follow-up appointments  3. Return to the hospital or call your primary care physicians if any worsening symptoms such as lightheadedness/dizziness, chest pain, shortness of breath, or any other concerning symptoms occur.    Follow-Up Appointments:  PCP- Dr. oSriano upon discharge from SNF facility (1-2 weeks)  Cardiology within 1-2 weeks

## 2017-04-06 NOTE — PLAN OF CARE
Problem: Physical Therapy Goal  Goal: Physical Therapy Goal  Goals to be met by:10 days ()    Patient will increase functional independence with mobility by performin. Supine to sit with supervision.  2. Sit to supine with supervision.  3. Sit to stand transfer with Supervision  4. Bed to chair transfer with Supervision using Rolling Walker  5. Gait x 150 feet with Supervision using Rolling Walker.   6. Wheelchair propulsion x 150 feet with Supervision using bilateral upper extremities  7. Ascend/Descend 5 inch curb step with Supervision using Rolling Walker.  8. Stand for 3 minutes with Supervision using Rolling Walker  9. Lower extremity exercise program x 20 reps per handout, with supervision  Outcome: Ongoing (interventions implemented as appropriate)  Goals set today.

## 2017-04-06 NOTE — PT/OT/SLP EVAL
PhysicalTherapy   Evaluation    Kaity Rebolledo   MRN: [id number]     PT Received On: 04/06/17  PT Start Time: 0930     PT Stop Time: 1015    PT Total Time (min): 45 min       Billable Minutes:  Evaluation 1, Gait Training8, Therapeutic Activity 15 and Therapeutic Exercise 15    Diagnosis: Shortness of breath (acute onset of respiratory failure)  Past Medical History:   Diagnosis Date    Atrial fibrillation with RVR 3/4/2017    CAD (coronary artery disease) 3/14/2016    Cardiomyopathy 9/24/2015    CHF (congestive heart failure) 10/14/2015    COPD (chronic obstructive pulmonary disease)     COPD exacerbation 3/14/2016    Fall     patient  in  wheel  chair    Hyperlipidemia     Hypertension     Osteoporosis     Pneumonia     Rotator cuff injury     R s/p therapy      Past Surgical History:   Procedure Laterality Date    CATARACT EXTRACTION BILATERAL W/ ANTERIOR VITRECTOMY      EYE SURGERY      FRACTURE SURGERY      LEG SURGERY           General Precautions: Standard, fall, respiratory, aspiration  Orthopedic Precautions: N/A   Braces: N/A    Do you have any cultural, spiritual, Mandaeism conflicts, given your current situation?: Episcopal    Patient History:  Lives With: alone  Living Arrangements: assisted living (in [place], [place])  Home Layout: Able to live on 1st floor  Stair Railings at Home: none  Living Environment Comment: Resident of assisted living facility; ambulates with RW PRN in apartment, w/c to dining hallway with assist to propel.  Pt did have available assistance for bathing from caretaker with shower chair. Has BSC near bed at night; uses regular toilet during the day. Mod I dsg/toileting/grooming; 3L O2 at home.  Equipment Currently Used at Home: bedside commode, oxygen, shower chair, walker, rolling, wheelchair  DME owned (not currently used): none    Previous Level of Function:  Ambulation Skills: needs device  Transfer Skills: needs device  ADL Skills: needs device and assist  (intermittently)  Work/Leisure Activity: needs device    Subjective:  Communicated with OT prior to session. Pt was agreeable to PT services. Reported that her   recently (a few months ago).  She was  to him for 65 years.  She had broken her L leg a few years ago, and it hurts every now and then. She also tore a rotator cuff tendon/muscle in LUE, limiting her mobility.    Chief Complaint: Shortness of breath  Patient goals: To be able to return to her assisted living facility.    Pain Ratin/10                   Objective:  Patient found seated in wheelchair with Patient found with: oxygen (3L)    Cognitive Exam:  Oriented to: Person, Place, Time and Situation  Follows Commands/attention: Follows multistep  commands  Communication: clear/fluent  Safety awareness/insight to disability: intact    Physical Exam:  Postural examination/scapula alignment: Rounded shoulder and Head forward    Skin integrity: Visible skin intact  Edema: None noted     Sensation:   Intact    Upper Extremity Range of Motion:  Right Upper Extremity: Deficits: Passive and active shoulder ROM limited to <90 degrees (abduction/flexion). Biceps/triceps/ WFL  Left Upper Extremity: WNL    Upper Extremity Strength:  Right Upper Extremity: Deficits: Passive and active shoulder ROM limited to <90 degrees (abduction/flexion). Biceps/triceps/ WFL  Left Upper Extremity: WNL    Lower Extremity Range of Motion:  Right Lower Extremity: WNL  Left Lower Extremity: WNL    Lower Extremity Strength:  Right Lower Extremity: WNL  Left Lower Extremity: WNL     Fine motor coordination:  Intact    Gross motor coordination: WFL    Functional Status:  MDS G  Transfer Functional Status: CGA-Min (A)  Walk in Room Functional Status: CGA-Min (A)  Walk in Corridor Functional Status: CGA-Min (A)  Locomotion on Unit Functional Status: S-SBA  Locomotion Off Unit Functional Status: S-SBA  Eval Only: Number of L/E limb <4/5 MMT:  2    Transfers:  Sit<>Stand: contact guard assistance    Gait:  Amb 150 feet with CGA and use of a rolling walker.     Wheelchair Mobility:  Patient propels w/c 75 feet with supervision     Additional Treatment:  Timed Up & Go Test (TUG)    Instructions to the patient:   From sitting in a chair, stand up, walk 3 meters, turn around, walk back, and sit down    Scoring:   Assistive Device and/or Bracing Used: Rolling Walker  TUG Time: 35 seconds     Community Dwelling Older Adult = > 13.5 sec. are associated with high fall risk         Patient left up in chair with call button in reach.    Assessment:  Kaity Rebolledo is a 83 y.o. female with a medical diagnosis of acute onset of chronic respiratory failure. She presents as a falls risk, as noted with her TUG score above. She lives in an assisted living facility and receives assistance as needed. She will benefit from further PT services to address her functional deficits and improve her endurance as well as her overall mobility. Her evaluation is labeled as low complexity, as her presentation is stable.    Rehab identified problem list/impairments: weakness, impaired endurance, impaired self care skills, impaired functional mobilty, gait instability, impaired balance, decreased upper extremity function, decreased lower extremity function, pain    Rehab potential is fair.    Activity tolerance: fair    Discharge recommendations:  (return to assisted living)     Barriers to discharge: None    Equipment recommendations: none     GOALS:   Physical Therapy Goals        Problem: Physical Therapy Goal    Goal Priority Disciplines Outcome Goal Variances Interventions   Physical Therapy Goal     PT/OT, PT Ongoing (interventions implemented as appropriate)     Description:  Goals to be met by:10 days ()    Patient will increase functional independence with mobility by performin. Supine to sit with supervision.  2. Sit to supine with supervision.  3. Sit to stand  transfer with Supervision  4. Bed to chair transfer with Supervision using Rolling Walker  5. Gait  x 150 feet with Supervision using Rolling Walker.   6. Wheelchair propulsion x 150 feet with Supervision using bilateral upper extremities  7. Ascend/Descend 5 inch curb step with Supervision using Rolling Walker.  8. Stand for 3 minutes with Supervision using Rolling Walker  9. Lower extremity exercise program x 20 reps per handout, with supervision                PLAN:    Patient to be seen 6 x/week  to address the above listed problems via gait training, therapeutic activities, therapeutic exercises  Plan of Care Expires: 05/06/17    Yesy Rolon, PT 4/6/2017

## 2017-04-06 NOTE — PLAN OF CARE
Problem: Occupational Therapy Goal  Goal: Occupational Therapy Goal  OT evaluation completed.

## 2017-04-06 NOTE — PROGRESS NOTES
04/06/2017  4:09 PM  Discharge Planning-Met with patient in room to inform of discharge date of 4/17/2017 . Discharge date written on dry erase board in room. Patient stated understanding to discharge date.  Vivian Doran RN, CM Skilled  [id number]

## 2017-04-06 NOTE — H&P
History & Physical  Skilled Nursing Facility      SUBJECTIVE:     Chief Complaint/Reason for Admission: Debility    History of Present Illness:  Patient is a 83 y.o. female with severe COPD on home oxygen, CHF, A.fib (diagnosed 3/2017) who presents to SNF after hospitalization for acute hypoxic/hypercapneic respiratory failure due to COPD.  Breathing improved with BIPAP, increased supplemental oxygen, nebs, prednisone and pulmonary toilet.  She has been hospitalized numerous times for the same.   She states her breathing is at baseline today and she denies pain.  She has not been using the bipap as prescribed nightly at the hospital.  She has a 'bad leg' on the left which occasionally causes her pain intermittently.  She had a leg fracture requirng surgery to that leg.  She takes tylenol or advil at home as needed.      The patient has been admitted to SNF for ongoing PT/OT due to insufficient progress to go home safely from the hospital.  Her   in the last few months and she lives in assisted living.     Events from last hospital admit reviewed.    Home Medication list:  PTA Medications   Medication Sig    albuterol 90 mcg/actuation inhaler Inhale 2 puffs into the lungs every 6 (six) hours as needed for Wheezing or Shortness of Breath.    alprazolam (XANAX) 0.25 MG tablet Take 1 tablet (0.25 mg total) by mouth nightly as needed for Anxiety.    apixaban 2.5 mg Tab Take 1 tablet (2.5 mg total) by mouth 2 (two) times daily.    ascorbic acid (VITAMIN C) 500 MG tablet Take 500 mg by mouth 2 (two) times daily.    aspirin (ECOTRIN) 81 MG EC tablet Take 1 tablet (81 mg total) by mouth once daily.    citalopram (CELEXA) 20 MG tablet TAKE 1 TABLET BY MOUTH ONCE DAILY    cyanocobalamin (VITAMIN B-12) 100 MCG tablet Take 1 tablet (100 mcg total) by mouth once daily.    cyproheptadine (PERIACTIN) 4 mg tablet Take 1 tablet (4 mg total) by mouth 3 (three) times daily as needed.    fluticasone-vilanterol (BREO)  100-25 mcg/dose diskus inhaler Inhale 1 puff into the lungs once daily.    furosemide (LASIX) 20 MG tablet Take 1 tablet (20 mg total) by mouth once daily.    guaifenesin (MUCINEX) 600 mg 12 hr tablet Take 2 tablets (1,200 mg total) by mouth 2 (two) times daily. Okay to dispense box of medication  as prepackaged by .    levalbuterol (XOPENEX) 0.31 mg/3 mL nebulizer solution Take 3 mLs (0.31 mg total) by nebulization 4 (four) times daily.    lisinopril (PRINIVIL,ZESTRIL) 2.5 MG tablet Take 1 tablet (2.5 mg total) by mouth once daily.    metoprolol succinate (TOPROL-XL) 50 MG 24 hr tablet Take 1 tablet (50 mg total) by mouth once daily.    multivitamin (ONE DAILY MULTIVITAMIN) per tablet Take 1 tablet by mouth once daily.       Medications ordered by the discharge team:  Scheduled Meds:   apixaban  2.5 mg Oral BID    ascorbic acid (vitamin C)  500 mg Oral BID    aspirin  81 mg Oral Daily    citalopram  20 mg Oral Daily    cyanocobalamin  250 mcg Oral Daily    docusate sodium  100 mg Oral Daily    fluticasone-vilanterol  1 puff Inhalation Daily    furosemide  20 mg Oral Daily    guaifenesin  1,200 mg Oral BID    levalbuterol  0.3108 mg Nebulization QID    lisinopril  2.5 mg Oral Daily    metoprolol succinate  50 mg Oral Daily    multivitamin  1 tablet Oral Daily    predniSONE  10 mg Oral TID    Followed by    [START ON 4/9/2017] predniSONE  10 mg Oral BID    Followed by    [START ON 4/12/2017] predniSONE  10 mg Oral Daily    tiotropium  1 capsule Inhalation Daily     Continuous Infusions:   PRN Meds:.acetaminophen, albuterol, cyproheptadine    Review of patient's allergies indicates:   Allergen Reactions    Advair diskus  [fluticasone-salmeterol]      Other reaction(s): Nausea    Morphine Hallucinations    Pravastatin      Other reaction(s): Muscle pain        Past Medical History:   Diagnosis Date    Atrial fibrillation with RVR 3/4/2017    CAD (coronary artery disease)  3/14/2016    Cardiomyopathy 9/24/2015    CHF (congestive heart failure) 10/14/2015    COPD (chronic obstructive pulmonary disease)     COPD exacerbation 3/14/2016    Fall     patient  in  wheel  chair    Hyperlipidemia     Hypertension     Osteoporosis     Pneumonia     Rotator cuff injury     R s/p therapy     Past Surgical History:   Procedure Laterality Date    CATARACT EXTRACTION BILATERAL W/ ANTERIOR VITRECTOMY      EYE SURGERY      FRACTURE SURGERY      LEG SURGERY       History reviewed. No pertinent family history.  Social History   Substance Use Topics    Smoking status: Former Smoker     Packs/day: 1.00     Years: 50.00     Types: Cigarettes     Quit date: 3/13/2003    Smokeless tobacco: Never Used    Alcohol use No       Review of Systems:  Constitutional: no fever or chills  Eyes: no visual changes  ENT: no nasal congestion or sore throat  Respiratory: + occasional productive cough: + shortness of breath with activity  Cardiovascular: no chest pain or palpitations  Gastrointestinal: no nausea or vomiting, no abdominal pain; last BM: 4/2/2017  Genitourinary: no hematuria or dysuria  Integument/Breast: no rash or pruritis  Hematologic/Lymphatic: no easy bruising or lymphadenopathy  Allergy/Immunology: no postnasal drip  Musculoskeletal: no arthralgias or myalgias  Neurological: no seizures or tremors  Behavioral/Psych: no auditory or visual hallucinations  Endocrine: no heat or cold intolerance      OBJECTIVE:     Vital Signs (Most Recent)  Temp: 98 °F (36.7 °C) (04/06/17 0750)  Pulse: 69 (04/06/17 0934)  Resp: 18 (04/06/17 0934)  BP: 115/60 (04/06/17 0750)  SpO2: 95 % (04/06/17 0934)    Vital Signs Range (Last 24H):  Temp:  [98 °F (36.7 °C)-98.9 °F (37.2 °C)]   Pulse:  []   Resp:  [14-22]   BP: (112-122)/(56-87)   SpO2:  [94 %-97 %]     Physical Exam:  General: well developed, well nourished, no distress  HENT: Head:normocephalic, atraumatic. Ears:bilateral external ear canals  normal. Nose: Nares normal. Septum midline. Mucosa normal. Throat: lips, mucosa, and tongue normal and no throat erythema.  Eyes: conjunctivae/corneas clear.    Neck: supple, symmetrical, trachea midline, no JVD and thyroid not enlarged.   Lungs:  clear to auscultation bilaterally and normal respiratory effort  Cardiovascular: Heart: regular rate and rhythm, S1, S2 normal, no murmur, click, rub or gallop. Chest Wall: no tenderness. Extremities: no cyanosis, no edema, no clubbing. Pulses: 2+ and symmetric.  Abdomen/Rectal: Abdomen: soft, non-tender non-distended; bowel sounds normal; no masses,  no organomegaly.   Skin: Skin color, texture, turgor normal. No rashes or lesions  Musculoskeletal:no clubbing, cyanosis  Lymph Nodes: No cervical or supraclavicular adenopathy  Neurologic: Normal strength and tone. No focal numbness or weakness  Psych/Behavioral:  Alert and oriented, appropriate affect.      Laboratory  Recent Results (from the past 24 hour(s))   Basic Metabolic Panel (BMP)    Collection Time: 04/06/17  4:10 AM   Result Value Ref Range    Sodium 138 136 - 145 mmol/L    Potassium 3.6 3.5 - 5.1 mmol/L    Chloride 92 (L) 95 - 110 mmol/L    CO2 39 (H) 23 - 29 mmol/L    Glucose 73 70 - 110 mg/dL    BUN, Bld 13 8 - 23 mg/dL    Creatinine 0.8 0.5 - 1.4 mg/dL    Calcium 8.8 8.7 - 10.5 mg/dL    Anion Gap 7 (L) 8 - 16 mmol/L    eGFR if African American >60.0 >60 mL/min/1.73 m^2    eGFR if non African American >60.0 >60 mL/min/1.73 m^2   CBC auto differential    Collection Time: 04/06/17  4:10 AM   Result Value Ref Range    WBC 9.82 3.90 - 12.70 K/uL    RBC 2.72 (L) 4.00 - 5.40 M/uL    Hemoglobin 8.1 (L) 12.0 - 16.0 g/dL    Hematocrit 26.7 (L) 37.0 - 48.5 %    MCV 98 82 - 98 fL    MCH 29.8 27.0 - 31.0 pg    MCHC 30.3 (L) 32.0 - 36.0 %    RDW 14.0 11.5 - 14.5 %    Platelets 284 150 - 350 K/uL    MPV 11.4 9.2 - 12.9 fL    Gran # 7.0 1.8 - 7.7 K/uL    Lymph # 1.6 1.0 - 4.8 K/uL    Mono # 1.2 (H) 0.3 - 1.0 K/uL    Eos #  0.0 0.0 - 0.5 K/uL    Baso # 0.00 0.00 - 0.20 K/uL    Gran% 70.9 38.0 - 73.0 %    Lymph% 16.4 (L) 18.0 - 48.0 %    Mono% 12.4 4.0 - 15.0 %    Eosinophil% 0.3 0.0 - 8.0 %    Basophil% 0.0 0.0 - 1.9 %    Differential Method Automated    Magnesium    Collection Time: 04/06/17  4:10 AM   Result Value Ref Range    Magnesium 1.9 1.6 - 2.6 mg/dL   Phosphorus    Collection Time: 04/06/17  4:10 AM   Result Value Ref Range    Phosphorus 3.8 2.7 - 4.5 mg/dL       Diagnostic Results:  Labs: Reviewed    ASSESSMENT/PLAN:       Active Hospital Problems    Diagnosis  POA    *Debility [R53.81]  -continue PT/OT to increase ambulation, ADL performance and endurance  -continue apixaban for DVT prophylaxis  -continue fall precautions  -continue senokot-s to prevent constipation; hold for frequent or loose stooling; discontinue colace  Yes    Acute on chronic respiratory failure with hypoxia and hypercapnia [J96.21, J96.22]  -chronic oxygen supplement at home  -continue CPAP at night; patient states she will try but did not tolerate in the hospital  -continue COPD therapy  -minimize oxygen to least flow rate possible to keep saturation > 88% due to hypercapnea  Yes    COPD, very severe [J44.9]  -continue nebs with xopenex due to A. Fib  -continue mucinex for cough  --continue current medical therapy as listed below  -monitor oxygen saturation  -start acapella to treat cough/congestion  Yes    Paroxysmal atrial fibrillation [I48.0]  -HR is better controlled  -continue current medical therapy as listed below  -will continue to monitor and adjust regimen as necessary  Yes    Anxiety and depression [F41.9, F32.9]  -in good spirits but sad about her 's death  -continue current medical therapy as listed below  -she is anxious to do PT/OT to get to [place]  Yes    Left leg pain [M79.605]  -intermittent since her fracture surgery  -tylenol prn pain  Yes    Hypokalemia [E87.6]  -continue to treat with potassium supplements as  needed  -will continue to monitor   Yes    Chronic diastolic heart failure [I50.32]  -compensated currently  -continue current medical therapy as listed below  -monitor with daily weights and escalate therapy to treat  -cardiac diet to treat  Yes    Coronary artery disease involving native coronary artery of native heart without angina pectoris [I25.10]  -no CP with activity  -continue current medical therapy as listed below  Yes          Continue the following medications for treatment of the indicated conditions:  ·  Indication Medication Dose Route  Frequency       A. fib apixaban  2.5 mg Oral BID    Nutritional supplement ascorbic acid (vitamin C)  500 mg Oral BID    CAD aspirin  81 mg Oral Daily    depression citalopram  20 mg Oral Daily    Nutritional supplement cyanocobalamin  250 mcg Oral Daily    COPD fluticasone-vilanterol  1 puff Inhalation Daily    CHF furosemide  20 mg Oral Daily    COPD guaifenesin  1,200 mg Oral BID    COPD levalbuterol  0.3108 mg Nebulization QID    CHF lisinopril  2.5 mg Oral Daily    A. Fib, CHF metoprolol succinate  50 mg Oral Daily    Nutritional supplement multivitamin  1 tablet Oral Daily    hypokalemia [START ON 4/7/2017] potassium chloride  10 mEq Oral Daily    hypokalemia potassium chloride  20 mEq Oral Q4H    COPD predniSONE  10 mg Oral TID     Followed by    COPD [START ON 4/9/2017] predniSONE  10 mg Oral BID     Followed by    COPD [START ON 4/12/2017] predniSONE  10 mg Oral Daily    COPD tiotropium  1 capsule Inhalation Daily       No future appointments.  The patient needs f/u with Pulmonary in 2 weeks in [place].     Patient's care plan and discharge planning will be discussed by the SNF team in IDT meeting. Medications to be reviewed and discussed with the SNF unit clinical pharmacist.

## 2017-04-06 NOTE — PT/OT/SLP EVAL
Occupational Therapy  Evaluation/tx    Kaity Rebolledo   MRN: [id number]   Admitting Diagnosis: COPD with respiratory failure   OT Date of Treatment: 04/06/17              Billable Minutes:  Evaluation 20  Self Care/Home Management 38    Diagnosis:  COPD with respiratory failure       Past Medical History:   Diagnosis Date    Atrial fibrillation with RVR 3/4/2017    CAD (coronary artery disease) 3/14/2016    Cardiomyopathy 9/24/2015    CHF (congestive heart failure) 10/14/2015    COPD (chronic obstructive pulmonary disease)     COPD exacerbation 3/14/2016    Fall     patient  in  wheel  chair    Hyperlipidemia     Hypertension     Osteoporosis     Pneumonia     Rotator cuff injury     R s/p therapy      Past Surgical History:   Procedure Laterality Date    CATARACT EXTRACTION BILATERAL W/ ANTERIOR VITRECTOMY      EYE SURGERY      FRACTURE SURGERY      LEG SURGERY           General Precautions: Standard, fall, respiratory, aspiration (cardiac/ partial code)  Orthopedic Precautions: N/A  Braces: N/A    Do you have any cultural, spiritual, Mormonism conflicts, given your current situation?: Christian     Patient History:  Lives With: alone  Living Arrangements: assisted living  Transportation Available: family or friend will provide  Equipment Currently Used at Home: bedside commode, oxygen, shower chair, walker, rolling, wheelchair    Prior level of function:   Bed Mobility/Transfers: needs device  Grooming: independent  Bathing: needs device and assist  Upper Body Dressing: independent  Lower Body Dressing: needs assist  Driving License: No  Leisure and Hobbies: does activities at [facility]  IADL Comments: Pt. reported that she resides in [facility] but has assist with bathing as well as dressing for lower body.  pt. is  on 02 at home.  Pt. did report that she is able to walk in [facility] with RW without assist to and from bathroom .  she reports being able to toilet with use of BSC independently and using w/c siome at [facility]  but someone usually pushes her in it.     Dominant hand: right    Subjective:  Communicated with nurse prior to session.    Chief Complaint: Not being out of bed lately and getting weak  Patient/Family stated goals: To get stronger     Pain Ratin/10              Pain Rating Post-Intervention: 0/10    Objective:   Patient found with: oxygen (supine in bed)    Cognitive Exam:  Oriented to: Person, Place, Time and Situation  Follows Commands/attention: Follows multistep  commands  Communication: clear/fluent  Memory:  At times appeared to have difficulty with expressing thoughts completely  Safety awareness/insight to disability: intact  Coping skills/emotional control: Appropriate to situation    Visual/perceptual:  Intact    Physical Exam:  Postural examination/scapula alignment: Rounded shoulder and Head forward  Skin integrity: Visible skin intact slight bruising noted  Edema: None noted in BUE    Sensation:   Intact    Upper Extremity Range of Motion:  Right Upper Extremity: shoulder flexion limited to 45 degrees ; elbow and below WFL  Left Upper Extremity: WFL    Upper Extremity Strength:  Right Upper Extremity: limited at shoulder   Left Upper Extremity: WFL   Strength: good      Fine motor coordination:   Intact    Gross motor coordination: limited at RUE    Functional Status:  MDS G  Bed Mobility Functional Status:Min (A) supine to sit  Transfer Functional Status: Min (A) sit to stand from EOB and Min A SPT from bed to w/c and bedside commode  Dressing Functional Status: 3:Max(A)LBD with assist to don slippers as well as thread BLE and manage over hips in stand 2/2 slight SOB/ UBD Min A to fasten bra ; Education on technique to don shirt 2/2 deficits in RUE  Eating Functional Status: SBA  Toilet Use Functional Status: mod(A) with use of BSC for balance when managing pants up/down   Personal Hygiene Functional Status: Min A seated EOB to brush teeth and hair  Bathing Functional Status: mod(A) with  assist to wash below knees and for balance with linda area in stand  Eval Only: Number of U/E limb <4/5 MMT: 0         Additional Treatment:  Pt. Educated on role of OT and pOC.. Energy conservation, safety with transfers and ADLs    Patient left up in chair with call button in reach and nurse notified    Assessment:  Kaity Rebolledo is a 83 y.o. female with a medical diagnosis of   COPD with respiratory failure   And presents with deficits in self-care skills, functional mobility as well as overall endurance level and would benefit from continued OT services to maximize independence and safety with ADL tasks.    Rehab identified problem list/impairments: impaired endurance, impaired self care skills, impaired functional mobilty, other (comment) (respiratory issues)    Rehab potential is good    Activity tolerance: Good    Discharge recommendations:  (nursing home)     Barriers to discharge: None     Equipment recommendations: none     GOALS:   Occupational Therapy Goals        Problem: Occupational Therapy Goal    Goal Priority Disciplines Outcome Interventions   Occupational Therapy Goal     OT, PT/OT     Description:  Goals to be met by: 12 days    Patient will increase functional independence with ADLs by performing:    Feeding with Set-up Assistance.  UE Dressing with Set-up Assistance.  LE Dressing with Minimal Assistance.  Grooming while seated with Set-up Assistance.  Toileting from bedside commode with SBA for hygiene and clothing management.   Bathing from  shower chair/bench with Minimal Assistance.  Supine to sit with Modified [place].  Stand pivot transfers with Supervision.  Toilet transfer to bedside commode with Supervision.  Pt. To demonstrate understanding of energy conservation techniques with ADL task performance.                  PLAN: Patient to be seen 5 x/week to address the above listed problems via self-care/home management, therapeutic activities, therapeutic exercises  Plan of Care expires:  05/06/17  Plan of Care reviewed with: patient    Heather NEY Denton  04/06/2017

## 2017-04-07 PROCEDURE — 25000003 PHARM REV CODE 250: Performed by: HOSPITALIST

## 2017-04-07 PROCEDURE — 25000003 PHARM REV CODE 250: Performed by: NURSE PRACTITIONER

## 2017-04-07 PROCEDURE — 97530 THERAPEUTIC ACTIVITIES: CPT

## 2017-04-07 PROCEDURE — 97535 SELF CARE MNGMENT TRAINING: CPT

## 2017-04-07 PROCEDURE — 27000221 HC OXYGEN, UP TO 24 HOURS

## 2017-04-07 PROCEDURE — 63600175 PHARM REV CODE 636 W HCPCS: Performed by: INTERNAL MEDICINE

## 2017-04-07 PROCEDURE — 11000004 HC SNF PRIVATE

## 2017-04-07 PROCEDURE — 94640 AIRWAY INHALATION TREATMENT: CPT

## 2017-04-07 PROCEDURE — 94664 DEMO&/EVAL PT USE INHALER: CPT

## 2017-04-07 PROCEDURE — 25000003 PHARM REV CODE 250: Performed by: INTERNAL MEDICINE

## 2017-04-07 PROCEDURE — 97116 GAIT TRAINING THERAPY: CPT

## 2017-04-07 PROCEDURE — 99309 SBSQ NF CARE MODERATE MDM 30: CPT | Mod: ,,, | Performed by: NURSE PRACTITIONER

## 2017-04-07 PROCEDURE — 97110 THERAPEUTIC EXERCISES: CPT

## 2017-04-07 PROCEDURE — 94761 N-INVAS EAR/PLS OXIMETRY MLT: CPT

## 2017-04-07 RX ORDER — LEVALBUTEROL INHALATION SOLUTION 0.63 MG/3ML
0.31 SOLUTION RESPIRATORY (INHALATION) EVERY 6 HOURS
Status: DISCONTINUED | OUTPATIENT
Start: 2017-04-07 | End: 2017-04-08

## 2017-04-07 RX ORDER — TIOTROPIUM BROMIDE 18 UG/1
18 CAPSULE ORAL; RESPIRATORY (INHALATION) DAILY
COMMUNITY
End: 2017-07-17 | Stop reason: SDUPTHER

## 2017-04-07 RX ADMIN — FUROSEMIDE 20 MG: 20 TABLET ORAL at 10:04

## 2017-04-07 RX ADMIN — FLUTICASONE FUROATE AND VILANTEROL TRIFENATATE 1 PUFF: 100; 25 POWDER RESPIRATORY (INHALATION) at 10:04

## 2017-04-07 RX ADMIN — PREDNISONE 10 MG: 10 TABLET ORAL at 05:04

## 2017-04-07 RX ADMIN — CITALOPRAM HYDROBROMIDE 20 MG: 20 TABLET ORAL at 10:04

## 2017-04-07 RX ADMIN — LISINOPRIL 2.5 MG: 2.5 TABLET ORAL at 10:04

## 2017-04-07 RX ADMIN — STANDARDIZED SENNA CONCENTRATE AND DOCUSATE SODIUM 1 TABLET: 8.6; 5 TABLET, FILM COATED ORAL at 08:04

## 2017-04-07 RX ADMIN — ACETAMINOPHEN 650 MG: 325 TABLET ORAL at 03:04

## 2017-04-07 RX ADMIN — STANDARDIZED SENNA CONCENTRATE AND DOCUSATE SODIUM 1 TABLET: 8.6; 5 TABLET, FILM COATED ORAL at 10:04

## 2017-04-07 RX ADMIN — ASPIRIN 81 MG: 81 TABLET, COATED ORAL at 10:04

## 2017-04-07 RX ADMIN — CYANOCOBALAMIN TAB 250 MCG 250 MCG: 250 TAB at 10:04

## 2017-04-07 RX ADMIN — OXYCODONE HYDROCHLORIDE AND ACETAMINOPHEN 500 MG: 500 TABLET ORAL at 10:04

## 2017-04-07 RX ADMIN — LEVALBUTEROL 0.31 MG: 0.63 SOLUTION RESPIRATORY (INHALATION) at 12:04

## 2017-04-07 RX ADMIN — THERA TABS 1 TABLET: TAB at 10:04

## 2017-04-07 RX ADMIN — GUAIFENESIN 1200 MG: 600 TABLET, EXTENDED RELEASE ORAL at 10:04

## 2017-04-07 RX ADMIN — METOPROLOL SUCCINATE 50 MG: 50 TABLET, EXTENDED RELEASE ORAL at 10:04

## 2017-04-07 RX ADMIN — APIXABAN 2.5 MG: 2.5 TABLET, FILM COATED ORAL at 10:04

## 2017-04-07 RX ADMIN — OXYCODONE HYDROCHLORIDE AND ACETAMINOPHEN 500 MG: 500 TABLET ORAL at 08:04

## 2017-04-07 RX ADMIN — POTASSIUM CHLORIDE 10 MEQ: 750 CAPSULE, EXTENDED RELEASE ORAL at 10:04

## 2017-04-07 RX ADMIN — TIOTROPIUM BROMIDE 18 MCG: 18 CAPSULE ORAL; RESPIRATORY (INHALATION) at 10:04

## 2017-04-07 RX ADMIN — PREDNISONE 10 MG: 10 TABLET ORAL at 02:04

## 2017-04-07 RX ADMIN — APIXABAN 2.5 MG: 2.5 TABLET, FILM COATED ORAL at 08:04

## 2017-04-07 RX ADMIN — ACETAMINOPHEN 650 MG: 325 TABLET ORAL at 05:04

## 2017-04-07 RX ADMIN — LEVALBUTEROL: 0.63 SOLUTION RESPIRATORY (INHALATION) at 11:04

## 2017-04-07 RX ADMIN — LEVALBUTEROL 0.31 MG: 0.63 SOLUTION RESPIRATORY (INHALATION) at 06:04

## 2017-04-07 RX ADMIN — GUAIFENESIN 1200 MG: 600 TABLET, EXTENDED RELEASE ORAL at 08:04

## 2017-04-07 RX ADMIN — LEVALBUTEROL: 0.63 SOLUTION RESPIRATORY (INHALATION) at 06:04

## 2017-04-07 RX ADMIN — PREDNISONE 10 MG: 10 TABLET ORAL at 10:04

## 2017-04-07 NOTE — PT/OT/SLP PROGRESS
"Physical Therapy  Treatment    Kaity Rebolledo   MRN: [id number]   Admitting Diagnosis: Debility       Total Time (min): 47       Billable Minutes:  Gait Training 22, Therapeutic Activity 10 and Therapeutic Exercise 15    Treatment Type: Treatment  PT/PTA: PTA     PTA Visit Number: 1       General Precautions: Standard, fall, respiratory, aspiration  Orthopedic Precautions: N/A   Braces: N/A    Do you have any cultural, spiritual, Christianity conflicts, given your current situation?: Protestant    Subjective:  Communicated with tasha Lopez prior to session.  Pt agreeable to therapy. "I want to be independent."    Pain Ratin/10                   Objective:   Patient found with: oxygen in therapy room sitting in w/c.       Functional Status:  MDS G  Bed Mobility Functional Status: CGA-Min (A)  Transfer Functional Status: CGA-Min (A)  Walk in Corridor Functional Status: CGA-Min (A)          Bed Mobility:  Sit>Supine: Not performed 2/2 UIC  Supine>Sit:  Not performed 2/2 UIC    Transfers:  Sit<>Stand: x 4 from WC and BSC  Stand Pivot Transfer: w/c <> bsc   CGA/SBA    Gait:  Amb ~125 feet with CGA/SBA, wc follow, O2 in tow    Wheelchair Mobility:  Patient propels w/c 54 feet using BUE SBA    Therex:  Pt performed BLE therex 2 x 20 in w/c    AP   LAQ   HF    Additional Treatment:  Pt with transfer to bedside commode for hygiene tasks. Pt was able to perform hygiene tasks independently. SBA/CGA    Patient left up in chair with call button in reach and nsg notified.    Assessment:  Kaity Rebolledo is a 83 y.o. female with a medical diagnosis of Debility.  Pt kamlesh tx well and is very motivated for therapy. Pt ambulated well with no loss of balance. The patient needed to use the bathroom at the end of therapy, after hygiene tasks were performed, the patient was left sitting up in her wheelchair with the tray table in front of her and bed behind her and connected to O2 in the room. Pt was educated to call nsg to get back into bed " and verbalized understanding. The patient would continue to benefit from skilled PT to address the deficits in her plan of care.     Rehab identified problem list/impairments: weakness, impaired endurance, impaired self care skills, impaired functional mobilty, gait instability, impaired balance, decreased upper extremity function, decreased lower extremity function, pain    Rehab potential is good.    Activity tolerance: Good    Discharge recommendations:  (return to assisted living)     Barriers to discharge: None    Equipment recommendations: none     GOALS:   Physical Therapy Goals        Problem: Physical Therapy Goal    Goal Priority Disciplines Outcome Goal Variances Interventions   Physical Therapy Goal     PT/OT, PT Ongoing (interventions implemented as appropriate)     Description:  Goals to be met by:10 days ()    Patient will increase functional independence with mobility by performin. Supine to sit with supervision.  2. Sit to supine with supervision.  3. Sit to stand transfer with Supervision  4. Bed to chair transfer with Supervision using Rolling Walker  5. Gait  x 150 feet with Supervision using Rolling Walker.   6. Wheelchair propulsion x 150 feet with Supervision using bilateral upper extremities  7. Ascend/Descend 5 inch curb step with Supervision using Rolling Walker.  8. Stand for 3 minutes with Supervision using Rolling Walker  9. Lower extremity exercise program x 20 reps per handout, with supervision                PLAN:    Patient to be seen 6 x/week  to address the above listed problems via gait training, therapeutic activities, therapeutic exercises  Plan of Care expires: 17  Plan of Care reviewed with: patient    Zhao Ackerman, PTA  2017

## 2017-04-07 NOTE — PROGRESS NOTES
Progress Note  Skilled Nursing Unit    Admit Date: 4/5/2017  Anticipated Discharge Date:  4/17/2017    SUBJECTIVE:     Follow-up for  Debility    HPI/Interval history: Patient seen at bedside, she currently reports a cough but no chest pain or SOB at this time.      Pain Scale: denies pain currently    Review of Systems:  Constitutional: no fever or chills  Respiratory: positive for cough, negative for SOB  Cardiovascular: no chest pain or palpitations  Gastrointestinal: no nausea or vomiting, no abdominal pain or change in bowel habits  Genitourinary: no hematuria or dysuria  Integument/Breast: no rash or pruritis  Musculoskeletal: positive for muscle weakness    OBJECTIVE:       Vital Signs Range (Last 24H):  Temp:  [98.8 °F (37.1 °C)]   Pulse:  [80-99]   Resp:  [16-20]   BP: (129-135)/(58-84)   SpO2:  [94 %-100 %]     I & O (Last 24H):    Intake/Output Summary (Last 24 hours) at 04/07/17 1127  Last data filed at 04/07/17 0549   Gross per 24 hour   Intake              225 ml   Output                0 ml   Net              225 ml       Physical Exam:  General: well developed, well nourished, appears stated age, no distress, kyphosis, frail elderly female  Lungs:  normal respiratory effort, diminished breath sounds bibasilar and chronic supplemental oxygen  Cardiovascular: Heart: regular rate and rhythm, S1, S2 normal, no murmur, click, rub or gallop. Chest Wall: no tenderness. Extremities: no cyanosis or edema, or clubbing. Pulses: 2+ and symmetric.  Abdomen/Rectal: Abdomen: soft, non-tender non-distented; bowel sounds normal; no masses,  no organomegaly. Rectal: not examined  Skin: Skin color, texture, turgor normal. No rashes or lesions  Musculoskeletal:no clubbing, cyanosis  Psych/Behavioral:  Alert and oriented, appropriate affect.    Diagnostic Results:    Recent Labs  Lab 04/04/17  0633 04/05/17  1023 04/06/17  0410   WBC 10.80 16.77* 9.82   HGB 8.0* 8.3* 8.1*   HCT 25.5* 25.8* 26.7*    266 284         Recent Labs  Lab 04/04/17  0633 04/05/17  1023 04/06/17  0410   * 137 138   K 3.6 3.1* 3.6   CL 89* 90* 92*   CO2 38* 39* 39*   BUN 15 13 13   CREATININE 0.8 0.9 0.8   GLU 87 109 73   CALCIUM 8.6* 9.2 8.8   MG  --   --  1.9   PHOS  --   --  3.8        No results for input(s): INR, APTT in the last 168 hours.    Invalid input(s): PT   Microbiology Results (last 7 days)     ** No results found for the last 168 hours. **           Lab Results   Component Value Date    HGBA1C 5.3 03/28/2017     No results found for: POCTGLUCOSE    Imaging Results     None          Current Facility-Administered Medications   Medication    acetaminophen tablet 650 mg    albuterol inhaler 2 puff    apixaban tablet 2.5 mg    ascorbic acid (vitamin C) tablet 500 mg    aspirin EC tablet 81 mg    citalopram tablet 20 mg    cyanocobalamin tablet 250 mcg    cyproheptadine 4 mg tablet 4 mg    fluticasone-vilanterol 100-25 mcg/dose diskus inhaler 1 puff    furosemide tablet 20 mg    guaifenesin 12 hr tablet 1,200 mg    levalbuterol nebulizer solution 0.3108 mg    lisinopril tablet 2.5 mg    metoprolol succinate (TOPROL-XL) 24 hr tablet 50 mg    multivitamin tablet 1 tablet    potassium chloride CR capsule 10 mEq    predniSONE tablet 10 mg    Followed by    [START ON 4/9/2017] predniSONE tablet 10 mg    Followed by    [START ON 4/12/2017] predniSONE tablet 10 mg    senna-docusate 8.6-50 mg per tablet 1 tablet    tiotropium inhalation capsule 18 mcg         ASSESSMENT/PLAN:     Active Hospital Problems    Diagnosis  POA    *Debility [R53.81]  Continue PT/OT to restore functional goals.  She reports she recently lost her  and is currently living in an assisted living facility ([facility]) and plans to return to [facility] upon discharge.  Yes    Left leg pain [M79.605]  No reports of leg pain today.  She does admit to muscle aches which is relieved with Tylenol.  Yes    Anticoagulant long-term use  "[Z79.01]  Continue Apixaban as ordered.  Not Applicable    Acute on chronic respiratory failure with hypoxia and hypercapnia [J96.21, J96.22]  Continue scheduled Bero, Xopenox, and Spiriva as ordered.  Complete steroid taper.  Continue Albuterol inh PRN.  Has supplemental oxygen at 3 liters sats are %.  She reports she has oxygen at home.   Per MD note, continue CPAP at night.  Yes    Paroxysmal atrial fibrillation [I48.0]  HR is controlled with b-blocker as ordered, continue to monitor.  Yes    Anxiety and depression [F41.9, F32.9]  Mood is calm and cooperative.  She is grieving over the loss of her .  Continue celexa 20 mg daily.  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  She has a cough which she states she is coughing up "green" mucus.  She has no fever.  Continue Mucinex and acapella as ordered.  Per chart review she had her pneumonia vaccine in 2/2016 and flu in 10/2016.  Yes    Hypokalemia [E87.6]  Repeat lab on Monday, continue potassium supplement as ordered by MD.  Recent K level was 3.6   Yes    Chronic diastolic heart failure [I50.32]  Appears compensated.  Continue Lasix 20 mg daily, Lisinopril 2.5 mg daily and monitor daily weights.  Yes    Coronary artery disease involving native coronary artery of native heart without angina pectoris [I25.10]  No reports of chest pain.  Continue ASA 81 mg daily.  Yes      Resolved Hospital Problems    Diagnosis Date Resolved POA   No resolved problems to display.       No future appointments.    DVT Prophylaxis: Apixaban 2.5 mg bid      Carl Vang, APRN, FNP-BC    "

## 2017-04-07 NOTE — PLAN OF CARE
Problem: Physical Therapy Goal  Goal: Physical Therapy Goal  Goals to be met by:10 days ()    Patient will increase functional independence with mobility by performin. Supine to sit with supervision.  2. Sit to supine with supervision.  3. Sit to stand transfer with Supervision  4. Bed to chair transfer with Supervision using Rolling Walker  5. Gait x 150 feet with Supervision using Rolling Walker.   6. Wheelchair propulsion x 150 feet with Supervision using bilateral upper extremities  7. Ascend/Descend 5 inch curb step with Supervision using Rolling Walker.  8. Stand for 3 minutes with Supervision using Rolling Walker  9. Lower extremity exercise program x 20 reps per handout, with supervision   Outcome: Ongoing (interventions implemented as appropriate)  Goals remain appropriate. Continue with plan of care.

## 2017-04-07 NOTE — CLINICAL REVIEW
Clinical Pharmacy Chart Review Note    Admit Date: 4/5/2017   LOS: 2 days     Kaity Rebolledo is a 83 y.o. female admitted to SNF for PT/OT after hospitalization for acute on chronic respiratory failure with hypoxia and hypercapnia.    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     Review of patient's allergies indicates:   Allergen Reactions    Advair diskus  [fluticasone-salmeterol]      Other reaction(s): Nausea    Morphine Hallucinations    Pravastatin      Other reaction(s): Muscle pain     Patient Active Problem List    Diagnosis Date Noted    Debility 04/06/2017    Left leg pain 04/06/2017    Anticoagulant long-term use     COPD with respiratory failure, acute 04/01/2017    Depression with anxiety 04/01/2017    Pulmonary hypertension due to lung disease 03/30/2017    Elevated troponin 03/29/2017    Pulmonary hypertension 03/28/2017    History of atrial fibrillation 03/28/2017    SOB (shortness of breath) 03/28/2017    Acute on chronic respiratory failure with hypoxia and hypercapnia 03/28/2017    Hyponatremia 03/28/2017    Urinary retention 03/28/2017    Paroxysmal atrial fibrillation     Anxiety and depression     (HFpEF) heart failure with preserved ejection fraction 03/27/2017    COPD, very severe 03/27/2017    Hypokalemia 03/04/2017    Atrial fibrillation with RVR  03/04/2017    Low oxygen saturation 12/27/2016    Iron deficiency anemia due to chronic blood loss 10/10/2016    Pneumonia 09/02/2016    Chronic diastolic heart failure 09/02/2016    Chronic obstructive pulmonary disease 06/16/2016    Coronary artery disease involving native coronary artery without angina pectoris 06/16/2016    Greater trochanter fracture 05/15/2016    Hip fracture 05/15/2016    Closed bilateral fracture of pubic rami 03/18/2016    Osteoporosis 03/18/2016    Arthralgia of left hip 03/17/2016    Hip pain 03/17/2016    Anemia, chronic disease 03/17/2016    HLD (hyperlipidemia) 03/17/2016    Uterine prolapse 03/16/2016    Coronary artery disease involving native coronary artery of native heart without angina pectoris 03/14/2016    COPD exacerbation 03/14/2016    Macrocytic anemia 03/14/2016    Closed fracture of ramus of right pubis 02/06/2016    Chronic hypoxemic respiratory failure 02/06/2016    Anemia of chronic disease 12/16/2015    Cardiomyopathy 09/24/2015    Chronic systolic heart failure 09/10/2015    Chronic anemia 08/04/2015    Physical deconditioning 03/05/2015    Cystocele 08/06/2013    Essential hypertension 08/06/2013    OP (osteoporosis) 08/06/2013    Dyslipidemia 08/06/2013    COPD (chronic obstructive pulmonary disease) 09/11/2012       Scheduled Meds:    apixaban  2.5 mg Oral BID    ascorbic acid (vitamin C)  500 mg Oral BID    aspirin  81 mg Oral Daily    citalopram  20 mg Oral Daily    cyanocobalamin  250 mcg Oral Daily    fluticasone-vilanterol  1 puff Inhalation Daily    furosemide  20 mg Oral Daily    guaifenesin  1,200 mg Oral BID    levalbuterol  0.3108 mg Nebulization Q6H    lisinopril  2.5 mg Oral Daily    metoprolol succinate  50 mg Oral Daily    multivitamin  1 tablet Oral Daily    potassium chloride  10 mEq Oral Daily    predniSONE  10 mg Oral TID    Followed by    [START ON 4/9/2017] predniSONE  10 mg Oral BID    Followed by     [START ON 4/12/2017] predniSONE  10 mg Oral Daily    senna-docusate 8.6-50 mg  1 tablet Oral BID    tiotropium  1 capsule Inhalation Daily     Continuous Infusions:    PRN Meds: acetaminophen, albuterol, cyproheptadine    OBJECTIVE:     Vital Signs (Last 24H)  Temp:  [98.8 °F (37.1 °C)]   Pulse:  [80-99]   Resp:  [16-24]   BP: (129-135)/(58-84)   SpO2:  [94 %-100 %]     Laboratory:  CBC:   Recent Labs  Lab 04/04/17  0633 04/05/17  1023 04/06/17  0410   WBC 10.80 16.77* 9.82   RBC 2.70* 2.73* 2.72*   HGB 8.0* 8.3* 8.1*   HCT 25.5* 25.8* 26.7*    266 284   MCV 94 95 98   MCH 29.6 30.4 29.8   MCHC 31.4* 32.2 30.3*     BMP:   Recent Labs  Lab 04/04/17  0633 04/05/17  1023 04/06/17  0410   GLU 87 109 73   * 137 138   K 3.6 3.1* 3.6   CL 89* 90* 92*   CO2 38* 39* 39*   BUN 15 13 13   CREATININE 0.8 0.9 0.8   CALCIUM 8.6* 9.2 8.8   MG  --   --  1.9     CMP:   Recent Labs  Lab 04/01/17  0658 04/02/17  0636 04/04/17  0633 04/05/17  1023 04/06/17  0410   GLU 86 87 87 109 73   CALCIUM 9.1 8.9 8.6* 9.2 8.8   ALBUMIN 3.1* 3.2* 3.0*  --   --    PROT 5.9* 5.9* 5.5*  --   --     138 135* 137 138   K 3.5 3.6 3.6 3.1* 3.6   CO2 39* 40* 38* 39* 39*   CL 91* 90* 89* 90* 92*   BUN 14 12 15 13 13   CREATININE 0.8 0.8 0.8 0.9 0.8   ALKPHOS 42* 41* 39*  --   --    ALT 28 24 18  --   --    AST 29 22 15  --   --    BILITOT 0.2 0.2 0.2  --   --      LFTs:   Recent Labs  Lab 04/01/17  0658 04/02/17  0636 04/04/17  0633   ALT 28 24 18   AST 29 22 15   ALKPHOS 42* 41* 39*   BILITOT 0.2 0.2 0.2   PROT 5.9* 5.9* 5.5*   ALBUMIN 3.1* 3.2* 3.0*     Coagulation: No results for input(s): INR, APTT in the last 168 hours.    Invalid input(s): PT  Cardiac markers: No results for input(s): CKMB, TROPONINT, MYOGLOBIN in the last 168 hours.  ABGs: No results for input(s): PH, PCO2, PO2, HCO3, POCSATURATED, BE in the last 168 hours.  Microbiology Results (last 7 days)     ** No results found for the last 168 hours. **        Specimen      None        No results for input(s): COLORU, CLARITYU, SPECGRAV, PHUR, PROTEINUA, GLUCOSEU, BILIRUBINCON, BLOODU, WBCU, RBCU, BACTERIA, MUCUS, NITRITE, LEUKOCYTESUR, UROBILINOGEN, HYALINECASTS in the last 168 hours.  Others: No results for input(s): [id number], TSH, T4FREE, LABLIPI in the last 168 hours.    Invalid input(s): A1C    ASSESSMENT/PLAN:      Debility  --PT/OT  --bowel regimen for constipation; hold for loose or frequent stools  --DVT PPX: apixaban 2.5 mg BID     Left leg pain   --acetaminophen 650 mg q6h prn mild pain     Acute on chronic respiratory failure with hypoxia and hypercapnia/COPD, very severe   --breo 100-25 mcg/dose daily, tiotropium 18 mcg daily  --levalbuterol 0.3108 mg q6h, albuterol 2 puffs q6h prn wheezing/sob   --completed 5 days of moxifloxacin   --prednisone tapering dose  --guaifenesin XR 1200 mg BID   --CPAP nightly   SpO2 97% on 3L NC, RR 24, CO2=39    Paroxysmal atrial fibrillation/Anticoagulant long-term use  --toprol XL 50 mg daily  --apixaban 2.5 mg BID (84 y/o, 51.9 kg, SCr 0.8)  Monitor: HR 80-99, CBC     Anxiety and depression  --citalopram 20 mg daily  Monitor: mental status for depression, suicide ideation, anxiety, serotonin syndrome, hyponatremia     Hypokalemia   --potassium chloride 10 mEq daily; K+ 3.6     Chronic diastolic heart failure   --furosemide 20 mg daily   --toprol XL 50 mg daily, lisinopril 2.5 mg daily  Monitor weight, volume status, electrolytes, renal function, BP: (129-135)/(58-84), HR 80-99, EF 55%      Coronary artery disease involving native coronary artery of native heart without angina pectoris   --ASA 81 mg daily  --toprol XL 50 mg daily  --Patient not currently on lipid lowering therapy; statin previously discontinued due to myopathy   Lab Results   Component Value Date    CHOL 223 (H) 03/28/2017    CHOL 245 (H) 05/15/2016    CHOL 151 02/28/2015     Lab Results   Component Value Date    HDL 82 (H) 03/28/2017    HDL 68 05/15/2016     HDL 62 02/28/2015     Lab Results   Component Value Date    LDLCALC 132.0 03/28/2017    LDLCALC 153.6 05/15/2016    LDLCALC 78.0 02/28/2015     Lab Results   Component Value Date    TRIG 45 03/28/2017    TRIG 117 05/15/2016    TRIG 55 02/28/2015     Lab Results   Component Value Date    CHOLHDL 36.8 03/28/2017    CHOLHDL 27.8 05/15/2016    CHOLHDL 41.1 02/28/2015         --nutritional supplementation: ascorbic acid 500 mg BID, MVI daily, cyanocobalamin 250 mcg daily    Monitor BMP/CBC/Vitals

## 2017-04-07 NOTE — PLAN OF CARE
Problem: Occupational Therapy Goal  Goal: Occupational Therapy Goal  Goals to be met by: 2 weeks     Patient will increase functional independence with ADLs by performing:    Feeding with Set-up Assistance.  UE Dressing with Set-up Assistance.  LE Dressing with Minimal Assistance.  Grooming while seated with Set-up Assistance.  Toileting from bedside commode with SBA for hygiene and clothing management.   Bathing from shower chair/bench with Minimal Assistance.  Supine to sit with Modified [place].  Stand pivot transfers with Supervision.  Toilet transfer to bedside commode with Supervision.  Pt. To demonstrate understanding of energy conservation techniques with ADL task performance.     Outcome: Ongoing (interventions implemented as appropriate)  Goals remain appropriate.

## 2017-04-07 NOTE — PT/OT/SLP PROGRESS
"Occupational Therapy  Treatment    Kaity Rebolledo   MRN: [id number]   Admitting Diagnosis: Debility    OT Date of Treatment: 17  Total Time (min): 53 min    Billable Minutes:  Self Care/Home Management 53    General Precautions: Standard, fall, respiratory, aspiration (cardiac/ partial code)  Orthopedic Precautions: N/A  Braces: N/A    Do you have any cultural, spiritual, Zoroastrian conflicts, given your current situation?: Mu-ism    Subjective:  "My left knee hurts when I sit."    Pain Ratin/10  Location - Side: Left     Location: knee  Pain Addressed: Reposition, Distraction  Pain Rating Post-Intervention: 5/10    Objective:  Patient found with: oxygen    Functional Status:  MDS G  Bed Mobility Functional Status: Min (A) for sup to sit  Transfer Functional Status: Min (A) for stand pivot w/ RW and sit to stand from EOB and BSC  Dressing Functional Status: UBD - Min (A), LBD - mod(A)  Eating Functional Status: Set up   Toilet Use Functional Status: Min (A) on BSC  Personal Hygiene Functional Status: Set up seated in WC  Bathing Functional Status: mod(A) seated EOB    Patient left up in chair with all lines intact and call button in reach    ASSESSMENT:  Kaity Rebolledo is a 83 y.o. female with a medical diagnosis of Debility. Pt req's frequent rest breaks during all ADL tasks.    Rehab identified problem list/impairments: impaired endurance, impaired self care skills, impaired functional mobilty, other (comment) (respiratory issues)    Rehab potential is good    Activity tolerance: Fair    Discharge recommendations:  (care home)     Barriers to discharge: None     Equipment recommendations: none     GOALS:   Occupational Therapy Goals        Problem: Occupational Therapy Goal    Goal Priority Disciplines Outcome Interventions   Occupational Therapy Goal     OT, PT/OT Ongoing (interventions implemented as appropriate)    Description:  Goals to be met by: 2 weeks     Patient will increase functional independence " with ADLs by performing:    Feeding with Set-up Assistance.  UE Dressing with Set-up Assistance.  LE Dressing with Minimal Assistance.  Grooming while seated with Set-up Assistance.  Toileting from bedside commode with SBA for hygiene and clothing management.   Bathing from  shower chair/bench with Minimal Assistance.  Supine to sit with Modified [place].  Stand pivot transfers with Supervision.  Toilet transfer to bedside commode with Supervision.  Pt. To demonstrate understanding of energy conservation techniques with ADL task performance.              Plan:  Patient to be seen 5 x/week to address the above listed problems via self-care/home management, therapeutic activities, therapeutic exercises  Plan of Care expires: 05/06/17  Plan of Care reviewed with: patient    NEY Villatoro  04/07/2017

## 2017-04-07 NOTE — PLAN OF CARE
Problem: Patient Care Overview  Goal: Plan of Care Review  Outcome: Ongoing (interventions implemented as appropriate)    04/07/17 0259   Coping/Psychosocial   Plan Of Care Reviewed With patient         Problem: Chronic Obstructive Pulmonary Disease (Adult)  Intervention: Prevent/Manage DVT/VTE Risk    04/07/17 0259   OTHER   VTE Required Core Measure Pharmacological prophylaxis initiated/maintained   Minimize Embolism Risk   VTE Prevention/Management bleeding precautions maintained;bleeding risk assessed;bleeding risk factor(s) identified, provider notified;ROM (active) performed;fluids promoted       Intervention: Optimize Functional Ability/Increase Activity Tolerance    04/07/17 0259   Activity   Activity Type bedrest with commode;dorsiflexion, plantar flexion encouraged;ROM, active encouraged;stand at bedside;up in chair;sitting, edge of bed       Intervention: Optimize Oxygenation/Ventilation/Perfusion    04/07/17 0259   Respiratory Interventions   Airway/Ventilation Management airway patency maintained   Breathing Techniques/Airway Clearance deep/controlled cough encouraged   Positioning   Head of Bed (HOB) HOB at 30-45 degrees         Goal: Signs and Symptoms of Listed Potential Problems Will be Absent, Minimized or Managed (Chronic Obstructive Pulmonary Disease)  Signs and symptoms of listed potential problems will be absent, minimized or managed by discharge/transition of care (reference Chronic Obstructive Pulmonary Disease (Adult) CPG).  Outcome: Ongoing (interventions implemented as appropriate)    04/07/17 0259   Chronic Obstructive Pulmonary Disease   Problems Assessed (Chronic Obstructive Pulmonary Disease (COPD)) all   Problems Present (Chronic Obstructive Pulmonary Disease (COPD)) functional deficit;respiratory compromise;situational response

## 2017-04-07 NOTE — PROGRESS NOTES
Discharge Planning Assessment    Payor: [employer] MEDICARE / Plan: [employer] MEDICARE HMO / Product Type: Capitation /      Expected length of stay:  [] 7 days   [] 10 days  [x] 14 days   [] 21 days   [] > 30 days    Communicated expected length of stay with patient/caregiver:  [x] Yes   [] No    Anticipated discharge date:  4/17/2017    Assessment information obtained from:  [x] Patient   [] Caregiver     Patient has:  [x] POA   [] Conservator    Arrived from:   [] Home   [x] Assisted Living    [] Nursing Home   [] SNF   [] Rehab  [] LTACH   [] Group Home   [] Foster Care   [] Psych   [] Shelter   [] Homeless   [] Transfer  [] Correctional Facility  [] Name of Facility:      Patient currently lives with:    [x] Alone   [] Spouse   [] Daughter   [] Son   [] Grandparents   []  Parents   [] Siblings   [] Friends   [] Domestic Partner   [] Facility Resident      [] Foster Home    [] Other:       Extended Emergency Contact Information  Primary Emergency Contact: Maryjane Chan   [facility]  Home Phone: [phone]  Mobile Phone: [phone]  Relation: Daughter  Secondary Emergency Contact: Sheela Michele   [facility]  Home Phone: [phone]  Mobile Phone: [phone]  Relation: Daughter     Prior to hospitalization cognitive status:   [x] Alert/Oriented  [] No Deficits [] Risk of Harm to Self/Others   [] Not Oriented to Person   [] Not Oriented to Place   [] Not Oriented to Time   [] Coma/Sedated/Intubated  [] Judgement Impaired    []  Unable to Assess   [] Inappropriate Behavior  [] Infant/Toddler    Prior to hospitalization functional status:   [] Independent   [x] Assistive Equipment   [x] Assistive Person    [] Completely Dependent  [] Infant/Toddler/Child Appropriate   [] Infant/Toddler/Child Delayed    []  Adolescent     Current cognitive status:   [x] Alert/Oriented  [] No Deficits [] Risk of Harm to Self/Others   [] Not Oriented to Person   [] Not Oriented to Place   []  Not Oriented to Time   [] Coma/Sedated/Intubated  [] Judgement Impaired    []  Unable to Assess   [] Inappropriate Behavior  [] Infant/Toddler    Current functional status:     [] Independent   [x] Assistive Equipment   [x] Assistive Person     [] Completely Dependent   [] Infant/Toddler/Child Appropriate   [] Infant/Toddler/Child Delayed     [] Adolescent     Capacity to Care for Self:   Is patient able to return to prior living arrangements after discharge: [x] Yes  [] No     Is patient able to care for self after discharge?   [] Yes   [x] No     [] Pediatric     Does the patient have family/friends to assist after discharge?:  [x] Yes   [] No    [] N/A   Comments:  Daughter      Patient/caregiver perception of discharge disposition:   [x] Home   [] Home with Family  [x] Home Health   [] SNF   [] Rehab   [] LTAC    []  New Nursing Home Placement  [] Return to Nursing Home    [] Shelter     [] Assisted Living  [] Foster Home   [] Other:      Readmit:   Has patient connor in the hospital in the last 30 days? [] Yes   [x] No     If YES, was patient admitted for the same reason?  [] Yes   [] No       Home Health:   Patient currently receives home health services?:   [] Yes   [x] No     Patient previously received home health services and would like to resume services if necessary   [] Yes   [x] No   DME:   Patient currently uses DME:   [x] Yes   [] No        If YES, name of DME provider: Nini for oxygen       List of equipment currently used:     [x] Wheelchair   [] Standard Walker  [x] Rolling Walker  []  Rollator    [x] Oxygen    [] Portable oxygen   [] Nebulizer    [] Apnea Monitor    [] Crutches  [] Hospital Bed   []  Lift Device   [] Scooter [] Cane     [] Prosthesis   [x]  BSC   [] Tub Bench   [] Catheter Supplies    [] Ostomy Supplies   [] Trach Supplies     [] Suction Machine        [] Home Vent    [] Bipap   [x] Other: shower chair         Medications:    Can the patient afford all prescribed medications?  [x]  Yes   [] No     If NO, what medication:       Is the patient taking medications as prescribed?    [x] Yes   [] No    Financial Concerns:   Does the patient have any financial concerns?   [] Yes   [x] No      If YES, what are the concerns:      Transportation:   Does the patient have transportation to healthcare appointments?   [x] Yes   [] No     If YES, what means of transportation does the patient have?   [] Car   [x] Family/Friend  [] Bicycle   [] Motorcycle   [] Public Transportation [] Ambulance[] Wheelchair van   [] Name of Provider    Dialysis:   Does the patient currently receive dialysis?   [] Yes   [x] No      If YES, what is the name of the provider:        Samuel Soriano MD   [address]  [address]  [facility] [address]  [phone]         APS/CPS involved in the case:  [] Yes   [x] No   If YES, name of :     If YES, phone number of :        Discharge Plan A:  [x] Home   [] Home with Family  [x] Home Health   [] SNF   [] Rehab   [] LTAC   []  New Nursing Home Placement  [] Return to Nursing Home    [] Assisted Living    [] Shelter     [] Private Duty Nursing   [] Foster Home    [] Psych    [] Early [facility]  [] WIC       [] Home Hospice   [] Inpatient Hospice   [] Other    Discharge Plan B:  [] Home   [] Home with Family  [] Home Health   [] SNF   [] Rehab   [] LTAC  []  New Nursing Home Placement   [] Return to  Nursing Home    [] Assisted Living   [] Shelter  [] Private Duty Nursing   [] Foster Home     [] Psych     [] Early Steps  [] WIC    [] Home Hospice     [] Inpatient Hospice   [] Other     [x] Patient and family in agreement with discharge plan.    Rounded with NP Carl and pharmacist Lexii. Patient AAO, sitting up in wheelchair in room. Patient on O2 per NC. Patient states has home O2. Discharge plan is to return home ([facility]) alone. Patient stated assist of daughters if needed. Informed patient therapy recommends a 2 week SNF stay and that  discharge would be 4/17/2017. Patient stated understanding to discharge date. CM and SW will continue to follow for any additional needs.    Vivian Doran RN, CM Skilled  [id number]

## 2017-04-08 LAB
CK MB SERPL-MCNC: 1.8 NG/ML
CK MB SERPL-RTO: 6.9 %
CK SERPL-CCNC: 26 U/L
CK SERPL-CCNC: 26 U/L
TROPONIN I SERPL DL<=0.01 NG/ML-MCNC: 0.02 NG/ML

## 2017-04-08 PROCEDURE — 36415 COLL VENOUS BLD VENIPUNCTURE: CPT

## 2017-04-08 PROCEDURE — 94640 AIRWAY INHALATION TREATMENT: CPT

## 2017-04-08 PROCEDURE — 94761 N-INVAS EAR/PLS OXIMETRY MLT: CPT

## 2017-04-08 PROCEDURE — 63600175 PHARM REV CODE 636 W HCPCS: Performed by: INTERNAL MEDICINE

## 2017-04-08 PROCEDURE — 11000004 HC SNF PRIVATE

## 2017-04-08 PROCEDURE — 27000221 HC OXYGEN, UP TO 24 HOURS

## 2017-04-08 PROCEDURE — 94664 DEMO&/EVAL PT USE INHALER: CPT

## 2017-04-08 PROCEDURE — 25000003 PHARM REV CODE 250: Performed by: INTERNAL MEDICINE

## 2017-04-08 PROCEDURE — 25000003 PHARM REV CODE 250: Performed by: NURSE PRACTITIONER

## 2017-04-08 PROCEDURE — 82553 CREATINE MB FRACTION: CPT

## 2017-04-08 PROCEDURE — 25000003 PHARM REV CODE 250: Performed by: HOSPITALIST

## 2017-04-08 PROCEDURE — 97110 THERAPEUTIC EXERCISES: CPT

## 2017-04-08 PROCEDURE — 84484 ASSAY OF TROPONIN QUANT: CPT

## 2017-04-08 PROCEDURE — 97116 GAIT TRAINING THERAPY: CPT

## 2017-04-08 PROCEDURE — 93010 ELECTROCARDIOGRAM REPORT: CPT | Mod: ,,, | Performed by: INTERNAL MEDICINE

## 2017-04-08 RX ORDER — LEVALBUTEROL INHALATION SOLUTION 0.63 MG/3ML
0.63 SOLUTION RESPIRATORY (INHALATION) EVERY 6 HOURS
Status: DISCONTINUED | OUTPATIENT
Start: 2017-04-08 | End: 2017-04-14 | Stop reason: HOSPADM

## 2017-04-08 RX ORDER — LEVALBUTEROL INHALATION SOLUTION 0.63 MG/3ML
0.63 SOLUTION RESPIRATORY (INHALATION) EVERY 6 HOURS
Status: DISCONTINUED | OUTPATIENT
Start: 2017-04-08 | End: 2017-04-08

## 2017-04-08 RX ORDER — PANTOPRAZOLE SODIUM 40 MG/1
40 TABLET, DELAYED RELEASE ORAL DAILY
Status: DISCONTINUED | OUTPATIENT
Start: 2017-04-08 | End: 2017-04-14 | Stop reason: HOSPADM

## 2017-04-08 RX ADMIN — LISINOPRIL 2.5 MG: 2.5 TABLET ORAL at 10:04

## 2017-04-08 RX ADMIN — THERA TABS 1 TABLET: TAB at 10:04

## 2017-04-08 RX ADMIN — FLUTICASONE FUROATE AND VILANTEROL TRIFENATATE 1 PUFF: 100; 25 POWDER RESPIRATORY (INHALATION) at 09:04

## 2017-04-08 RX ADMIN — POTASSIUM CHLORIDE 10 MEQ: 750 CAPSULE, EXTENDED RELEASE ORAL at 10:04

## 2017-04-08 RX ADMIN — CYANOCOBALAMIN TAB 250 MCG 250 MCG: 250 TAB at 10:04

## 2017-04-08 RX ADMIN — APIXABAN 2.5 MG: 2.5 TABLET, FILM COATED ORAL at 09:04

## 2017-04-08 RX ADMIN — CITALOPRAM HYDROBROMIDE 20 MG: 20 TABLET ORAL at 10:04

## 2017-04-08 RX ADMIN — STANDARDIZED SENNA CONCENTRATE AND DOCUSATE SODIUM 1 TABLET: 8.6; 5 TABLET, FILM COATED ORAL at 09:04

## 2017-04-08 RX ADMIN — LEVALBUTEROL 0.63 MG: 0.63 SOLUTION RESPIRATORY (INHALATION) at 02:04

## 2017-04-08 RX ADMIN — OXYCODONE HYDROCHLORIDE AND ACETAMINOPHEN 500 MG: 500 TABLET ORAL at 09:04

## 2017-04-08 RX ADMIN — ACETAMINOPHEN 650 MG: 325 TABLET ORAL at 02:04

## 2017-04-08 RX ADMIN — LEVALBUTEROL 0.63 MG: 0.63 SOLUTION RESPIRATORY (INHALATION) at 11:04

## 2017-04-08 RX ADMIN — GUAIFENESIN 1200 MG: 600 TABLET, EXTENDED RELEASE ORAL at 10:04

## 2017-04-08 RX ADMIN — FUROSEMIDE 20 MG: 20 TABLET ORAL at 10:04

## 2017-04-08 RX ADMIN — APIXABAN 2.5 MG: 2.5 TABLET, FILM COATED ORAL at 10:04

## 2017-04-08 RX ADMIN — PREDNISONE 10 MG: 10 TABLET ORAL at 09:04

## 2017-04-08 RX ADMIN — OXYCODONE HYDROCHLORIDE AND ACETAMINOPHEN 500 MG: 500 TABLET ORAL at 10:04

## 2017-04-08 RX ADMIN — TIOTROPIUM BROMIDE 18 MCG: 18 CAPSULE ORAL; RESPIRATORY (INHALATION) at 09:04

## 2017-04-08 RX ADMIN — PREDNISONE 10 MG: 10 TABLET ORAL at 02:04

## 2017-04-08 RX ADMIN — PANTOPRAZOLE SODIUM 40 MG: 40 TABLET, DELAYED RELEASE ORAL at 02:04

## 2017-04-08 RX ADMIN — GUAIFENESIN 1200 MG: 600 TABLET, EXTENDED RELEASE ORAL at 09:04

## 2017-04-08 RX ADMIN — LEVALBUTEROL 0.63 MG: 0.63 SOLUTION RESPIRATORY (INHALATION) at 07:04

## 2017-04-08 RX ADMIN — PREDNISONE 10 MG: 10 TABLET ORAL at 05:04

## 2017-04-08 RX ADMIN — LEVALBUTEROL: 0.63 SOLUTION RESPIRATORY (INHALATION) at 07:04

## 2017-04-08 RX ADMIN — STANDARDIZED SENNA CONCENTRATE AND DOCUSATE SODIUM 1 TABLET: 8.6; 5 TABLET, FILM COATED ORAL at 10:04

## 2017-04-08 RX ADMIN — METOPROLOL SUCCINATE 50 MG: 50 TABLET, EXTENDED RELEASE ORAL at 10:04

## 2017-04-08 RX ADMIN — ASPIRIN 81 MG: 81 TABLET, COATED ORAL at 10:04

## 2017-04-08 NOTE — PLAN OF CARE
Problem: Patient Care Overview  Goal: Plan of Care Review  Outcome: Ongoing (interventions implemented as appropriate)    04/08/17 0323   Coping/Psychosocial   Plan Of Care Reviewed With patient         Problem: Chronic Obstructive Pulmonary Disease (Adult)  Intervention: Prevent/Manage DVT/VTE Risk    04/08/17 0323   OTHER   VTE Required Core Measure Pharmacological prophylaxis initiated/maintained   Minimize Embolism Risk   VTE Prevention/Management bleeding precautions maintained;bleeding risk assessed;bleeding risk factor(s) identified, provider notified;fluids promoted;dorsiflexion/plantar flexion performed;ROM (active) performed       Intervention: Optimize Functional Ability/Increase Activity Tolerance    04/08/17 0323   Activity   Activity Type activity adjusted per tolerance;bedrest with commode;dorsiflexion, plantar flexion encouraged;ROM, active encouraged;sitting, edge of bed;up in chair         Goal: Signs and Symptoms of Listed Potential Problems Will be Absent, Minimized or Managed (Chronic Obstructive Pulmonary Disease)  Signs and symptoms of listed potential problems will be absent, minimized or managed by discharge/transition of care (reference Chronic Obstructive Pulmonary Disease (Adult) CPG).   Outcome: Ongoing (interventions implemented as appropriate)    04/08/17 0323   Chronic Obstructive Pulmonary Disease   Problems Assessed (Chronic Obstructive Pulmonary Disease (COPD)) all   Problems Present (Chronic Obstructive Pulmonary Disease (COPD)) respiratory compromise;situational response

## 2017-04-08 NOTE — PT/OT/SLP PROGRESS
"Physical Therapy  Treatment    Kaity Rebolledo   MRN: [id number]   Admitting Diagnosis: Debility       Total Time (min): 45       Billable Minutes:  Gait [id number] and Therapeutic Exercise 20    Treatment Type: Treatment  PT/PTA: PTA     PTA Visit Number: 2       General Precautions: Standard, fall, respiratory, aspiration  Orthopedic Precautions: N/A   Braces: N/A    Do you have any cultural, spiritual, Evangelical conflicts, given your current situation?: Sabianist    Subjective:  Pt agreeable to physical therapy.     Pain Ratin/10    Objective:  Patient found with: oxygen       Functional Status:  MDS G  Transfer Functional Status: S-SBA  Locomotion on Unit Functional Status: CGA-Min (A)    Bed Mobility:  Sit>Supine: Not performed 2/2 UIC  Supine>Sit: Not performed 2/2 UIC    Transfers:  Sit<>Stand: CGA x 1, Pt with extreme fwd flexion during sit > stand    Gait:  Amb 345 feet with RW, CGA, decreased mari, 1 standing rest break after ~150 feet for one minute in corridors on level tile with wheelchair follow and O2 in tow.     Therex:  LBE x 12 minutes   Pt performs B LE therex 2 x 30 ea    Ankle Pumps   LAQ with vcs for TKE with LLE. LLE fatigues quicker than the RLE.    Hip Flexion    Hip abd with blue TB   Hip add with ball squeeze       Balance:  Dynamic balance in standing for blowing nose without UE support, CGA x 30"    Patient left up in chair with call button in reach.     Assessment:  Kaity Rebolledo is a 83 y.o. female with a medical diagnosis of Debility.  Pt showed increased endurance with gait as opposed to yesterday's session. The patient kamlesh all tx well with no complaint of pain. The patient would continue to benefit from skilled PT to address the deficits in her plan of care.     Rehab identified problem list/impairments: weakness, impaired endurance, impaired self care skills, impaired functional mobilty, gait instability, impaired balance, decreased upper extremity function, decreased lower " extremity function, pain    Rehab potential is good.    Activity tolerance: Excellent    Discharge recommendations:  (return to assisted living)     Barriers to discharge: None    Equipment recommendations: none     GOALS:   Physical Therapy Goals        Problem: Physical Therapy Goal    Goal Priority Disciplines Outcome Goal Variances Interventions   Physical Therapy Goal     PT/OT, PT Ongoing (interventions implemented as appropriate)     Description:  Goals to be met by:10 days ()    Patient will increase functional independence with mobility by performin. Supine to sit with supervision.  2. Sit to supine with supervision.  3. Sit to stand transfer with Supervision  4. Bed to chair transfer with Supervision using Rolling Walker  5. Gait  x 150 feet with Supervision using Rolling Walker.   6. Wheelchair propulsion x 150 feet with Supervision using bilateral upper extremities  7. Ascend/Descend 5 inch curb step with Supervision using Rolling Walker.  8. Stand for 3 minutes with Supervision using Rolling Walker  9. Lower extremity exercise program x 20 reps per handout, with supervision                PLAN:    Patient to be seen 6 x/week  to address the above listed problems via gait training, therapeutic activities, therapeutic exercises  Plan of Care expires: 17  Plan of Care reviewed with: patient    Zhao Ackerman, PTA  2017

## 2017-04-08 NOTE — PROGRESS NOTES
"Called to room by patient's daughter for chest pain that started "all of a sudden" per patient.  Central chest, sharp with pressure.  Denies extremity or back pain.  Daughter states that patient was earlier complaining of tightness to her chest.  Blood pressure and heartrate measures obtained.  Dr. Boyer notified.     "

## 2017-04-09 PROCEDURE — 94664 DEMO&/EVAL PT USE INHALER: CPT

## 2017-04-09 PROCEDURE — 63600175 PHARM REV CODE 636 W HCPCS: Performed by: INTERNAL MEDICINE

## 2017-04-09 PROCEDURE — 27000221 HC OXYGEN, UP TO 24 HOURS

## 2017-04-09 PROCEDURE — 94640 AIRWAY INHALATION TREATMENT: CPT

## 2017-04-09 PROCEDURE — 11000004 HC SNF PRIVATE

## 2017-04-09 PROCEDURE — 25000003 PHARM REV CODE 250: Performed by: HOSPITALIST

## 2017-04-09 PROCEDURE — 25000003 PHARM REV CODE 250: Performed by: INTERNAL MEDICINE

## 2017-04-09 PROCEDURE — 94761 N-INVAS EAR/PLS OXIMETRY MLT: CPT

## 2017-04-09 PROCEDURE — 97530 THERAPEUTIC ACTIVITIES: CPT

## 2017-04-09 PROCEDURE — 97535 SELF CARE MNGMENT TRAINING: CPT

## 2017-04-09 RX ADMIN — LEVALBUTEROL 0.63 MG: 0.63 SOLUTION RESPIRATORY (INHALATION) at 07:04

## 2017-04-09 RX ADMIN — FUROSEMIDE 20 MG: 20 TABLET ORAL at 09:04

## 2017-04-09 RX ADMIN — LISINOPRIL 2.5 MG: 2.5 TABLET ORAL at 09:04

## 2017-04-09 RX ADMIN — STANDARDIZED SENNA CONCENTRATE AND DOCUSATE SODIUM 1 TABLET: 8.6; 5 TABLET, FILM COATED ORAL at 09:04

## 2017-04-09 RX ADMIN — PANTOPRAZOLE SODIUM 40 MG: 40 TABLET, DELAYED RELEASE ORAL at 09:04

## 2017-04-09 RX ADMIN — GUAIFENESIN 1200 MG: 600 TABLET, EXTENDED RELEASE ORAL at 09:04

## 2017-04-09 RX ADMIN — APIXABAN 2.5 MG: 2.5 TABLET, FILM COATED ORAL at 09:04

## 2017-04-09 RX ADMIN — TIOTROPIUM BROMIDE 18 MCG: 18 CAPSULE ORAL; RESPIRATORY (INHALATION) at 09:04

## 2017-04-09 RX ADMIN — LEVALBUTEROL 0.63 MG: 0.63 SOLUTION RESPIRATORY (INHALATION) at 12:04

## 2017-04-09 RX ADMIN — OXYCODONE HYDROCHLORIDE AND ACETAMINOPHEN 500 MG: 500 TABLET ORAL at 09:04

## 2017-04-09 RX ADMIN — PREDNISONE 10 MG: 10 TABLET ORAL at 09:04

## 2017-04-09 RX ADMIN — LEVALBUTEROL 0.63 MG: 0.63 SOLUTION RESPIRATORY (INHALATION) at 06:04

## 2017-04-09 RX ADMIN — LEVALBUTEROL 0.63 MG: 0.63 SOLUTION RESPIRATORY (INHALATION) at 11:04

## 2017-04-09 RX ADMIN — THERA TABS 1 TABLET: TAB at 09:04

## 2017-04-09 RX ADMIN — METOPROLOL SUCCINATE 50 MG: 50 TABLET, EXTENDED RELEASE ORAL at 09:04

## 2017-04-09 RX ADMIN — FLUTICASONE FUROATE AND VILANTEROL TRIFENATATE 1 PUFF: 100; 25 POWDER RESPIRATORY (INHALATION) at 09:04

## 2017-04-09 RX ADMIN — CYANOCOBALAMIN TAB 250 MCG 250 MCG: 250 TAB at 09:04

## 2017-04-09 RX ADMIN — POTASSIUM CHLORIDE 10 MEQ: 750 CAPSULE, EXTENDED RELEASE ORAL at 09:04

## 2017-04-09 RX ADMIN — ASPIRIN 81 MG: 81 TABLET, COATED ORAL at 09:04

## 2017-04-09 RX ADMIN — CITALOPRAM HYDROBROMIDE 20 MG: 20 TABLET ORAL at 09:04

## 2017-04-09 NOTE — PT/OT/SLP PROGRESS
Occupational Therapy  Treatment    Kaity Rebolledo   MRN: [id number]   Admitting Diagnosis: Debility    OT Date of Treatment: 17  Total Time (min): 45 min    Billable Minutes:  Self Care/Home Management 30 and Therapeutic Exercise 15    General Precautions: Standard, fall, respiratory, aspiration (cardiac/ partial code)  Orthopedic Precautions: N/A  Braces: N/A    Do you have any cultural, spiritual, Buddhism conflicts, given your current situation?: Sabianist    Subjective:  Pt agreeable to tx.    Pain Ratin/10   Pain Rating Post-Intervention: 0/10    Objective:  Patient found with: oxygen    Functional Status:  MDS G  Transfer Functional Status: CGA for stand pivot w/ RW, SBA for sit to stand x several trails during ADL  Dressing Functional Status: UBD - (S), LBD - mod(A)  Toilet Use Functional Status: CGA on BSC  Personal Hygiene Functional Status: Set up (A)    OT Exercises: UE Ergometer x15 min    Patient left up in chair with all lines intact and call button in reach    ASSESSMENT:  Kaity Rebolledo is a 83 y.o. female with a medical diagnosis of Debility. Pt cont to req (A) w/ ADL and rest breaks to complete tasks.    Rehab identified problem list/impairments: impaired endurance, impaired self care skills, impaired functional mobilty, other (comment) (respiratory issues)    Rehab potential is good    Activity tolerance: Fair    Discharge recommendations:  ([facility])     Barriers to discharge: None     Equipment recommendations: none     GOALS:   Occupational Therapy Goals        Problem: Occupational Therapy Goal    Goal Priority Disciplines Outcome Interventions   Occupational Therapy Goal     OT, PT/OT Ongoing (interventions implemented as appropriate)    Description:  Goals to be met by: 2 weeks     Patient will increase functional independence with ADLs by performing:    Feeding with Set-up Assistance.  UE Dressing with Set-up Assistance.  LE Dressing with Minimal Assistance.  Grooming while seated with  Set-up Assistance.  Toileting from bedside commode with SBA for hygiene and clothing management.   Bathing from  shower chair/bench with Minimal Assistance.  Supine to sit with Modified [place].  Stand pivot transfers with Supervision.  Toilet transfer to bedside commode with Supervision.  Pt. To demonstrate understanding of energy conservation techniques with ADL task performance.              Plan:  Patient to be seen 5 x/week to address the above listed problems via self-care/home management, therapeutic activities, therapeutic exercises  Plan of Care expires: 05/06/17  Plan of Care reviewed with: patient    NEY Villatoro  04/09/2017

## 2017-04-10 ENCOUNTER — OUTPATIENT CASE MANAGEMENT (OUTPATIENT)
Dept: ADMINISTRATIVE | Facility: OTHER | Age: 82
End: 2017-04-10

## 2017-04-10 LAB
ANION GAP SERPL CALC-SCNC: 7 MMOL/L
BASOPHILS # BLD AUTO: 0.01 K/UL
BASOPHILS NFR BLD: 0.1 %
BUN SERPL-MCNC: 13 MG/DL
CALCIUM SERPL-MCNC: 9.5 MG/DL
CHLORIDE SERPL-SCNC: 93 MMOL/L
CO2 SERPL-SCNC: 37 MMOL/L
CREAT SERPL-MCNC: 0.8 MG/DL
DIFFERENTIAL METHOD: ABNORMAL
EOSINOPHIL # BLD AUTO: 0 K/UL
EOSINOPHIL NFR BLD: 0 %
ERYTHROCYTE [DISTWIDTH] IN BLOOD BY AUTOMATED COUNT: 13.8 %
EST. GFR  (AFRICAN AMERICAN): >60 ML/MIN/1.73 M^2
EST. GFR  (NON AFRICAN AMERICAN): >60 ML/MIN/1.73 M^2
GLUCOSE SERPL-MCNC: 115 MG/DL
HCT VFR BLD AUTO: 27.3 %
HGB BLD-MCNC: 8.8 G/DL
LYMPHOCYTES # BLD AUTO: 1.1 K/UL
LYMPHOCYTES NFR BLD: 8.8 %
MAGNESIUM SERPL-MCNC: 2 MG/DL
MCH RBC QN AUTO: 30 PG
MCHC RBC AUTO-ENTMCNC: 32.2 %
MCV RBC AUTO: 93 FL
MONOCYTES # BLD AUTO: 0.8 K/UL
MONOCYTES NFR BLD: 6.4 %
NEUTROPHILS # BLD AUTO: 10.2 K/UL
NEUTROPHILS NFR BLD: 84.5 %
PHOSPHATE SERPL-MCNC: 4.3 MG/DL
PLATELET # BLD AUTO: 410 K/UL
PMV BLD AUTO: 10 FL
POTASSIUM SERPL-SCNC: 4.8 MMOL/L
RBC # BLD AUTO: 2.93 M/UL
SODIUM SERPL-SCNC: 137 MMOL/L
WBC # BLD AUTO: 12.04 K/UL

## 2017-04-10 PROCEDURE — 11000004 HC SNF PRIVATE

## 2017-04-10 PROCEDURE — 97542 WHEELCHAIR MNGMENT TRAINING: CPT

## 2017-04-10 PROCEDURE — 94664 DEMO&/EVAL PT USE INHALER: CPT

## 2017-04-10 PROCEDURE — 94640 AIRWAY INHALATION TREATMENT: CPT

## 2017-04-10 PROCEDURE — 97110 THERAPEUTIC EXERCISES: CPT

## 2017-04-10 PROCEDURE — 97116 GAIT TRAINING THERAPY: CPT

## 2017-04-10 PROCEDURE — 80048 BASIC METABOLIC PNL TOTAL CA: CPT

## 2017-04-10 PROCEDURE — 63600175 PHARM REV CODE 636 W HCPCS: Performed by: INTERNAL MEDICINE

## 2017-04-10 PROCEDURE — 83735 ASSAY OF MAGNESIUM: CPT

## 2017-04-10 PROCEDURE — 85025 COMPLETE CBC W/AUTO DIFF WBC: CPT

## 2017-04-10 PROCEDURE — 25000003 PHARM REV CODE 250: Performed by: HOSPITALIST

## 2017-04-10 PROCEDURE — 25000003 PHARM REV CODE 250: Performed by: INTERNAL MEDICINE

## 2017-04-10 PROCEDURE — 36415 COLL VENOUS BLD VENIPUNCTURE: CPT

## 2017-04-10 PROCEDURE — 94761 N-INVAS EAR/PLS OXIMETRY MLT: CPT

## 2017-04-10 PROCEDURE — 27000221 HC OXYGEN, UP TO 24 HOURS

## 2017-04-10 PROCEDURE — 84100 ASSAY OF PHOSPHORUS: CPT

## 2017-04-10 PROCEDURE — 97535 SELF CARE MNGMENT TRAINING: CPT

## 2017-04-10 RX ADMIN — CYANOCOBALAMIN TAB 250 MCG 250 MCG: 250 TAB at 10:04

## 2017-04-10 RX ADMIN — STANDARDIZED SENNA CONCENTRATE AND DOCUSATE SODIUM 1 TABLET: 8.6; 5 TABLET, FILM COATED ORAL at 10:04

## 2017-04-10 RX ADMIN — TIOTROPIUM BROMIDE 18 MCG: 18 CAPSULE ORAL; RESPIRATORY (INHALATION) at 10:04

## 2017-04-10 RX ADMIN — FUROSEMIDE 20 MG: 20 TABLET ORAL at 10:04

## 2017-04-10 RX ADMIN — POTASSIUM CHLORIDE 10 MEQ: 750 CAPSULE, EXTENDED RELEASE ORAL at 10:04

## 2017-04-10 RX ADMIN — LEVALBUTEROL 0.63 MG: 0.63 SOLUTION RESPIRATORY (INHALATION) at 06:04

## 2017-04-10 RX ADMIN — APIXABAN 2.5 MG: 2.5 TABLET, FILM COATED ORAL at 10:04

## 2017-04-10 RX ADMIN — ACETAMINOPHEN 650 MG: 325 TABLET ORAL at 10:04

## 2017-04-10 RX ADMIN — STANDARDIZED SENNA CONCENTRATE AND DOCUSATE SODIUM 1 TABLET: 8.6; 5 TABLET, FILM COATED ORAL at 09:04

## 2017-04-10 RX ADMIN — ACETAMINOPHEN 650 MG: 325 TABLET ORAL at 09:04

## 2017-04-10 RX ADMIN — THERA TABS 1 TABLET: TAB at 10:04

## 2017-04-10 RX ADMIN — APIXABAN 2.5 MG: 2.5 TABLET, FILM COATED ORAL at 09:04

## 2017-04-10 RX ADMIN — FLUTICASONE FUROATE AND VILANTEROL TRIFENATATE 1 PUFF: 100; 25 POWDER RESPIRATORY (INHALATION) at 10:04

## 2017-04-10 RX ADMIN — OXYCODONE HYDROCHLORIDE AND ACETAMINOPHEN 500 MG: 500 TABLET ORAL at 10:04

## 2017-04-10 RX ADMIN — PREDNISONE 10 MG: 10 TABLET ORAL at 09:04

## 2017-04-10 RX ADMIN — METOPROLOL SUCCINATE 50 MG: 50 TABLET, EXTENDED RELEASE ORAL at 10:04

## 2017-04-10 RX ADMIN — GUAIFENESIN 1200 MG: 600 TABLET, EXTENDED RELEASE ORAL at 10:04

## 2017-04-10 RX ADMIN — LISINOPRIL 2.5 MG: 2.5 TABLET ORAL at 10:04

## 2017-04-10 RX ADMIN — GUAIFENESIN 1200 MG: 600 TABLET, EXTENDED RELEASE ORAL at 09:04

## 2017-04-10 RX ADMIN — ASPIRIN 81 MG: 81 TABLET, COATED ORAL at 10:04

## 2017-04-10 RX ADMIN — LEVALBUTEROL 0.63 MG: 0.63 SOLUTION RESPIRATORY (INHALATION) at 01:04

## 2017-04-10 RX ADMIN — OXYCODONE HYDROCHLORIDE AND ACETAMINOPHEN 500 MG: 500 TABLET ORAL at 09:04

## 2017-04-10 RX ADMIN — CITALOPRAM HYDROBROMIDE 20 MG: 20 TABLET ORAL at 10:04

## 2017-04-10 RX ADMIN — PREDNISONE 10 MG: 10 TABLET ORAL at 10:04

## 2017-04-10 RX ADMIN — PANTOPRAZOLE SODIUM 40 MG: 40 TABLET, DELAYED RELEASE ORAL at 10:04

## 2017-04-10 NOTE — PT/OT/SLP PROGRESS
Occupational Therapy  Treatment    Kaity Rebolledo   MRN: [id number]   Admitting Diagnosis: Debility    OT Date of Treatment: 04/10/17  Total Time (min): 45 min    Billable Minutes:  Self Care/Home Management 45    General Precautions: Standard, fall, aspiration, respiratory  Orthopedic Precautions: N/A  Braces: N/A    Do you have any cultural, spiritual, Bahai conflicts, given your current situation?: Quaker    Subjective:  Communicated with nurse prior to session.  How did I do?    Pain Ratin/10              Pain Rating Post-Intervention: 0/10    Objective:  Patient found with: oxygen (3 liters/ seated EOB eating breakfast)    Functional Status:  MDS G  Transfer Functional Status: CGA-Min (A)  Dressing Functional Status: 3:mod(A) LBD with assist for balance when managing over hips in stand /UBD Min A  Toilet Use Functional Status:Min (A) to manage pantsup over hips in stand  Personal Hygiene Functional Status: Set Up A seated to groom  Bathing Functional Status: mod(A) with assist to wash buttocks region and feet            Additional Treatment:  Pt. Educated on energy conservation techniques when performing ADL tasks    Patient left up in chair with call button in reach    ASSESSMENT:  Kaity Rebolledo is a 83 y.o. female with a medical diagnosis of Debility and presents with deficits with self-care skills as well as endurance and functional mobility and would benefit from continued OT services.     Rehab identified problem list/impairments: impaired endurance, impaired self care skills, impaired functional mobilty    Rehab potential is good    Activity tolerance: Good    Discharge recommendations:  (retirement)     Barriers to discharge: None     Equipment recommendations: none     GOALS:   Occupational Therapy Goals        Problem: Occupational Therapy Goal    Goal Priority Disciplines Outcome Interventions   Occupational Therapy Goal     OT, PT/OT Ongoing (interventions implemented as appropriate)     Description:  Goals to be met by: 2 weeks     Patient will increase functional independence with ADLs by performing:    Feeding with Set-up Assistance.  UE Dressing with Set-up Assistance.  LE Dressing with Minimal Assistance.  Grooming while seated with Set-up Assistance.  Toileting from bedside commode with SBA for hygiene and clothing management.   Bathing from  shower chair/bench with Minimal Assistance.  Supine to sit with Modified [place].  Stand pivot transfers with Supervision.  Toilet transfer to bedside commode with Supervision.  Pt. To demonstrate understanding of energy conservation techniques with ADL task performance.                  Plan:  Patient to be seen 5 x/week to address the above listed problems via self-care/home management, therapeutic activities, therapeutic exercises  Plan of Care expires: 05/06/17  Plan of Care reviewed with: patient    NEY Shields  04/10/2017

## 2017-04-10 NOTE — PROGRESS NOTES
Noted that patient is currently admitted in [facility]. This RN reviewed the chart. [phone] and asked to speak with Vivian Jimenez RN. Transferred to IPCM RN phone asking to be notified of discharge plan and also asked for IPCM RN to notify patient/caregiver that this RN  will be  contacting patient/caregiver once patient is discharged. Partially completed initial assessment for OPCM via chart review. Will follow up with patient/caregiver once discharged from the hospital.  MEELIA 4/17/2017.  CARLYN Wall, OPCM-RN

## 2017-04-10 NOTE — PT/OT/SLP DISCHARGE
Physical Therapy Discharge Summary    Kaity Rebolledo  MRN: [id number]   Acute on chronic respiratory failure with hypoxia and hypercapnia   Patient Discharged from acute Physical Therapy on 4/5/17.  Please refer to prior PT noted date on 4/4/17 for functional status.     Assessment:   Patient appropriate for care in another setting.  GOALS:   Physical Therapy Goals        Problem: Physical Therapy Goal    Goal Priority Disciplines Outcome Goal Variances Interventions   Physical Therapy Goal     PT/OT, PT Ongoing (interventions implemented as appropriate)     Description:  1.  Ambulate 40' with RW with 2 Lit O2.  2.  Sit in chair 1 1/2 hours.  3.  Perform 10 x 3 BLE exercises sitting.            Reasons for Discontinuation of Therapy Services  Transfer to alternate level of care.      Plan:  Patient Discharged to: Skilled Nursing Facility.

## 2017-04-10 NOTE — PROGRESS NOTES
Patient c/o chest pain, appears to be anxious. Provided with deep breathing exercises and guided imagery, chest pains resolved. Will continue to monitor.

## 2017-04-10 NOTE — PT/OT/SLP PROGRESS
Physical Therapy  Treatment    Kaity Rebolledo   MRN: [id number]   Admitting Diagnosis: Debility    PT Received On: 04/10/17  Total Time (min): 45       Billable Minutes:  Gait [id number], Therapeutic Exercise 15 and Train/Wheelchair Management 15    Treatment Type: Treatment  PT/PTA: PTA     PTA Visit Number: 3       General Precautions: Standard, fall, respiratory, aspiration  Orthopedic Precautions: N/A   Braces: N/A    Do you have any cultural, spiritual, Worship conflicts, given your current situation?: Methodist    Subjective:  Pt agreeable to physical therapy today.     Pain Ratin/10              Pain Rating Post-Intervention: 0/10    Objective:  Patient found up in wheelchair. Patient found with: oxygen       Functional Status:  MDS G  Transfer Functional Status: S-SBA  Walk in Corridor Functional Status: S-SBA          Bed Mobility:  Sit>Supine: Not performed 2/2 pt UIC   Supine>Sit: Not performed 2/2 pt UIC.     Transfers:  Sit<>Stand: SBA x 3   Stand Pivot Transfer: Not performed 2/2 pt UIC.     Gait:  Amb 152 feet SBA/CGA with 1 standing rest break with wheelchair follow and O2 in tow.     Wheelchair Mobility:  Patient propels w/c 150 feet BUE with SPV with 4 rest breaks     Therex:  Pt performed BLE therex x 30    HF   LAQ with vcs to extend the L knee completely to facilitate muscle contraction.     Balance:  Pt with dynamic stand x 2 at parallel bars with RW.    1 min x marching    1 min x heel raises   1 min x mini squats   Seated rest breaks between trials.     Patient left up in wheelchair with all lines intact, call button in reach and tray table in front of patient..    Assessment:  Kaity Rebolledo is a 83 y.o. female with a medical diagnosis of Debility.  The patient tolerated tx well with incidence of shortness of breath. Pt practiced pursed lip breathing to normalize breathing. The patient has decreased endurance with wheelchair mobility, but tolerated gait training very well. The patient  would continue to benefit from skilled PT to address the deficits in her plan of care.     Rehab identified problem list/impairments: weakness, impaired endurance, impaired self care skills, impaired functional mobilty, gait instability, impaired balance, decreased upper extremity function, decreased lower extremity function, pain    Rehab potential is good.    Activity tolerance: Good    Discharge recommendations:  (return to assisted living)     Barriers to discharge: None    Equipment recommendations: none     GOALS:   Physical Therapy Goals        Problem: Physical Therapy Goal    Goal Priority Disciplines Outcome Goal Variances Interventions   Physical Therapy Goal     PT/OT, PT Ongoing (interventions implemented as appropriate)     Description:  Goals to be met by:10 days ()    Patient will increase functional independence with mobility by performin. Supine to sit with supervision.  2. Sit to supine with supervision.  3. Sit to stand transfer with Supervision  4. Bed to chair transfer with Supervision using Rolling Walker  5. Gait  x 150 feet with Supervision using Rolling Walker.   6. Wheelchair propulsion x 150 feet with Supervision using bilateral upper extremities  7. Ascend/Descend 5 inch curb step with Supervision using Rolling Walker.  8. Stand for 3 minutes with Supervision using Rolling Walker  9. Lower extremity exercise program x 20 reps per handout, with supervision                PLAN:    Patient to be seen 6 x/week  to address the above listed problems via gait training, therapeutic activities, therapeutic exercises  Plan of Care expires: 17  Plan of Care reviewed with: patient    Zhao Ackerman, PTA  04/10/2017

## 2017-04-10 NOTE — PLAN OF CARE
Problem: Occupational Therapy Goal  Goal: Occupational Therapy Goal  Goals to be met by: 2 weeks     Patient will increase functional independence with ADLs by performing:    Feeding with Set-up Assistance.  UE Dressing with Set-up Assistance.  LE Dressing with Minimal Assistance.  Grooming while seated with Set-up Assistance.  Toileting from bedside commode with SBA for hygiene and clothing management.   Bathing from shower chair/bench with Minimal Assistance.  Supine to sit with Modified [place].  Stand pivot transfers with Supervision.  Toilet transfer to bedside commode with Supervision.  Pt. To demonstrate understanding of energy conservation techniques with ADL task performance.     Safety with ADL tasks/energy conservation

## 2017-04-11 ENCOUNTER — TELEPHONE (OUTPATIENT)
Dept: FAMILY MEDICINE | Facility: CLINIC | Age: 82
End: 2017-04-11

## 2017-04-11 LAB
ANION GAP SERPL CALC-SCNC: 11 MMOL/L
BUN SERPL-MCNC: 13 MG/DL
CALCIUM SERPL-MCNC: 9.3 MG/DL
CHLORIDE SERPL-SCNC: 91 MMOL/L
CO2 SERPL-SCNC: 32 MMOL/L
CREAT SERPL-MCNC: 0.9 MG/DL
EST. GFR  (AFRICAN AMERICAN): >60 ML/MIN/1.73 M^2
EST. GFR  (NON AFRICAN AMERICAN): 59.3 ML/MIN/1.73 M^2
GLUCOSE SERPL-MCNC: 95 MG/DL
MAGNESIUM SERPL-MCNC: 1.8 MG/DL
PHOSPHATE SERPL-MCNC: 3.3 MG/DL
POTASSIUM SERPL-SCNC: 4.2 MMOL/L
SODIUM SERPL-SCNC: 134 MMOL/L
TROPONIN I SERPL DL<=0.01 NG/ML-MCNC: 0.02 NG/ML

## 2017-04-11 PROCEDURE — 97110 THERAPEUTIC EXERCISES: CPT

## 2017-04-11 PROCEDURE — 94664 DEMO&/EVAL PT USE INHALER: CPT

## 2017-04-11 PROCEDURE — 93010 ELECTROCARDIOGRAM REPORT: CPT | Mod: ,,, | Performed by: INTERNAL MEDICINE

## 2017-04-11 PROCEDURE — 84100 ASSAY OF PHOSPHORUS: CPT

## 2017-04-11 PROCEDURE — 94761 N-INVAS EAR/PLS OXIMETRY MLT: CPT

## 2017-04-11 PROCEDURE — 93041 RHYTHM ECG TRACING: CPT

## 2017-04-11 PROCEDURE — 63600175 PHARM REV CODE 636 W HCPCS: Performed by: INTERNAL MEDICINE

## 2017-04-11 PROCEDURE — 11000004 HC SNF PRIVATE

## 2017-04-11 PROCEDURE — 97530 THERAPEUTIC ACTIVITIES: CPT

## 2017-04-11 PROCEDURE — 36415 COLL VENOUS BLD VENIPUNCTURE: CPT

## 2017-04-11 PROCEDURE — 84484 ASSAY OF TROPONIN QUANT: CPT

## 2017-04-11 PROCEDURE — 83735 ASSAY OF MAGNESIUM: CPT

## 2017-04-11 PROCEDURE — 97116 GAIT TRAINING THERAPY: CPT

## 2017-04-11 PROCEDURE — 25000003 PHARM REV CODE 250: Performed by: INTERNAL MEDICINE

## 2017-04-11 PROCEDURE — 80048 BASIC METABOLIC PNL TOTAL CA: CPT

## 2017-04-11 PROCEDURE — 27000221 HC OXYGEN, UP TO 24 HOURS

## 2017-04-11 PROCEDURE — 25000003 PHARM REV CODE 250

## 2017-04-11 PROCEDURE — 25000003 PHARM REV CODE 250: Performed by: HOSPITALIST

## 2017-04-11 PROCEDURE — 94640 AIRWAY INHALATION TREATMENT: CPT

## 2017-04-11 RX ORDER — NITROGLYCERIN 0.4 MG/1
TABLET SUBLINGUAL
Status: COMPLETED
Start: 2017-04-11 | End: 2017-04-11

## 2017-04-11 RX ORDER — NITROGLYCERIN 0.4 MG/1
0.4 TABLET SUBLINGUAL EVERY 5 MIN PRN
Status: DISCONTINUED | OUTPATIENT
Start: 2017-04-11 | End: 2017-04-14 | Stop reason: HOSPADM

## 2017-04-11 RX ADMIN — OXYCODONE HYDROCHLORIDE AND ACETAMINOPHEN 500 MG: 500 TABLET ORAL at 09:04

## 2017-04-11 RX ADMIN — PANTOPRAZOLE SODIUM 40 MG: 40 TABLET, DELAYED RELEASE ORAL at 09:04

## 2017-04-11 RX ADMIN — LEVALBUTEROL 0.63 MG: 0.63 SOLUTION RESPIRATORY (INHALATION) at 01:04

## 2017-04-11 RX ADMIN — CYANOCOBALAMIN TAB 250 MCG 250 MCG: 250 TAB at 09:04

## 2017-04-11 RX ADMIN — NITROGLYCERIN 10 MG: 0.4 TABLET SUBLINGUAL at 05:04

## 2017-04-11 RX ADMIN — TIOTROPIUM BROMIDE 18 MCG: 18 CAPSULE ORAL; RESPIRATORY (INHALATION) at 09:04

## 2017-04-11 RX ADMIN — ACETAMINOPHEN 650 MG: 325 TABLET ORAL at 10:04

## 2017-04-11 RX ADMIN — FUROSEMIDE 20 MG: 20 TABLET ORAL at 09:04

## 2017-04-11 RX ADMIN — CITALOPRAM HYDROBROMIDE 20 MG: 20 TABLET ORAL at 09:04

## 2017-04-11 RX ADMIN — POTASSIUM CHLORIDE 10 MEQ: 750 CAPSULE, EXTENDED RELEASE ORAL at 09:04

## 2017-04-11 RX ADMIN — GUAIFENESIN 1200 MG: 600 TABLET, EXTENDED RELEASE ORAL at 09:04

## 2017-04-11 RX ADMIN — PREDNISONE 10 MG: 10 TABLET ORAL at 09:04

## 2017-04-11 RX ADMIN — STANDARDIZED SENNA CONCENTRATE AND DOCUSATE SODIUM 1 TABLET: 8.6; 5 TABLET, FILM COATED ORAL at 09:04

## 2017-04-11 RX ADMIN — THERA TABS 1 TABLET: TAB at 09:04

## 2017-04-11 RX ADMIN — FLUTICASONE FUROATE AND VILANTEROL TRIFENATATE 1 PUFF: 100; 25 POWDER RESPIRATORY (INHALATION) at 09:04

## 2017-04-11 RX ADMIN — METOPROLOL SUCCINATE 50 MG: 50 TABLET, EXTENDED RELEASE ORAL at 09:04

## 2017-04-11 RX ADMIN — LEVALBUTEROL 0.63 MG: 0.63 SOLUTION RESPIRATORY (INHALATION) at 12:04

## 2017-04-11 RX ADMIN — ASPIRIN 81 MG: 81 TABLET, COATED ORAL at 09:04

## 2017-04-11 RX ADMIN — LEVALBUTEROL 0.63 MG: 0.63 SOLUTION RESPIRATORY (INHALATION) at 07:04

## 2017-04-11 RX ADMIN — APIXABAN 2.5 MG: 2.5 TABLET, FILM COATED ORAL at 09:04

## 2017-04-11 RX ADMIN — LISINOPRIL 2.5 MG: 2.5 TABLET ORAL at 09:04

## 2017-04-11 NOTE — PLAN OF CARE
Problem: Physical Therapy Goal  Goal: Physical Therapy Goal  Goals to be met by:10 days ()    Patient will increase functional independence with mobility by performin. Supine to sit with supervision.  2. Sit to supine with supervision.  3. Sit to stand transfer with Supervision  4. Bed to chair transfer with Supervision using Rolling Walker  5. Gait x 150 feet with Supervision using Rolling Walker.   6. Wheelchair propulsion x 150 feet with Supervision using bilateral upper extremities  7. Ascend/Descend 5 inch curb step with Supervision using Rolling Walker.  8. Stand for 3 minutes with Supervision using Rolling Walker  9. Lower extremity exercise program x 20 reps per handout, with supervision   LTGs remain appropriate. Pt will continue PT POC.     Ileana Christianson, PT  2017

## 2017-04-11 NOTE — PLAN OF CARE
Problem: Occupational Therapy Goal  Goal: Occupational Therapy Goal  Goals to be met by: 2 weeks     Patient will increase functional independence with ADLs by performing:    Feeding with Set-up Assistance.  UE Dressing with Set-up Assistance.  LE Dressing with Minimal Assistance.  Grooming while seated with Set-up Assistance.  Toileting from bedside commode with SBA for hygiene and clothing management.   Bathing from shower chair/bench with Minimal Assistance.  Supine to sit with Modified [place].  Stand pivot transfers with Supervision.  Toilet transfer to bedside commode with Supervision.  Pt. To demonstrate understanding of energy conservation techniques with ADL task performance.     Pt. Tolerated session well.

## 2017-04-11 NOTE — TREATMENT PLAN
Rehab Services' DME recommendations    Kaity Rebolledo  MRN: [id number]    [x]  No DME needed    [x] Home health PT and OT      Ileana Christianson, PT 4/11/2017

## 2017-04-11 NOTE — PT/OT/SLP PROGRESS
"Physical Therapy  Treatment    Kaity Rebolledo   MRN: [id number]   Admitting Diagnosis: Debility    PT Received On: 17  Total Time (min): 38       Billable Minutes:  Gait [id number], Therapeutic Activity 8, Therapeutic Exercise 15 and Total Time 38    Treatment Type: Treatment  PT/PTA: PT     PTA Visit Number: 0       General Precautions: Standard, fall, respiratory, aspiration  Orthopedic Precautions: N/A   Braces: N/A    Do you have any cultural, spiritual, Mormon conflicts, given your current situation?: Congregational    Subjective:  "I'm ready."    Pain Ratin/10    Objective:  Patient found sitting in w/c. Patient found with: oxygen (3L O2)     Functional Status:  MDS G  Transfer Functional Status: S-SBA  Walk in Room Functional Status: S-SBA  Walk in Corridor Functional Status: CGA-Min (A)  Locomotion on Unit Functional Status: CGA-Min (A)    Bed Mobility:  Not assessed today    Transfers:  Sit<>Stand: to/from w/c (multiple trials) w/ RW and SBA for safety    Gait:  Amb 2 trials (125ft and 177ft) w/ RW and CGA/close SBA for safety, limited by fatigue     Advanced Gait:  Curb Step: 4" curb step w/ RW and Min/CGA for safety, minor LOB w/ descent- foot did not clear step when stepping down    Balance:  Standing beach ball tap [id number] w/ RW and CGA for safety, no LOB, limited by fatigue    Additional Treatment:  Seated LBE x15min to inc BLE strength and endurance    Patient left up in chair with all lines intact and call button in reach.    Assessment:  Kaity Rebolledo is a 83 y.o. female with a medical diagnosis of Debility.  Pt kamlesh session well w/ good participation. Pt was able to slightly inc amb distance today and complete the curb step for the first time. She is making good progress but remains limited by decreased endurance and fatigue w/ prolonged activity. Pt will continue to benefit from skilled PT in order to improve functional mobility and safety.    Rehab identified problem list/impairments: " weakness, impaired endurance, impaired self care skills, impaired functional mobilty, gait instability, impaired balance, decreased upper extremity function, decreased lower extremity function, pain    Rehab potential is good.    Activity tolerance: Good    Discharge recommendations:  (return to assisted living)     Barriers to discharge: None    Equipment recommendations: none     GOALS:   Physical Therapy Goals        Problem: Physical Therapy Goal    Goal Priority Disciplines Outcome Goal Variances Interventions   Physical Therapy Goal     PT/OT, PT Ongoing (interventions implemented as appropriate)     Description:  Goals to be met by:10 days ()    Patient will increase functional independence with mobility by performin. Supine to sit with supervision.  2. Sit to supine with supervision.  3. Sit to stand transfer with Supervision  4. Bed to chair transfer with Supervision using Rolling Walker  5. Gait  x 150 feet with Supervision using Rolling Walker.   6. Wheelchair propulsion x 150 feet with Supervision using bilateral upper extremities  7. Ascend/Descend 5 inch curb step with Supervision using Rolling Walker.  8. Stand for 3 minutes with Supervision using Rolling Walker  9. Lower extremity exercise program x 20 reps per handout, with supervision                PLAN:    Patient to be seen 6 x/week  to address the above listed problems via gait training, therapeutic activities, therapeutic exercises  Plan of Care expires: 17  Plan of Care reviewed with: patient    Ileana M Sammy, PT  2017

## 2017-04-11 NOTE — PLAN OF CARE
04/11/2017  4:05 PM     04/11/17 1607   Medicare Message   Important Message from Medicare regarding Discharge Appeal Rights Given to patient/caregiver;Explained to patient/caregiver;Signed/date by patient/caregiver   EVAN served NOMNC to patient notifying of discharge date of 4/14/17 and appeal right.  Patient expressed understanding and satisfaction regarding discharge date.  Patient signed NOMNC, and SW provided patient with copy.  EVAN faxed copy of signed NOMNC to [employer] (fx [phone]) and placed original in patient's blue chart in nurse's station.    Pt expressing readiness for d/c back to [facility].  Pt reports having adequate assistance at [facility].  Pt's daughter also present in room during discussion regarding d/c plans.  Pt confirms having home oxygen, BSC, RW, WC, and SC at apartment.  Pt requesting to be placed with [facility] upon SNF d/c.  Pt to be accompanied and transported home by family upon SNF d/c.  Pt/daughter denies having any questions or concerns regarding d/c at this time.  SW will f/u as needed.    David Givens, FER  [id number]

## 2017-04-11 NOTE — PROGRESS NOTES
04/11/2017  8:03 AM  Discharge Planning- Message left with Dr. Soriano's office ([phone]) for a hospital follow up appt for patient.    Vivian Doran RN, [facility]  [id number]    04/11/2017  3:24 PM  Received a call back from Dr Soriano's office- hospital follow up appt 4/27/2017 at 10:40am.  Vivian Doran RN, [facility]  [phone]

## 2017-04-11 NOTE — TELEPHONE ENCOUNTER
----- Message from Angela Burgess RN sent at 4/11/2017 10:42 AM CDT -----  Contact: NEDRA Ordaz/Vivian with case management   Jim,    This pt is coming from SNF. PC pts need to come directly from INPT.  -Thank you for the referral.    ----- Message -----     From: Ed Alves LPN     Sent: 4/11/2017   9:09 AM       To: Angela Burgess RN    PC?    ----- Message -----     From: Celine Frankel     Sent: 4/11/2017   7:16 AM       To: Christie Baker Staff    Ms Vivian its requesting a 2 weeks hospital follow up 2 weeks from her discharge date on 04/13/107. Please advise

## 2017-04-12 PROCEDURE — 63600175 PHARM REV CODE 636 W HCPCS: Performed by: INTERNAL MEDICINE

## 2017-04-12 PROCEDURE — 94761 N-INVAS EAR/PLS OXIMETRY MLT: CPT

## 2017-04-12 PROCEDURE — 99900058 HC 022 PAID UNDER SNF PPS

## 2017-04-12 PROCEDURE — 25000003 PHARM REV CODE 250: Performed by: INTERNAL MEDICINE

## 2017-04-12 PROCEDURE — 94664 DEMO&/EVAL PT USE INHALER: CPT

## 2017-04-12 PROCEDURE — 27000221 HC OXYGEN, UP TO 24 HOURS

## 2017-04-12 PROCEDURE — 97110 THERAPEUTIC EXERCISES: CPT

## 2017-04-12 PROCEDURE — 25000003 PHARM REV CODE 250: Performed by: HOSPITALIST

## 2017-04-12 PROCEDURE — 97535 SELF CARE MNGMENT TRAINING: CPT

## 2017-04-12 PROCEDURE — 25000003 PHARM REV CODE 250: Performed by: NURSE PRACTITIONER

## 2017-04-12 PROCEDURE — 97116 GAIT TRAINING THERAPY: CPT

## 2017-04-12 PROCEDURE — 11000004 HC SNF PRIVATE

## 2017-04-12 PROCEDURE — 97530 THERAPEUTIC ACTIVITIES: CPT

## 2017-04-12 PROCEDURE — 94640 AIRWAY INHALATION TREATMENT: CPT

## 2017-04-12 PROCEDURE — 99315 NF DSCHRG MGMT 30 MIN/LESS: CPT | Mod: ,,, | Performed by: HOSPITALIST

## 2017-04-12 RX ORDER — DOXYCYCLINE HYCLATE 100 MG
100 TABLET ORAL EVERY 12 HOURS
Status: DISCONTINUED | OUTPATIENT
Start: 2017-04-12 | End: 2017-04-14 | Stop reason: HOSPADM

## 2017-04-12 RX ORDER — POTASSIUM CHLORIDE 750 MG/1
10 CAPSULE, EXTENDED RELEASE ORAL DAILY
Qty: 30 CAPSULE | Refills: 3 | Status: SHIPPED | OUTPATIENT
Start: 2017-04-12 | End: 2017-08-14 | Stop reason: SDUPTHER

## 2017-04-12 RX ORDER — AMOXICILLIN 250 MG
1 CAPSULE ORAL 2 TIMES DAILY
Status: ON HOLD | COMMUNITY
Start: 2017-04-12 | End: 2018-03-19

## 2017-04-12 RX ORDER — DOXYCYCLINE HYCLATE 100 MG
100 TABLET ORAL EVERY 12 HOURS
Qty: 16 TABLET | Refills: 0 | Status: ON HOLD | OUTPATIENT
Start: 2017-04-12 | End: 2017-05-01 | Stop reason: HOSPADM

## 2017-04-12 RX ORDER — NITROGLYCERIN 0.4 MG/1
0.4 TABLET SUBLINGUAL EVERY 5 MIN PRN
Qty: 30 TABLET | Refills: 1 | Status: SHIPPED | OUTPATIENT
Start: 2017-04-12 | End: 2018-04-12

## 2017-04-12 RX ADMIN — THERA TABS 1 TABLET: TAB at 09:04

## 2017-04-12 RX ADMIN — APIXABAN 2.5 MG: 2.5 TABLET, FILM COATED ORAL at 09:04

## 2017-04-12 RX ADMIN — LEVALBUTEROL 0.63 MG: 0.63 SOLUTION RESPIRATORY (INHALATION) at 07:04

## 2017-04-12 RX ADMIN — PANTOPRAZOLE SODIUM 40 MG: 40 TABLET, DELAYED RELEASE ORAL at 09:04

## 2017-04-12 RX ADMIN — DOXYCYCLINE HYCLATE 100 MG: 100 TABLET, COATED ORAL at 12:04

## 2017-04-12 RX ADMIN — TIOTROPIUM BROMIDE 18 MCG: 18 CAPSULE ORAL; RESPIRATORY (INHALATION) at 09:04

## 2017-04-12 RX ADMIN — GUAIFENESIN 1200 MG: 600 TABLET, EXTENDED RELEASE ORAL at 09:04

## 2017-04-12 RX ADMIN — PREDNISONE 10 MG: 10 TABLET ORAL at 09:04

## 2017-04-12 RX ADMIN — POTASSIUM CHLORIDE 10 MEQ: 750 CAPSULE, EXTENDED RELEASE ORAL at 09:04

## 2017-04-12 RX ADMIN — OXYCODONE HYDROCHLORIDE AND ACETAMINOPHEN 500 MG: 500 TABLET ORAL at 09:04

## 2017-04-12 RX ADMIN — LEVALBUTEROL 0.63 MG: 0.63 SOLUTION RESPIRATORY (INHALATION) at 11:04

## 2017-04-12 RX ADMIN — LEVALBUTEROL 0.63 MG: 0.63 SOLUTION RESPIRATORY (INHALATION) at 12:04

## 2017-04-12 RX ADMIN — ACETAMINOPHEN 650 MG: 325 TABLET ORAL at 09:04

## 2017-04-12 RX ADMIN — DOXYCYCLINE HYCLATE 100 MG: 100 TABLET, COATED ORAL at 09:04

## 2017-04-12 RX ADMIN — ASPIRIN 81 MG: 81 TABLET, COATED ORAL at 09:04

## 2017-04-12 RX ADMIN — CITALOPRAM HYDROBROMIDE 20 MG: 20 TABLET ORAL at 09:04

## 2017-04-12 RX ADMIN — CYANOCOBALAMIN TAB 250 MCG 250 MCG: 250 TAB at 09:04

## 2017-04-12 RX ADMIN — FLUTICASONE FUROATE AND VILANTEROL TRIFENATATE 1 PUFF: 100; 25 POWDER RESPIRATORY (INHALATION) at 09:04

## 2017-04-12 RX ADMIN — FUROSEMIDE 20 MG: 20 TABLET ORAL at 09:04

## 2017-04-12 NOTE — PROGRESS NOTES
EKG done. Results normal sinus rhythm with occasional PVC's. Nitro administered as ordered. Patient states that she has relief of chest pain. Will notify provider.

## 2017-04-12 NOTE — PT/OT/SLP PROGRESS
Physical Therapy  Treatment    Kaity Rebolledo   MRN: [id number]   Admitting Diagnosis: Debility    PT Received On: 17  Total Time (min): 40       Billable Minutes:  Gait [id number], Therapeutic Activity 15 and Therapeutic Exercise 10    Treatment Type: Treatment  PT/PTA: PT     PTA Visit Number: 0       General Precautions: Standard, aspiration, fall, respiratory  Orthopedic Precautions: N/A   Braces: N/A    Do you have any cultural, spiritual, Methodist conflicts, given your current situation?: Moravian    Subjective:  Communicated with pt prior to session. Pt reported that she needed to have a bowel movement.     Pain Ratin/10    Objective:  Patient found supine in bed receiving a breathing treatment with Patient found with: oxygen (3L)       Functional Status:  MDS G  Bed Mobility Functional Status: S-SBA  Transfer Functional Status: S-SBA  Walk in Room Functional Status: S-SBA  Walk in Corridor Functional Status: S-SBA          Bed Mobility:  Supine>Sit: supervision    Transfers:  Sit<>Stand: SBA from EOB and WC  Stand Pivot Transfer: SBA-- EOB>BSC>WC (needs UE support either on handrails of BSC or use of rolling walker; forward flexion of trunk noted as well).    Gait:  Amb 150 feet with SBA and use of rolling walker with supplemental O2 and wheelchair in tow.     Therex:  Hip flexion x20 reps BLE  LAQ x20 reps BLE  Ankle pumps x20 reps BLE    Balance:  Pt was able to stand for ~5 minutes to brush her teeth and comb her hair.     Additional Treatment:  Pt was able to melinda her pants via sit>stand and pulling her pants up. She was assisted with min A to thread feet through her pants.  Required assistance for clasping brassiere in back. Able to melinda her own shirt.  Ms. Rebolledo was educated on wiping front to back to prevent future infection of bladder. She was set up for toileting (twice- once for urine, another for bowel movement).    Patient left up in chair with call button in  reach.    Assessment:  Kaity Rebolledo is a 83 y.o. female with a medical diagnosis of Debility.  Ms. Rebolledo was able to dress herself for the most part, requiring minimal assistance for her pants and brassiere.  She was able to perform her own toileting and still requires stand-by assistance for transfers and ambulation. Will benefit from further PT services to continue to improve functionally prior to returning to her retirement.    Rehab identified problem list/impairments: weakness, impaired endurance, impaired self care skills, impaired functional mobilty, gait instability, impaired balance, decreased upper extremity function, decreased lower extremity function, pain    Rehab potential is fair.    Activity tolerance: Fair    Discharge recommendations:  (return to assisted living)     Barriers to discharge: None    Equipment recommendations: none     GOALS:   Physical Therapy Goals        Problem: Physical Therapy Goal    Goal Priority Disciplines Outcome Goal Variances Interventions   Physical Therapy Goal     PT/OT, PT Ongoing (interventions implemented as appropriate)     Description:  Goals to be met by:10 days ()    Patient will increase functional independence with mobility by performin. Supine to sit with supervision. Met (2017)  2. Sit to supine with supervision.  3. Sit to stand transfer with Supervision  4. Bed to chair transfer with Supervision using Rolling Walker met (2017)  5. Gait  x 150 feet with Supervision using Rolling Walker. Met (2017)  6. Wheelchair propulsion x 150 feet with Supervision using bilateral upper extremities  7. Ascend/Descend 5 inch curb step with Supervision using Rolling Walker.  8. Stand for 3 minutes with Supervision using Rolling Walker  9. Lower extremity exercise program x 20 reps per handout, with supervision                 PLAN:    Patient to be seen 6 x/week  to address the above listed problems via gait training, therapeutic activities,  therapeutic exercises  Plan of Care expires: 05/06/17  Plan of Care reviewed with: patient    Yesy WILCOX Gonzales, PT  04/12/2017

## 2017-04-12 NOTE — TREATMENT PLAN
Rehab Services' DME recommendations    Kaity Rebolledo  MRN: [id number]    [x]  No DME needed    [x] Home health PT and OT    NEY Shields 4/12/2017

## 2017-04-12 NOTE — PLAN OF CARE
Pt A & O pt repositioned with pillow every q2 hrs, no skin breakdown noted . pulses palable +movement/sensation, cap refill WNL in all 4 extremities , monitored for pain and safety. Safety maintained. , pt remained afebrile 97.9. Bed locked and lowered, call light within reach. Will continue to monitor.

## 2017-04-12 NOTE — PROGRESS NOTES
On call provider Bisi Braswell NP notified of pt results that were collected d/t pt c/o chest pain.

## 2017-04-12 NOTE — PLAN OF CARE
Problem: Occupational Therapy Goal  Goal: Occupational Therapy Goal  Goals to be met by: 2 weeks     Patient will increase functional independence with ADLs by performing:    Feeding with Set-up Assistance.  UE Dressing with Set-up Assistance.  LE Dressing with Minimal Assistance.  Grooming while seated with Set-up Assistance.  Toileting from bedside commode with SBA for hygiene and clothing management.   Bathing from shower chair/bench with Minimal Assistance.  Supine to sit with Modified [place].  Stand pivot transfers with Supervision.  Toilet transfer to bedside commode with Supervision.  Pt. To demonstrate understanding of energy conservation techniques with ADL task performance.     Tolerated session well

## 2017-04-12 NOTE — PROGRESS NOTES
Patient c/o chest pain that in midsternal area. V/S 123/78, 99, 18, 98%. Pain 7 out of 10. Daughter at bedside. Will inform provider. Will continue to monitor.

## 2017-04-12 NOTE — DISCHARGE SUMMARY
[facility] Hospital Medicine  Discharge Summary      Patient Name: Kaity Rebolledo  MRN: [id number]  Admission Date: 4/5/2017  Hospital Length of Stay: 7 days  Discharge Date and Time: 4/14/17  Attending Physician: Kirk Martinez MD   Discharging Provider: Carl Vang NP  Primary Care Provider: Samuel Soriano MD        HPI: Patient is a 83 y.o. female with severe COPD on home oxygen, CHF, A.fib (diagnosed 3/2017) who presents to SNF after hospitalization for acute hypoxic/hypercapneic respiratory failure due to COPD. Breathing improved with BIPAP, increased supplemental oxygen, nebs, prednisone and pulmonary toilet. She has been hospitalized numerous times for the same. She continues to have a chronic productive cough.  Repeat CXR was performed at [facility] and suggestive of infectious versus inflammatory airway with probable emphysema and bronchiectasis.  She was started on Doxycycline 100 mg bid for 10 days and sent to Pulmonary.  Dr. Christian suggested to extend Prednisone to 20 mg daily x 5 follow by 10 mg daily x 5 then d/c.  He also recommended to continue to use nebs q4h as ordered and to continue mucinex 1200 mg bid.       * No surgery found *      Indwelling Lines/Drains at time of discharge:   Lines/Drains/Airways          No matching active lines, drains, or airways        Hospital Course:   Debility [R53.81]  She is participating at a S-A for her adls and transfers.  She is ambulating up to 150 feet.  Therapy feels she is safe for discharge from a therapy stand point.  Recommend home health.  She will return to [facility]) at discharge.      Left leg pain [M79.605]  No reports of leg pain.  She will continue Tylenol as needed for aches and pain.      Anticoagulant long-term use [Z79.01]  Continue Apixaban as ordered.    Acute on chronic respiratory failure with hypoxia and hypercapnia [J96.21, J96.22]  Continue scheduled Bero, Xopenox, and Spiriva as ordered. As above, seen  "pulmonary today, 4/13, who recommended to extend her steroid taper.  She will continue Mucinex 1200 mg bid to help with chronic cough.  Continue Albuterol inh PRN. Has supplemental oxygen at 2.5 liters sats are 95%. She reports she has oxygen at home. Per MD note, continue CPAP at night.  On 4/12, patient continues to have a productive cough.  CXR done and suggestive of probable emphysema and bronchiectasis.  She also has small opacities in her lower lung zones suggestive of airway infection/inflammation.  Discussed findings with Dr. Martinez, she agrees with a 10 day course of Doxycyline.    Paroxysmal atrial fibrillation [I48.0]  HR is controlled with b-blocker as ordered.    Anxiety and depression [F41.9, F32.9]  Mood is calm and cooperative. She is grieving over the loss of her . Continue celexa 20 mg daily.    COPD, very severe [J44.9]  Patient reports this is chronic. She has a cough which she states she is coughing up "green" mucus. She has no fever. Continue Mucinex and acapella as ordered. Per chart review she had her pneumonia vaccine in 2/2016 and flu in 10/2016.    Hypokalemia [E87.6]  Resolved, repeat K was 4.2 on 4/13.  Continue potassium supplement as ordered by MD.    Chronic diastolic heart failure [I50.32]  Appears compensated. Continue Lasix 20 mg daily, Lisinopril 2.5 mg daily.  Weight down 3 kg since admission.   Patient to continue to track her weight and notify her PCP if weight loss continues.      Coronary artery disease involving native coronary artery of native heart without angina pectoris [I25.10]  Chest pain  Patient had episode of chest pains on night of 4/11.  Covering provider was notified.  EKG done showed SR with PVC, biatrial enlargement with nonspecific ST abnormality, appears this was present in her previous EKG.  Troponin was 0.023 (normal) and she was given 1 SL NTG with relief.  Patient reported her pain was localized to center of her chest and non radiating.  She denies " N/V or diaphoresis at the time.  Patient felt her chest pain was associated with her anxiety about her upcoming discharge.  The following morning patient reported chest pain had resolved and she felt back to her baseline.  She will continue her baby aspirin as ordered.    Consults: PT, OT    Significant Diagnostic Studies: Labs:   BMP:   Recent Labs  Lab 04/11/17  1805   GLU 95   *   K 4.2   CL 91*   CO2 32*   BUN 13   CREATININE 0.9   CALCIUM 9.3   MG 1.8   , CBC     Recent Labs  Lab 04/13/17  0518   WBC 10.83   HGB 8.7*   HCT 29.1*   *    and Troponin   Recent Labs  Lab 04/11/17  1805   TROPONINI 0.023     Imaging Results         X-Ray Chest PA And Lateral (Final result) Result time:  04/12/17 11:18:12    Final result by Layton Colón MD (04/12/17 11:18:12)    Impression:     Chronic lung findings including probable emphysema and bronchiectasis. Small opacities in lower lung zone suggesting airway infection/inflammation. Correlate clinically.      Electronically signed by: LAYTON COLÓN MD  Date:     04/12/17  Time:    11:18     Narrative:    PA and lateral views of the chest were obtained.  Comparison -- 3/28/17. The heart size is normal. Mediastinum shows aortic atherosclerosis. The lungs are well-expanded possibly with emphysema. Both lower lung zones   show mildly increased markings suggesting bronchiectasis and small airway infection/inflammation.   These chronic findings are more noticeable on current exam. Correlate for bronchitis. No consolidation or pleural effusion is seen. Skeletal structures show increased thoracic kyphosis.                Pending Diagnostic Studies:     None        Final Active Diagnoses:    Diagnosis Date Noted POA    PRINCIPAL PROBLEM:  Debility [R53.81] 04/06/2017 Yes    Left leg pain [M79.605] 04/06/2017 Yes    Anticoagulant long-term use [Z79.01]  Not Applicable    Acute on chronic respiratory failure with hypoxia and hypercapnia [J96.21, J96.22]  03/28/2017 Yes    Paroxysmal atrial fibrillation [I48.0]  Yes    Anxiety and depression [F41.9, F32.9]  Yes    COPD, very severe [J44.9] 03/27/2017 Yes    Hypokalemia [E87.6] 03/04/2017 Yes    Chronic diastolic heart failure [I50.32] 09/02/2016 Yes    Coronary artery disease involving native coronary artery of native heart without angina pectoris [I25.10] 03/14/2016 Yes      Problems Resolved During this Admission:    Diagnosis Date Noted Date Resolved POA      Discharged Condition: stable    Disposition: Assistant living facility with home health.    Follow Up:  Follow-up Information     Follow up with Samuel Soriano MD. Go on 4/27/2017.    Specialty:  Internal Medicine    Why:  Hospital follow up    Contact information:    [address]  [address]  [place] [id number] [phone]          Follow up with Mason Baeza MD. Go on 6/29/2017.    Specialty:  Pulmonary Disease    Why:  Follow up    Contact information:    [address]  [facility] [address]  [phone]          Patient Instructions:     Diet general   Order Specific Question Answer Comments   Additional restrictions: Low Chol/Sat Fat      Activity as tolerated     Call MD for:  temperature >100.4     Call MD for:  persistent nausea and vomiting or diarrhea     Call MD for:  severe uncontrolled pain     Call MD for:  difficulty breathing or increased cough     Call MD for:  severe persistent headache     Call MD for:  persistent dizziness, light-headedness, or visual disturbances     Call MD for:  increased confusion or weakness       Medications:  Reconciled Home Medications:   Current Discharge Medication List      START taking these medications    Details   doxycycline (VIBRA-TABS) 100 MG tablet Take 1 tablet (100 mg total) by mouth every 12 (twelve) hours.  Qty: 16 tablet, Refills: 0      nitroGLYCERIN (NITROSTAT) 0.4 MG SL tablet Place 1 tablet (0.4 mg total) under the tongue every 5 (five) minutes as needed for Chest pain.  Qty: 30  tablet, Refills: 1      potassium chloride (MICRO-K) 10 MEQ CpSR Take 1 capsule (10 mEq total) by mouth once daily.  Qty: 30 capsule, Refills: 3      predniSONE (DELTASONE) 10 MG tablet Take 2 tabs (20 mg) for 5 days, then 1 tab (10 mg) for 5 days then stop  Qty: 15 tablet, Refills: 0      senna-docusate 8.6-50 mg (PERICOLACE) 8.6-50 mg per tablet Take 1 tablet by mouth 2 (two) times daily.         CONTINUE these medications which have NOT CHANGED    Details   albuterol 90 mcg/actuation inhaler Inhale 2 puffs into the lungs every 6 (six) hours as needed for Wheezing or Shortness of Breath.  Qty: 6.7 g, Refills: 4    Comments: OK FOR GENERIC  Associated Diagnoses: Chronic obstructive pulmonary disease, unspecified COPD type      apixaban 2.5 mg Tab Take 1 tablet (2.5 mg total) by mouth 2 (two) times daily.  Qty: 60 tablet, Refills: 1      ascorbic acid (VITAMIN C) 500 MG tablet Take 500 mg by mouth 2 (two) times daily.      citalopram (CELEXA) 20 MG tablet TAKE 1 TABLET BY MOUTH ONCE DAILY  Qty: 30 tablet, Refills: 2    Comments: This prescription was filled today. Any refills authorized will be placed on file.      cyanocobalamin (VITAMIN B-12) 100 MCG tablet Take 1 tablet (100 mcg total) by mouth once daily.  Qty: 90 tablet, Refills: 3    Associated Diagnoses: Macrocytic anemia      cyproheptadine (PERIACTIN) 4 mg tablet Take 1 tablet (4 mg total) by mouth 3 (three) times daily as needed.  Qty: 30 tablet, Refills: 2    Comments: This prescription was filled today. Any refills authorized will be placed on file.      fluticasone-vilanterol (BREO) 100-25 mcg/dose diskus inhaler Inhale 1 puff into the lungs once daily.  Qty: 60 each, Refills: 12    Associated Diagnoses: Chronic obstructive pulmonary disease, unspecified COPD type      furosemide (LASIX) 20 MG tablet Take 1 tablet (20 mg total) by mouth once daily.  Qty: 30 tablet, Refills: 1      guaifenesin (MUCINEX) 600 mg 12 hr tablet Take 2 tablets (1,200 mg total)  by mouth 2 (two) times daily. Okay to dispense box of medication  as prepackaged by .  Qty: 60 tablet, Refills: 6    Associated Diagnoses: Thick sputum      levalbuterol (XOPENEX) 0.31 mg/3 mL nebulizer solution Take 3 mLs (0.31 mg total) by nebulization 4 (four) times daily.  Qty: 300 mL, Refills: 12    Associated Diagnoses: Chronic airway obstruction, not elsewhere classified; Other dyspnea and respiratory abnormality      lisinopril (PRINIVIL,ZESTRIL) 2.5 MG tablet Take 1 tablet (2.5 mg total) by mouth once daily.  Qty: 30 tablet, Refills: 4      metoprolol succinate (TOPROL-XL) 50 MG 24 hr tablet Take 1 tablet (50 mg total) by mouth once daily.  Qty: 90 tablet, Refills: 0    Associated Diagnoses: Chronic systolic heart failure      tiotropium (SPIRIVA WITH HANDIHALER) 18 mcg inhalation capsule Inhale 18 mcg into the lungs once daily. Controller      alprazolam (XANAX) 0.25 MG tablet Take 1 tablet (0.25 mg total) by mouth nightly as needed for Anxiety.  Qty: 20 tablet, Refills: 0    Associated Diagnoses: Anxiety      aspirin (ECOTRIN) 81 MG EC tablet Take 1 tablet (81 mg total) by mouth once daily.  Refills: 0    Associated Diagnoses: Chronic systolic heart failure      multivitamin (ONE DAILY MULTIVITAMIN) per tablet Take 1 tablet by mouth once daily.           Time spent on the discharge of patient: 45 minutes    Carl Vang NP  Department of Hospital Medicine  [facility]

## 2017-04-12 NOTE — PLAN OF CARE
[facility] HOME HEALTH ORDERS  FACE TO FACE ENCOUNTER    Patient Name: Kaity Rebolledo  YOB: 1933    PCP: Samuel Soriano MD   PCP Address: [address] / [place] [address]  PCP Phone Number: [phone]  PCP Fax: [phone]    Encounter Date: 04/12/2017    Admit to Home Health    Diagnoses:  Active Hospital Problems    Diagnosis  POA    *Debility [R53.81]  Yes    Left leg pain [M79.605]  Yes    Anticoagulant long-term use [Z79.01]  Not Applicable    Acute on chronic respiratory failure with hypoxia and hypercapnia [J96.21, J96.22]  Yes    Paroxysmal atrial fibrillation [I48.0]  Yes    Anxiety and depression [F41.9, F32.9]  Yes    COPD, 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       Future Appointments  Date Time Provider Department Center   4/27/2017 10:40 AM Samuel Soriano MD [facility] [place] [facility]   6/29/2017 3:00 PM Brian Baeza MD [facility] [place]     Follow-up Information     Follow up with Samuel Soriano MD. Go on 4/27/2017.    Specialty:  Internal Medicine    Why:  Hospital follow up    Contact information:    [address]  [address]  [place] [address]  [phone]          Follow up with Mason Baeza MD. Go on 6/29/2017.    Specialty:  Pulmonary Disease    Why:  Follow up    Contact information:    [address]  [facility] [address]  [phone]              I have seen and examined this patient face to face today. My clinical findings that support the need for the home health skilled services and home bound status are the following:  Weakness/numbness causing balance and gait disturbance due to COPD Exacerbation and Weakness/Debility making it  taxing to leave home.    Allergies:  Review of patient's allergies indicates:   Allergen Reactions    Advair diskus  [fluticasone-salmeterol]      Other reaction(s): Nausea    Morphine Hallucinations    Pravastatin      Other reaction(s): Muscle pain       Diet: low fat, low cholesterol diet    Activities: activity as tolerated    Nursing:   SN to complete comprehensive assessment including routine vital signs. Instruct on disease process and s/s of complications to report to MD. Review/verify medication list sent home with the patient at time of discharge  and instruct patient/caregiver as needed. Frequency may be adjusted depending on start of care date.    Notify MD if SBP > 160 or < 90; DBP > 90 or < 50; HR > 120 or < 50; Temp > 101;       CONSULTS:    Physical Therapy to evaluate and treat. Evaluate for home safety and equipment needs; Establish/upgrade home exercise program. Perform / instruct on therapeutic exercises, gait training, transfer training, and Range of Motion.  Occupational Therapy to evaluate and treat. Evaluate home environment for safety and equipment needs. Perform/Instruct on transfers, ADL training, ROM, and therapeutic exercises.  Aide to provide assistance with personal care, ADLs, and vital signs.    MISCELLANEOUS CARE:  Heart Failure:      SN to instruct on the following:    Instruct on the definition of CHF.   Instruct on the signs/sympoms of CHF to be reported.   Instruct on and monitor daily weights.   Instruct on factors that cause exacerbation.   Instruct on action, dose, schedule, and side effects of medications.   Instruct on diet as prescribed.   Instruct on activity allowed.   Instruct on life-style modifications for life long management of CHF   SN to assess compliance with daily weights, diet, medications, fluid retention,    safety precautions, activities permitted and life-style modifications.   Additional 1-2 SN visits per week as needed for signs and symptoms     of CHF  exacerbation.      For Weight Gain > 2-3 lbs in 1 day or 4-6 lbs over 1 week notify PCP:    Home Oxygen:  Oxygen at 2 L/min nasal canula to be used:  Continuously.    WOUND CARE ORDERS  n/a      Medications: Review discharge medications with patient and family and provide education.      Current Discharge Medication List      START taking these medications    Details   doxycycline (VIBRA-TABS) 100 MG tablet Take 1 tablet (100 mg total) by mouth every 12 (twelve) hours.  Qty: 16 tablet, Refills: 0      nitroGLYCERIN (NITROSTAT) 0.4 MG SL tablet Place 1 tablet (0.4 mg total) under the tongue every 5 (five) minutes as needed for Chest pain.  Qty: 30 tablet, Refills: 1      potassium chloride (MICRO-K) 10 MEQ CpSR Take 1 capsule (10 mEq total) by mouth once daily.  Qty: 30 capsule, Refills: 3      predniSONE (DELTASONE) 10 MG tablet Take 2 tabs (20 mg) for 5 days, then 1 tab (10 mg) for 5 days then stop  Qty: 15 tablet, Refills: 0      senna-docusate 8.6-50 mg (PERICOLACE) 8.6-50 mg per tablet Take 1 tablet by mouth 2 (two) times daily.         CONTINUE these medications which have NOT CHANGED    Details   albuterol 90 mcg/actuation inhaler Inhale 2 puffs into the lungs every 6 (six) hours as needed for Wheezing or Shortness of Breath.  Qty: 6.7 g, Refills: 4    Comments: OK FOR GENERIC  Associated Diagnoses: Chronic obstructive pulmonary disease, unspecified COPD type      apixaban 2.5 mg Tab Take 1 tablet (2.5 mg total) by mouth 2 (two) times daily.  Qty: 60 tablet, Refills: 1      ascorbic acid (VITAMIN C) 500 MG tablet Take 500 mg by mouth 2 (two) times daily.      citalopram (CELEXA) 20 MG tablet TAKE 1 TABLET BY MOUTH ONCE DAILY  Qty: 30 tablet, Refills: 2    Comments: This prescription was filled today. Any refills authorized will be placed on file.      cyanocobalamin (VITAMIN B-12) 100 MCG tablet Take 1 tablet (100 mcg total) by mouth once daily.  Qty: 90 tablet, Refills: 3    Associated Diagnoses: Macrocytic  anemia      cyproheptadine (PERIACTIN) 4 mg tablet Take 1 tablet (4 mg total) by mouth 3 (three) times daily as needed.  Qty: 30 tablet, Refills: 2    Comments: This prescription was filled today. Any refills authorized will be placed on file.      fluticasone-vilanterol (BREO) 100-25 mcg/dose diskus inhaler Inhale 1 puff into the lungs once daily.  Qty: 60 each, Refills: 12    Associated Diagnoses: Chronic obstructive pulmonary disease, unspecified COPD type      furosemide (LASIX) 20 MG tablet Take 1 tablet (20 mg total) by mouth once daily.  Qty: 30 tablet, Refills: 1      guaifenesin (MUCINEX) 600 mg 12 hr tablet Take 2 tablets (1,200 mg total) by mouth 2 (two) times daily. Okay to dispense box of medication  as prepackaged by .  Qty: 60 tablet, Refills: 6    Associated Diagnoses: Thick sputum      levalbuterol (XOPENEX) 0.31 mg/3 mL nebulizer solution Take 3 mLs (0.31 mg total) by nebulization 4 (four) times daily.  Qty: 300 mL, Refills: 12    Associated Diagnoses: Chronic airway obstruction, not elsewhere classified; Other dyspnea and respiratory abnormality      lisinopril (PRINIVIL,ZESTRIL) 2.5 MG tablet Take 1 tablet (2.5 mg total) by mouth once daily.  Qty: 30 tablet, Refills: 4      metoprolol succinate (TOPROL-XL) 50 MG 24 hr tablet Take 1 tablet (50 mg total) by mouth once daily.  Qty: 90 tablet, Refills: 0    Associated Diagnoses: Chronic systolic heart failure      tiotropium (SPIRIVA WITH HANDIHALER) 18 mcg inhalation capsule Inhale 18 mcg into the lungs once daily. Controller      alprazolam (XANAX) 0.25 MG tablet Take 1 tablet (0.25 mg total) by mouth nightly as needed for Anxiety.  Qty: 20 tablet, Refills: 0    Associated Diagnoses: Anxiety      aspirin (ECOTRIN) 81 MG EC tablet Take 1 tablet (81 mg total) by mouth once daily.  Refills: 0    Associated Diagnoses: Chronic systolic heart failure      multivitamin (ONE DAILY MULTIVITAMIN) per tablet Take 1 tablet by mouth once daily.              I certify that this patient is confined to her home and needs physical therapy and occupational therapy.

## 2017-04-12 NOTE — PT/OT/SLP PROGRESS
Occupational Therapy  Treatment    Kaity Rebolledo   MRN: [id number]   Admitting Diagnosis: Debility    OT Date of Treatment: 17  Total Time (min): 35 min    Billable Minutes:  Self Care/Home Management 15 and Therapeutic Exercise 20    General Precautions: Standard, aspiration, fall, respiratory  Orthopedic Precautions: N/A  Braces: N/A    Do you have any cultural, spiritual, Mosque conflicts, given your current situation?: Restoration    Subjective:  Communicated with nurse prior to session.  I go home Friday. Hopefully sooner.     Pain Ratin/10              Pain Rating Post-Intervention: 0/10    Objective:  Patient found with: oxygen    Functional Status:  MDS G  Transfer Functional Status:SBA from w/c to BSC and sit to stand from w/c  Toilet Use Functional Status: SBA with useof BSC          OT Exercises: AROM with 1 # dowel x 5 sets 10 reps  Rickshaw with 5# x 5 sets 10 reps  Bilateral Hieu flat with no weight with useof LUE x 5 sets 10 reps    Additional Treatment:  Pt. Engaged in dynamic standing activity at table on this date with SBA x 5 minutes    Patient left up in chair with call button in reach    ASSESSMENT:  Kaity Rebolledo is a 83 y.o. female with a medical diagnosis of Debility and presents with deficits in endurance as well as higher level ADL tasks and functional mobility and would benefit from continued OT services.     Rehab identified problem list/impairments: impaired endurance, impaired self care skills, impaired functional mobilty    Rehab potential is good    Activity tolerance: Good    Discharge recommendations:  (custodial)     Barriers to discharge: None     Equipment recommendations: none     GOALS:   Occupational Therapy Goals        Problem: Occupational Therapy Goal    Goal Priority Disciplines Outcome Interventions   Occupational Therapy Goal     OT, PT/OT Ongoing (interventions implemented as appropriate)    Description:  Goals to be met by: 2 weeks     Patient will increase  functional independence with ADLs by performing:    Feeding with Set-up Assistance.  UE Dressing with Set-up Assistance.  LE Dressing with Minimal Assistance.  Grooming while seated with Set-up Assistance.  Toileting from bedside commode with SBA for hygiene and clothing management.   Bathing from  shower chair/bench with Minimal Assistance.  Supine to sit with Modified [place].  Stand pivot transfers with Supervision.  Toilet transfer to bedside commode with Supervision.  Pt. To demonstrate understanding of energy conservation techniques with ADL task performance.                  Plan:  Patient to be seen 5 x/week to address the above listed problems via self-care/home management, therapeutic activities, therapeutic exercises  Plan of Care expires: 05/06/17  Plan of Care reviewed with: patient    NEY Shields  04/12/2017

## 2017-04-12 NOTE — PROGRESS NOTES
Provider informed of EKG and ordered to troponin levels, bmp, phos, and mag levels. Blood drawn and sent per  to [facility]. Will continue to monitor. Patient currently sitting in wheelchair with no c/o chest pain . O2 per n/c as ordered.

## 2017-04-12 NOTE — PLAN OF CARE
Problem: Physical Therapy Goal  Goal: Physical Therapy Goal  Goals to be met by:10 days ()    Patient will increase functional independence with mobility by performin. Supine to sit with supervision. Met (2017)  2. Sit to supine with supervision.  3. Sit to stand transfer with Supervision  4. Bed to chair transfer with Supervision using Rolling Walker met (2017)  5. Gait x 150 feet with Supervision using Rolling Walker. Met (2017)  6. Wheelchair propulsion x 150 feet with Supervision using bilateral upper extremities  7. Ascend/Descend 5 inch curb step with Supervision using Rolling Walker.  8. Stand for 3 minutes with Supervision using Rolling Walker  9. Lower extremity exercise program x 20 reps per handout, with supervision   Outcome: Ongoing (interventions implemented as appropriate)  Pt met three goals today.

## 2017-04-13 ENCOUNTER — OFFICE VISIT (OUTPATIENT)
Dept: PULMONOLOGY | Facility: CLINIC | Age: 82
DRG: 190 | End: 2017-04-13
Attending: INTERNAL MEDICINE
Payer: MEDICARE

## 2017-04-13 VITALS
WEIGHT: 108 LBS | OXYGEN SATURATION: 92 % | HEIGHT: 65 IN | BODY MASS INDEX: 17.99 KG/M2 | RESPIRATION RATE: 16 BRPM | HEART RATE: 109 BPM | DIASTOLIC BLOOD PRESSURE: 60 MMHG | SYSTOLIC BLOOD PRESSURE: 100 MMHG

## 2017-04-13 DIAGNOSIS — J96.11 CHRONIC HYPOXEMIC RESPIRATORY FAILURE: ICD-10-CM

## 2017-04-13 DIAGNOSIS — I48.0 PAROXYSMAL ATRIAL FIBRILLATION: ICD-10-CM

## 2017-04-13 DIAGNOSIS — I25.10 CORONARY ARTERY DISEASE INVOLVING NATIVE CORONARY ARTERY OF NATIVE HEART WITHOUT ANGINA PECTORIS: ICD-10-CM

## 2017-04-13 DIAGNOSIS — I42.9 CARDIOMYOPATHY, UNSPECIFIED TYPE: ICD-10-CM

## 2017-04-13 DIAGNOSIS — J43.2 CENTRILOBULAR EMPHYSEMA: Primary | ICD-10-CM

## 2017-04-13 LAB
ANION GAP SERPL CALC-SCNC: 6 MMOL/L
BASOPHILS # BLD AUTO: 0.01 K/UL
BASOPHILS NFR BLD: 0.1 %
BUN SERPL-MCNC: 13 MG/DL
CALCIUM SERPL-MCNC: 9.3 MG/DL
CHLORIDE SERPL-SCNC: 94 MMOL/L
CO2 SERPL-SCNC: 39 MMOL/L
CREAT SERPL-MCNC: 0.8 MG/DL
DIFFERENTIAL METHOD: ABNORMAL
EOSINOPHIL # BLD AUTO: 0.1 K/UL
EOSINOPHIL NFR BLD: 0.6 %
ERYTHROCYTE [DISTWIDTH] IN BLOOD BY AUTOMATED COUNT: 13.5 %
EST. GFR  (AFRICAN AMERICAN): >60 ML/MIN/1.73 M^2
EST. GFR  (NON AFRICAN AMERICAN): >60 ML/MIN/1.73 M^2
GLUCOSE SERPL-MCNC: 83 MG/DL
HCT VFR BLD AUTO: 29.1 %
HGB BLD-MCNC: 8.7 G/DL
LYMPHOCYTES # BLD AUTO: 2.1 K/UL
LYMPHOCYTES NFR BLD: 19.7 %
MAGNESIUM SERPL-MCNC: 1.7 MG/DL
MCH RBC QN AUTO: 29.4 PG
MCHC RBC AUTO-ENTMCNC: 29.9 %
MCV RBC AUTO: 98 FL
MONOCYTES # BLD AUTO: 1 K/UL
MONOCYTES NFR BLD: 9 %
NEUTROPHILS # BLD AUTO: 7.7 K/UL
NEUTROPHILS NFR BLD: 70.6 %
PHOSPHATE SERPL-MCNC: 3.3 MG/DL
PLATELET # BLD AUTO: 485 K/UL
PMV BLD AUTO: 9.8 FL
POTASSIUM SERPL-SCNC: 4.2 MMOL/L
RBC # BLD AUTO: 2.96 M/UL
SODIUM SERPL-SCNC: 139 MMOL/L
WBC # BLD AUTO: 10.83 K/UL

## 2017-04-13 PROCEDURE — 25000003 PHARM REV CODE 250: Performed by: NURSE PRACTITIONER

## 2017-04-13 PROCEDURE — 1159F MED LIST DOCD IN RCRD: CPT | Mod: S$GLB,,, | Performed by: INTERNAL MEDICINE

## 2017-04-13 PROCEDURE — 3078F DIAST BP <80 MM HG: CPT | Mod: S$GLB,,, | Performed by: INTERNAL MEDICINE

## 2017-04-13 PROCEDURE — 85025 COMPLETE CBC W/AUTO DIFF WBC: CPT

## 2017-04-13 PROCEDURE — 99499 UNLISTED E&M SERVICE: CPT | Mod: S$GLB,,, | Performed by: INTERNAL MEDICINE

## 2017-04-13 PROCEDURE — 94664 DEMO&/EVAL PT USE INHALER: CPT

## 2017-04-13 PROCEDURE — 97110 THERAPEUTIC EXERCISES: CPT

## 2017-04-13 PROCEDURE — 99214 OFFICE O/P EST MOD 30 MIN: CPT | Mod: S$GLB,,, | Performed by: INTERNAL MEDICINE

## 2017-04-13 PROCEDURE — 83735 ASSAY OF MAGNESIUM: CPT

## 2017-04-13 PROCEDURE — 3074F SYST BP LT 130 MM HG: CPT | Mod: S$GLB,,, | Performed by: INTERNAL MEDICINE

## 2017-04-13 PROCEDURE — 94640 AIRWAY INHALATION TREATMENT: CPT

## 2017-04-13 PROCEDURE — 25000003 PHARM REV CODE 250: Performed by: HOSPITALIST

## 2017-04-13 PROCEDURE — 25000003 PHARM REV CODE 250: Performed by: INTERNAL MEDICINE

## 2017-04-13 PROCEDURE — 27000221 HC OXYGEN, UP TO 24 HOURS

## 2017-04-13 PROCEDURE — 97530 THERAPEUTIC ACTIVITIES: CPT

## 2017-04-13 PROCEDURE — 36415 COLL VENOUS BLD VENIPUNCTURE: CPT

## 2017-04-13 PROCEDURE — 1160F RVW MEDS BY RX/DR IN RCRD: CPT | Mod: S$GLB,,, | Performed by: INTERNAL MEDICINE

## 2017-04-13 PROCEDURE — 1157F ADVNC CARE PLAN IN RCRD: CPT | Mod: S$GLB,,, | Performed by: INTERNAL MEDICINE

## 2017-04-13 PROCEDURE — 63600175 PHARM REV CODE 636 W HCPCS: Performed by: INTERNAL MEDICINE

## 2017-04-13 PROCEDURE — 84100 ASSAY OF PHOSPHORUS: CPT

## 2017-04-13 PROCEDURE — 80048 BASIC METABOLIC PNL TOTAL CA: CPT

## 2017-04-13 PROCEDURE — 99999 PR PBB SHADOW E&M-EST. PATIENT-LVL III: CPT | Mod: PBBFAC,,, | Performed by: INTERNAL MEDICINE

## 2017-04-13 PROCEDURE — 11000004 HC SNF PRIVATE

## 2017-04-13 RX ORDER — PREDNISONE 10 MG/1
10 TABLET ORAL DAILY
Status: DISCONTINUED | OUTPATIENT
Start: 2017-04-19 | End: 2017-04-14 | Stop reason: HOSPADM

## 2017-04-13 RX ORDER — PREDNISONE 10 MG/1
TABLET ORAL
Qty: 15 TABLET | Refills: 0 | Status: ON HOLD | OUTPATIENT
Start: 2017-04-13 | End: 2017-05-01 | Stop reason: HOSPADM

## 2017-04-13 RX ORDER — PREDNISONE 20 MG/1
20 TABLET ORAL DAILY
Status: DISCONTINUED | OUTPATIENT
Start: 2017-04-14 | End: 2017-04-14 | Stop reason: HOSPADM

## 2017-04-13 RX ADMIN — GUAIFENESIN 1200 MG: 600 TABLET, EXTENDED RELEASE ORAL at 08:04

## 2017-04-13 RX ADMIN — LEVALBUTEROL 0.63 MG: 0.63 SOLUTION RESPIRATORY (INHALATION) at 07:04

## 2017-04-13 RX ADMIN — LEVALBUTEROL 0.63 MG: 0.63 SOLUTION RESPIRATORY (INHALATION) at 03:04

## 2017-04-13 RX ADMIN — THERA TABS 1 TABLET: TAB at 08:04

## 2017-04-13 RX ADMIN — STANDARDIZED SENNA CONCENTRATE AND DOCUSATE SODIUM 1 TABLET: 8.6; 5 TABLET, FILM COATED ORAL at 09:04

## 2017-04-13 RX ADMIN — OXYCODONE HYDROCHLORIDE AND ACETAMINOPHEN 500 MG: 500 TABLET ORAL at 08:04

## 2017-04-13 RX ADMIN — FUROSEMIDE 20 MG: 20 TABLET ORAL at 08:04

## 2017-04-13 RX ADMIN — TIOTROPIUM BROMIDE 18 MCG: 18 CAPSULE ORAL; RESPIRATORY (INHALATION) at 08:04

## 2017-04-13 RX ADMIN — APIXABAN 2.5 MG: 2.5 TABLET, FILM COATED ORAL at 08:04

## 2017-04-13 RX ADMIN — LEVALBUTEROL 0.63 MG: 0.63 SOLUTION RESPIRATORY (INHALATION) at 11:04

## 2017-04-13 RX ADMIN — METOPROLOL SUCCINATE 50 MG: 50 TABLET, EXTENDED RELEASE ORAL at 08:04

## 2017-04-13 RX ADMIN — CYANOCOBALAMIN TAB 250 MCG 250 MCG: 250 TAB at 08:04

## 2017-04-13 RX ADMIN — POTASSIUM CHLORIDE 10 MEQ: 750 CAPSULE, EXTENDED RELEASE ORAL at 08:04

## 2017-04-13 RX ADMIN — PREDNISONE 10 MG: 10 TABLET ORAL at 08:04

## 2017-04-13 RX ADMIN — OXYCODONE HYDROCHLORIDE AND ACETAMINOPHEN 500 MG: 500 TABLET ORAL at 09:04

## 2017-04-13 RX ADMIN — PANTOPRAZOLE SODIUM 40 MG: 40 TABLET, DELAYED RELEASE ORAL at 08:04

## 2017-04-13 RX ADMIN — ASPIRIN 81 MG: 81 TABLET, COATED ORAL at 08:04

## 2017-04-13 RX ADMIN — LISINOPRIL 2.5 MG: 2.5 TABLET ORAL at 08:04

## 2017-04-13 RX ADMIN — FLUTICASONE FUROATE AND VILANTEROL TRIFENATATE 1 PUFF: 100; 25 POWDER RESPIRATORY (INHALATION) at 08:04

## 2017-04-13 RX ADMIN — DOXYCYCLINE HYCLATE 100 MG: 100 TABLET, COATED ORAL at 08:04

## 2017-04-13 RX ADMIN — APIXABAN 2.5 MG: 2.5 TABLET, FILM COATED ORAL at 09:04

## 2017-04-13 RX ADMIN — CITALOPRAM HYDROBROMIDE 20 MG: 20 TABLET ORAL at 08:04

## 2017-04-13 RX ADMIN — GUAIFENESIN 1200 MG: 600 TABLET, EXTENDED RELEASE ORAL at 09:04

## 2017-04-13 RX ADMIN — DOXYCYCLINE HYCLATE 100 MG: 100 TABLET, COATED ORAL at 09:04

## 2017-04-13 NOTE — LETTER
April 13, 2017      Samuel Soriano MD  [address]  [address]  [facility] [address]           [facility]  [address]  [place] [address]  Phone: [phone]          Patient: Kaity Rebolledo   MR Number: [id number]   YOB: 1933   Date of Visit: 4/13/2017       Dear Dr. Samuel Soriano:    Thank you for referring Kaity Rebolledo to me for evaluation. Attached you will find relevant portions of my assessment and plan of care.    If you have questions, please do not hesitate to call me. I look forward to following Kaity Rebolledo along with you.    Sincerely,    IRMA Christian MD    Enclosure  CC:  No Recipients    If you would like to receive this communication electronically, please contact [email or web address] or [phone] to request more information on [facility] access.    For providers and/or their staff who would like to refer a patient to [facility], please contact us through our one-stop-shop provider referral line, [facility], at [phone].    If you feel you have received this communication in error or would no longer like to receive these types of communications, please e-mail [email or web address]

## 2017-04-13 NOTE — PT/OT/SLP PROGRESS
Occupational Therapy  Treatment    Kaity Rebolledo   MRN: [id number]   Admitting Diagnosis: Debility       Total Time (min): 40 min    Billable Minutes:  Therapeutic Activity 10 and Therapeutic Exercise 30    General Precautions: Standard, aspiration, fall, respiratory  Orthopedic Precautions: N/A  Braces: N/A    Do you have any cultural, spiritual, Congregational conflicts, given your current situation?: Samaritan    Subjective:  Communicated with pt prior to session.  Reported to nurse pt would like something to eat before appt    Pain Rating: 3/10  Location - Side: Right     Location: shoulder  Pain Addressed: Cessation of Activity  Pain Rating Post-Intervention: 3/10    Objective:  Patient found with: oxygen (in w/c)    Functional Status:  MDS G  Transfer Functional Status: Sup to stand at table x 7 min and perform a FM task  Locomotion Off Unit Functional Status: total(A) (w/c)  Moving from seated to standing position: Steady at all times  Surface-to-surface transfer (transfer between bed and chair or wheelchair): Not steady, but able to stabilize without staff assistance          OT Exercises: UE Ergometer 15 min for and reverse and 4 rest breaks to increase funcitonal endurance for daily tasks    Additional Treatment:  B kayce flat on table 10 x 2 no weight forward and reverse only--pt did c/o pain so discont  Ext rotation AROM 15reps x 2 sets with elbows adducted to increase shoulder strength and decrease pain from rotator cuff injury in past--no c/o pain  Yellow theraband rows with elbows adducted 10r x 3    Patient left up in chair with all lines intact, call button in reach and nursing notified    ASSESSMENT:  Pt toelrated tx well and demonstrated increased standing tolerance during functional tasks and UE there ex tolerance to decrease shoulder pain and increase functional endruance    Rehab identified problem list/impairments: impaired endurance, impaired self care skills, impaired functional mobilty    Rehab  potential is good    Activity tolerance: Fair    Discharge recommendations:  (FCI)     Barriers to discharge: None     Equipment recommendations: none     GOALS:   Occupational Therapy Goals        Problem: Occupational Therapy Goal    Goal Priority Disciplines Outcome Interventions   Occupational Therapy Goal     OT, PT/OT Ongoing (interventions implemented as appropriate)    Description:  Goals to be met by: 2 weeks     Patient will increase functional independence with ADLs by performing:    Feeding with Set-up Assistance.  UE Dressing with Set-up Assistance.  LE Dressing with Minimal Assistance.  Grooming while seated with Set-up Assistance.  Toileting from bedside commode with SBA for hygiene and clothing management.   Bathing from  shower chair/bench with Minimal Assistance.  Supine to sit with Modified [place].  Stand pivot transfers with Supervision.--met  Toilet transfer to bedside commode with Supervision.  Pt. To demonstrate understanding of energy conservation techniques with ADL task performance.--met                   Plan:  Patient to be seen 5 x/week to address the above listed problems via self-care/home management, therapeutic activities, therapeutic exercises  Plan of Care expires: 05/06/17  Plan of Care reviewed with: patient    NEY Morrison  04/13/2017

## 2017-04-13 NOTE — PLAN OF CARE
04/13/2017  11:07 AM    SW sent home health referral (via [facility]) to [facility] per pt's request.  Pt does not require DME to be ordered for SNF d/c.  Pt denies having any questions or concerns regarding d/c.  Pt to have daughter available to accompany and transport her home upon SNF d/c.    David Givens, [facility]  [id number]

## 2017-04-13 NOTE — PLAN OF CARE
Problem: Occupational Therapy Goal  Goal: Occupational Therapy Goal  Goals to be met by: 2 weeks     Patient will increase functional independence with ADLs by performing:    Feeding with Set-up Assistance.  UE Dressing with Set-up Assistance.  LE Dressing with Minimal Assistance.  Grooming while seated with Set-up Assistance.  Toileting from bedside commode with SBA for hygiene and clothing management.   Bathing from shower chair/bench with Minimal Assistance.  Supine to sit with Modified [place].  Stand pivot transfers with Supervision.--met  Toilet transfer to bedside commode with Supervision.  Pt. To demonstrate understanding of energy conservation techniques with ADL task performance.--met     Outcome: Ongoing (interventions implemented as appropriate)  Progressing. NEY Morrison  4/13/2017

## 2017-04-13 NOTE — PLAN OF CARE
Patient to discharge home ([facility]) tomorrow 4/14/2017 with assist of family. Patient set up with [facility] per EVAN Hernandez.     Future Appointments  Date Time Provider Department Center   4/27/2017 10:40 AM Samuel Soriano MD [facility]   6/29/2017 3:00 PM Brian Baeza MD [facility] [address]        04/13/17 1430   Final Note   Assessment Type Final Discharge Note   Discharge Disposition Home  ([facility])   Discharge planning education complete? Yes   What phone number can be called within the next 1-3 days to see how you are doing after discharge? [phone]   Hospital Follow Up  Appt(s) scheduled? Yes   Discharge plans and expectations educations in teach back method with documentation complete? Yes   Referral to / orders for Home Health Complete? Yes   Any social issues identified prior to discharge? No   Did you assess the readiness or willingness of the family or caregiver to support self management of care? Yes     Vivian Doran RN, CM Skilled  [id number]

## 2017-04-13 NOTE — PT/OT/SLP PROGRESS
Physical Therapy      Kaity Rebolledo  MRN: [id number]    Patient not seen today secondary to out for doctors appointment during 1st attempt and with lunch receiving treatment during the 2nd attempt. A member of the tx team will follow-up as determined by the pt plan of care.    Zhao Ackerman, PTA   04/13/2017

## 2017-04-14 VITALS
OXYGEN SATURATION: 95 % | DIASTOLIC BLOOD PRESSURE: 78 MMHG | HEART RATE: 104 BPM | TEMPERATURE: 98 F | SYSTOLIC BLOOD PRESSURE: 125 MMHG | HEIGHT: 65 IN | RESPIRATION RATE: 20 BRPM | BODY MASS INDEX: 17.63 KG/M2 | WEIGHT: 105.81 LBS

## 2017-04-14 PROCEDURE — 25000003 PHARM REV CODE 250: Performed by: INTERNAL MEDICINE

## 2017-04-14 PROCEDURE — 94664 DEMO&/EVAL PT USE INHALER: CPT

## 2017-04-14 PROCEDURE — 97110 THERAPEUTIC EXERCISES: CPT

## 2017-04-14 PROCEDURE — 97530 THERAPEUTIC ACTIVITIES: CPT

## 2017-04-14 PROCEDURE — 27000221 HC OXYGEN, UP TO 24 HOURS

## 2017-04-14 PROCEDURE — 94640 AIRWAY INHALATION TREATMENT: CPT

## 2017-04-14 PROCEDURE — 63600175 PHARM REV CODE 636 W HCPCS: Performed by: NURSE PRACTITIONER

## 2017-04-14 PROCEDURE — 25000003 PHARM REV CODE 250: Performed by: NURSE PRACTITIONER

## 2017-04-14 PROCEDURE — 25000003 PHARM REV CODE 250: Performed by: HOSPITALIST

## 2017-04-14 RX ADMIN — LISINOPRIL 2.5 MG: 2.5 TABLET ORAL at 09:04

## 2017-04-14 RX ADMIN — TIOTROPIUM BROMIDE 18 MCG: 18 CAPSULE ORAL; RESPIRATORY (INHALATION) at 10:04

## 2017-04-14 RX ADMIN — ASPIRIN 81 MG: 81 TABLET, COATED ORAL at 09:04

## 2017-04-14 RX ADMIN — APIXABAN 2.5 MG: 2.5 TABLET, FILM COATED ORAL at 09:04

## 2017-04-14 RX ADMIN — FUROSEMIDE 20 MG: 20 TABLET ORAL at 09:04

## 2017-04-14 RX ADMIN — THERA TABS 1 TABLET: TAB at 09:04

## 2017-04-14 RX ADMIN — POTASSIUM CHLORIDE 10 MEQ: 750 CAPSULE, EXTENDED RELEASE ORAL at 09:04

## 2017-04-14 RX ADMIN — PANTOPRAZOLE SODIUM 40 MG: 40 TABLET, DELAYED RELEASE ORAL at 09:04

## 2017-04-14 RX ADMIN — OXYCODONE HYDROCHLORIDE AND ACETAMINOPHEN 500 MG: 500 TABLET ORAL at 09:04

## 2017-04-14 RX ADMIN — CITALOPRAM HYDROBROMIDE 20 MG: 20 TABLET ORAL at 09:04

## 2017-04-14 RX ADMIN — METOPROLOL SUCCINATE 50 MG: 50 TABLET, EXTENDED RELEASE ORAL at 09:04

## 2017-04-14 RX ADMIN — LEVALBUTEROL 0.63 MG: 0.63 SOLUTION RESPIRATORY (INHALATION) at 07:04

## 2017-04-14 RX ADMIN — DOXYCYCLINE HYCLATE 100 MG: 100 TABLET, COATED ORAL at 09:04

## 2017-04-14 RX ADMIN — FLUTICASONE FUROATE AND VILANTEROL TRIFENATATE 1 PUFF: 100; 25 POWDER RESPIRATORY (INHALATION) at 10:04

## 2017-04-14 RX ADMIN — PREDNISONE 20 MG: 20 TABLET ORAL at 09:04

## 2017-04-14 RX ADMIN — GUAIFENESIN 1200 MG: 600 TABLET, EXTENDED RELEASE ORAL at 09:04

## 2017-04-14 RX ADMIN — CYANOCOBALAMIN TAB 250 MCG 250 MCG: 250 TAB at 10:04

## 2017-04-14 RX ADMIN — CYPROHEPTADINE HYDROCHLORIDE 4 MG: 4 TABLET ORAL at 09:04

## 2017-04-14 NOTE — PLAN OF CARE
Problem: Patient Care Overview  Goal: Plan of Care Review  Outcome: Ongoing (interventions implemented as appropriate)    04/14/17 1329   Coping/Psychosocial   Plan Of Care Reviewed With patient         Problem: Fall Risk (Adult)  Goal: Absence of Falls  Patient will demonstrate the desired outcomes by discharge/transition of care.   Outcome: Ongoing (interventions implemented as appropriate)    04/14/17 1329   Fall Risk (Adult)   Absence of Falls making progress toward outcome         Problem: Activity Intolerance (Adult)  Goal: Activity Tolerance  Patient will demonstrate the desired outcomes by discharge/transition of care.   Outcome: Ongoing (interventions implemented as appropriate)    04/14/17 1329   Activity Intolerance (Adult)   Activity Tolerance making progress toward outcome         Problem: Chronic Obstructive Pulmonary Disease (Adult)  Goal: Signs and Symptoms of Listed Potential Problems Will be Absent, Minimized or Managed (Chronic Obstructive Pulmonary Disease)  Signs and symptoms of listed potential problems will be absent, minimized or managed by discharge/transition of care (reference Chronic Obstructive Pulmonary Disease (Adult) CPG).   Outcome: Ongoing (interventions implemented as appropriate)    04/14/17 1329   Chronic Obstructive Pulmonary Disease   Problems Assessed (Chronic Obstructive Pulmonary Disease (COPD)) all   Problems Present (Chronic Obstructive Pulmonary Disease (COPD)) none         Comments:   Patient monitored every 1 to 2 hours for pain and safety.  Safety maintained.  Patient to be discharged to home today.  Patient instructed to call for assistance.Call  Light and persoanl items in reach.

## 2017-04-14 NOTE — PATIENT INSTRUCTIONS
Continue aerosol treatments with Xopenex 0.63 mg and Ipratropium 0.5 mg Q4 hrs,   around the clock (order faxed to [facility] living center at patient's request).  Extend Prednisone 20 mg QAM x 5, 10 mg QAM x 5, then stop.  Continue Mucinex 1200 mg twice daily.  Complete Doxycycline.  Continue other respiratory medications unchanged (roles reviewed at today's visit).

## 2017-04-14 NOTE — PT/OT/SLP PROGRESS
Occupational Therapy  Treatment/Discharge Summary    Kaity Rebolledo   MRN: [id number]   Admitting Diagnosis: Debility    OT Date of Treatment: 17  Total Time (min): 40 min    Billable Minutes:  Therapeutic Activity 10 and Therapeutic Exercise 30    General Precautions: Standard, aspiration, fall, respiratory  Orthopedic Precautions: N/A  Braces: N/A    Do you have any cultural, spiritual, Alevism conflicts, given your current situation?: Mosque    Subjective:  Communicated with patient prior to session.    Pain Ratin/10  Pain Rating Post-Intervention: 0/10    Objective:       Functional Status:  MDS G  Bed Mobility Functional Status: mod(I) - (I) supine<>sit with mod (I) HOB flat and no handrail  Transfer Functional Status: mod(I) - (I) Mod (I):  bed<>w/c stand pivot; w/c<>BSC stand pivot; bed>w/c stand pivot      OT Exercises:  UE Ergometer 15 min for improving endurance to increase independence with ADLs.   Rickshaw 25 x 4 10# for improving strength to increase independence with functional mobility.    Patient left up in chair with call button in reach and all needs met.     ASSESSMENT:  Kaity Rebolledo is a 83 y.o. female with a medical diagnosis of Debility and presents with the deficits listed below. Patient tolerated treatment session and was motivated to complete tasks. Patient declined ADLs and she performed them prior to OT session. Patient was unable to achieve all goals prior to discharge.       GOALS:   Occupational Therapy Goals        Problem: Occupational Therapy Goal    Goal Priority Disciplines Outcome Interventions   Occupational Therapy Goal     OT, PT/OT Unable to achieve all goals prior to discharge    Description:  Goals to be met by: 2 weeks     Patient will increase functional independence with ADLs by performing:    Feeding with Set-up Assistance.  UE Dressing with Set-up Assistance.  LE Dressing with Minimal Assistance.  Grooming while seated with Set-up Assistance.  Toileting  from bedside commode with SBA for hygiene and clothing management.   Bathing from  shower chair/bench with Minimal Assistance.  Supine to sit with Modified [place]. Met  Stand pivot transfers with Supervision.--met  Toilet transfer to bedside commode with Supervision. Met  Pt. To demonstrate understanding of energy conservation techniques with ADL task performance.--met                    Plan:  Patient was discharged from SNF on this date.   NEY Delgadillo  04/14/2017

## 2017-04-14 NOTE — PROGRESS NOTES
Subjective:       Patient ID: Kaity Rebolledo is a 83 y.o. female.    Chief Complaint: COPD; Cough; and Sputum Production    HPI HISTORY OF PRESENT ILLNESS:  Mrs. Rebolledo is an 83-year-old former smoker who   comes for assessment of advanced chronic obstructive lung disease and   respiratory failure.  She has a complicated past medical history, which includes   COPD and chronic respiratory failure as noted previously.  She also has atrial   fibrillation, coronary artery disease, cardiomyopathy, and recurrent episodes of   pneumonia.    Mrs. Rebolledo was hospitalized near the end of last month ([facility]) for   treatment of an acute exacerbation of COPD.  She was then discharged to the   [facility].  She anticipates being able to leave that facility tomorrow and   return to the assisted living center where she currently resides.    At present, Mrs. Rebolledo is having increased cough with sputum which is thick   in character.  This has become more of a problem in the last 2-3 days.  As a   result of these symptoms, she has begun taking doxycycline and a low-dose of   prednisone.  She also remains on maintenance bronchodilator medications that   include Xopenex aerosol treatments, BREO 100/25, Spiriva, and oxygen at 3 L per   minute.  In addition, she has been taking Mucinex and she uses an Acapella   device to help mobilize respiratory secretions.    DATA:  I reviewed the images from a chest x-ray done two days ago.  There do not   appear to be any acute cardiovascular abnormalities.  There are changes of   pulmonary overinflation reflecting her advanced chronic obstructive lung   disease.  Lung markings appear mildly accentuated in the bases, but there are no   areas of consolidation and no significant pleural effusions.      WALDEMAR/SONA  dd: 04/13/2017 19:56:20 (CDT)  td: 04/14/2017 11:01:29 (CDT)  Doc ID   #[id number]  Job ID #[id number]    CC:       Review of Systems   Constitutional: Positive for weakness. Negative for  fever and fatigue.   HENT: Negative for postnasal drip, sinus pressure, voice change and congestion.    Respiratory: Positive for cough, sputum production, shortness of breath and dyspnea on extertion. Negative for wheezing.    Cardiovascular: Positive for leg swelling. Negative for chest pain.   Genitourinary: Negative for difficulty urinating.   Musculoskeletal: Negative for arthralgias and back pain.   Skin: Negative for rash.   Gastrointestinal: Negative for abdominal pain and acid reflux.   Neurological: Negative for dizziness and weakness.   Hematological: Negative for adenopathy.       Objective:      Physical Exam   Constitutional: She is oriented to person, place, and time. She appears well-developed. She appears cachectic.   Chronically ill appearing, here in wheelchair.   HENT:   Head: Normocephalic.   Nose: Nose normal.   Mouth/Throat: No oropharyngeal exudate.   Neck: Normal range of motion. No JVD present. No tracheal deviation present. No thyromegaly present.   Cardiovascular: Normal rate, regular rhythm and normal heart sounds.    No murmur heard.  Regular Tachycardia.   Pulmonary/Chest: Hyperinflation. No stridor. She has decreased breath sounds. She has no wheezes. She has rhonchi. She has no rales. She exhibits no tenderness.   Abdominal: Soft. There is no tenderness.   Musculoskeletal: She exhibits edema (mild LE edema).   Lymphadenopathy:     She has no cervical adenopathy.   Neurological: She is alert and oriented to person, place, and time.   Skin: Skin is warm and dry. No rash noted. No erythema. Nails show no clubbing.   Psychiatric: She has a normal mood and affect.   Vitals reviewed.    Personal Diagnostic Review    No flowsheet data found.      Assessment:       1. Centrilobular emphysema    2. Chronic hypoxemic respiratory failure    3. Paroxysmal atrial fibrillation    4. Cardiomyopathy, unspecified type    5. Coronary artery disease involving native coronary artery of native heart  without angina pectoris        Facility-Administered Encounter Medications as of 4/13/2017   Medication Dose Route Frequency Provider Last Rate Last Dose    acetaminophen tablet 650 mg  650 mg Oral Q6H PRN Darlene Motta MD   650 mg at 04/12/17 2107    albuterol inhaler 2 puff  2 puff Inhalation Q6H PRN Darlene Motta MD        apixaban tablet 2.5 mg  2.5 mg Oral BID Darlene Motta MD   2.5 mg at 04/13/17 0831    ascorbic acid (vitamin C) tablet 500 mg  500 mg Oral BID Darlene Motta MD   500 mg at 04/13/17 0831    aspirin EC tablet 81 mg  81 mg Oral Daily Darlene Motta MD   81 mg at 04/13/17 0832    citalopram tablet 20 mg  20 mg Oral Daily Darlene Motta MD   20 mg at 04/13/17 0832    cyanocobalamin tablet 250 mcg  250 mcg Oral Daily Darlene Motta MD   250 mcg at 04/13/17 0832    cyproheptadine 4 mg tablet 4 mg  4 mg Oral TID PRN Darlene Motta MD        doxycycline tablet 100 mg  100 mg Oral Q12H Carl Vang NP   100 mg at 04/13/17 0832    fluticasone-vilanterol 100-25 mcg/dose diskus inhaler 1 puff  1 puff Inhalation Daily Darlene Motta MD   1 puff at 04/13/17 0832    furosemide tablet 20 mg  20 mg Oral Daily Darlene Motta MD   20 mg at 04/13/17 0832    guaifenesin 12 hr tablet 1,200 mg  1,200 mg Oral BID Darlene Motta MD   1,200 mg at 04/13/17 0831    levalbuterol nebulizer solution 0.63 mg  0.63 mg Nebulization Q6H Arabella Boyer MD   0.63 mg at 04/13/17 1501    lisinopril tablet 2.5 mg  2.5 mg Oral Daily Darlene Motta MD   2.5 mg at 04/13/17 0831    metoprolol succinate (TOPROL-XL) 24 hr tablet 50 mg  50 mg Oral Daily Darlene Motta MD   50 mg at 04/13/17 0832    multivitamin tablet 1 tablet  1 tablet Oral Daily Darlene Motta MD   1 tablet at 04/13/17 0832    nitroGLYCERIN SL tablet 0.4 mg  0.4 mg Sublingual Q5 Min PRN Kirk Martinez MD         pantoprazole EC tablet 40 mg  40 mg Oral Daily Arabella Boyer MD   40 mg at 04/13/17 0832    potassium chloride CR capsule 10 mEq  10 mEq Oral Daily Arabella Boyer MD   10 mEq at 04/13/17 0831    predniSONE tablet 10 mg  10 mg Oral Daily Arabella Boyer MD   10 mg at 04/13/17 0832    senna-docusate 8.6-50 mg per tablet 1 tablet  1 tablet Oral BID Arabella Boyer MD   1 tablet at 04/11/17 2130    tiotropium inhalation capsule 18 mcg  1 capsule Inhalation Daily Darlene Motta MD   18 mcg at 04/13/17 0832     Outpatient Encounter Prescriptions as of 4/13/2017   Medication Sig Dispense Refill    albuterol 90 mcg/actuation inhaler Inhale 2 puffs into the lungs every 6 (six) hours as needed for Wheezing or Shortness of Breath. 6.7 g 4    alprazolam (XANAX) 0.25 MG tablet Take 1 tablet (0.25 mg total) by mouth nightly as needed for Anxiety. 20 tablet 0    apixaban 2.5 mg Tab Take 1 tablet (2.5 mg total) by mouth 2 (two) times daily. 60 tablet 1    ascorbic acid (VITAMIN C) 500 MG tablet Take 500 mg by mouth 2 (two) times daily.      aspirin (ECOTRIN) 81 MG EC tablet Take 1 tablet (81 mg total) by mouth once daily.  0    citalopram (CELEXA) 20 MG tablet TAKE 1 TABLET BY MOUTH ONCE DAILY 30 tablet 2    cyanocobalamin (VITAMIN B-12) 100 MCG tablet Take 1 tablet (100 mcg total) by mouth once daily. 90 tablet 3    cyproheptadine (PERIACTIN) 4 mg tablet Take 1 tablet (4 mg total) by mouth 3 (three) times daily as needed. 30 tablet 2    doxycycline (VIBRA-TABS) 100 MG tablet Take 1 tablet (100 mg total) by mouth every 12 (twelve) hours. 16 tablet 0    fluticasone-vilanterol (BREO) 100-25 mcg/dose diskus inhaler Inhale 1 puff into the lungs once daily. 60 each 12    furosemide (LASIX) 20 MG tablet Take 1 tablet (20 mg total) by mouth once daily. 30 tablet 1    guaifenesin (MUCINEX) 600 mg 12 hr tablet Take 2 tablets (1,200 mg total) by mouth 2 (two) times daily. Okay to dispense box of medication  as  prepackaged by . 60 tablet 6    levalbuterol (XOPENEX) 0.31 mg/3 mL nebulizer solution Take 3 mLs (0.31 mg total) by nebulization 4 (four) times daily. 300 mL 12    lisinopril (PRINIVIL,ZESTRIL) 2.5 MG tablet Take 1 tablet (2.5 mg total) by mouth once daily. 30 tablet 4    metoprolol succinate (TOPROL-XL) 50 MG 24 hr tablet Take 1 tablet (50 mg total) by mouth once daily. 90 tablet 0    multivitamin (ONE DAILY MULTIVITAMIN) per tablet Take 1 tablet by mouth once daily.      nitroGLYCERIN (NITROSTAT) 0.4 MG SL tablet Place 1 tablet (0.4 mg total) under the tongue every 5 (five) minutes as needed for Chest pain. 30 tablet 1    potassium chloride (MICRO-K) 10 MEQ CpSR Take 1 capsule (10 mEq total) by mouth once daily. 30 capsule 3    senna-docusate 8.6-50 mg (PERICOLACE) 8.6-50 mg per tablet Take 1 tablet by mouth 2 (two) times daily.      tiotropium (SPIRIVA WITH HANDIHALER) 18 mcg inhalation capsule Inhale 18 mcg into the lungs once daily. Controller       No orders of the defined types were placed in this encounter.    Plan:     Continue aerosol treatments with Xopenex 0.63 mg and Ipratropium 0.5 mg Q4 hrs,   around the clock (order faxed to [facility] at patient's request).  Extend Prednisone 20 mg QAM x 5, 10 mg QAM x 5, then stop.  Continue Mucinex 1200 mg twice daily.  Complete Doxycycline.  Continue other respiratory medications unchanged (roles reviewed at today's visit).

## 2017-04-14 NOTE — PLAN OF CARE
Problem: Patient Care Overview  Goal: Plan of Care Review  Outcome: Ongoing (interventions implemented as appropriate)  Pt remained free from falls, injury and trauma throughout shift. Pt AAO x 4. Bed in lowest position and wheels locked. Pt instructed to call for assistance. Hourly rounds to monitor pt for safety and comfort. Personal items and call bell within reach. Pt denies pain. No signs or symptoms of distress.  Will continue to monitor pt.

## 2017-04-14 NOTE — PLAN OF CARE
Problem: Occupational Therapy Goal  Goal: Occupational Therapy Goal  Goals to be met by: 2 weeks     Patient will increase functional independence with ADLs by performing:    Feeding with Set-up Assistance.  UE Dressing with Set-up Assistance.  LE Dressing with Minimal Assistance.  Grooming while seated with Set-up Assistance.  Toileting from bedside commode with SBA for hygiene and clothing management.   Bathing from shower chair/bench with Minimal Assistance.  Supine to sit with Modified [place]. Met  Stand pivot transfers with Supervision.--met  Toilet transfer to bedside commode with Supervision. Met  Pt. To demonstrate understanding of energy conservation techniques with ADL task performance.--met     Outcome: Unable to achieve goals prior to discharge  Patient was discharged from SNF on this date.   NEY Delgadillo  4/14/2017

## 2017-04-18 ENCOUNTER — OUTPATIENT CASE MANAGEMENT (OUTPATIENT)
Dept: ADMINISTRATIVE | Facility: OTHER | Age: 82
End: 2017-04-18

## 2017-04-18 NOTE — PROGRESS NOTES
First attempt to perform initial assessment for OPCM; left voice message to return call.  CARLYN Wall, OPCM-RN

## 2017-04-21 ENCOUNTER — HOSPITAL ENCOUNTER (INPATIENT)
Facility: HOSPITAL | Age: 82
LOS: 10 days | Discharge: NURSING, REHAB OR LONG-TERM CARE | DRG: 470 | End: 2017-05-02
Attending: EMERGENCY MEDICINE | Admitting: EMERGENCY MEDICINE
Payer: MEDICARE

## 2017-04-21 DIAGNOSIS — D62 ACUTE BLOOD LOSS ANEMIA: ICD-10-CM

## 2017-04-21 DIAGNOSIS — S72.141A INTERTROCHANTERIC FRACTURE OF RIGHT FEMUR, CLOSED, INITIAL ENCOUNTER: ICD-10-CM

## 2017-04-21 DIAGNOSIS — I48.92 ATRIAL FLUTTER WITH RAPID VENTRICULAR RESPONSE: ICD-10-CM

## 2017-04-21 DIAGNOSIS — F32.A ANXIETY AND DEPRESSION: ICD-10-CM

## 2017-04-21 DIAGNOSIS — F41.9 ANXIETY AND DEPRESSION: ICD-10-CM

## 2017-04-21 DIAGNOSIS — D63.8 ANEMIA OF CHRONIC DISEASE: ICD-10-CM

## 2017-04-21 DIAGNOSIS — I50.22 CHRONIC SYSTOLIC HEART FAILURE: Chronic | ICD-10-CM

## 2017-04-21 DIAGNOSIS — S72.009D ENCOUNTER FOR AFTERCARE FOR HEALING CLOSED TRAUMATIC FRACTURE OF HIP: ICD-10-CM

## 2017-04-21 DIAGNOSIS — I50.32 CHRONIC DIASTOLIC HEART FAILURE: ICD-10-CM

## 2017-04-21 DIAGNOSIS — E87.1 HYPONATREMIA: ICD-10-CM

## 2017-04-21 DIAGNOSIS — R33.9 URINARY RETENTION: ICD-10-CM
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
## 2017-04-21 DIAGNOSIS — E83.39 HYPOPHOSPHATEMIA: ICD-10-CM

## 2017-04-21 DIAGNOSIS — S72.001A DISPLACED FRACTURE OF RIGHT FEMORAL NECK: ICD-10-CM

## 2017-04-21 DIAGNOSIS — S52.531A CLOSED COLLES' FRACTURE OF RIGHT RADIUS: ICD-10-CM

## 2017-04-21 DIAGNOSIS — S72.001A: ICD-10-CM

## 2017-04-21 DIAGNOSIS — I10 ESSENTIAL HYPERTENSION: ICD-10-CM

## 2017-04-21 DIAGNOSIS — M80.00XD AGE-RELATED OSTEOPOROSIS WITH CURRENT PATHOLOGICAL FRACTURE WITH ROUTINE HEALING, SUBSEQUENT ENCOUNTER: ICD-10-CM
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
## 2017-04-21 DIAGNOSIS — I25.10 CORONARY ARTERY DISEASE INVOLVING NATIVE CORONARY ARTERY OF NATIVE HEART WITHOUT ANGINA PECTORIS: ICD-10-CM

## 2017-04-21 DIAGNOSIS — S72.001A CLOSED DISPLACED FRACTURE OF NECK OF RIGHT FEMUR: Primary | ICD-10-CM

## 2017-04-21 DIAGNOSIS — J96.11 CHRONIC HYPOXEMIC RESPIRATORY FAILURE: ICD-10-CM
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
## 2017-04-21 DIAGNOSIS — Z79.01 ANTICOAGULANT LONG-TERM USE: ICD-10-CM

## 2017-04-21 DIAGNOSIS — I48.0 PAROXYSMAL ATRIAL FIBRILLATION: ICD-10-CM

## 2017-04-21 DIAGNOSIS — S52.531D CLOSED COLLES' FRACTURE OF RIGHT RADIUS WITH ROUTINE HEALING, SUBSEQUENT ENCOUNTER: ICD-10-CM

## 2017-04-21 DIAGNOSIS — S52.531A CLOSED COLLES' FRACTURE OF RIGHT RADIUS, INITIAL ENCOUNTER: ICD-10-CM

## 2017-04-21 DIAGNOSIS — E55.9 VITAMIN D DEFICIENCY DISEASE: ICD-10-CM

## 2017-04-21 DIAGNOSIS — E83.42 HYPOMAGNESEMIA: ICD-10-CM

## 2017-04-21 DIAGNOSIS — J44.9 CHRONIC OBSTRUCTIVE PULMONARY DISEASE, UNSPECIFIED COPD TYPE: ICD-10-CM

## 2017-04-21 DIAGNOSIS — I50.30 (HFPEF) HEART FAILURE WITH PRESERVED EJECTION FRACTION: ICD-10-CM

## 2017-04-21 PROCEDURE — 51702 INSERT TEMP BLADDER CATH: CPT

## 2017-04-21 PROCEDURE — 96376 TX/PRO/DX INJ SAME DRUG ADON: CPT

## 2017-04-21 PROCEDURE — 99285 EMERGENCY DEPT VISIT HI MDM: CPT | Mod: ,,, | Performed by: EMERGENCY MEDICINE

## 2017-04-21 PROCEDURE — 96375 TX/PRO/DX INJ NEW DRUG ADDON: CPT

## 2017-04-21 PROCEDURE — 99285 EMERGENCY DEPT VISIT HI MDM: CPT | Mod: 25

## 2017-04-21 PROCEDURE — 63600175 PHARM REV CODE 636 W HCPCS: Performed by: STUDENT IN AN ORGANIZED HEALTH CARE EDUCATION/TRAINING PROGRAM

## 2017-04-21 PROCEDURE — 96374 THER/PROPH/DIAG INJ IV PUSH: CPT

## 2017-04-21 RX ORDER — FENTANYL CITRATE 50 UG/ML
25 INJECTION, SOLUTION INTRAMUSCULAR; INTRAVENOUS
Status: COMPLETED | OUTPATIENT
Start: 2017-04-21 | End: 2017-04-21

## 2017-04-21 RX ADMIN — FENTANYL CITRATE 25 MCG: 50 INJECTION INTRAMUSCULAR; INTRAVENOUS at 11:04

## 2017-04-21 RX ADMIN — FENTANYL CITRATE 25 MCG: 50 INJECTION INTRAMUSCULAR; INTRAVENOUS at 10:04

## 2017-04-22 ENCOUNTER — ANESTHESIA EVENT (OUTPATIENT)
Dept: SURGERY | Facility: HOSPITAL | Age: 82
DRG: 470 | End: 2017-04-22
Payer: MEDICARE

## 2017-04-22 PROBLEM — S72.141A INTERTROCHANTERIC FRACTURE OF RIGHT FEMUR: Status: ACTIVE | Noted: 2017-04-22

## 2017-04-22 PROBLEM — S72.001A DISPLACED FRACTURE OF RIGHT FEMORAL NECK: Status: ACTIVE | Noted: 2017-04-22

## 2017-04-22 PROBLEM — S52.531A CLOSED COLLES' FRACTURE OF RIGHT RADIUS: Status: ACTIVE | Noted: 2017-04-22

## 2017-04-22 LAB
ABO + RH BLD: NORMAL
ALBUMIN SERPL BCP-MCNC: 3.3 G/DL
ALLENS TEST: ABNORMAL
ALP SERPL-CCNC: 60 U/L
ALT SERPL W/O P-5'-P-CCNC: 29 U/L
ANION GAP SERPL CALC-SCNC: 9 MMOL/L
APTT BLDCRRT: 21.1 SEC
AST SERPL-CCNC: 24 U/L
BASOPHILS # BLD AUTO: 0.01 K/UL
BASOPHILS NFR BLD: 0 %
BILIRUB SERPL-MCNC: 0.4 MG/DL
BILIRUB UR QL STRIP: NEGATIVE
BLD GP AB SCN CELLS X3 SERPL QL: NORMAL
BUN SERPL-MCNC: 22 MG/DL
CALCIUM SERPL-MCNC: 9.3 MG/DL
CHLORIDE SERPL-SCNC: 95 MMOL/L
CLARITY UR REFRACT.AUTO: CLEAR
CO2 SERPL-SCNC: 27 MMOL/L
COLOR UR AUTO: YELLOW
CREAT SERPL-MCNC: 1.1 MG/DL
DELSYS: ABNORMAL
DIFFERENTIAL METHOD: ABNORMAL
EOSINOPHIL # BLD AUTO: 0.1 K/UL
EOSINOPHIL NFR BLD: 0.3 %
ERYTHROCYTE [DISTWIDTH] IN BLOOD BY AUTOMATED COUNT: 13.7 %
EST. GFR  (AFRICAN AMERICAN): 53.7 ML/MIN/1.73 M^2
EST. GFR  (NON AFRICAN AMERICAN): 46.5 ML/MIN/1.73 M^2
FIO2: 34
FLOW: 3.5
GLUCOSE SERPL-MCNC: 98 MG/DL
GLUCOSE UR QL STRIP: NEGATIVE
HCO3 UR-SCNC: 31.1 MMOL/L (ref 24–28)
HCT VFR BLD AUTO: 25.9 %
HGB BLD-MCNC: 8.8 G/DL
HGB UR QL STRIP: NEGATIVE
INR PPP: 1
KETONES UR QL STRIP: NEGATIVE
LEUKOCYTE ESTERASE UR QL STRIP: NEGATIVE
LYMPHOCYTES # BLD AUTO: 1.5 K/UL
LYMPHOCYTES NFR BLD: 7.2 %
MCH RBC QN AUTO: 30.2 PG
MCHC RBC AUTO-ENTMCNC: 34 %
MCV RBC AUTO: 89 FL
MICROSCOPIC COMMENT: NORMAL
MODE: ABNORMAL
MONOCYTES # BLD AUTO: 1 K/UL
MONOCYTES NFR BLD: 5.1 %
NEUTROPHILS # BLD AUTO: 17.7 K/UL
NEUTROPHILS NFR BLD: 87.1 %
NITRITE UR QL STRIP: NEGATIVE
PCO2 BLDA: 59.6 MMHG (ref 35–45)
PH SMN: 7.33 [PH] (ref 7.35–7.45)
PH UR STRIP: 5 [PH] (ref 5–8)
PLATELET # BLD AUTO: 374 K/UL
PMV BLD AUTO: 9.2 FL
PO2 BLDA: 70 MMHG (ref 80–100)
POC BE: 5 MMOL/L
POC SATURATED O2: 92 % (ref 95–100)
POC TCO2: 33 MMOL/L (ref 23–27)
POTASSIUM SERPL-SCNC: 4.3 MMOL/L
PROT SERPL-MCNC: 6.7 G/DL
PROT UR QL STRIP: NEGATIVE
PROTHROMBIN TIME: 10.3 SEC
RBC # BLD AUTO: 2.91 M/UL
RBC #/AREA URNS AUTO: 1 /HPF (ref 0–4)
SAMPLE: ABNORMAL
SITE: ABNORMAL
SODIUM SERPL-SCNC: 131 MMOL/L
SP GR UR STRIP: 1.01 (ref 1–1.03)
URN SPEC COLLECT METH UR: NORMAL
UROBILINOGEN UR STRIP-ACNC: NEGATIVE EU/DL
WBC # BLD AUTO: 20.29 K/UL
WBC #/AREA URNS AUTO: 1 /HPF (ref 0–5)

## 2017-04-22 PROCEDURE — 0RCN3ZZ EXTIRPATION OF MATTER FROM RIGHT WRIST JOINT, PERCUTANEOUS APPROACH: ICD-10-PCS | Performed by: ORTHOPAEDIC SURGERY

## 2017-04-22 PROCEDURE — 63600175 PHARM REV CODE 636 W HCPCS: Performed by: HOSPITALIST

## 2017-04-22 PROCEDURE — 11000001 HC ACUTE MED/SURG PRIVATE ROOM

## 2017-04-22 PROCEDURE — 25000003 PHARM REV CODE 250: Performed by: STUDENT IN AN ORGANIZED HEALTH CARE EDUCATION/TRAINING PROGRAM

## 2017-04-22 PROCEDURE — 81001 URINALYSIS AUTO W/SCOPE: CPT

## 2017-04-22 PROCEDURE — 63600175 PHARM REV CODE 636 W HCPCS: Performed by: ORTHOPAEDIC SURGERY

## 2017-04-22 PROCEDURE — 80053 COMPREHEN METABOLIC PANEL: CPT

## 2017-04-22 PROCEDURE — 86900 BLOOD TYPING SEROLOGIC ABO: CPT

## 2017-04-22 PROCEDURE — 0PSHXZZ REPOSITION RIGHT RADIUS, EXTERNAL APPROACH: ICD-10-PCS | Performed by: ORTHOPAEDIC SURGERY

## 2017-04-22 PROCEDURE — 85730 THROMBOPLASTIN TIME PARTIAL: CPT

## 2017-04-22 PROCEDURE — 99900035 HC TECH TIME PER 15 MIN (STAT)

## 2017-04-22 PROCEDURE — 63600175 PHARM REV CODE 636 W HCPCS: Performed by: EMERGENCY MEDICINE

## 2017-04-22 PROCEDURE — 85610 PROTHROMBIN TIME: CPT

## 2017-04-22 PROCEDURE — 99223 1ST HOSP IP/OBS HIGH 75: CPT | Mod: AI,,, | Performed by: HOSPITALIST

## 2017-04-22 PROCEDURE — 82803 BLOOD GASES ANY COMBINATION: CPT

## 2017-04-22 PROCEDURE — 85025 COMPLETE CBC W/AUTO DIFF WBC: CPT

## 2017-04-22 PROCEDURE — 86920 COMPATIBILITY TEST SPIN: CPT

## 2017-04-22 PROCEDURE — 25000003 PHARM REV CODE 250: Performed by: HOSPITALIST

## 2017-04-22 PROCEDURE — 94640 AIRWAY INHALATION TREATMENT: CPT

## 2017-04-22 PROCEDURE — 86901 BLOOD TYPING SEROLOGIC RH(D): CPT

## 2017-04-22 PROCEDURE — 25000242 PHARM REV CODE 250 ALT 637 W/ HCPCS: Performed by: HOSPITALIST

## 2017-04-22 PROCEDURE — 36600 WITHDRAWAL OF ARTERIAL BLOOD: CPT

## 2017-04-22 RX ORDER — CEFAZOLIN SODIUM 2 G/50ML
2 SOLUTION INTRAVENOUS
Status: DISCONTINUED | OUTPATIENT
Start: 2017-04-22 | End: 2017-04-23

## 2017-04-22 RX ORDER — ONDANSETRON 2 MG/ML
4 INJECTION INTRAMUSCULAR; INTRAVENOUS EVERY 6 HOURS PRN
Status: DISCONTINUED | OUTPATIENT
Start: 2017-04-22 | End: 2017-05-02 | Stop reason: HOSPADM

## 2017-04-22 RX ORDER — GUAIFENESIN 600 MG/1
1200 TABLET, EXTENDED RELEASE ORAL 2 TIMES DAILY
Status: DISCONTINUED | OUTPATIENT
Start: 2017-04-22 | End: 2017-05-02 | Stop reason: HOSPADM

## 2017-04-22 RX ORDER — METOPROLOL SUCCINATE 50 MG/1
50 TABLET, EXTENDED RELEASE ORAL DAILY
Status: DISCONTINUED | OUTPATIENT
Start: 2017-04-22 | End: 2017-04-29

## 2017-04-22 RX ORDER — NITROGLYCERIN 0.4 MG/1
0.4 TABLET SUBLINGUAL EVERY 5 MIN PRN
Status: DISCONTINUED | OUTPATIENT
Start: 2017-04-22 | End: 2017-05-02 | Stop reason: HOSPADM

## 2017-04-22 RX ORDER — SODIUM CHLORIDE 0.9 % (FLUSH) 0.9 %
3 SYRINGE (ML) INJECTION EVERY 8 HOURS
Status: DISCONTINUED | OUTPATIENT
Start: 2017-04-22 | End: 2017-05-02 | Stop reason: HOSPADM

## 2017-04-22 RX ORDER — LIDOCAINE HYDROCHLORIDE AND EPINEPHRINE 10; 10 MG/ML; UG/ML
10 INJECTION, SOLUTION INFILTRATION; PERINEURAL ONCE
Status: COMPLETED | OUTPATIENT
Start: 2017-04-22 | End: 2017-04-22

## 2017-04-22 RX ORDER — HYDROMORPHONE HYDROCHLORIDE 1 MG/ML
0.5 INJECTION, SOLUTION INTRAMUSCULAR; INTRAVENOUS; SUBCUTANEOUS EVERY 4 HOURS PRN
Status: DISCONTINUED | OUTPATIENT
Start: 2017-04-22 | End: 2017-04-25

## 2017-04-22 RX ORDER — CITALOPRAM 20 MG/1
20 TABLET, FILM COATED ORAL DAILY
Status: DISCONTINUED | OUTPATIENT
Start: 2017-04-22 | End: 2017-05-02 | Stop reason: HOSPADM

## 2017-04-22 RX ORDER — DIAZEPAM 5 MG/1
10 TABLET ORAL
Status: COMPLETED | OUTPATIENT
Start: 2017-04-22 | End: 2017-04-22

## 2017-04-22 RX ORDER — CYANOCOBALAMIN (VITAMIN B-12) 250 MCG
250 TABLET ORAL DAILY
Status: DISCONTINUED | OUTPATIENT
Start: 2017-04-22 | End: 2017-05-02 | Stop reason: HOSPADM

## 2017-04-22 RX ORDER — ALPRAZOLAM 0.25 MG/1
0.25 TABLET ORAL 2 TIMES DAILY PRN
Status: DISCONTINUED | OUTPATIENT
Start: 2017-04-22 | End: 2017-04-28

## 2017-04-22 RX ORDER — OXYCODONE HYDROCHLORIDE 5 MG/1
5 TABLET ORAL EVERY 4 HOURS PRN
Status: DISCONTINUED | OUTPATIENT
Start: 2017-04-22 | End: 2017-04-25

## 2017-04-22 RX ORDER — FLUTICASONE FUROATE AND VILANTEROL 100; 25 UG/1; UG/1
1 POWDER RESPIRATORY (INHALATION) DAILY
Status: DISCONTINUED | OUTPATIENT
Start: 2017-04-22 | End: 2017-05-02 | Stop reason: HOSPADM

## 2017-04-22 RX ORDER — FENTANYL CITRATE 50 UG/ML
25 INJECTION, SOLUTION INTRAMUSCULAR; INTRAVENOUS ONCE
Status: COMPLETED | OUTPATIENT
Start: 2017-04-22 | End: 2017-04-22

## 2017-04-22 RX ORDER — LEVALBUTEROL INHALATION SOLUTION 0.63 MG/3ML
0.31 SOLUTION RESPIRATORY (INHALATION) 4 TIMES DAILY
Status: DISCONTINUED | OUTPATIENT
Start: 2017-04-22 | End: 2017-05-02

## 2017-04-22 RX ORDER — ACETAMINOPHEN 325 MG/1
650 TABLET ORAL EVERY 6 HOURS PRN
Status: DISCONTINUED | OUTPATIENT
Start: 2017-04-22 | End: 2017-05-02 | Stop reason: HOSPADM

## 2017-04-22 RX ORDER — PREDNISONE 10 MG/1
10 TABLET ORAL DAILY
Status: COMPLETED | OUTPATIENT
Start: 2017-04-22 | End: 2017-04-22

## 2017-04-22 RX ORDER — IPRATROPIUM BROMIDE 0.5 MG/2.5ML
0.5 SOLUTION RESPIRATORY (INHALATION) EVERY 4 HOURS
Status: DISCONTINUED | OUTPATIENT
Start: 2017-04-22 | End: 2017-05-02 | Stop reason: HOSPADM

## 2017-04-22 RX ORDER — SODIUM CHLORIDE 9 MG/ML
INJECTION, SOLUTION INTRAVENOUS ONCE
Status: COMPLETED | OUTPATIENT
Start: 2017-04-22 | End: 2017-04-22

## 2017-04-22 RX ORDER — MUPIROCIN 20 MG/G
1 OINTMENT TOPICAL
Status: DISCONTINUED | OUTPATIENT
Start: 2017-04-22 | End: 2017-04-23

## 2017-04-22 RX ORDER — TIOTROPIUM BROMIDE 18 UG/1
18 CAPSULE ORAL; RESPIRATORY (INHALATION) DAILY
Status: DISCONTINUED | OUTPATIENT
Start: 2017-04-22 | End: 2017-05-02 | Stop reason: HOSPADM

## 2017-04-22 RX ORDER — HYDROMORPHONE HYDROCHLORIDE 1 MG/ML
0.25 INJECTION, SOLUTION INTRAMUSCULAR; INTRAVENOUS; SUBCUTANEOUS EVERY 4 HOURS PRN
Status: DISCONTINUED | OUTPATIENT
Start: 2017-04-22 | End: 2017-04-22

## 2017-04-22 RX ORDER — AMOXICILLIN 250 MG
1 CAPSULE ORAL 2 TIMES DAILY
Status: DISCONTINUED | OUTPATIENT
Start: 2017-04-22 | End: 2017-05-02 | Stop reason: HOSPADM

## 2017-04-22 RX ADMIN — STANDARDIZED SENNA CONCENTRATE AND DOCUSATE SODIUM 1 TABLET: 8.6; 5 TABLET, FILM COATED ORAL at 08:04

## 2017-04-22 RX ADMIN — CITALOPRAM HYDROBROMIDE 20 MG: 20 TABLET ORAL at 11:04

## 2017-04-22 RX ADMIN — STANDARDIZED SENNA CONCENTRATE AND DOCUSATE SODIUM 1 TABLET: 8.6; 5 TABLET, FILM COATED ORAL at 09:04

## 2017-04-22 RX ADMIN — LEVALBUTEROL 0.31 MG: 0.63 SOLUTION RESPIRATORY (INHALATION) at 08:04

## 2017-04-22 RX ADMIN — IPRATROPIUM BROMIDE 0.5 MG: 0.5 SOLUTION RESPIRATORY (INHALATION) at 12:04

## 2017-04-22 RX ADMIN — THERA TABS 1 TABLET: TAB at 11:04

## 2017-04-22 RX ADMIN — IPRATROPIUM BROMIDE 0.5 MG: 0.5 SOLUTION RESPIRATORY (INHALATION) at 03:04

## 2017-04-22 RX ADMIN — LIDOCAINE HYDROCHLORIDE,EPINEPHRINE BITARTRATE 10 ML: 10; .01 INJECTION, SOLUTION INFILTRATION; PERINEURAL at 12:04

## 2017-04-22 RX ADMIN — OXYCODONE HYDROCHLORIDE 5 MG: 5 TABLET ORAL at 08:04

## 2017-04-22 RX ADMIN — Medication 3 ML: at 09:04

## 2017-04-22 RX ADMIN — HYDROMORPHONE HYDROCHLORIDE 0.5 MG: 1 INJECTION, SOLUTION INTRAMUSCULAR; INTRAVENOUS; SUBCUTANEOUS at 07:04

## 2017-04-22 RX ADMIN — HYDROCORTISONE SODIUM SUCCINATE 25 MG: 100 INJECTION, POWDER, FOR SOLUTION INTRAMUSCULAR; INTRAVENOUS at 09:04

## 2017-04-22 RX ADMIN — PANTOPRAZOLE SODIUM 1200 MG: 40 TABLET, DELAYED RELEASE ORAL at 08:04

## 2017-04-22 RX ADMIN — Medication 3 ML: at 07:04

## 2017-04-22 RX ADMIN — PREDNISONE 10 MG: 20 TABLET ORAL at 11:04

## 2017-04-22 RX ADMIN — SODIUM CHLORIDE: 0.9 INJECTION, SOLUTION INTRAVENOUS at 11:04

## 2017-04-22 RX ADMIN — CYANOCOBALAMIN TAB 250 MCG 250 MCG: 250 TAB at 11:04

## 2017-04-22 RX ADMIN — PANTOPRAZOLE SODIUM 1200 MG: 40 TABLET, DELAYED RELEASE ORAL at 11:04

## 2017-04-22 RX ADMIN — HYDROMORPHONE HYDROCHLORIDE 0.25 MG: 1 INJECTION, SOLUTION INTRAMUSCULAR; INTRAVENOUS; SUBCUTANEOUS at 02:04

## 2017-04-22 RX ADMIN — OXYCODONE HYDROCHLORIDE 5 MG: 5 TABLET ORAL at 11:04

## 2017-04-22 RX ADMIN — OXYCODONE HYDROCHLORIDE 5 MG: 5 TABLET ORAL at 03:04

## 2017-04-22 RX ADMIN — METOPROLOL SUCCINATE 50 MG: 50 TABLET, EXTENDED RELEASE ORAL at 11:04

## 2017-04-22 RX ADMIN — IPRATROPIUM BROMIDE 0.5 MG: 0.5 SOLUTION RESPIRATORY (INHALATION) at 08:04

## 2017-04-22 RX ADMIN — FLUTICASONE FUROATE AND VILANTEROL TRIFENATATE 1 PUFF: 100; 25 POWDER RESPIRATORY (INHALATION) at 01:04

## 2017-04-22 RX ADMIN — HYDROCORTISONE SODIUM SUCCINATE: 100 INJECTION, POWDER, FOR SOLUTION INTRAMUSCULAR; INTRAVENOUS at 11:04

## 2017-04-22 RX ADMIN — DIAZEPAM 10 MG: 5 TABLET ORAL at 12:04

## 2017-04-22 RX ADMIN — ONDANSETRON 4 MG: 2 INJECTION INTRAMUSCULAR; INTRAVENOUS at 11:04

## 2017-04-22 RX ADMIN — FENTANYL CITRATE 25 MCG: 50 INJECTION INTRAMUSCULAR; INTRAVENOUS at 01:04

## 2017-04-22 NOTE — SUBJECTIVE & OBJECTIVE
Past Medical History:   Diagnosis Date    Atrial fibrillation with RVR 3/4/2017    CAD (coronary artery disease) 3/14/2016    Cardiomyopathy 9/24/2015    CHF (congestive heart failure) 10/14/2015    COPD (chronic obstructive pulmonary disease)     COPD exacerbation 3/14/2016    Fall     patient  in  wheel  chair    Hyperlipidemia     Hypertension     Osteoporosis     Pneumonia     Rotator cuff injury     R s/p therapy       Past Surgical History:   Procedure Laterality Date    CATARACT EXTRACTION BILATERAL W/ ANTERIOR VITRECTOMY      EYE SURGERY      FRACTURE SURGERY      LEG SURGERY         Review of patient's allergies indicates:   Allergen Reactions    Advair diskus  [fluticasone-salmeterol]      Other reaction(s): Nausea    Morphine Hallucinations    Pravastatin      Other reaction(s): Muscle pain       No current facility-administered medications on file prior to encounter.      Current Outpatient Prescriptions on File Prior to Encounter   Medication Sig    albuterol 90 mcg/actuation inhaler Inhale 2 puffs into the lungs every 6 (six) hours as needed for Wheezing or Shortness of Breath.    alprazolam (XANAX) 0.25 MG tablet Take 1 tablet (0.25 mg total) by mouth nightly as needed for Anxiety.    apixaban 2.5 mg Tab Take 1 tablet (2.5 mg total) by mouth 2 (two) times daily.    ascorbic acid (VITAMIN C) 500 MG tablet Take 500 mg by mouth 2 (two) times daily.    aspirin (ECOTRIN) 81 MG EC tablet Take 1 tablet (81 mg total) by mouth once daily.    citalopram (CELEXA) 20 MG tablet TAKE 1 TABLET BY MOUTH ONCE DAILY    cyanocobalamin (VITAMIN B-12) 100 MCG tablet Take 1 tablet (100 mcg total) by mouth once daily.    cyproheptadine (PERIACTIN) 4 mg tablet Take 1 tablet (4 mg total) by mouth 3 (three) times daily as needed.    doxycycline (VIBRA-TABS) 100 MG tablet Take 1 tablet (100 mg total) by mouth every 12 (twelve) hours.    fluticasone-vilanterol (BREO) 100-25 mcg/dose diskus inhaler  Inhale 1 puff into the lungs once daily.    furosemide (LASIX) 20 MG tablet Take 1 tablet (20 mg total) by mouth once daily.    guaifenesin (MUCINEX) 600 mg 12 hr tablet Take 2 tablets (1,200 mg total) by mouth 2 (two) times daily. Okay to dispense box of medication  as prepackaged by .    levalbuterol (XOPENEX) 0.31 mg/3 mL nebulizer solution Take 3 mLs (0.31 mg total) by nebulization 4 (four) times daily.    lisinopril (PRINIVIL,ZESTRIL) 2.5 MG tablet Take 1 tablet (2.5 mg total) by mouth once daily.    metoprolol succinate (TOPROL-XL) 50 MG 24 hr tablet Take 1 tablet (50 mg total) by mouth once daily.    multivitamin (ONE DAILY MULTIVITAMIN) per tablet Take 1 tablet by mouth once daily.    potassium chloride (MICRO-K) 10 MEQ CpSR Take 1 capsule (10 mEq total) by mouth once daily.    predniSONE (DELTASONE) 10 MG tablet Take 2 tabs (20 mg) for 5 days, then 1 tab (10 mg) for 5 days then stop    senna-docusate 8.6-50 mg (PERICOLACE) 8.6-50 mg per tablet Take 1 tablet by mouth 2 (two) times daily.    tiotropium (SPIRIVA WITH HANDIHALER) 18 mcg inhalation capsule Inhale 18 mcg into the lungs once daily. Controller    nitroGLYCERIN (NITROSTAT) 0.4 MG SL tablet Place 1 tablet (0.4 mg total) under the tongue every 5 (five) minutes as needed for Chest pain.     Family History     None        Social History Main Topics    Smoking status: Former Smoker     Packs/day: 1.00     Years: 50.00     Types: Cigarettes     Quit date: 3/13/2003    Smokeless tobacco: Never Used    Alcohol use No    Drug use: No    Sexual activity: Not Currently     Birth control/ protection: Post-menopausal     Review of Systems   Constitutional: Negative for activity change, appetite change, chills, diaphoresis, fatigue and fever.   HENT: Negative for congestion, dental problem, drooling, ear discharge, ear pain, facial swelling, hearing loss, mouth sores, nosebleeds, postnasal drip, rhinorrhea, sinus pressure, sneezing,  sore throat, tinnitus, trouble swallowing and voice change.    Eyes: Negative for photophobia, pain, discharge, redness, itching and visual disturbance.   Respiratory: Positive for shortness of breath (baseline chronic SOB ). Negative for apnea, cough, choking, chest tightness, wheezing and stridor.    Cardiovascular: Negative for chest pain, palpitations and leg swelling.   Gastrointestinal: Negative for abdominal distention, abdominal pain, anal bleeding, blood in stool, constipation, diarrhea, nausea, rectal pain and vomiting.   Endocrine: Negative for cold intolerance, heat intolerance, polydipsia, polyphagia and polyuria.   Genitourinary: Negative for decreased urine volume, difficulty urinating, dyspareunia, dysuria, enuresis, flank pain, frequency, genital sores, hematuria, menstrual problem, pelvic pain, urgency, vaginal bleeding, vaginal discharge and vaginal pain.   Musculoskeletal: Positive for arthralgias (Right wrist and right hip pain after a mechanical fall ). Negative for back pain, gait problem, joint swelling, myalgias, neck pain and neck stiffness.   Skin: Negative for color change, pallor, rash and wound.   Allergic/Immunologic: Negative for environmental allergies, food allergies and immunocompromised state.   Neurological: Negative for dizziness, tremors, seizures, syncope, facial asymmetry, speech difficulty, weakness, light-headedness, numbness and headaches.   Hematological: Negative for adenopathy. Does not bruise/bleed easily.   Psychiatric/Behavioral: Negative for agitation, behavioral problems, confusion, decreased concentration, dysphoric mood, hallucinations, self-injury, sleep disturbance and suicidal ideas. The patient is not nervous/anxious and is not hyperactive.      Objective:     Vital Signs (Most Recent):  Temp: 98.2 °F (36.8 °C) (04/21/17 2123)  Pulse: 100 (04/21/17 2316)  Resp: 16 (04/21/17 2123)  BP: (!) 99/54 (04/21/17 2316)  SpO2: 95 % (04/21/17 2316) Vital Signs (24h  Range):  Temp:  [98.2 °F (36.8 °C)] 98.2 °F (36.8 °C)  Pulse:  [] 100  Resp:  [16] 16  SpO2:  [92 %-96 %] 95 %  BP: ()/(54-74) 99/54        There is no height or weight on file to calculate BMI.    Physical Exam   Constitutional: She is oriented to person, place, and time. No distress.   Elderly thin female not in distress    HENT:   Head: Normocephalic and atraumatic.   Right Ear: External ear normal.   Left Ear: External ear normal.   Nose: Nose normal.   Mouth/Throat: Oropharynx is clear and moist. No oropharyngeal exudate.   Eyes: Conjunctivae and EOM are normal. Pupils are equal, round, and reactive to light. Right eye exhibits no discharge. Left eye exhibits no discharge. No scleral icterus.   Neck: Normal range of motion. Neck supple. No JVD present. No thyromegaly present.   Cardiovascular: Normal rate, normal heart sounds and intact distal pulses.  Exam reveals no gallop and no friction rub.    No murmur heard.  Pulses:       Radial pulses are 2+ on the right side, and 2+ on the left side.        Dorsalis pedis pulses are 2+ on the right side, and 2+ on the left side.        Posterior tibial pulses are 2+ on the right side, and 2+ on the left side.   IRRR   Pulmonary/Chest: Effort normal. No stridor. No respiratory distress. She has no wheezes. She has rales (Bilateral crackles ). She exhibits no tenderness.   Abdominal: Soft. Bowel sounds are normal. She exhibits no distension and no mass. There is no tenderness. There is no rebound and no guarding. No hernia.   Genitourinary: Rectal exam shows guaiac negative stool. No vaginal discharge found.   Musculoskeletal: She exhibits tenderness (tenderness over right hip on palpation ). She exhibits no edema or deformity.   Decreased ROM of RLE and RUE due to pain  Splint in place over right forearm     Lymphadenopathy:     She has no cervical adenopathy.   Neurological: She is alert and oriented to person, place, and time. She has normal reflexes. She  displays normal reflexes. No cranial nerve deficit. She exhibits normal muscle tone. Coordination normal.   Skin: Skin is warm and dry. No rash noted. She is not diaphoretic. No erythema. No pallor.   Psychiatric: She has a normal mood and affect. Her behavior is normal. Judgment and thought content normal.        Significant Labs:   Admission on 04/21/2017   Component Date Value Ref Range Status    WBC 04/22/2017 20.29* 3.90 - 12.70 K/uL Final    RBC 04/22/2017 2.91* 4.00 - 5.40 M/uL Final    Hemoglobin 04/22/2017 8.8* 12.0 - 16.0 g/dL Final    Hematocrit 04/22/2017 25.9* 37.0 - 48.5 % Final    MCV 04/22/2017 89  82 - 98 fL Final    MCH 04/22/2017 30.2  27.0 - 31.0 pg Final    MCHC 04/22/2017 34.0  32.0 - 36.0 % Final    RDW 04/22/2017 13.7  11.5 - 14.5 % Final    Platelets 04/22/2017 374* 150 - 350 K/uL Final    MPV 04/22/2017 9.2  9.2 - 12.9 fL Final    Gran # 04/22/2017 17.7* 1.8 - 7.7 K/uL Final    Lymph # 04/22/2017 1.5  1.0 - 4.8 K/uL Final    Mono # 04/22/2017 1.0  0.3 - 1.0 K/uL Final    Eos # 04/22/2017 0.1  0.0 - 0.5 K/uL Final    Baso # 04/22/2017 0.01  0.00 - 0.20 K/uL Final    Gran% 04/22/2017 87.1* 38.0 - 73.0 % Final    Lymph% 04/22/2017 7.2* 18.0 - 48.0 % Final    Mono% 04/22/2017 5.1  4.0 - 15.0 % Final    Eosinophil% 04/22/2017 0.3  0.0 - 8.0 % Final    Basophil% 04/22/2017 0.0  0.0 - 1.9 % Final    Differential Method 04/22/2017 Automated   Final    Sodium 04/22/2017 131* 136 - 145 mmol/L Final    Potassium 04/22/2017 4.3  3.5 - 5.1 mmol/L Final    Chloride 04/22/2017 95  95 - 110 mmol/L Final    CO2 04/22/2017 27  23 - 29 mmol/L Final    Glucose 04/22/2017 98  70 - 110 mg/dL Final    BUN, Bld 04/22/2017 22  8 - 23 mg/dL Final    Creatinine 04/22/2017 1.1  0.5 - 1.4 mg/dL Final    Calcium 04/22/2017 9.3  8.7 - 10.5 mg/dL Final    Total Protein 04/22/2017 6.7  6.0 - 8.4 g/dL Final    Albumin 04/22/2017 3.3* 3.5 - 5.2 g/dL Final    Total Bilirubin 04/22/2017 0.4  0.1  - 1.0 mg/dL Final    Alkaline Phosphatase 04/22/2017 60  55 - 135 U/L Final    AST 04/22/2017 24  10 - 40 U/L Final    ALT 04/22/2017 29  10 - 44 U/L Final    Anion Gap 04/22/2017 9  8 - 16 mmol/L Final    eGFR if  04/22/2017 53.7* >60 mL/min/1.73 m^2 Final    eGFR if non African American 04/22/2017 46.5* >60 mL/min/1.73 m^2 Final    Prothrombin Time 04/22/2017 10.3  9.0 - 12.5 sec Final    INR 04/22/2017 1.0  0.8 - 1.2 Final    aPTT 04/22/2017 21.1  21.0 - 32.0 sec Final         Significant Imaging: I have reviewed and interpreted all pertinent imaging results/findings within the past 24 hours.

## 2017-04-22 NOTE — ED NOTES
"Kaity Rebolledo, a 83 y.o. female presents to the ED with c/o pain to wrist and hip. Pt states that she was ambulating without her walker, when her oxygen tank cord got caught on a plant, which caused her to fall. Pt reports that her right hand broke her fall but is also complaining of right hip and knee pain. Pt presents with a "splint" to right arm from EMS. Denies LOC and hitting head.       Chief Complaint   Patient presents with    Wrist Pain     Pt fell today and attempted to catch self with right hand, now c/o right wrist and right hip fracture. Pt given 25mcg Fentanyl in route     Review of patient's allergies indicates:   Allergen Reactions    Advair diskus  [fluticasone-salmeterol]      Other reaction(s): Nausea    Morphine Hallucinations    Pravastatin      Other reaction(s): Muscle pain     Past Medical History:   Diagnosis Date    Atrial fibrillation with RVR 3/4/2017    CAD (coronary artery disease) 3/14/2016    Cardiomyopathy 9/24/2015    CHF (congestive heart failure) 10/14/2015    COPD (chronic obstructive pulmonary disease)     COPD exacerbation 3/14/2016    Fall     patient  in  wheel  chair    Hyperlipidemia     Hypertension     Osteoporosis     Pneumonia     Rotator cuff injury     R s/p therapy       LOC: Patient name and date of birth verified.  The patient is awake, alert and aware of environment with an appropriate affect, the patient is oriented x 3 and speaking appropriately.  Pt in NAD.    APPEARANCE: Patient resting comfortably and in no acute distress, patient is clean and well groomed, patient's clothing is properly fastened.  SKIN: The skin is warm and dry, color consistent with ethnicity, patient has normal skin turgor and moist mucus membranes, skin intact, Bruising and swelling to right wrist   MUSCULOSKELETAL: Swelling, bruising and deformity to right wrist. Pt unable to flex wrist, but able to supinate and pronate wrist. Pt reports pain to right hip with movement. "   RESPIRATORY: Airway is open and patent, respirations are spontaneous, patient has a normal effort and rate, no accessory muscle use noted.  CARDIAC: Patient has a normal rate and rhythm, no periphreal edema noted, capillary refill < 3 seconds.  ABDOMEN: Soft and non tender to palpation, no distention noted. Bowel sounds present in all four quadrants.  NEUROLOGIC: Eyes open spontaneously, behavior appropriate to situation, follows commands, facial expression symmetrical, bilateral hand grasp equal and even, purposeful motor response noted, normal sensation in all extremities when touched with a finger.

## 2017-04-22 NOTE — H&P
[facility] Hospital Medicine  History & Physical    Patient Name: Kaity Rebolledo  MRN: [id number]  Admission Date: 4/21/2017  Attending Physician: Nish Pfeiffer MD   Primary Care Provider: Samuel Soriano MD    Hospital Medicine Team: [facility] HOSP MED H Mychal Feldman DO     Patient information was obtained from patient, relative(s) and ER records.     Subjective:     Principal Problem:Displaced fracture of right femoral neck    Chief Complaint:   Chief Complaint   Patient presents with    Wrist Pain     Pt fell today and attempted to catch self with right hand, now c/o right wrist and right hip fracture. 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 Tanner recently was hospitalized at [facility] at the end of march for acute on chronic hypoxic and hypercapneic respiratory failuer due to COPD exacerbation. She was treated with BIPAP, steroid and nebs during hospital stay and discahred to [facility] on 04/04/17 for deconditioning. She was getting PT at [facility] and started on 10 days course of doxycycline for suspected penumonia based on CXR finding. She was seen at pulmonary clinic by Dr. Christian on 04/13 who started on prednisone 20 mg daily x 5 days, then 10 mg daily x 5 days and then stop. She was discharged from [facility] to [facility] on 04/14/17. She had multiple recent admissions for COPD exacerbation and durigh last admit she was made DNR per daughter at bedside. Daughter states she has been livign at assisted living for last 2 years and mostly wheel charir bound due to COPD and debility.     Last nigth around 8 pm while she was  walking from family room to her bed she got tangled with oxygen tubing, lost balnace and fell. She tried to prevent the fall with her right hand. Denies hitting her head or LOC. Imaing in ED showed right distal radial and ulnar fractuer, and right femoral neck fracture. Orthopedic reduced the radial fx at bedside and placed splint. Plan to have surgical fixation of right femoral neck fracture.     She is drowsy from valium and fentanyl received in ED. But arousable and daughter helped with information. Deneis focal weakness, numbness, chest pain, SOB, palpitation, dizziness, bleeding from any sites.           Past Medical History:   Diagnosis Date    Atrial fibrillation with RVR 3/4/2017    CAD (coronary artery disease) 3/14/2016    Cardiomyopathy 9/24/2015    CHF (congestive heart failure) 10/14/2015    COPD (chronic obstructive pulmonary disease)     COPD exacerbation 3/14/2016    Fall     patient  in  wheel  chair    Hyperlipidemia     Hypertension     Osteoporosis     Pneumonia     Rotator cuff injury     R s/p therapy       Past Surgical History:   Procedure Laterality Date    CATARACT EXTRACTION BILATERAL W/ ANTERIOR VITRECTOMY      EYE SURGERY      FRACTURE SURGERY      LEG SURGERY         Review of patient's allergies indicates:   Allergen Reactions    Advair diskus  [fluticasone-salmeterol]      Other reaction(s): Nausea    Morphine Hallucinations    Pravastatin      Other reaction(s): Muscle pain       No current facility-administered medications on file prior to encounter.      Current Outpatient Prescriptions on File Prior to Encounter   Medication Sig    albuterol 90 mcg/actuation inhaler Inhale 2 puffs into the lungs every 6 (six) hours as needed for Wheezing or Shortness of Breath.    alprazolam (XANAX) 0.25 MG tablet Take 1 tablet (0.25 mg total) by mouth nightly as needed for Anxiety.    apixaban 2.5 mg Tab Take 1 tablet (2.5 mg total) by mouth 2 (two) times daily.    ascorbic  acid (VITAMIN C) 500 MG tablet Take 500 mg by mouth 2 (two) times daily.    aspirin (ECOTRIN) 81 MG EC tablet Take 1 tablet (81 mg total) by mouth once daily.    citalopram (CELEXA) 20 MG tablet TAKE 1 TABLET BY MOUTH ONCE DAILY    cyanocobalamin (VITAMIN B-12) 100 MCG tablet Take 1 tablet (100 mcg total) by mouth once daily.    cyproheptadine (PERIACTIN) 4 mg tablet Take 1 tablet (4 mg total) by mouth 3 (three) times daily as needed.    doxycycline (VIBRA-TABS) 100 MG tablet Take 1 tablet (100 mg total) by mouth every 12 (twelve) hours.    fluticasone-vilanterol (BREO) 100-25 mcg/dose diskus inhaler Inhale 1 puff into the lungs once daily.    furosemide (LASIX) 20 MG tablet Take 1 tablet (20 mg total) by mouth once daily.    guaifenesin (MUCINEX) 600 mg 12 hr tablet Take 2 tablets (1,200 mg total) by mouth 2 (two) times daily. Okay to dispense box of medication  as prepackaged by .    levalbuterol (XOPENEX) 0.31 mg/3 mL nebulizer solution Take 3 mLs (0.31 mg total) by nebulization 4 (four) times daily.    lisinopril (PRINIVIL,ZESTRIL) 2.5 MG tablet Take 1 tablet (2.5 mg total) by mouth once daily.    metoprolol succinate (TOPROL-XL) 50 MG 24 hr tablet Take 1 tablet (50 mg total) by mouth once daily.    multivitamin (ONE DAILY MULTIVITAMIN) per tablet Take 1 tablet by mouth once daily.    potassium chloride (MICRO-K) 10 MEQ CpSR Take 1 capsule (10 mEq total) by mouth once daily.    predniSONE (DELTASONE) 10 MG tablet Take 2 tabs (20 mg) for 5 days, then 1 tab (10 mg) for 5 days then stop    senna-docusate 8.6-50 mg (PERICOLACE) 8.6-50 mg per tablet Take 1 tablet by mouth 2 (two) times daily.    tiotropium (SPIRIVA WITH HANDIHALER) 18 mcg inhalation capsule Inhale 18 mcg into the lungs once daily. Controller    nitroGLYCERIN (NITROSTAT) 0.4 MG SL tablet Place 1 tablet (0.4 mg total) under the tongue every 5 (five) minutes as needed for Chest pain.     Family History     None        Social  History Main Topics    Smoking status: Former Smoker     Packs/day: 1.00     Years: 50.00     Types: Cigarettes     Quit date: 3/13/2003    Smokeless tobacco: Never Used    Alcohol use No    Drug use: No    Sexual activity: Not Currently     Birth control/ protection: Post-menopausal     Review of Systems   Constitutional: Negative for activity change, appetite change, chills, diaphoresis, fatigue and fever.   HENT: Negative for congestion, dental problem, drooling, ear discharge, ear pain, facial swelling, hearing loss, mouth sores, nosebleeds, postnasal drip, rhinorrhea, sinus pressure, sneezing, sore throat, tinnitus, trouble swallowing and voice change.    Eyes: Negative for photophobia, pain, discharge, redness, itching and visual disturbance.   Respiratory: Positive for shortness of breath (baseline chronic SOB ). Negative for apnea, cough, choking, chest tightness, wheezing and stridor.    Cardiovascular: Negative for chest pain, palpitations and leg swelling.   Gastrointestinal: Negative for abdominal distention, abdominal pain, anal bleeding, blood in stool, constipation, diarrhea, nausea, rectal pain and vomiting.   Endocrine: Negative for cold intolerance, heat intolerance, polydipsia, polyphagia and polyuria.   Genitourinary: Negative for decreased urine volume, difficulty urinating, dyspareunia, dysuria, enuresis, flank pain, frequency, genital sores, hematuria, menstrual problem, pelvic pain, urgency, vaginal bleeding, vaginal discharge and vaginal pain.   Musculoskeletal: Positive for arthralgias (Right wrist and right hip pain after a mechanical fall ). Negative for back pain, gait problem, joint swelling, myalgias, neck pain and neck stiffness.   Skin: Negative for color change, pallor, rash and wound.   Allergic/Immunologic: Negative for environmental allergies, food allergies and immunocompromised state.   Neurological: Negative for dizziness, tremors, seizures, syncope, facial asymmetry,  speech difficulty, weakness, light-headedness, numbness and headaches.   Hematological: Negative for adenopathy. Does not bruise/bleed easily.   Psychiatric/Behavioral: Negative for agitation, behavioral problems, confusion, decreased concentration, dysphoric mood, hallucinations, self-injury, sleep disturbance and suicidal ideas. The patient is not nervous/anxious and is not hyperactive.      Objective:     Vital Signs (Most Recent):  Temp: 98.2 °F (36.8 °C) (04/21/17 2123)  Pulse: 100 (04/21/17 2316)  Resp: 16 (04/21/17 2123)  BP: (!) 99/54 (04/21/17 2316)  SpO2: 95 % (04/21/17 2316) Vital Signs (24h Range):  Temp:  [98.2 °F (36.8 °C)] 98.2 °F (36.8 °C)  Pulse:  [] 100  Resp:  [16] 16  SpO2:  [92 %-96 %] 95 %  BP: ()/(54-74) 99/54        There is no height or weight on file to calculate BMI.    Physical Exam   Constitutional: She is oriented to person, place, and time. No distress.   Elderly thin female not in distress    HENT:   Head: Normocephalic and atraumatic.   Right Ear: External ear normal.   Left Ear: External ear normal.   Nose: Nose normal.   Mouth/Throat: Oropharynx is clear and moist. No oropharyngeal exudate.   Eyes: Conjunctivae and EOM are normal. Pupils are equal, round, and reactive to light. Right eye exhibits no discharge. Left eye exhibits no discharge. No scleral icterus.   Neck: Normal range of motion. Neck supple. No JVD present. No thyromegaly present.   Cardiovascular: Normal rate, normal heart sounds and intact distal pulses.  Exam reveals no gallop and no friction rub.    No murmur heard.  Pulses:       Radial pulses are 2+ on the right side, and 2+ on the left side.        Dorsalis pedis pulses are 2+ on the right side, and 2+ on the left side.        Posterior tibial pulses are 2+ on the right side, and 2+ on the left side.   IRRR   Pulmonary/Chest: Effort normal. No stridor. No respiratory distress. She has no wheezes. She has rales (Bilateral crackles ). She exhibits no  tenderness.   Abdominal: Soft. Bowel sounds are normal. She exhibits no distension and no mass. There is no tenderness. There is no rebound and no guarding. No hernia.   Genitourinary: Rectal exam shows guaiac negative stool. No vaginal discharge found.   Musculoskeletal: She exhibits tenderness (tenderness over right hip on palpation ). She exhibits no edema or deformity.   Decreased ROM of RLE and RUE due to pain  Splint in place over right forearm     Lymphadenopathy:     She has no cervical adenopathy.   Neurological: She is alert and oriented to person, place, and time. She has normal reflexes. She displays normal reflexes. No cranial nerve deficit. She exhibits normal muscle tone. Coordination normal.   Skin: Skin is warm and dry. No rash noted. She is not diaphoretic. No erythema. No pallor.   Psychiatric: She has a normal mood and affect. Her behavior is normal. Judgment and thought content normal.        Significant Labs:   Admission on 04/21/2017   Component Date Value Ref Range Status    WBC 04/22/2017 20.29* 3.90 - 12.70 K/uL Final    RBC 04/22/2017 2.91* 4.00 - 5.40 M/uL Final    Hemoglobin 04/22/2017 8.8* 12.0 - 16.0 g/dL Final    Hematocrit 04/22/2017 25.9* 37.0 - 48.5 % Final    MCV 04/22/2017 89  82 - 98 fL Final    MCH 04/22/2017 30.2  27.0 - 31.0 pg Final    MCHC 04/22/2017 34.0  32.0 - 36.0 % Final    RDW 04/22/2017 13.7  11.5 - 14.5 % Final    Platelets 04/22/2017 374* 150 - 350 K/uL Final    MPV 04/22/2017 9.2  9.2 - 12.9 fL Final    Gran # 04/22/2017 17.7* 1.8 - 7.7 K/uL Final    Lymph # 04/22/2017 1.5  1.0 - 4.8 K/uL Final    Mono # 04/22/2017 1.0  0.3 - 1.0 K/uL Final    Eos # 04/22/2017 0.1  0.0 - 0.5 K/uL Final    Baso # 04/22/2017 0.01  0.00 - 0.20 K/uL Final    Gran% 04/22/2017 87.1* 38.0 - 73.0 % Final    Lymph% 04/22/2017 7.2* 18.0 - 48.0 % Final    Mono% 04/22/2017 5.1  4.0 - 15.0 % Final    Eosinophil% 04/22/2017 0.3  0.0 - 8.0 % Final    Basophil% 04/22/2017 0.0   0.0 - 1.9 % Final    Differential Method 04/22/2017 Automated   Final    Sodium 04/22/2017 131* 136 - 145 mmol/L Final    Potassium 04/22/2017 4.3  3.5 - 5.1 mmol/L Final    Chloride 04/22/2017 95  95 - 110 mmol/L Final    CO2 04/22/2017 27  23 - 29 mmol/L Final    Glucose 04/22/2017 98  70 - 110 mg/dL Final    BUN, Bld 04/22/2017 22  8 - 23 mg/dL Final    Creatinine 04/22/2017 1.1  0.5 - 1.4 mg/dL Final    Calcium 04/22/2017 9.3  8.7 - 10.5 mg/dL Final    Total Protein 04/22/2017 6.7  6.0 - 8.4 g/dL Final    Albumin 04/22/2017 3.3* 3.5 - 5.2 g/dL Final    Total Bilirubin 04/22/2017 0.4  0.1 - 1.0 mg/dL Final    Alkaline Phosphatase 04/22/2017 60  55 - 135 U/L Final    AST 04/22/2017 24  10 - 40 U/L Final    ALT 04/22/2017 29  10 - 44 U/L Final    Anion Gap 04/22/2017 9  8 - 16 mmol/L Final    eGFR if  04/22/2017 53.7* >60 mL/min/1.73 m^2 Final    eGFR if non African American 04/22/2017 46.5* >60 mL/min/1.73 m^2 Final    Prothrombin Time 04/22/2017 10.3  9.0 - 12.5 sec Final    INR 04/22/2017 1.0  0.8 - 1.2 Final    aPTT 04/22/2017 21.1  21.0 - 32.0 sec Final         Significant Imaging: I have reviewed and interpreted all pertinent imaging results/findings within the past 24 hours.    X-RAY HIP : Right femoral neck fracture with possible extension to greater trochanter     2 D ECHO ( 03/'03/17 ) :    1 - Normal left ventricular systolic function (EF 55-60%).     2 - Left ventricular diastolic dysfunction.     3 - Low normal right ventricular systolic function .     4 - Mild aortic stenosis, VIRI = 1.9 cm2, mean gradient = 8.7 mmHg.     5 - Mild aortic regurgitation.     6 - Mild mitral regurgitation.     7 - Moderate tricuspid regurgitation.     8 - Increased central venous pressure.     9 - Pulmonary hypertension. The estimated PA systolic pressure is 51 mmHg.             This document has been electronically    SIGNED BY: Micah Muniz MD On: 03/03/2017  16:48    Assessment/Plan:       Active cardiac issues? No   History of CAD/ischemic heart disease? Yes   History of cerebrovascular disease? No   History of compensated heart failure? Yes   Type 2 diabetes requiring insulin? No   Serum Creatinine > 2? No   Total cardiac risk factors 2     Functional mets < 4 METS     < 4* METs -unable to walk > 2 blocks on level ground without stopping due to symptoms  - eating, dressing, toileting, walking indoors, light housework. POOR   > 4* METs -climbing > 1 flight of stairs without stopping  -walking up hill > 1-2 blocks  -scrubbing floors  -moving furniture  - golf, bowling, dancing or tennis  -running short distance MODERATE to EXCELLENT   * performance of any one of the activities         No new Assessment & Plan notes have been filed under this hospital service since the last note was generated.  Service: Hospital Medicine    1) Right hip Fracture :    - S/P mechanical fall with right femoral neck fracture and R distal radial/ulnar frcature   - s/p reduction of radial fracture by orthopedic at bedside and splint placement   - plan to have surgical fixation for right femoral neck fracture  - no focal neuro vascular deficit on exam   - Her last dose of eliquis taken last evenign around 7 pm per daughter     - No signs of respiratory distress and oxygenating well on 3L N/C with O2 sat 100%  - Check ABG given h/o advanced COPD   - Continue nebs with xopenex and ipratropium q 4hrs   - Complete course of prednisone ( 04/22 is the last dose 10 mg )   - Will give hydrocortisone 50 mg IV X 1 prior to surgery and then 25 mg IV Q 8hrs x 3 doses after surgery to prevent perioperative adrenal crisis   - No active cardiac issue and no further cardiac testing warranted prior to surgery   - Idealy like to have hip surgery > 24 hrs from last dose of eliquis to minimize intraop bleeding risk     Perioperative Risk Assessment:  Patient is at intermediate risk for perioperative  cardiopulmonary complications.  Recommend proceed to surgery as planned. We will follow with you during the perioperative period  to manage any active medical issues and prevent postoperative complications.    - NPO for right hip surgery   - Gentle IVF with NS @ 50 cc / hr while NPO     2) COPD : Advanced COPD on home oxygen            - Recently completed 10 days course of doxycycycline and on last day of prednisone            - No signs of acute COPD exacerbation            - monitor respiratory status closely during periop period            - Continue nebs and supplemental oxygen to keep sat > 90 %      3) Afib : recent diagnosis of afib last month           - Currently on eliquis 2.5 mg bid and last dose 7 pm 04/21           - Hold eliquis for hip surgery           - Restart after surgery when deemed safe per orthopedic surgery           - Continue metoprolol for rate control     4) CAD : No angina and EKG w/o ischemic pattern           - BP/HR stable           - Hold ASA for surgery           - Hold lisinopril to prevent post op VIDAL           - Continue toprol XL 5O mg daily     5) Chronic diastolic CHF : No signs of volume overload or acute decompensated CHF                             - Recent Echo from last moth showed EF 55 - 60%                            - Hold lasix prior to surgery to prevent VIDAL                             - Monitor voluem status closely     6) Depression / Anxiety : On celexa                                         - xanax prn     7) DVT PPX : SCDs prior to surgery               - Restart eliquis after surgery when there is no bleeding and deemed safe per orthopedic     8) Code status : Patient is DNR per daughter at bedside given advanced COPD and debility                   - Other daughter ( Lary Chan [phone] ) who is the HPOA will be here in mornign to sign DNR form      VTE Risk Mitigation         Ordered     Medium Risk of VTE  Once      04/22/17 0553     Place  JENNIFER hose  Until discontinued      04/22/17 0553     Place sequential compression device  Until discontinued      04/22/17 0553        Mychal Feldman DO  Department of Hospital Medicine   [facility]

## 2017-04-22 NOTE — ED PROVIDER NOTES
Encounter Date: 4/21/2017    SCRIBE #1 NOTE: I, Khloe Fowler, am scribing for, and in the presence of,  Dr. Pfeiffer. I have scribed the following portions of the note - the Resident attestation.       History     Chief Complaint   Patient presents with    Wrist Pain     Pt fell today and attempted to catch self with right hand, now c/o right wrist and right hip fracture. Pt given 25mcg Fentanyl in route     Review of patient's allergies indicates:   Allergen Reactions    Advair diskus  [fluticasone-salmeterol]      Other reaction(s): Nausea    Morphine Hallucinations    Pravastatin      Other reaction(s): Muscle pain     HPI   83 y.o. F with right wrist and hip pain. Hx osteoporosis, a fib currently on lovenox. Sx's began after she fell today, as she was walking to her room and pulled her oxygen tube and tripped over it. No LOC, no cardiopulm sx's associated. She braced her fall with her outstretched hand, and noticed increased swelling after the fall. Right hip pain occurred at that time, as well, and both hurt to move them.     Past Medical History:   Diagnosis Date    Atrial fibrillation with RVR 3/4/2017    CAD (coronary artery disease) 3/14/2016    Cardiomyopathy 9/24/2015    CHF (congestive heart failure) 10/14/2015    COPD (chronic obstructive pulmonary disease)     COPD exacerbation 3/14/2016    Fall     patient  in  wheel  chair    Hyperlipidemia     Hypertension     Osteoporosis     Pneumonia     Rotator cuff injury     R s/p therapy     Past Surgical History:   Procedure Laterality Date    CATARACT EXTRACTION BILATERAL W/ ANTERIOR VITRECTOMY      EYE SURGERY      FRACTURE SURGERY      LEG SURGERY       History reviewed. No pertinent family history.  Social History   Substance Use Topics    Smoking status: Former Smoker     Packs/day: 1.00     Years: 50.00     Types: Cigarettes     Quit date: 3/13/2003    Smokeless tobacco: Never Used    Alcohol use No     Review of Systems    Constitutional: Negative for chills, diaphoresis and fever.   HENT: Negative for rhinorrhea, sore throat and trouble swallowing.    Eyes: Negative for pain and visual disturbance.   Respiratory: Negative for cough, chest tightness and shortness of breath.    Cardiovascular: Negative for chest pain and palpitations.   Gastrointestinal: Negative for blood in stool and constipation.   Genitourinary: Negative for dysuria, frequency and hematuria.   Musculoskeletal: Positive for arthralgias and back pain. Negative for neck stiffness.   Skin: Negative for rash and wound.   Neurological: Negative for seizures, syncope and weakness.   All other systems reviewed and are negative.      Physical Exam   Initial Vitals   BP Pulse Resp Temp SpO2   04/21/17 2123 04/21/17 2123 04/21/17 2123 04/21/17 2123 04/21/17 2123   110/74 98 16 98.2 °F (36.8 °C) 96 %     Physical Exam    Nursing note and vitals reviewed.  Constitutional: She appears well-developed. She is not diaphoretic. No distress.   HENT:   Head: Normocephalic and atraumatic.   Nose: Nose normal.   Mouth/Throat: Oropharynx is clear and moist.   Eyes: Conjunctivae and EOM are normal. Pupils are equal, round, and reactive to light.   Neck: Normal range of motion. Neck supple.   Cardiovascular: Normal heart sounds and intact distal pulses.   No murmur heard.  Pulses:       Dorsalis pedis pulses are 2+ on the right side, and 2+ on the left side.   Abdominal: Soft. Normal appearance and bowel sounds are normal. She exhibits no distension. There is no tenderness.   Musculoskeletal:   Right wrist with significant edema and ecchymosis, exquisite TTP; painful with all ROM, although can still mildly supinate and pronate. Right hip pain worst when the right greater trochanter is palpated.   Neurological: She is alert and oriented to person, place, and time. She has normal strength. She exhibits normal muscle tone.   Skin: Skin is warm and dry. No pallor.         ED Course    Procedures  Labs Reviewed   CBC W/ AUTO DIFFERENTIAL - Abnormal; Notable for the following:        Result Value    WBC 20.29 (*)     RBC 2.91 (*)     Hemoglobin 8.8 (*)     Hematocrit 25.9 (*)     Platelets 374 (*)     Gran # 17.7 (*)     Gran% 87.1 (*)     Lymph% 7.2 (*)     All other components within normal limits   COMPREHENSIVE METABOLIC PANEL - Abnormal; Notable for the following:     Sodium 131 (*)     Albumin 3.3 (*)     eGFR if  53.7 (*)     eGFR if non  46.5 (*)     All other components within normal limits   ISTAT PROCEDURE - Abnormal; Notable for the following:     POC PH 7.325 (*)     POC PCO2 59.6 (*)     POC PO2 70 (*)     POC HCO3 31.1 (*)     POC SATURATED O2 92 (*)     POC TCO2 33 (*)     All other components within normal limits   PROTIME-INR   APTT   URINALYSIS   URINALYSIS MICROSCOPIC   URINALYSIS   TYPE & SCREEN             Medical Decision Making:   History:   Old Medical Records: I decided to obtain old medical records.  Initial Assessment:   HO-I MDM  83 y.o. F with right wrist and hip pain after FOOSH. Hx osteoporosis, a fib currently on lovenox.   ddx includes: colles vs smith fx, right hip fx, tendonitis, ligament strain.  Work up: xrays right hand, wrist, forearm, elbow, xray right hip  Tx/dispo: call ortho for recs  Milena Skelton M.D.  [facility] EMERGENCY MEDICINE PGY-1  9:31 PM 04/21/2017    HO-I UPDATE  Pt found to have right colles' fx and right his fx. Is candidate for surgical intervention. Ortho will admit    Milena Skelton M.D.  [facility] EMERGENCY MEDICINE PGY-1  1:45 AM 04/22/2017     Independently Interpreted Test(s):   I have ordered and independently interpreted X-rays - see prior notes.  Clinical Tests:   Lab Tests: Ordered and Reviewed  Radiological Study: Ordered and Reviewed            Scribe Attestation:   Scribe #1: I performed the above scribed service and the documentation accurately describes the services I performed. I attest to the accuracy of  the note.    Attending Attestation:   Physician Attestation Statement for Resident:  As the supervising MD   Physician Attestation Statement: I have personally seen and examined this patient.   I agree with the above history. -: Patient with fall on outstretched right hand. She has radial and ulnar fracture and right intertochanteric versus femoral neck fracture. We will admit to Medicine. Orthopedics will reduce wrist, and she will have surgery for hip in a couple of days.   As the supervising MD I agree with the above PE.    As the supervising MD I agree with the above treatment, course, plan, and disposition.  I have reviewed and agree with the residents interpretation of the following: lab data and x-rays.          Physician Attestation for Scribe:  Physician Attestation Statement for Scribe #1: I, Dr. Pfeiffer, reviewed documentation, as scribed by Khloe Fowler in my presence, and it is both accurate and complete.                 ED Course     Clinical Impression:   The primary encounter diagnosis was Displaced fracture of right femoral neck, closed, initial encounter. Diagnoses of Fall, Intertrochanteric fracture of right femur, closed, initial encounter, and Closed Colles' fracture of right radius, initial encounter were also pertinent to this visit.          Milena Skelton MD  Resident  04/22/17 0537       Nish Pfeiffer MD  04/24/17 5355

## 2017-04-22 NOTE — CONSULTS
Consult Note  Orthopaedics    SUBJECTIVE:     History of Present Illness:  Patient is a 83 y.o. female with PMHx of COPD (on home oxygen), CHF (last EF 55%), Afib (on Eliquis -- last took yesterday AM) who presents with chief complaint of right wrist pain and right hip pain s/p fall. Per her daughters, she tripped at her assisted living home on her oxygen cord. Used her right arm to break her fall. [place] [facility] [place] and slowly becoming weaker. No LOC, no other injuries. No paresthesias     Scheduled Meds:   sodium chloride 0.9%   Intravenous Once    citalopram  20 mg Oral Daily    cyanocobalamin  100 mcg Oral Daily    fluticasone-vilanterol  1 puff Inhalation Daily    guaifenesin  1,200 mg Oral BID    hydrocortisone sodium succinate  50 mg Intravenous Once    Followed by    hydrocortisone sodium succinate  25 mg Intravenous Q8H    ipratropium  0.5 mg Nebulization Q4H    levalbuterol  0.3108 mg Nebulization QID    metoprolol succinate  50 mg Oral Daily    multivitamin  1 tablet Oral Daily    predniSONE  10 mg Oral Daily    senna-docusate 8.6-50 mg  1 tablet Oral BID    sodium chloride 0.9%  3 mL Intravenous Q8H    tiotropium  18 mcg Inhalation Daily     Continuous Infusions:   PRN Meds:acetaminophen, alprazolam, HYDROmorphone, HYDROmorphone, nitroGLYCERIN, oxycodone    Review of patient's allergies indicates:   Allergen Reactions    Advair diskus  [fluticasone-salmeterol]      Other reaction(s): Nausea    Morphine Hallucinations    Pravastatin      Other reaction(s): Muscle pain       Past Medical History:   Diagnosis Date    Atrial fibrillation with RVR 3/4/2017    CAD (coronary artery disease) 3/14/2016    Cardiomyopathy 9/24/2015    CHF (congestive heart failure) 10/14/2015    COPD (chronic obstructive pulmonary disease)     COPD exacerbation 3/14/2016    Fall     patient  in  wheel  chair    Hyperlipidemia     Hypertension     Osteoporosis      Pneumonia     Rotator cuff injury     R s/p therapy     Past Surgical History:   Procedure Laterality Date    CATARACT EXTRACTION BILATERAL W/ ANTERIOR VITRECTOMY      EYE SURGERY      FRACTURE SURGERY      LEG SURGERY       History reviewed. No pertinent family history.  Social History   Substance Use Topics    Smoking status: Former Smoker     Packs/day: 1.00     Years: 50.00     Types: Cigarettes     Quit date: 3/13/2003    Smokeless tobacco: Never Used    Alcohol use No        Review of Systems:  Patient denies constitutional symptoms, cardiac symptoms, respiratory symptoms, GI symptoms.  The remainder of the musculoskeletal ROS is included in the HPI.      OBJECTIVE:     Vital Signs (Most Recent)  Temp: 98.2 °F (36.8 °C) (04/21/17 2123)  Pulse: 100 (04/21/17 2316)  Resp: 16 (04/21/17 2123)  BP: (!) 99/54 (04/21/17 2316)  SpO2: 95 % (04/21/17 2316)    Physical Exam:  Gen:  No acute distress  CV:  Peripherally well-perfused.  Pulses 2+ bilaterally.  Lungs:  Normal respiratory effort.  Abdomen:  Soft, non-tender, non-distended  Head/Neck:  Normocephalic.  Atraumatic. No TTP, AROM and PROM intact without pain  Neuro:  Alert and Oriented x3, CN intact without deficit  Pelvis: No TTP, Stable to direct anterior pressure over ASIS.     MSK:  RUE with obvious deformity, no open fracture  Diffuse swelling and eccyhmosis dorsally  AIN PIN and ulnar nerve intact, sensation intact  Well perfused. Compartments soft     RLE shortened and externally rotated  Sensation intact, motor intact, palpable distal pulses     Laboratory:  Recent Results (from the past 24 hour(s))   CBC auto differential    Collection Time: 04/22/17 12:19 AM   Result Value Ref Range    WBC 20.29 (H) 3.90 - 12.70 K/uL    RBC 2.91 (L) 4.00 - 5.40 M/uL    Hemoglobin 8.8 (L) 12.0 - 16.0 g/dL    Hematocrit 25.9 (L) 37.0 - 48.5 %    MCV 89 82 - 98 fL    MCH 30.2 27.0 - 31.0 pg    MCHC 34.0 32.0 - 36.0 %    RDW 13.7 11.5 - 14.5 %    Platelets 374 (H)  150 - 350 K/uL    MPV 9.2 9.2 - 12.9 fL    Gran # 17.7 (H) 1.8 - 7.7 K/uL    Lymph # 1.5 1.0 - 4.8 K/uL    Mono # 1.0 0.3 - 1.0 K/uL    Eos # 0.1 0.0 - 0.5 K/uL    Baso # 0.01 0.00 - 0.20 K/uL    Gran% 87.1 (H) 38.0 - 73.0 %    Lymph% 7.2 (L) 18.0 - 48.0 %    Mono% 5.1 4.0 - 15.0 %    Eosinophil% 0.3 0.0 - 8.0 %    Basophil% 0.0 0.0 - 1.9 %    Differential Method Automated    Comprehensive metabolic panel    Collection Time: 04/22/17 12:19 AM   Result Value Ref Range    Sodium 131 (L) 136 - 145 mmol/L    Potassium 4.3 3.5 - 5.1 mmol/L    Chloride 95 95 - 110 mmol/L    CO2 27 23 - 29 mmol/L    Glucose 98 70 - 110 mg/dL    BUN, Bld 22 8 - 23 mg/dL    Creatinine 1.1 0.5 - 1.4 mg/dL    Calcium 9.3 8.7 - 10.5 mg/dL    Total Protein 6.7 6.0 - 8.4 g/dL    Albumin 3.3 (L) 3.5 - 5.2 g/dL    Total Bilirubin 0.4 0.1 - 1.0 mg/dL    Alkaline Phosphatase 60 55 - 135 U/L    AST 24 10 - 40 U/L    ALT 29 10 - 44 U/L    Anion Gap 9 8 - 16 mmol/L    eGFR if African American 53.7 (A) >60 mL/min/1.73 m^2    eGFR if non  46.5 (A) >60 mL/min/1.73 m^2   Protime-INR    Collection Time: 04/22/17 12:19 AM   Result Value Ref Range    Prothrombin Time 10.3 9.0 - 12.5 sec    INR 1.0 0.8 - 1.2   APTT    Collection Time: 04/22/17 12:19 AM   Result Value Ref Range    aPTT 21.1 21.0 - 32.0 sec   Type & Screen    Collection Time: 04/22/17 12:19 AM   Result Value Ref Range    Group & Rh A POS     Indirect [place] NEG    Urinalysis    Collection Time: 04/22/17  4:36 AM   Result Value Ref Range    Specimen UA Urine, Catheterized     Color, UA Yellow Yellow, Straw, Gisel    Appearance, UA Clear Clear    pH, UA 5.0 5.0 - 8.0    Specific Gravity, UA 1.010 1.005 - 1.030    Protein, UA Negative Negative    Glucose, UA Negative Negative    Ketones, UA Negative Negative    Bilirubin (UA) Negative Negative    Occult Blood UA Negative Negative    Nitrite, UA Negative Negative    Urobilinogen, UA Negative <2.0 EU/dL    Leukocytes, UA Negative  Negative   Urinalysis Microscopic    Collection Time: 04/22/17  4:36 AM   Result Value Ref Range    RBC, UA 1 0 - 4 /hpf    WBC, UA 1 0 - 5 /hpf    Microscopic Comment SEE COMMENT        Diagnostic Results:  X-Ray: Reviewed  Right wrist x-rays show a distal radius fracture/distal ulna fracture with dorsal displacement, intra-articular  Right AP pelvis shows a right displaced subcapital femoral neck fracture, in varus   ASSESSMENT/PLAN:     Kaity Rebolledo is a 83 y.o. with a right femoral neck fracture, right comminuted distal radius fracture    After time out was performed and patient ID, side, and site were verified, the right wrist was sterilly prepped in the standard fashion.  A 22-gauge needle was introduced into the fracture site without complication and fracture hematoma was aspirated.  10cc of 1% lidocaine was then injected to the fracture site without difficulty. After adequate analgesia, the fracture was closed reduced under c-arm guidance and adequate reduction was obtained.  A sugartong splint was applied and then post-reduction films were obtained which verified maintenance of the reduction.  The patient tolerated the procedure well with no complications.  Blood loss was none.    --Admit to medicine hip fracture service for pre-operative clearance and medical evaluation  --To OR for right hip hemiarthroplasty. May wait until Sunday due to recent Eliquis. Medicine thinks she will benefit from 1 day of optimization   --Consents obtained from family, daughter is POA  --Pre-operative labs and imaging obtained in ED (UA, Type and Cross, PT-INR, CBC, BMP, PreAlbumin, CXR, EKG)   --Bed rest, guzman, will give diet if surgery not today   --right distal radius will require ORIF at a later date     Risks and complications were discussed including but not limited to the risks of anesthetic complications, infection, wound healing complications, non-union, mal-union, hardware failure, pain, stiffness, DVT, pulmonary  embolism, perioperative medical risks (cardiac, pulmonary, renal, neurologic), and death among others were discussed. No guarantees were made and the patient and family elect to proceed. They fully understand the reported 30% mortality risk within the first year of surgery.                Zac Castillo MD  Orthopedic Surgery, PGY3

## 2017-04-23 ENCOUNTER — ANESTHESIA (OUTPATIENT)
Dept: SURGERY | Facility: HOSPITAL | Age: 82
DRG: 470 | End: 2017-04-23
Payer: MEDICARE

## 2017-04-23 PROBLEM — E87.6 HYPOKALEMIA: Status: RESOLVED | Noted: 2017-03-04 | Resolved: 2017-04-23

## 2017-04-23 PROBLEM — J96.21 ACUTE ON CHRONIC RESPIRATORY FAILURE WITH HYPOXIA AND HYPERCAPNIA: Status: RESOLVED | Noted: 2017-03-28 | Resolved: 2017-04-23

## 2017-04-23 PROBLEM — M79.605 LEFT LEG PAIN: Status: RESOLVED | Noted: 2017-04-06 | Resolved: 2017-04-23

## 2017-04-23 PROBLEM — I48.91 ATRIAL FIBRILLATION WITH RVR: Status: RESOLVED | Noted: 2017-03-04 | Resolved: 2017-04-23

## 2017-04-23 PROBLEM — R06.02 SOB (SHORTNESS OF BREATH): Status: RESOLVED | Noted: 2017-03-28 | Resolved: 2017-04-23

## 2017-04-23 PROBLEM — J96.22 ACUTE ON CHRONIC RESPIRATORY FAILURE WITH HYPOXIA AND HYPERCAPNIA: Status: RESOLVED | Noted: 2017-03-28 | Resolved: 2017-04-23
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
## 2017-04-23 PROBLEM — E87.1 HYPONATREMIA: Status: RESOLVED | Noted: 2017-03-28 | Resolved: 2017-04-23

## 2017-04-23 PROBLEM — R79.89 ELEVATED TROPONIN: Status: RESOLVED | Noted: 2017-03-29 | Resolved: 2017-04-23

## 2017-04-23 LAB
25(OH)D3+25(OH)D2 SERPL-MCNC: 25 NG/ML
ALBUMIN SERPL BCP-MCNC: 2.4 G/DL
ALP SERPL-CCNC: 53 U/L
ALT SERPL W/O P-5'-P-CCNC: 20 U/L
ANION GAP SERPL CALC-SCNC: 5 MMOL/L
ANION GAP SERPL CALC-SCNC: 8 MMOL/L
AST SERPL-CCNC: 15 U/L
BASOPHILS # BLD AUTO: 0.01 K/UL
BASOPHILS # BLD AUTO: 0.01 K/UL
BASOPHILS NFR BLD: 0 %
BASOPHILS NFR BLD: 0.1 %
BILIRUB SERPL-MCNC: 0.3 MG/DL
BUN SERPL-MCNC: 25 MG/DL
BUN SERPL-MCNC: 25 MG/DL
CALCIUM SERPL-MCNC: 7.9 MG/DL
CALCIUM SERPL-MCNC: 8.4 MG/DL
CHLORIDE SERPL-SCNC: 100 MMOL/L
CHLORIDE SERPL-SCNC: 98 MMOL/L
CO2 SERPL-SCNC: 25 MMOL/L
CO2 SERPL-SCNC: 28 MMOL/L
CREAT SERPL-MCNC: 0.8 MG/DL
CREAT SERPL-MCNC: 0.9 MG/DL
DIFFERENTIAL METHOD: ABNORMAL
DIFFERENTIAL METHOD: ABNORMAL
EOSINOPHIL # BLD AUTO: 0 K/UL
EOSINOPHIL # BLD AUTO: 0.1 K/UL
EOSINOPHIL NFR BLD: 0.2 %
EOSINOPHIL NFR BLD: 0.4 %
ERYTHROCYTE [DISTWIDTH] IN BLOOD BY AUTOMATED COUNT: 13.7 %
ERYTHROCYTE [DISTWIDTH] IN BLOOD BY AUTOMATED COUNT: 14.4 %
EST. GFR  (AFRICAN AMERICAN): >60 ML/MIN/1.73 M^2
EST. GFR  (AFRICAN AMERICAN): >60 ML/MIN/1.73 M^2
EST. GFR  (NON AFRICAN AMERICAN): 59.3 ML/MIN/1.73 M^2
EST. GFR  (NON AFRICAN AMERICAN): >60 ML/MIN/1.73 M^2
GLUCOSE SERPL-MCNC: 130 MG/DL
GLUCOSE SERPL-MCNC: 165 MG/DL (ref 70–110)
GLUCOSE SERPL-MCNC: 92 MG/DL
HCO3 UR-SCNC: 30.8 MMOL/L (ref 24–28)
HCT VFR BLD AUTO: 22.6 %
HCT VFR BLD AUTO: 25.8 %
HCT VFR BLD CALC: 28 %PCV (ref 36–54)
HGB BLD-MCNC: 7.3 G/DL
HGB BLD-MCNC: 8.7 G/DL
INR PPP: 1
LYMPHOCYTES # BLD AUTO: 1 K/UL
LYMPHOCYTES # BLD AUTO: 1.5 K/UL
LYMPHOCYTES NFR BLD: 10.4 %
LYMPHOCYTES NFR BLD: 6.3 %
MAGNESIUM SERPL-MCNC: 1.5 MG/DL
MCH RBC QN AUTO: 30.2 PG
MCH RBC QN AUTO: 30.4 PG
MCHC RBC AUTO-ENTMCNC: 32.3 %
MCHC RBC AUTO-ENTMCNC: 33.7 %
MCV RBC AUTO: 90 FL
MCV RBC AUTO: 94 FL
MONOCYTES # BLD AUTO: 0.9 K/UL
MONOCYTES # BLD AUTO: 1.4 K/UL
MONOCYTES NFR BLD: 6 %
MONOCYTES NFR BLD: 9.1 %
NEUTROPHILS # BLD AUTO: 20.8 K/UL
NEUTROPHILS # BLD AUTO: 7.6 K/UL
NEUTROPHILS NFR BLD: 80 %
NEUTROPHILS NFR BLD: 87.3 %
PCO2 BLDA: 67.8 MMHG (ref 35–45)
PH SMN: 7.26 [PH] (ref 7.35–7.45)
PHOSPHATE SERPL-MCNC: 3.6 MG/DL
PLATELET # BLD AUTO: 262 K/UL
PLATELET # BLD AUTO: 291 K/UL
PLATELET BLD QL SMEAR: ABNORMAL
PMV BLD AUTO: 9.5 FL
PMV BLD AUTO: 9.6 FL
PO2 BLDA: 509 MMHG (ref 80–100)
POC BE: 4 MMOL/L
POC IONIZED CALCIUM: 1.14 MMOL/L (ref 1.06–1.42)
POC SATURATED O2: 100 % (ref 95–100)
POC TCO2: 33 MMOL/L (ref 23–27)
POTASSIUM BLD-SCNC: 4.6 MMOL/L (ref 3.5–5.1)
POTASSIUM SERPL-SCNC: 4.9 MMOL/L
POTASSIUM SERPL-SCNC: 5.2 MMOL/L
PROT SERPL-MCNC: 5.2 G/DL
PROTHROMBIN TIME: 11.1 SEC
RBC # BLD AUTO: 2.4 M/UL
RBC # BLD AUTO: 2.88 M/UL
SAMPLE: ABNORMAL
SODIUM BLD-SCNC: 132 MMOL/L (ref 136–145)
SODIUM SERPL-SCNC: 131 MMOL/L
SODIUM SERPL-SCNC: 133 MMOL/L
WBC # BLD AUTO: 23.95 K/UL
WBC # BLD AUTO: 9.52 K/UL

## 2017-04-23 PROCEDURE — 25000003 PHARM REV CODE 250: Performed by: ORTHOPAEDIC SURGERY

## 2017-04-23 PROCEDURE — C1776 JOINT DEVICE (IMPLANTABLE): HCPCS | Performed by: ORTHOPAEDIC SURGERY

## 2017-04-23 PROCEDURE — 37000008 HC ANESTHESIA 1ST 15 MINUTES: Performed by: ORTHOPAEDIC SURGERY

## 2017-04-23 PROCEDURE — 63600175 PHARM REV CODE 636 W HCPCS: Performed by: NURSE ANESTHETIST, CERTIFIED REGISTERED

## 2017-04-23 PROCEDURE — 37000009 HC ANESTHESIA EA ADD 15 MINS: Performed by: ORTHOPAEDIC SURGERY

## 2017-04-23 PROCEDURE — 36620 INSERTION CATHETER ARTERY: CPT | Mod: 59,,, | Performed by: ANESTHESIOLOGY

## 2017-04-23 PROCEDURE — 11000001 HC ACUTE MED/SURG PRIVATE ROOM

## 2017-04-23 PROCEDURE — D9220A PRA ANESTHESIA: Mod: CRNA,,, | Performed by: NURSE ANESTHETIST, CERTIFIED REGISTERED

## 2017-04-23 PROCEDURE — 36415 COLL VENOUS BLD VENIPUNCTURE: CPT

## 2017-04-23 PROCEDURE — 80053 COMPREHEN METABOLIC PANEL: CPT

## 2017-04-23 PROCEDURE — 85025 COMPLETE CBC W/AUTO DIFF WBC: CPT | Mod: 91

## 2017-04-23 PROCEDURE — 88304 TISSUE EXAM BY PATHOLOGIST: CPT | Mod: 26,,, | Performed by: PATHOLOGY

## 2017-04-23 PROCEDURE — P9021 RED BLOOD CELLS UNIT: HCPCS

## 2017-04-23 PROCEDURE — 80048 BASIC METABOLIC PNL TOTAL CA: CPT

## 2017-04-23 PROCEDURE — 99222 1ST HOSP IP/OBS MODERATE 55: CPT | Mod: 57,,, | Performed by: ORTHOPAEDIC SURGERY

## 2017-04-23 PROCEDURE — 71000039 HC RECOVERY, EACH ADD'L HOUR: Performed by: ORTHOPAEDIC SURGERY

## 2017-04-23 PROCEDURE — 25000242 PHARM REV CODE 250 ALT 637 W/ HCPCS: Performed by: HOSPITALIST

## 2017-04-23 PROCEDURE — 36000710: Performed by: ORTHOPAEDIC SURGERY

## 2017-04-23 PROCEDURE — 88311 DECALCIFY TISSUE: CPT | Performed by: PATHOLOGY

## 2017-04-23 PROCEDURE — 63600175 PHARM REV CODE 636 W HCPCS: Performed by: STUDENT IN AN ORGANIZED HEALTH CARE EDUCATION/TRAINING PROGRAM

## 2017-04-23 PROCEDURE — D9220A PRA ANESTHESIA: Mod: ANES,,, | Performed by: ANESTHESIOLOGY

## 2017-04-23 PROCEDURE — 63600175 PHARM REV CODE 636 W HCPCS: Performed by: HOSPITALIST

## 2017-04-23 PROCEDURE — 25000003 PHARM REV CODE 250: Performed by: NURSE ANESTHETIST, CERTIFIED REGISTERED

## 2017-04-23 PROCEDURE — 82306 VITAMIN D 25 HYDROXY: CPT

## 2017-04-23 PROCEDURE — 94640 AIRWAY INHALATION TREATMENT: CPT

## 2017-04-23 PROCEDURE — 25000003 PHARM REV CODE 250: Performed by: HOSPITALIST

## 2017-04-23 PROCEDURE — 71000033 HC RECOVERY, INTIAL HOUR: Performed by: ORTHOPAEDIC SURGERY

## 2017-04-23 PROCEDURE — C1713 ANCHOR/SCREW BN/BN,TIS/BN: HCPCS | Performed by: ORTHOPAEDIC SURGERY

## 2017-04-23 PROCEDURE — 27236 TREAT THIGH FRACTURE: CPT | Mod: RT,,, | Performed by: ORTHOPAEDIC SURGERY

## 2017-04-23 PROCEDURE — 85610 PROTHROMBIN TIME: CPT

## 2017-04-23 PROCEDURE — 27201423 OPTIME MED/SURG SUP & DEVICES STERILE SUPPLY: Performed by: ORTHOPAEDIC SURGERY

## 2017-04-23 PROCEDURE — 99232 SBSQ HOSP IP/OBS MODERATE 35: CPT | Mod: ,,, | Performed by: HOSPITALIST

## 2017-04-23 PROCEDURE — 88311 DECALCIFY TISSUE: CPT | Mod: 26,,, | Performed by: PATHOLOGY

## 2017-04-23 PROCEDURE — 36000711: Performed by: ORTHOPAEDIC SURGERY

## 2017-04-23 PROCEDURE — 83735 ASSAY OF MAGNESIUM: CPT

## 2017-04-23 PROCEDURE — 84100 ASSAY OF PHOSPHORUS: CPT

## 2017-04-23 DEVICE — CEMENT BONE SIMPLE P INDIVID: Type: IMPLANTABLE DEVICE | Site: FEMUR | Status: FUNCTIONAL

## 2017-04-23 DEVICE — STEM OMNIFIT EON 132DEG SZ6: Type: IMPLANTABLE DEVICE | Site: FEMUR | Status: FUNCTIONAL

## 2017-04-23 DEVICE — SPACER CENTRALIZER DIST UNIV 1: Type: IMPLANTABLE DEVICE | Site: FEMUR | Status: FUNCTIONAL

## 2017-04-23 DEVICE — SLEEVE FEM C-TAPER UNI +5MM: Type: IMPLANTABLE DEVICE | Site: FEMUR | Status: FUNCTIONAL

## 2017-04-23 DEVICE — PLUG BONE: Type: IMPLANTABLE DEVICE | Site: FEMUR | Status: FUNCTIONAL

## 2017-04-23 DEVICE — HEAD UNITRAX MODULAR ENDO 46MM: Type: IMPLANTABLE DEVICE | Site: FEMUR | Status: FUNCTIONAL

## 2017-04-23 RX ORDER — ONDANSETRON 2 MG/ML
INJECTION INTRAMUSCULAR; INTRAVENOUS
Status: DISCONTINUED | OUTPATIENT
Start: 2017-04-23 | End: 2017-04-23

## 2017-04-23 RX ORDER — ROCURONIUM BROMIDE 10 MG/ML
INJECTION, SOLUTION INTRAVENOUS
Status: DISCONTINUED | OUTPATIENT
Start: 2017-04-23 | End: 2017-04-23

## 2017-04-23 RX ORDER — NEOSTIGMINE METHYLSULFATE 1 MG/ML
INJECTION, SOLUTION INTRAVENOUS
Status: DISCONTINUED | OUTPATIENT
Start: 2017-04-23 | End: 2017-04-23

## 2017-04-23 RX ORDER — LIDOCAINE HCL/PF 100 MG/5ML
SYRINGE (ML) INTRAVENOUS
Status: DISCONTINUED | OUTPATIENT
Start: 2017-04-23 | End: 2017-04-23

## 2017-04-23 RX ORDER — SUCCINYLCHOLINE CHLORIDE 20 MG/ML
INJECTION INTRAMUSCULAR; INTRAVENOUS
Status: DISCONTINUED | OUTPATIENT
Start: 2017-04-23 | End: 2017-04-23

## 2017-04-23 RX ORDER — ACETAMINOPHEN 10 MG/ML
INJECTION, SOLUTION INTRAVENOUS
Status: DISCONTINUED | OUTPATIENT
Start: 2017-04-23 | End: 2017-04-23

## 2017-04-23 RX ORDER — PHENYLEPHRINE HYDROCHLORIDE 10 MG/ML
INJECTION INTRAVENOUS
Status: DISCONTINUED | OUTPATIENT
Start: 2017-04-23 | End: 2017-04-23

## 2017-04-23 RX ORDER — GLYCOPYRROLATE 0.2 MG/ML
INJECTION INTRAMUSCULAR; INTRAVENOUS
Status: DISCONTINUED | OUTPATIENT
Start: 2017-04-23 | End: 2017-04-23

## 2017-04-23 RX ORDER — FENTANYL CITRATE 50 UG/ML
INJECTION, SOLUTION INTRAMUSCULAR; INTRAVENOUS
Status: DISCONTINUED | OUTPATIENT
Start: 2017-04-23 | End: 2017-04-23

## 2017-04-23 RX ORDER — CEFAZOLIN SODIUM 1 G/50ML
1 SOLUTION INTRAVENOUS
Status: DISCONTINUED | OUTPATIENT
Start: 2017-04-23 | End: 2017-04-23

## 2017-04-23 RX ORDER — ETOMIDATE 2 MG/ML
INJECTION INTRAVENOUS
Status: DISCONTINUED | OUTPATIENT
Start: 2017-04-23 | End: 2017-04-23

## 2017-04-23 RX ORDER — CEFAZOLIN SODIUM 1 G/50ML
1 SOLUTION INTRAVENOUS
Status: COMPLETED | OUTPATIENT
Start: 2017-04-23 | End: 2017-04-24

## 2017-04-23 RX ADMIN — SUCCINYLCHOLINE CHLORIDE 100 MG: 20 INJECTION, SOLUTION INTRAMUSCULAR; INTRAVENOUS at 08:04

## 2017-04-23 RX ADMIN — IPRATROPIUM BROMIDE 0.5 MG: 0.5 SOLUTION RESPIRATORY (INHALATION) at 08:04

## 2017-04-23 RX ADMIN — GLYCOPYRROLATE 0.4 MG: 0.2 INJECTION, SOLUTION INTRAMUSCULAR; INTRAVENOUS at 10:04

## 2017-04-23 RX ADMIN — FENTANYL CITRATE 25 MCG: 50 INJECTION, SOLUTION INTRAMUSCULAR; INTRAVENOUS at 10:04

## 2017-04-23 RX ADMIN — STANDARDIZED SENNA CONCENTRATE AND DOCUSATE SODIUM 1 TABLET: 8.6; 5 TABLET, FILM COATED ORAL at 01:04

## 2017-04-23 RX ADMIN — CEFAZOLIN SODIUM 1 G: 1 SOLUTION INTRAVENOUS at 03:04

## 2017-04-23 RX ADMIN — LEVALBUTEROL 0.31 MG: 0.63 SOLUTION RESPIRATORY (INHALATION) at 11:04

## 2017-04-23 RX ADMIN — IPRATROPIUM BROMIDE 0.5 MG: 0.5 SOLUTION RESPIRATORY (INHALATION) at 12:04

## 2017-04-23 RX ADMIN — LEVALBUTEROL 0.31 MG: 0.63 SOLUTION RESPIRATORY (INHALATION) at 08:04

## 2017-04-23 RX ADMIN — CITALOPRAM HYDROBROMIDE 20 MG: 20 TABLET ORAL at 01:04

## 2017-04-23 RX ADMIN — THERA TABS 1 TABLET: TAB at 01:04

## 2017-04-23 RX ADMIN — NEOSTIGMINE METHYLSULFATE 4 MG: 1 INJECTION INTRAVENOUS at 10:04

## 2017-04-23 RX ADMIN — PHENYLEPHRINE HYDROCHLORIDE 200 MCG: 10 INJECTION INTRAVENOUS at 08:04

## 2017-04-23 RX ADMIN — LIDOCAINE HYDROCHLORIDE 60 MG: 20 INJECTION, SOLUTION INTRAVENOUS at 08:04

## 2017-04-23 RX ADMIN — HYDROCORTISONE SODIUM SUCCINATE 25 MG: 100 INJECTION, POWDER, FOR SOLUTION INTRAMUSCULAR; INTRAVENOUS at 06:04

## 2017-04-23 RX ADMIN — ETOMIDATE 10 MG: 2 INJECTION, SOLUTION INTRAVENOUS at 08:04

## 2017-04-23 RX ADMIN — FENTANYL CITRATE 50 MCG: 50 INJECTION, SOLUTION INTRAMUSCULAR; INTRAVENOUS at 08:04

## 2017-04-23 RX ADMIN — CYANOCOBALAMIN TAB 250 MCG 250 MCG: 250 TAB at 01:04

## 2017-04-23 RX ADMIN — Medication 3 ML: at 09:04

## 2017-04-23 RX ADMIN — LEVALBUTEROL 0.31 MG: 0.63 SOLUTION RESPIRATORY (INHALATION) at 12:04

## 2017-04-23 RX ADMIN — PHENYLEPHRINE HYDROCHLORIDE 100 MCG: 10 INJECTION INTRAVENOUS at 09:04

## 2017-04-23 RX ADMIN — Medication 3 ML: at 06:04

## 2017-04-23 RX ADMIN — ROCURONIUM BROMIDE 15 MG: 10 INJECTION, SOLUTION INTRAVENOUS at 08:04

## 2017-04-23 RX ADMIN — SODIUM CHLORIDE, SODIUM GLUCONATE, SODIUM ACETATE, POTASSIUM CHLORIDE, MAGNESIUM CHLORIDE, SODIUM PHOSPHATE, DIBASIC, AND POTASSIUM PHOSPHATE: .53; .5; .37; .037; .03; .012; .00082 INJECTION, SOLUTION INTRAVENOUS at 08:04

## 2017-04-23 RX ADMIN — FLUTICASONE FUROATE AND VILANTEROL TRIFENATATE 1 PUFF: 100; 25 POWDER RESPIRATORY (INHALATION) at 01:04

## 2017-04-23 RX ADMIN — TIOTROPIUM BROMIDE 18 MCG: 18 CAPSULE ORAL; RESPIRATORY (INHALATION) at 04:04

## 2017-04-23 RX ADMIN — PANTOPRAZOLE SODIUM 1200 MG: 40 TABLET, DELAYED RELEASE ORAL at 08:04

## 2017-04-23 RX ADMIN — OXYCODONE HYDROCHLORIDE 5 MG: 5 TABLET ORAL at 06:04

## 2017-04-23 RX ADMIN — OXYCODONE HYDROCHLORIDE 5 MG: 5 TABLET ORAL at 12:04

## 2017-04-23 RX ADMIN — IPRATROPIUM BROMIDE 0.5 MG: 0.5 SOLUTION RESPIRATORY (INHALATION) at 03:04

## 2017-04-23 RX ADMIN — ONDANSETRON 4 MG: 2 INJECTION INTRAMUSCULAR; INTRAVENOUS at 10:04

## 2017-04-23 RX ADMIN — Medication 3 ML: at 03:04

## 2017-04-23 RX ADMIN — ACETAMINOPHEN 1000 MG: 10 INJECTION, SOLUTION INTRAVENOUS at 10:04

## 2017-04-23 RX ADMIN — PHENYLEPHRINE HYDROCHLORIDE 200 MCG: 10 INJECTION INTRAVENOUS at 09:04

## 2017-04-23 RX ADMIN — PHENYLEPHRINE HYDROCHLORIDE 100 MCG: 10 INJECTION INTRAVENOUS at 08:04

## 2017-04-23 RX ADMIN — IPRATROPIUM BROMIDE 0.5 MG: 0.5 SOLUTION RESPIRATORY (INHALATION) at 11:04

## 2017-04-23 RX ADMIN — PANTOPRAZOLE SODIUM 1200 MG: 40 TABLET, DELAYED RELEASE ORAL at 01:04

## 2017-04-23 RX ADMIN — PHENYLEPHRINE HYDROCHLORIDE 100 MCG: 10 INJECTION INTRAVENOUS at 10:04

## 2017-04-23 RX ADMIN — ROCURONIUM BROMIDE 5 MG: 10 INJECTION, SOLUTION INTRAVENOUS at 08:04

## 2017-04-23 RX ADMIN — STANDARDIZED SENNA CONCENTRATE AND DOCUSATE SODIUM 1 TABLET: 8.6; 5 TABLET, FILM COATED ORAL at 08:04

## 2017-04-23 NOTE — NURSING TRANSFER
Nursing Transfer Note      4/23/2017     Transfer To: 544 A    Transfer via stretcher    Transfer with cardiac monitoring    Transported by transport    Medicines sent: n/a    Chart send with patient: Yes    Notified: daughter    Patient reassessed at: 04/23/17 1258    Upon arrival to floor:

## 2017-04-23 NOTE — PROGRESS NOTES
Pt placed back on tele box. Per tele tech, pt confirmed on monitor. Ok to transport back to room 544A from pacu.

## 2017-04-23 NOTE — PROGRESS NOTES
Daily Orthopaedic Progress Note    Kaity Rebolledo is a 83 y.o. female admitted on 4/21/2017  Hospital Day: 1    Day of surgery     The patient was seen and examined this morning at the bedside. DIEGO overnight. Pain controlled. NPO since midnight for OR today.     +voiding  +flatus  +Tolerating PO  - fevers/chills     PHYSICAL EXAM:  Awake/alert/oriented x3, No acute distress, Afebrile, Vital signs stable  Good inspiratory effort with unlaboured breathing  RUE in sugartong splint   RLE shortened and externally rotated   Palpable distal pulses  Neurovascularly intact      Vitals:    04/23/17 0049 04/23/17 0330 04/23/17 0400 04/23/17 0405   BP:    100/61   BP Location:    Left arm   Patient Position:    Lying   BP Method:    Automatic   Pulse: 86 76 61 62   Resp: 20 20 16 15   Temp:    97.9 °F (36.6 °C)   TempSrc:    Oral   SpO2: 96% 98% 98% 99%   Weight:       Height:         I/O last 3 completed shifts:  In: 620 [P.O.:400; I.V.:220]  Out: 850 [Urine:850]    Recent Labs  Lab 04/22/17  0019 04/23/17  0507   CALCIUM 9.3 8.4*   PROT 6.7 5.2*   * 133*   K 4.3 5.2*   CO2 27 28   CL 95 100   BUN 22 25*   CREATININE 1.1 0.8       Recent Labs  Lab 04/22/17  0019 04/23/17  0507   WBC 20.29* 9.52   RBC 2.91* 2.40*   HGB 8.8* 7.3*   HCT 25.9* 22.6*   * 291       Recent Labs  Lab 04/22/17  0019 04/23/17  0507   INR 1.0 1.0   APTT 21.1  --        A/P: 83 y.o. female with right femoral neck fracture, right distal radius fracture  -to OR today for right hip hemiarthroplasty. Marked and consented.   -2 units PRBCs prepared. H&H 7.3/22.6   -will need ORIF of right distal radius as well. Possibly Wednesday if she recovers well from surgery today.     Dispo: hemiarthroplasty today right hip     Mateusz Castillo M.D. PGY3  [facility]   Orthopaedic Surgery Resident

## 2017-04-23 NOTE — ASSESSMENT & PLAN NOTE
No signs of volume overload or acute decompensated CHF   - Recent Echo from last moth showed EF 55 - 60%  - Hold lasix prior to surgery to prevent VIDAL   - Monitor volume status closely

## 2017-04-23 NOTE — PROGRESS NOTES
[facility] Hospital Medicine  Progress Note    Patient Name: Kaity Rebolledo  MRN: [id number]  Patient Class: IP- Inpatient   Admission Date: 4/21/2017  Length of Stay: 1 days  Attending Physician: Alexa Humphries MD  Primary Care Provider: Samuel Soriano MD    [facility] Medicine Team: [facility] MED H Alexa Humphries MD    Subjective:     Principal Problem:Displaced fracture of right femoral neck    HPI:  83 year old female with h/o advanced COPD, choronic hypoxic respiratory failure on home oxygen 3L, CAD, recent diagnosis of Afib last month and currently on eliquis, chronic diastolic CHF, HTN, Debility with mostly wheel chair bound status brought to ED  with right hip and right forearm pain after a mechanical fall at [facility]. Tanner recently was hospitalized at [facility] at the end of march for acute on chronic hypoxic and hypercapneic respiratory failuer due to COPD exacerbation. She was treated with BIPAP, steroid and nebs during hospital stay and discahred to [facility] on 04/04/17 for deconditioning. She was getting PT at [facility] and started on 10 days course of doxycycline for suspected penumonia based on CXR finding. She was seen at pulmonary clinic by Dr. Christian on 04/13 who started on prednisone 20 mg daily x 5 days, then 10 mg daily x 5 days and then stop. She was discharged from [facility] to [facility] on 04/14/17. She had multiple recent admissions for COPD exacerbation and durigh last admit she was made DNR per daughter at bedside. Daughter states she has been livign at assisted living for last 2 years and mostly wheel charir bound due to COPD and debility.     4/21 around 8 pm while she was walking from family room to her bed she got tangled with oxygen tubing, lost balnace and fell. She tried to prevent the fall with her right hand. Denies hitting her head or LOC. Imaing in ED showed right distal radial and ulnar fractuer, and right femoral  neck fracture. Orthopedic reduced the radial fx at bedside and placed splint. Plan to have surgical fixation of right femoral neck fracture.           Hospital Course:  Pt was admitted to [facility] and medically optimized for surgery.  On 4/23, she underwent  right hip hemiarthroplasty. She is now WBAT with poterior hip precautions per ortho recs.     Interval History: Pt seen in PACU, still sleepy, but comfortable and stable on 3L NC    Review of Systems   Constitutional: Negative for chills and fever.   Respiratory: Negative for cough, shortness of breath and wheezing.    Cardiovascular: Negative for chest pain and palpitations.   Gastrointestinal: Negative for abdominal pain, constipation, diarrhea and nausea.   Genitourinary: Negative for difficulty urinating, dysuria and frequency.     Objective:     Vital Signs (Most Recent):  Temp: 98.2 °F (36.8 °C) (04/23/17 1303)  Pulse: 65 (04/23/17 1335)  Resp: 14 (04/23/17 1335)  BP: (!) 95/52 (04/23/17 1303)  SpO2: (!) 92 % (04/23/17 1303) Vital Signs (24h Range):  Temp:  [97.9 °F (36.6 °C)-98.6 °F (37 °C)] 98.2 °F (36.8 °C)  Pulse:  [] 65  Resp:  [14-93] 14  SpO2:  [92 %-100 %] 92 %  BP: ()/(46-68) 95/52     Weight: 49.4 kg (109 lb)  Body mass index is 18.14 kg/(m^2).    Intake/Output Summary (Last 24 hours) at 04/23/17 1337  Last data filed at 04/23/17 1300   Gross per 24 hour   Intake             2716 ml   Output             2530 ml   Net              186 ml      Physical Exam   Constitutional: She is oriented to person, place, and time. She appears well-developed and well-nourished. No distress.   HENT:   Head: Normocephalic and atraumatic.   Cardiovascular: Normal rate and regular rhythm.    No murmur heard.  Pulmonary/Chest: Effort normal and breath sounds normal. She has no wheezes.   Abdominal: Soft. Bowel sounds are normal. She exhibits no distension. There is no tenderness.   Musculoskeletal: She exhibits tenderness (to hip).   Neurological: She is alert  and oriented to person, place, and time.   Skin: Skin is warm and dry. No rash noted.       Assessment/Plan:      * Displaced fracture of right femoral neck  [facility] for [facility].  On 4/23, she underwent  right hip hemiarthroplasty. She is now WBAT with poterior hip precautions per ortho recs.       Chronic hypoxemic respiratory failure  COPD  On 3LNC home oxygen  - No signs of acute COPD exacerbation   - monitor respiratory status closely during periop period   - Continue nebs and supplemental oxygen to keep sat > 90 %      Coronary artery disease involving native coronary artery without angina pectoris  No angina and EKG w/o ischemic pattern   - BP/HR stable   - Hold ASA for surgery   - Hold lisinopril to prevent post op VIDAL   - Continue toprol XL 5O mg daily       Paroxysmal atrial fibrillation  - Currently on eliquis 2.5 mg bid and last dose 7 pm 04/21   - Hold eliquis for hip surgery   - Restart after surgery when deemed safe per orthopedic surgery   - Continue metoprolol for rate control       Chronic diastolic heart failure  No signs of volume overload or acute decompensated CHF   - Recent Echo from last moth showed EF 55 - 60%  - Hold lasix prior to surgery to prevent VIDAL   - Monitor volume status closely       Osteoporosis  Vit D 25.  Will start calcium and Vit D supplements.       Anxiety and depression  On celexa   - xanax prn       Closed Colles' fracture of right radius  Per ortho      VTE Risk Mitigation         Ordered     Medium Risk of VTE  Once      04/22/17 0916     Place JENNIFER hose  Until discontinued      04/22/17 0553     Place sequential compression device  Until discontinued      04/22/17 0553          Alexa Humphries MD  Department of Hospital Medicine   [facility]

## 2017-04-23 NOTE — ASSESSMENT & PLAN NOTE
No angina and EKG w/o ischemic pattern   - BP/HR stable   - Hold ASA for surgery   - Hold lisinopril to prevent post op VIDAL   - Continue toprol XL 5O mg daily

## 2017-04-23 NOTE — ASSESSMENT & PLAN NOTE
COPD  On 3LNC home oxygen  - No signs of acute COPD exacerbation   - monitor respiratory status closely during periop period   - Continue nebs and supplemental oxygen to keep sat > 90 %

## 2017-04-23 NOTE — ASSESSMENT & PLAN NOTE
- Currently on eliquis 2.5 mg bid and last dose 7 pm 04/21   - Hold eliquis for hip surgery   - Restart after surgery when deemed safe per orthopedic surgery   - Continue metoprolol for rate control

## 2017-04-23 NOTE — ANESTHESIA PROCEDURE NOTES
Arterial    Diagnosis: hip fracture    Patient location during procedure: done in OR  Procedure start time: 4/23/2017 8:30 AM  Procedure end time: 4/23/2017 8:35 AM  Staffing  Anesthesiologist: MIGUEL A LANDON  Performed by: anesthesiologist   Anesthesiologist was present at the time of the procedure.  Preanesthetic Checklist  Completed: patient identified, site marked, surgical consent, pre-op evaluation, timeout performed, IV checked, risks and benefits discussed, monitors and equipment checked and anesthesia consent given  Arterial Line  Skin Prep: chlorhexidine gluconate and isopropyl alcohol  Local Infiltration: none  Orientation: left  Location: radial  Catheter Size: 20 G{[facility] ANESTHESIA BLOCK ART PLACEMENTInsertion Attempts: 1  Assessment  Dressing: secured with tape and tegaderm  Patient: Tolerated well

## 2017-04-23 NOTE — ANESTHESIA RELEASE NOTE
"Anesthesia Release from PACU Note    Patient: Kaity Rebolledo    Procedure(s) Performed: Procedure(s) (LRB):  HEMIARTHROPLASTY RIGHT HIP (Right)    Anesthesia type: general    Post pain: Adequate analgesia    Post assessment: no apparent anesthetic complications and tolerated procedure well    Last Vitals:   Visit Vitals    BP (!) 105/57 (BP Location: Left arm, Patient Position: Lying, BP Method: Automatic)    Pulse 82    Temp 36.9 °C (98.4 °F) (Axillary)    Resp (!) 21    Ht 5' 5" (1.651 m)    Wt 49.4 kg (109 lb)    LMP  (LMP Unknown)    SpO2 (!) 93%    Breastfeeding No    BMI 18.14 kg/m2       Post vital signs: stable    Level of consciousness: awake and alert     Nausea/Vomiting: no nausea/no vomiting    Complications: none    Airway Patency: patent    Respiratory: unassisted, spontaneous ventilation, room air    Cardiovascular: stable and blood pressure at baseline    Hydration: euvolemic  "

## 2017-04-23 NOTE — ANESTHESIA PREPROCEDURE EVALUATION
04/22/2017  Kaity Rebolledo is a 83 y.o., female.    Pre-operative evaluation for Procedure(s) (LRB):  HEMIARTHROPLASTY RIGHT HIP PEG BOARD -- BERYL  (Right)    Kaity Rebolledo is a 83 y.o. 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     Some baseline dementia, but AAO currently. Spoke w/ daughter via phone as well. Cooeprative. Wheezes BL.     VSS on 3L NC (her home dose).     LDA: being placed currently    Prev airway: none in system    Drips: none currently    Patient Active Problem List   Diagnosis    Cystocele    Essential hypertension    OP (osteoporosis)    Dyslipidemia    Physical deconditioning    Chronic anemia    Chronic systolic heart failure    Cardiomyopathy    Anemia of chronic disease    Closed fracture of ramus of right pubis    Chronic hypoxemic respiratory failure    Coronary artery disease involving native coronary artery of native heart without angina pectoris    Macrocytic anemia    Uterine prolapse    Arthralgia of left hip    Hip pain    Anemia, chronic disease    HLD (hyperlipidemia)    Closed bilateral fracture of pubic rami    Osteoporosis    Greater trochanter fracture    Hip fracture    Chronic obstructive pulmonary disease    Coronary artery disease involving native coronary artery without angina pectoris    Pneumonia    Chronic diastolic heart failure    Iron deficiency anemia due to chronic blood loss    Low oxygen saturation    Hypokalemia    Atrial fibrillation with RVR    (HFpEF) heart failure with preserved ejection fraction    Pulmonary hypertension     History of atrial fibrillation    SOB (shortness of breath)    Acute on chronic respiratory failure with hypoxia and hypercapnia    Hyponatremia    Paroxysmal atrial fibrillation    Anxiety and depression    Urinary retention    Elevated troponin    Pulmonary hypertension due to lung disease    Depression with anxiety    Debility    Left leg pain    Anticoagulant long-term use    Displaced fracture of right femoral neck    Closed Colles' fracture of right radius       Review of patient's allergies indicates:   Allergen Reactions    Advair diskus  [fluticasone-salmeterol]      Other reaction(s): Nausea    Morphine Hallucinations    Pravastatin      Other reaction(s): Muscle pain        No current facility-administered medications on file prior to encounter.      Current Outpatient Prescriptions on File Prior to Encounter   Medication Sig Dispense Refill    albuterol 90 mcg/actuation inhaler Inhale 2 puffs into the lungs every 6 (six) hours as needed for Wheezing or Shortness of Breath. 6.7 g 4    alprazolam (XANAX) 0.25 MG tablet Take 1 tablet (0.25 mg total) by mouth nightly as needed for Anxiety. 20 tablet 0    apixaban 2.5 mg Tab Take 1 tablet (2.5 mg total) by mouth 2 (two) times daily. 60 tablet 1    ascorbic acid (VITAMIN C) 500 MG tablet Take 500 mg by mouth 2 (two) times daily.      aspirin (ECOTRIN) 81 MG EC tablet Take 1 tablet (81 mg total) by mouth once daily.  0    citalopram (CELEXA) 20 MG tablet TAKE 1 TABLET BY MOUTH ONCE DAILY 30 tablet 2    cyanocobalamin (VITAMIN B-12) 100 MCG tablet Take 1 tablet (100 mcg total) by mouth once daily. 90 tablet 3    cyproheptadine (PERIACTIN) 4 mg tablet Take 1 tablet (4 mg total) by mouth 3 (three) times daily as needed. 30 tablet 2    doxycycline (VIBRA-TABS) 100 MG tablet Take 1 tablet (100 mg total) by mouth every 12 (twelve) hours. 16 tablet 0    fluticasone-vilanterol (BREO) 100-25 mcg/dose diskus inhaler Inhale 1 puff into the  lungs once daily. 60 each 12    furosemide (LASIX) 20 MG tablet Take 1 tablet (20 mg total) by mouth once daily. 30 tablet 1    guaifenesin (MUCINEX) 600 mg 12 hr tablet Take 2 tablets (1,200 mg total) by mouth 2 (two) times daily. Okay to dispense box of medication  as prepackaged by . 60 tablet 6    levalbuterol (XOPENEX) 0.31 mg/3 mL nebulizer solution Take 3 mLs (0.31 mg total) by nebulization 4 (four) times daily. 300 mL 12    lisinopril (PRINIVIL,ZESTRIL) 2.5 MG tablet Take 1 tablet (2.5 mg total) by mouth once daily. 30 tablet 4    metoprolol succinate (TOPROL-XL) 50 MG 24 hr tablet Take 1 tablet (50 mg total) by mouth once daily. 90 tablet 0    multivitamin (ONE DAILY MULTIVITAMIN) per tablet Take 1 tablet by mouth once daily.      potassium chloride (MICRO-K) 10 MEQ CpSR Take 1 capsule (10 mEq total) by mouth once daily. 30 capsule 3    predniSONE (DELTASONE) 10 MG tablet Take 2 tabs (20 mg) for 5 days, then 1 tab (10 mg) for 5 days then stop 15 tablet 0    senna-docusate 8.6-50 mg (PERICOLACE) 8.6-50 mg per tablet Take 1 tablet by mouth 2 (two) times daily.      tiotropium (SPIRIVA WITH HANDIHALER) 18 mcg inhalation capsule Inhale 18 mcg into the lungs once daily. Controller      nitroGLYCERIN (NITROSTAT) 0.4 MG SL tablet Place 1 tablet (0.4 mg total) under the tongue every 5 (five) minutes as needed for Chest pain. 30 tablet 1       Past Surgical History:   Procedure Laterality Date    CATARACT EXTRACTION BILATERAL W/ ANTERIOR VITRECTOMY      EYE SURGERY      FRACTURE SURGERY      LEG SURGERY         Social History     Social History    Marital status:      Spouse name: N/A    Number of children: N/A    Years of education: N/A     Occupational History    Not on file.     Social History Main Topics    Smoking status: Former Smoker     Packs/day: 1.00     Years: 50.00     Types: Cigarettes     Quit date: 3/13/2003    Smokeless tobacco: Never Used    Alcohol use No     Drug use: No    Sexual activity: Not Currently     Birth control/ protection: Post-menopausal     Other Topics Concern    Not on file     Social History Narrative         Vital Signs Range (Last 24H):  Temp:  [36.2 °C (97.2 °F)-37.1 °C (98.7 °F)]   Pulse:  [1-121]   Resp:  [14-18]   BP: ()/(54-74)   SpO2:  [92 %-99 %]       CBC:   Recent Labs      04/22/17   0019   WBC  20.29*   RBC  2.91*   HGB  8.8*   HCT  25.9*   PLT  374*   MCV  89   MCH  30.2   MCHC  34.0       CMP:   Recent Labs      04/22/17 0019   NA  131*   K  4.3   CL  95   CO2  27   BUN  22   CREATININE  1.1   GLU  98   CALCIUM  9.3   ALBUMIN  3.3*   PROT  6.7   ALKPHOS  60   ALT  29   AST  24   BILITOT  0.4       INR  Recent Labs      04/22/17 0019   INR  1.0   APTT  21.1           Diagnostic Studies:      EKG:  Vent. Rate : 097 BPM     Atrial Rate : 097 BPM     P-R Int : 126 ms          QRS Dur : 070 ms      QT Int : 308 ms       P-R-T Axes : 078 047 076 degrees     QTc Int : 391 ms    Sinus rhythm with occasional Premature ventricular complexes  Biatrial enlargement  Nonspecific ST abnormality  Abnormal ECG  No previous ECGs available  Confirmed by GITA WHITTAKER MD ([id number]) on 4/12/2017 10:56:27 AM    2D Echo:  CONCLUSIONS     1 - Normal left ventricular systolic function (EF 55-60%).     2 - Left ventricular diastolic dysfunction.     3 - Low normal right ventricular systolic function .     4 - Mild aortic stenosis, VIRI = 1.9 cm2, mean gradient = 8.7 mmHg.     5 - Mild aortic regurgitation.     6 - Mild mitral regurgitation.     7 - Moderate tricuspid regurgitation.     8 - Increased central venous pressure.     9 - Pulmonary hypertension. The estimated PA systolic pressure is 51 mmHg.             This document has been electronically    SIGNED BY: Micah Muniz MD On: 03/03/2017 16:48        Anesthesia Evaluation    I have reviewed the Patient Summary Reports.    I have reviewed the Nursing Notes.   I have reviewed the  Medications.   Steroids Taken In Past Year: Prednisone    Review of Systems  Anesthesia Hx:  No problems with previous Anesthesia  History of prior surgery of interest to airway management or planning: Denies Family Hx of Anesthesia complications.   Denies Personal Hx of Anesthesia complications.   Social:  Former Smoker, No Alcohol Use    Hematology/Oncology:  Hematology Normal        EENT/Dental:EENT/Dental Normal   Cardiovascular:   Exercise tolerance: poor Hypertension CAD asymptomatic  CHF ECG has been reviewed.    Pulmonary:   Pneumonia COPD, severe Asthma Shortness of breath    Renal/:  Renal/ Normal     Hepatic/GI:  Hepatic/GI Normal    Neurological:  Dementia    Endocrine:  Endocrine Normal    Psych:   Psychiatric History          Physical Exam  General:  Cachexia    Airway/Jaw/Neck:  Airway Findings: Mouth Opening: Normal Tongue: Normal  General Airway Assessment: Adult  Mallampati: II  Improves to I with phonation.  TM Distance: Normal, at least 6 cm  Jaw/Neck Findings:  Neck ROM: Normal ROM     Eyes/Ears/Nose:  EYES/EARS/NOSE FINDINGS: Normal   Dental:  Dental Findings: In tact   Chest/Lungs:  Chest/Lungs Findings: Normal Respiratory Rate, Expiratory Wheezes, Mod.     Heart/Vascular:  Heart Findings: Rate: Normal  Rhythm: Regular Rhythm  Heart murmur: negative    Abdomen:  Abdomen Findings: Normal    Musculoskeletal:  Musculoskeletal Findings:    Skin:  Skin Findings: Normal    Mental Status:  Mental Status Findings:  Cooperative, Alert and Oriented         Anesthesia Plan  Type of Anesthesia, risks & benefits discussed:  Anesthesia Type:  general, epidural, MAC  Patient's Preference:   Intra-op Monitoring Plan: standard ASA monitors  Intra-op Monitoring Plan Comments:   Post Op Pain Control Plan:   Post Op Pain Control Plan Comments:   Induction:   IV  Beta Blocker:  Patient is on a Beta-Blocker and has received one dose within the past 24 hours (No further documentation required).       Informed  Consent: Patient representative understands risks and agrees with Anesthesia plan.  Questions answered. Anesthesia consent signed with patient representative.  ASA Score: 4     Day of Surgery Review of History & Physical:            Ready For Surgery From Anesthesia Perspective.

## 2017-04-23 NOTE — ANESTHESIA POSTPROCEDURE EVALUATION
"Anesthesia Post Evaluation    Patient: Kaity Rebolledo    Procedure(s) Performed: Procedure(s) (LRB):  HEMIARTHROPLASTY RIGHT HIP (Right)    Final Anesthesia Type: general  Patient location during evaluation: PACU  Patient participation: Yes- Able to Participate  Level of consciousness: awake and alert  Post-procedure vital signs: reviewed and stable  Pain management: adequate  Airway patency: patent  PONV status at discharge: No PONV  Anesthetic complications: no      Cardiovascular status: blood pressure returned to baseline  Respiratory status: unassisted, spontaneous ventilation and room air  Hydration status: euvolemic  Follow-up not needed.        Visit Vitals    BP (!) 105/57 (BP Location: Left arm, Patient Position: Lying, BP Method: Automatic)    Pulse 82    Temp 36.9 °C (98.4 °F) (Axillary)    Resp (!) 21    Ht 5' 5" (1.651 m)    Wt 49.4 kg (109 lb)    LMP  (LMP Unknown)    SpO2 (!) 93%    Breastfeeding No    BMI 18.14 kg/m2       Pain/Tommy Score: Pain Assessment Performed: Yes (4/23/2017 12:00 PM)  Presence of Pain: denies (4/23/2017 12:00 PM)  Pain Rating Prior to Med Admin: 5 (4/23/2017  6:14 AM)  Tommy Score: 7 (4/23/2017 12:00 PM)      "

## 2017-04-23 NOTE — PLAN OF CARE
Problem: Patient Care Overview  Goal: Plan of Care Review  Outcome: Ongoing (interventions implemented as appropriate)  Pt AAOX4, VSS. Tele monitor on. Delirium precautions maintained.  Caregiver to remain at bedside. Pt receiving breathing treatment now. Right arm in soft cast. FCD's on and functioning properly. Pain managed with PRN medications. IS education complete. Fall precautions maintained. Will resume care.

## 2017-04-23 NOTE — TRANSFER OF CARE
"Anesthesia Transfer of Care Note    Patient: Kaity Rebolledo    Procedure(s) Performed: Procedure(s) (LRB):  HEMIARTHROPLASTY RIGHT HIP (Right)    Patient location: PACU    Anesthesia Type: general    Transport from OR: Transported from OR on 6-10 L/min O2 by face mask with adequate spontaneous ventilation    Post pain: adequate analgesia    Post assessment: no apparent anesthetic complications    Post vital signs: stable    Level of consciousness: awake    Nausea/Vomiting: no nausea/vomiting    Complications: none          Last vitals:   Visit Vitals    /68 (BP Location: Left arm, Patient Position: Lying, BP Method: Automatic)    Pulse 98    Temp 36.8 °C (98.3 °F) (Axillary)    Resp 15    Ht 5' 5" (1.651 m)    Wt 49.4 kg (109 lb)    LMP  (LMP Unknown)    SpO2 100%    Breastfeeding No    BMI 18.14 kg/m2     "

## 2017-04-23 NOTE — ASSESSMENT & PLAN NOTE
Pt was admitted to [facility] and medically optimized for surgery.  On 4/23, she underwent  right hip hemiarthroplasty. She is now WBAT with poterior hip precautions per ortho recs.

## 2017-04-24 ENCOUNTER — PATIENT MESSAGE (OUTPATIENT)
Dept: ORTHOPEDICS | Facility: CLINIC | Age: 82
End: 2017-04-24

## 2017-04-24 PROBLEM — D62 ACUTE BLOOD LOSS ANEMIA: Status: ACTIVE | Noted: 2017-04-24

## 2017-04-24 LAB
ALBUMIN SERPL BCP-MCNC: 2.3 G/DL
ALP SERPL-CCNC: 53 U/L
ALT SERPL W/O P-5'-P-CCNC: 17 U/L
ANION GAP SERPL CALC-SCNC: 7 MMOL/L
AST SERPL-CCNC: 28 U/L
BASOPHILS # BLD AUTO: 0.02 K/UL
BASOPHILS NFR BLD: 0.2 %
BILIRUB SERPL-MCNC: 0.3 MG/DL
BLD PROD TYP BPU: NORMAL
BLD PROD TYP BPU: NORMAL
BLOOD UNIT EXPIRATION DATE: NORMAL
BLOOD UNIT EXPIRATION DATE: NORMAL
BLOOD UNIT TYPE CODE: 6200
BLOOD UNIT TYPE CODE: 6200
BLOOD UNIT TYPE: NORMAL
BLOOD UNIT TYPE: NORMAL
BUN SERPL-MCNC: 22 MG/DL
CALCIUM SERPL-MCNC: 8.3 MG/DL
CHLORIDE SERPL-SCNC: 98 MMOL/L
CO2 SERPL-SCNC: 24 MMOL/L
CODING SYSTEM: NORMAL
CODING SYSTEM: NORMAL
CREAT SERPL-MCNC: 0.9 MG/DL
DIFFERENTIAL METHOD: ABNORMAL
DISPENSE STATUS: NORMAL
DISPENSE STATUS: NORMAL
EOSINOPHIL # BLD AUTO: 0.5 K/UL
EOSINOPHIL NFR BLD: 4.3 %
ERYTHROCYTE [DISTWIDTH] IN BLOOD BY AUTOMATED COUNT: 14.7 %
EST. GFR  (AFRICAN AMERICAN): >60 ML/MIN/1.73 M^2
EST. GFR  (NON AFRICAN AMERICAN): 59.3 ML/MIN/1.73 M^2
GLUCOSE SERPL-MCNC: 95 MG/DL
HCT VFR BLD AUTO: 24.1 %
HGB BLD-MCNC: 7.7 G/DL
LYMPHOCYTES # BLD AUTO: 1 K/UL
LYMPHOCYTES NFR BLD: 9.6 %
MAGNESIUM SERPL-MCNC: 1.7 MG/DL
MCH RBC QN AUTO: 29.5 PG
MCHC RBC AUTO-ENTMCNC: 32 %
MCV RBC AUTO: 92 FL
MONOCYTES # BLD AUTO: 0.9 K/UL
MONOCYTES NFR BLD: 8.2 %
NEUTROPHILS # BLD AUTO: 8.1 K/UL
NEUTROPHILS NFR BLD: 77.4 %
PHOSPHATE SERPL-MCNC: 2.6 MG/DL
PLATELET # BLD AUTO: 221 K/UL
PMV BLD AUTO: 9.9 FL
POTASSIUM SERPL-SCNC: 4.4 MMOL/L
PROT SERPL-MCNC: 4.9 G/DL
RBC # BLD AUTO: 2.61 M/UL
SODIUM SERPL-SCNC: 129 MMOL/L
TRANS ERYTHROCYTES VOL PATIENT: NORMAL ML
TRANS ERYTHROCYTES VOL PATIENT: NORMAL ML
WBC # BLD AUTO: 10.47 K/UL

## 2017-04-24 PROCEDURE — 36415 COLL VENOUS BLD VENIPUNCTURE: CPT

## 2017-04-24 PROCEDURE — 94640 AIRWAY INHALATION TREATMENT: CPT

## 2017-04-24 PROCEDURE — 63600175 PHARM REV CODE 636 W HCPCS: Performed by: STUDENT IN AN ORGANIZED HEALTH CARE EDUCATION/TRAINING PROGRAM

## 2017-04-24 PROCEDURE — P9021 RED BLOOD CELLS UNIT: HCPCS

## 2017-04-24 PROCEDURE — 63600175 PHARM REV CODE 636 W HCPCS: Performed by: HOSPITALIST

## 2017-04-24 PROCEDURE — 84100 ASSAY OF PHOSPHORUS: CPT

## 2017-04-24 PROCEDURE — 25000003 PHARM REV CODE 250: Performed by: HOSPITALIST

## 2017-04-24 PROCEDURE — 85025 COMPLETE CBC W/AUTO DIFF WBC: CPT

## 2017-04-24 PROCEDURE — 97535 SELF CARE MNGMENT TRAINING: CPT

## 2017-04-24 PROCEDURE — 97162 PT EVAL MOD COMPLEX 30 MIN: CPT

## 2017-04-24 PROCEDURE — 83735 ASSAY OF MAGNESIUM: CPT

## 2017-04-24 PROCEDURE — 97166 OT EVAL MOD COMPLEX 45 MIN: CPT

## 2017-04-24 PROCEDURE — 27000221 HC OXYGEN, UP TO 24 HOURS

## 2017-04-24 PROCEDURE — 99232 SBSQ HOSP IP/OBS MODERATE 35: CPT | Mod: ,,, | Performed by: HOSPITALIST

## 2017-04-24 PROCEDURE — 80053 COMPREHEN METABOLIC PANEL: CPT

## 2017-04-24 PROCEDURE — 97530 THERAPEUTIC ACTIVITIES: CPT

## 2017-04-24 PROCEDURE — 86580 TB INTRADERMAL TEST: CPT | Performed by: HOSPITALIST

## 2017-04-24 PROCEDURE — 0SRR0J9 REPLACEMENT OF RIGHT HIP JOINT, FEMORAL SURFACE WITH SYNTHETIC SUBSTITUTE, CEMENTED, OPEN APPROACH: ICD-10-PCS | Performed by: ORTHOPAEDIC SURGERY

## 2017-04-24 PROCEDURE — 11000001 HC ACUTE MED/SURG PRIVATE ROOM

## 2017-04-24 RX ORDER — ENOXAPARIN SODIUM 100 MG/ML
40 INJECTION SUBCUTANEOUS EVERY 24 HOURS
Status: DISCONTINUED | OUTPATIENT
Start: 2017-04-24 | End: 2017-04-28

## 2017-04-24 RX ORDER — HYDROCODONE BITARTRATE AND ACETAMINOPHEN 500; 5 MG/1; MG/1
TABLET ORAL
Status: DISCONTINUED | OUTPATIENT
Start: 2017-04-24 | End: 2017-05-02 | Stop reason: HOSPADM

## 2017-04-24 RX ADMIN — TIOTROPIUM BROMIDE 18 MCG: 18 CAPSULE ORAL; RESPIRATORY (INHALATION) at 08:04

## 2017-04-24 RX ADMIN — THERA TABS 1 TABLET: TAB at 08:04

## 2017-04-24 RX ADMIN — IPRATROPIUM BROMIDE 0.5 MG: 0.5 SOLUTION RESPIRATORY (INHALATION) at 03:04

## 2017-04-24 RX ADMIN — IPRATROPIUM BROMIDE 0.5 MG: 0.5 SOLUTION RESPIRATORY (INHALATION) at 07:04

## 2017-04-24 RX ADMIN — OXYCODONE HYDROCHLORIDE 5 MG: 5 TABLET ORAL at 06:04

## 2017-04-24 RX ADMIN — OXYCODONE HYDROCHLORIDE 5 MG: 5 TABLET ORAL at 02:04

## 2017-04-24 RX ADMIN — CITALOPRAM HYDROBROMIDE 20 MG: 20 TABLET ORAL at 08:04

## 2017-04-24 RX ADMIN — LEVALBUTEROL 0.31 MG: 0.63 SOLUTION RESPIRATORY (INHALATION) at 11:04

## 2017-04-24 RX ADMIN — IPRATROPIUM BROMIDE 0.5 MG: 0.5 SOLUTION RESPIRATORY (INHALATION) at 11:04

## 2017-04-24 RX ADMIN — Medication 3 ML: at 10:04

## 2017-04-24 RX ADMIN — CEFAZOLIN SODIUM 1 G: 1 SOLUTION INTRAVENOUS at 06:04

## 2017-04-24 RX ADMIN — Medication 3 ML: at 06:04

## 2017-04-24 RX ADMIN — PANTOPRAZOLE SODIUM 1200 MG: 40 TABLET, DELAYED RELEASE ORAL at 09:04

## 2017-04-24 RX ADMIN — OXYCODONE HYDROCHLORIDE 5 MG: 5 TABLET ORAL at 11:04

## 2017-04-24 RX ADMIN — Medication 5 UNITS: at 03:04

## 2017-04-24 RX ADMIN — LEVALBUTEROL 0.31 MG: 0.63 SOLUTION RESPIRATORY (INHALATION) at 09:04

## 2017-04-24 RX ADMIN — LEVALBUTEROL 0.31 MG: 0.63 SOLUTION RESPIRATORY (INHALATION) at 07:04

## 2017-04-24 RX ADMIN — FLUTICASONE FUROATE AND VILANTEROL TRIFENATATE 1 PUFF: 100; 25 POWDER RESPIRATORY (INHALATION) at 08:04

## 2017-04-24 RX ADMIN — CYANOCOBALAMIN TAB 250 MCG 250 MCG: 250 TAB at 08:04

## 2017-04-24 RX ADMIN — STANDARDIZED SENNA CONCENTRATE AND DOCUSATE SODIUM 1 TABLET: 8.6; 5 TABLET, FILM COATED ORAL at 09:04

## 2017-04-24 RX ADMIN — METOPROLOL SUCCINATE 50 MG: 50 TABLET, EXTENDED RELEASE ORAL at 08:04

## 2017-04-24 RX ADMIN — ENOXAPARIN SODIUM 40 MG: 100 INJECTION SUBCUTANEOUS at 04:04

## 2017-04-24 RX ADMIN — CEFAZOLIN SODIUM 1 G: 1 SOLUTION INTRAVENOUS at 03:04

## 2017-04-24 RX ADMIN — STANDARDIZED SENNA CONCENTRATE AND DOCUSATE SODIUM 1 TABLET: 8.6; 5 TABLET, FILM COATED ORAL at 08:04

## 2017-04-24 RX ADMIN — ONDANSETRON 4 MG: 2 INJECTION INTRAMUSCULAR; INTRAVENOUS at 02:04

## 2017-04-24 RX ADMIN — PANTOPRAZOLE SODIUM 1200 MG: 40 TABLET, DELAYED RELEASE ORAL at 08:04

## 2017-04-24 RX ADMIN — OXYCODONE HYDROCHLORIDE 5 MG: 5 TABLET ORAL at 08:04

## 2017-04-24 RX ADMIN — IPRATROPIUM BROMIDE 0.5 MG: 0.5 SOLUTION RESPIRATORY (INHALATION) at 09:04

## 2017-04-24 RX ADMIN — CEFAZOLIN SODIUM 1 G: 1 SOLUTION INTRAVENOUS at 02:04

## 2017-04-24 NOTE — PT/OT/SLP EVAL
Occupational Therapy  Evaluation    Kaity Rebolledo   MRN: [id number]   Admitting Diagnosis: Displaced fracture of right femoral neck    OT Date of Treatment: 17   OT Start Time: 1254  OT Stop Time: 1322  OT Total Time (min): 28 min    Billable Minutes:  Evaluation 18  Self Care/Home Management 10    Diagnosis: Displaced fracture of right femoral neck   S.p R hip hemiarthroplasty    Past Medical History:   Diagnosis Date    Atrial fibrillation with RVR 3/4/2017    CAD (coronary artery disease) 3/14/2016    Cardiomyopathy 2015    CHF (congestive heart failure) 10/14/2015    COPD (chronic obstructive pulmonary disease)     COPD exacerbation 3/14/2016    Fall     patient  in  wheel  chair    Hyperlipidemia     Hypertension     Osteoporosis     Pneumonia     Rotator cuff injury     R s/p therapy      Past Surgical History:   Procedure Laterality Date    CATARACT EXTRACTION BILATERAL W/ ANTERIOR VITRECTOMY      EYE SURGERY      FRACTURE SURGERY      LEG SURGERY         General Precautions: Standard, fall  Orthopedic Precautions: RLE weight bearing as tolerated, RLE posterior precautions, RUE non weight bearing    Patient History:  Living Environment  Lives With: facility resident  Living Arrangements: assisted living  Transportation Available: agency transportation  Living Environment Comment: Pt requiring (A) with most ADLs and using C for mobility PTA  Equipment Currently Used at Home: bedside commode, oxygen, walker, rolling, wheelchair, shower chair    Prior level of function:   Bed Mobility/Transfers: needs device and assist  Grooming: independent  Bathing: needs device and assist  Upper Body Dressing: independent  Lower Body Dressing: needs assist  Toileting: needs device and assist    Subjective:  Communicated with RN prior to session.    Pt agreeable to Evaluation    Pain Ratin/10  Location - Side: Right  Location: wrist  Pain Addressed: Pre-medicate for activity, Reposition,  "Distraction    Objective:  Patient found with: peripheral IV, FCD, SCD, oxygen    Upper Extremity Range of Motion:  Right Upper Extremity: WFL except wrist immobilized  Left Upper Extremity: WFL    Upper Extremity Strength:  Right Upper Extremity: 3/5  Left Upper Extremity: 3/5    Functional Mobility:  Bed Mobility:  Scooting/Bridging: Maximum Assistance  Supine to Sit: Maximum Assistance    Transfers:  Sit <> Stand Assistance: Maximum Assistance  Sit <> Stand Assistive Device: Rolling Walker    Bed <> Chair Technique: Stand Pivot  Bed <> Chair Transfer Assistance: Maximum Assistance  Bed <> Chair Assistive Device: No Assistive Device    Activities of Daily Living:  UE Dressing Level of Assistance: Maximum assistance    LE Dressing Level of Assistance: Total assistance    Grooming Position: Seated, EOB  Grooming Level of Assistance: Moderate assistance    Balance:   Static Sit: FAIR-: Maintains without assist but inconsistent   Dynamic Sit: FAIR: Cannot move trunk without losing balance  Static Stand: POOR: Needs MODERATE assist to maintain  Dynamic stand: 0: N/A    Therapeutic Activities and Exercises:  Pt educated on and provided with handout on posterior hip precautions. Pt unable to verbalize any following evaluation    AM-PAC 6 CLICK ADL  How much help from another person does this patient currently need?  1 = Unable, Total/Dependent Assistance  2 = A lot, Maximum/Moderate Assistance  3 = A little, Minimum/Contact Guard/Supervision  4 = None, Modified [place]/Independent    Putting on and taking off regular lower body clothing? : 1  Bathing (including washing, rinsing, drying)?: 1  Toileting, which includes using toilet, bedpan, or urinal? : 2  Putting on and taking off regular upper body clothing?: 2  Taking care of personal grooming such as brushing teeth?: 2  Eating meals?: 3  Total Score: 11    AM-PAC Raw Score CMS "G-Code Modifier Level of Impairment Assistance   6 % Total / Unable   7 - 9 CM " 80 - 100% Maximal Assist   10 - 14 CL 60 - 80% Moderate Assist   15 - 19 CK 40 - 60% Moderate Assist   20 - 22 CJ 20 - 40% Minimal Assist   23 CI 1-20% SBA / CGA   24 CH 0% Independent/ Mod I       Patient left up in chair with all lines intact, call button in reach and RN notified    Assessment:  Kaity Rebolledo is a 83 y.o. female with a medical diagnosis of Displaced fracture of right femoral neck and presents with overall strength and endurance deficits as well as decreased function and orthopedic precautions of R LE and R UE impeding her ability to perform ADLs and functional mobility and would benefit from OT services to maximize functional (I) and safety.    Rehab identified problem list/impairments: Rehab identified problem list/impairments: weakness, impaired endurance, impaired self care skills, impaired functional mobilty, gait instability, impaired balance, decreased upper extremity function, decreased lower extremity function, decreased safety awareness, pain, impaired skin, edema, orthopedic precautions, decreased ROM, impaired fine motor    Rehab potential is good.    Activity tolerance: Good    Discharge recommendations: Discharge Facility/Level Of Care Needs: nursing facility, skilled     Barriers to discharge: Barriers to Discharge: None    Equipment recommendations:  (TBD)     GOALS:   Occupational Therapy Goals        Problem: Occupational Therapy Goal    Goal Priority Disciplines Outcome Interventions   Occupational Therapy Goal     OT, PT/OT     Description:  Goals to be met by: 05/04     Patient will increase functional independence with ADLs by performing:    UE Dressing with Minimal Assistance.  Grooming while seated with Stand-by Assistance.  Toileting from bedside commode with Moderate Assistance for hygiene and clothing management.   Supine to sit with Minimal Assistance.  Stand pivot transfers with Minimal Assistance.  Increased functional strength to WFL for ADLs and functional  transfers.                PLAN:  Patient to be seen 4 x/week to address the above listed problems via self-care/home management, therapeutic exercises, therapeutic activities  Plan of Care reviewed with: patient, daughter    Lauryn NEY Smith  04/24/2017

## 2017-04-24 NOTE — PLAN OF CARE
PT is AAOX3, VSS. No falls noted. Fall precautions remain Pain assessed. Pain controlled with meds Pt resting comfortable in bed. Call light in reach. Will continue to monitor.

## 2017-04-24 NOTE — ASSESSMENT & PLAN NOTE
Expected post op.  Will transfuse 1 unit PRBC as pt has bad CAD and will need another surgery soon for radius repair.

## 2017-04-24 NOTE — ASSESSMENT & PLAN NOTE
- was on eliquis 2.5 mg bid and last dose 7 pm 04/21   - Hold eliquis for hip surgery / radius fracture repair  - Restart after surgery when deemed safe per orthopedic surgery   - Continue metoprolol for rate control

## 2017-04-24 NOTE — PLAN OF CARE
Problem: Occupational Therapy Goal  Goal: Occupational Therapy Goal  Goals to be met by: 05/04     Patient will increase functional independence with ADLs by performing:    UE Dressing with Minimal Assistance.  Grooming while seated with Stand-by Assistance.  Toileting from bedside commode with Moderate Assistance for hygiene and clothing management.   Supine to sit with Minimal Assistance.  Stand pivot transfers with Minimal Assistance.  Increased functional strength to WFL for ADLs and functional transfers.     NEY Cartwright  4/24/2017

## 2017-04-24 NOTE — SUBJECTIVE & OBJECTIVE
Interval History: Pt awake, talkative, eating breakfast and comfortable and stable on 3L NC.  Pain well controlled. Hemoglobin dropped to 7.7.     Review of Systems   Constitutional: Negative for chills and fever.   Respiratory: Negative for cough, shortness of breath and wheezing.    Cardiovascular: Negative for chest pain and palpitations.   Gastrointestinal: Negative for abdominal pain, constipation, diarrhea and nausea.   Genitourinary: Negative for difficulty urinating, dysuria and frequency.     Objective:     Vital Signs (Most Recent):  Temp: 98.3 °F (36.8 °C) (04/24/17 1045)  Pulse: 89 (04/24/17 1147)  Resp: 12 (04/24/17 1129)  BP: (!) 98/50 (04/24/17 1045)  SpO2: (!) 91 % (04/24/17 1129) Vital Signs (24h Range):  Temp:  [96.8 °F (36 °C)-98.8 °F (37.1 °C)] 98.3 °F (36.8 °C)  Pulse:  [] 89  Resp:  [12-21] 12  SpO2:  [91 %-98 %] 91 %  BP: ()/(46-69) 98/50     Weight: 49.4 kg (109 lb)  Body mass index is 18.14 kg/(m^2).    Intake/Output Summary (Last 24 hours) at 04/24/17 1228  Last data filed at 04/23/17 1800   Gross per 24 hour   Intake              320 ml   Output              450 ml   Net             -130 ml      Physical Exam   Constitutional: She is oriented to person, place, and time. She appears well-developed and well-nourished. No distress.   HENT:   Head: Normocephalic and atraumatic.   Cardiovascular: Normal rate and regular rhythm.    No murmur heard.  Pulmonary/Chest: Effort normal and breath sounds normal. She has no wheezes.   Abdominal: Soft. Bowel sounds are normal. She exhibits no distension. There is no tenderness.   Musculoskeletal: She exhibits tenderness (to hip).   Neurological: She is alert and oriented to person, place, and time.   Skin: Skin is warm and dry. No rash noted.

## 2017-04-24 NOTE — PROGRESS NOTES
Daily Orthopaedic Progress Note    Kaity Rebolledo is a 83 y.o. female admitted on 4/21/2017  Hospital Day: 2    Day of surgery     The patient was seen and examined this morning at the bedside. DIEGO overnight. Pain controlled this am.       PHYSICAL EXAM:  Awake/alert/oriented x3, No acute distress, Afebrile, Vital signs stable  Good inspiratory effort with unlaboured breathing  RUE in sugartong splint   Right hip dressing C/d/i  Palpable distal pulses  Neurovascularly intact      Vitals:    04/24/17 0735 04/24/17 0754 04/24/17 0810 04/24/17 0811   BP: (!) 94/54      BP Location:       Patient Position:       BP Method:       Pulse: 81 103 77 77   Resp: 16  16 16   Temp: 97.5 °F (36.4 °C)      TempSrc: Oral      SpO2: 98%      Weight:       Height:         I/O last 3 completed shifts:  In: 2416 [P.O.:1256; I.V.:810; Blood:350]  Out: 2030 [Urine:2030]    Recent Labs  Lab 04/22/17  0019 04/23/17  0507 04/23/17  1129 04/24/17  0416   CALCIUM 9.3 8.4* 7.9* 8.3*   PROT 6.7 5.2*  --  4.9*   * 133* 131* 129*   K 4.3 5.2* 4.9 4.4   CO2 27 28 25 24   CL 95 100 98 98   BUN 22 25* 25* 22   CREATININE 1.1 0.8 0.9 0.9       Recent Labs  Lab 04/23/17  0507 04/23/17  1003 04/23/17  1129 04/24/17  0416   WBC 9.52  --  23.95* 10.47   RBC 2.40*  --  2.88* 2.61*   HGB 7.3*  --  8.7* 7.7*   HCT 22.6* 28* 25.8* 24.1*     --  262 221       Recent Labs  Lab 04/22/17  0019 04/23/17  0507   INR 1.0 1.0   APTT 21.1  --        A/P: 83 y.o. female POD1 s/p right hip hemiarthroplasty   -post-op ancef  -pain control  -PT/OT  -WBAT RLE, NWB DAGOE- platform walker  -Will monitor progress for decision on treatment plan for right distal radius fracture

## 2017-04-24 NOTE — PLAN OF CARE
Problem: Patient Care Overview  Goal: Plan of Care Review  Outcome: Ongoing (interventions implemented as appropriate)  Patient aaox4. Patient vs are stable and the patient is afebrile. Patient safety goals are in place; side rails up x2, bed side table within reach, patient belongings are within reach, non skid socks applied to patient, bed locked at the lowest position.

## 2017-04-24 NOTE — ASSESSMENT & PLAN NOTE
No signs of volume overload or acute decompensated CHF   - Recent Echo from last moth showed EF 55 - 60%  - Hold lasix perioperatively to prevent VIDAL   - Monitor volume status closely

## 2017-04-24 NOTE — PLAN OF CARE
POD 1 s/p R CHARISMA. Plans for R distal radius ORIF Wednesday 4/26/17. Patient currently resides at [facility] in [place], [place]. Patient stated she would like to return to [facility]. Patient has the support of her daughters. CM completed discharge assessment and planning with patient. Patient verbalized understanding. All questions and concerns addressed. SW and CM will continue to follow for any additional needs. Plan A to discharge to SNF as soon as medically stable. Plan B to discharge back to Assisted Living Facility with PT/OT.    PCP: Samuel Soriano MD    Pharmacy:   [facility] [place] [facility] - [address]  [place] [address]  Phone: [phone] Fax: [phone]    Payor: [employer] MANAGED MEDICARE / Plan: [employer] MEDICARE HMO / Product Type: Capitation /      04/24/17 0815   Discharge Assessment   Assessment Type Discharge Planning Assessment   Confirmed/corrected address and phone number on facesheet? Yes   Assessment information obtained from? Patient;Medical Record   Expected Length of Stay (days) 6   Communicated expected length of stay with patient/caregiver yes   Prior to hospitilization cognitive status: Alert/Oriented   Prior to hospitalization functional status: Assistive Equipment   Current cognitive status: Alert/Oriented   Current Functional Status: Assistive Equipment   Arrived From assisted living   Lives With facility resident   Able to Return to Prior Arrangements yes   Is patient able to care for self after discharge? No   How many people do you have in your home that can help with your care after discharge? 0   Who are your caregiver(s) and their phone number(s)? daughter- Maryjane Chan [phone], [phone]; daughter Sheela Michele [phone]   Patient's perception of discharge disposition skilled nursing facility   Readmission Within The Last 30 Days current reason for admission unrelated to previous  admission   Patient currently being followed by outpatient case management? No   Patient currently receives home health services? No   Does the patient currently use HME? Yes   Patient currently receives private duty nursing? No   Patient currently receives any other outside agency services? No   Equipment Currently Used at Home bedside commode;oxygen;shower chair;walker, rolling;wheelchair  (3L O2)   Do you have any problems affording any of your prescribed medications? No   Is the patient taking medications as prescribed? yes   Do you have any financial concerns preventing you from receiving the healthcare you need? No   Does the patient have transportation to healthcare appointments? Yes   Transportation Available family or friend will provide   On Dialysis? No   Does the patient receive services at the Coumadin Clinic? No   Are there any open cases? No   Discharge Plan A Skilled Nursing Facility   Discharge Plan B Assisted Living   Patient/Family In Agreement With Plan yes

## 2017-04-24 NOTE — PLAN OF CARE
9:30 AM DELIO spoke with Bautista (Director of [facility]) [phone] and he stated that they did not have SNF at their facility. But that our pt had gone to [facility] ([phone]) and received SNF before. He also gave [facility] as an additional SNF option.     9:34 AM DELIO spoke with Neida at [facility] ([phone]) she stated that they do accept [employer] insurance pending an authorization. DELIO asked if a female skilled bed would be available this coming Friday to Monday. She stated that yes they would availability.      DELIO Allred  Social Work Intern

## 2017-04-24 NOTE — ASSESSMENT & PLAN NOTE
Pt was admitted to [facility] and medically optimized for surgery.  On 4/23, she underwent  right hip hemiarthroplasty with Dr Justin Freeman. She is now WBAT with poterior hip precautions per ortho recs. Joshua was removed today and PT/OT will evaluate the patient today.  I suspect she will need SNF placement.  Will discuss DVT ppx with ortho.

## 2017-04-24 NOTE — PROGRESS NOTES
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 Tanner recently was hospitalized at [facility] at the end of march for acute on chronic hypoxic and hypercapneic respiratory failuer due to COPD exacerbation. She was treated with BIPAP, steroid and nebs during hospital stay and discahred to [facility] on 04/04/17 for deconditioning. She was getting PT at [facility] and started on 10 days course of doxycycline for suspected penumonia based on CXR finding. She was seen at pulmonary clinic by Dr. Christian on 04/13 who started on prednisone 20 mg daily x 5 days, then 10 mg daily x 5 days and then stop. She was discharged from [facility] to [facility] on 04/14/17. She had multiple recent admissions for COPD exacerbation and durigh last admit she was made DNR per daughter at bedside. Daughter states she has been livign at assisted living for last 2 years and mostly wheel charir bound due to COPD and debility.     4/21 around 8 pm while she was walking from family room to her bed she got tangled with oxygen tubing, lost balnace and fell. She tried to prevent the fall with her right hand. Denies hitting her head or LOC. Imaing in ED showed right distal radial and ulnar fractuer, and right femoral  neck fracture. Orthopedic reduced the radial fx at bedside and placed splint. Plan to have surgical fixation of right femoral neck fracture.           Hospital Course:  Pt was admitted to [facility] and medically optimized for surgery.  On 4/23, she underwent  right hip hemiarthroplasty by Dr Justin Freeman.  She is now WBAT with poterior hip precautions per ortho recs. Will discuss DVT ppx with surgery and the timing of there surgery for distal radius fracture.     Interval History: Pt awake, talkative, eating breakfast and comfortable and stable on 3L NC.  Pain well controlled. Hemoglobin dropped to 7.7.     Review of Systems   Constitutional: Negative for chills and fever.   Respiratory: Negative for cough, shortness of breath and wheezing.    Cardiovascular: Negative for chest pain and palpitations.   Gastrointestinal: Negative for abdominal pain, constipation, diarrhea and nausea.   Genitourinary: Negative for difficulty urinating, dysuria and frequency.     Objective:     Vital Signs (Most Recent):  Temp: 98.3 °F (36.8 °C) (04/24/17 1045)  Pulse: 89 (04/24/17 1147)  Resp: 12 (04/24/17 1129)  BP: (!) 98/50 (04/24/17 1045)  SpO2: (!) 91 % (04/24/17 1129) Vital Signs (24h Range):  Temp:  [96.8 °F (36 °C)-98.8 °F (37.1 °C)] 98.3 °F (36.8 °C)  Pulse:  [] 89  Resp:  [12-21] 12  SpO2:  [91 %-98 %] 91 %  BP: ()/(46-69) 98/50     Weight: 49.4 kg (109 lb)  Body mass index is 18.14 kg/(m^2).    Intake/Output Summary (Last 24 hours) at 04/24/17 1228  Last data filed at 04/23/17 1800   Gross per 24 hour   Intake              320 ml   Output              450 ml   Net             -130 ml      Physical Exam   Constitutional: She is oriented to person, place, and time. She appears well-developed and well-nourished. No distress.   HENT:   Head: Normocephalic and atraumatic.   Cardiovascular: Normal rate and regular rhythm.    No murmur heard.  Pulmonary/Chest: Effort normal and breath sounds normal. She has no wheezes.    Abdominal: Soft. Bowel sounds are normal. She exhibits no distension. There is no tenderness.   Musculoskeletal: She exhibits tenderness (to hip).   Neurological: She is alert and oriented to person, place, and time.   Skin: Skin is warm and dry. No rash noted.       Assessment/Plan:      * Displaced fracture of right femoral neck  Pt was admitted to [facility] and medically optimized for surgery.  On 4/23, she underwent  right hip hemiarthroplasty with Dr Justin Freeman. She is now WBAT with poterior hip precautions per ortho recs. Joshua was removed today and PT/OT will evaluate the patient today.  I suspect she will need SNF placement.  Will discuss DVT ppx with ortho.       Chronic hypoxemic respiratory failure  COPD  On 3LNC home oxygen  - No signs of acute COPD exacerbation   - monitor respiratory status closely during periop period   - Continue nebs and supplemental oxygen to keep sat > 90 %      Acute blood loss anemia  Expected post op.  Will transfuse 1 unit PRBC as pt has bad CAD and will need another surgery soon for radius repair.       Coronary artery disease involving native coronary artery without angina pectoris  No angina and EKG w/o ischemic pattern   - BP/HR stable   - Hold ASA for surgery   - Hold lisinopril perioperatively  - Continue toprol XL 5O mg daily       Paroxysmal atrial fibrillation  - was on eliquis 2.5 mg bid and last dose 7 pm 04/21   - Hold eliquis for hip surgery / radius fracture repair  - Restart after surgery when deemed safe per orthopedic surgery   - Continue metoprolol for rate control       Chronic diastolic heart failure  No signs of volume overload or acute decompensated CHF   - Recent Echo from last moth showed EF 55 - 60%  - Hold lasix perioperatively to prevent VIDAL   - Monitor volume status closely       Osteoporosis  Vit D 25.  Will continue calcium and Vit D supplements.       Anxiety and depression  On celexa   - xanax prn       Closed Colles' fracture of right  lc  Per ortho.  Will discuss timing of next surgery today.       VTE Risk Mitigation         Ordered     Medium Risk of VTE  Once      04/22/17 0916     Place JENNIFER hose  Until discontinued      04/22/17 0553     Place sequential compression device  Until discontinued      04/22/17 0553          Alexa Humphries MD  Department of Hospital Medicine   [facility]

## 2017-04-24 NOTE — ASSESSMENT & PLAN NOTE
No angina and EKG w/o ischemic pattern   - BP/HR stable   - Hold ASA for surgery   - Hold lisinopril perioperatively  - Continue toprol XL 5O mg daily

## 2017-04-24 NOTE — PLAN OF CARE
Problem: Physical Therapy Goal  Goal: Physical Therapy Goal  Goals to be met by:      Patient will increase functional independence with mobility by performin. Supine to sit with MInimal Assistance  2. Sit to supine with MInimal Assistance  3. Sit to stand transfer with Moderate Assistance  4. Bed to chair transfer with Moderate Assistance using appropriate AD  5. Stand for 5 minutes with Contact Guard Assistance using appropriate AD  6. (I) with HEP  Outcome: Ongoing (interventions implemented as appropriate)  Gianfranco Stauffer, PT  2017

## 2017-04-24 NOTE — OP NOTE
04/24/2017    PREOPERATIVE DIAGNOSIS:  Right femoral neck fracture.    POSTOPERATIVE DIAGNOSIS:  Right femoral neck fracture.    PROCEDURE:  Right hip endoprosthesis for femoral neck fracture. (CPT# 06771)    SURGEON:  Dr Justin Freeman    ASSISTANT:   KEYANA Castillo PGY-3, TONY No PGY-4.    ANESTHESIA:  General.    ESTIMATED BLOOD LOSS:  150 mL    INDICATIONS:  The patient is a 83 y.o. female who fell sustaining a displaced right femoral neck fracture and right distal radius fracture.  Treatment options were discussed and it was recommended to place an endoprosthesis.  Risks and complications were discussed including, but not limited to the risks of anesthetic complications, infections, wound healing complications, aseptic loosening, instability, DVT, pulmonary embolism and death among others.     COMPONENTS USED:  The [employer] system with a cemented 6 stem and a 46 mm head.    DESCRIPTION OF PROCEDURE:  The patient was taken to the Operating Room where anesthesia was administered by the Anesthesia Department. She was then placed in the lateral decubitus position.The right hip and lower extremity were then sterilely prepped and draped in the normal fashion.    A 15 cm curvilinear incision was made from the base of the greater trochanter extending proximally and posteriorly.  Subcutaneous tissue was dissected with electrocautery down to the deep fascia, which was incised along the line of the incision.  The gluteus que was then split along the line of its fibers.  The abductor musculature was retracted anteriorly. The piriformis was tagged for later repair with a #1 Vicryl and reflected from its insertion on the greater trochanter.  The other short external rotators were then released to reveal the underlying hip capsule.  A capsulotomy was made along the base of the femoral neck and extended towards the acetabulum in a trapezoidal fashion.  The femoral head was extracted and measured.    We then turned our  attention towards proximal femoral preparation.  A box chisel was used to gain access to intramedullary canal followed by straight reamer, lateralizing reamer and then further enlarging straight reamers. Sizes 4 through 8 broaches were placed.  The size 6 had excellent fit. We elected to cement the femur instead of press fit due to the quality of bone. A 46 mm head was applied and the hip was reduced.  Excellent stability was noted throughout the range of motion and leg lengths were satisfactory.    Trial components were then removed.  A cement restrictor was placed.  The proximal femur was curetted and the proximal femur was then irrigated in a pulse lavage fashion and dried.  Two batches of cement were mixed and placed on the proximal femur in retrograde fashion and slightly pressurized.  The size 6 cemented  stem was then impacted and held in place cement polymerized and any excess cement was removed using [place] elevators and curettes.  This was held in approximately 20 degrees of anteversion during polymerization.  The head was then applied and the hip was again reduced.  Again, excellent stability and satisfactory leg lengths were noted.    Wounds once again thoroughly irrigated.  The capsule as well as the short external rotators were tied over a bony bridge on the greater trochanter using a suture passer.  The gluteal fascia and fascia deloris were then closed with interrupted figure-of-eight sutures of #1 Vicryl.   Tranexamic acid was then injected into the deep space. Superficial subcutaneous tissue was closed with interrupted inverted sutures of #2-0 Vicryl.  Skin was approximated using barbed monocryl and dermabone.  Sterile dressing was applied.  Anesthesia was reversed and she was returned to the Postanesthesia Care Unit in stable condition.    Dispo: weight bearing as tolerated with posterior hip precautions. She may require ORIF of the right distal radius once she recovers from her hip surgery. Follow up  in 2 weeks for wound check of the right hip.

## 2017-04-24 NOTE — PT/OT/SLP EVAL
Physical Therapy  Evaluation/Treatment     Kaity Rebolledo   MRN: [id number]   Admitting Diagnosis: Displaced fracture of right femoral neck    PT Received On: 17  PT Start Time: 1254     PT Stop Time: 1322    PT Total Time (min): 28 min       Billable Minutes:  Evaluation 18 and Therapeutic Activity 10    Diagnosis: Displaced fracture of right femoral neck  PT/OT Co-eval    Past Medical History:   Diagnosis Date    Atrial fibrillation with RVR 3/4/2017    CAD (coronary artery disease) 3/14/2016    Cardiomyopathy 2015    CHF (congestive heart failure) 10/14/2015    COPD (chronic obstructive pulmonary disease)     COPD exacerbation 3/14/2016    Fall     patient  in  wheel  chair    Hyperlipidemia     Hypertension     Osteoporosis     Pneumonia     Rotator cuff injury     R s/p therapy      Past Surgical History:   Procedure Laterality Date    CATARACT EXTRACTION BILATERAL W/ ANTERIOR VITRECTOMY      EYE SURGERY      FRACTURE SURGERY      LEG SURGERY           General Precautions: Standard, fall  Orthopedic Precautions: RLE weight bearing as tolerated, RUE non weight bearing, RLE posterior precautions   Braces:              Patient History:  Lives With: facility resident  Living Arrangements: assisted living  Stair Railings at Home: none  Transportation Available: family or friend will provide  Living Environment Comment:  Spv/(A) available.  Pt using WC for all mobility and requiring assistance for bed to chair t/f's  Equipment Currently Used at Home: bedside commode, oxygen, shower chair, walker, rolling, wheelchair    Previous Level of Function:  Ambulation Skills: unable to perform  Transfer Skills: needs device  ADL Skills: needs device and assist    Subjective:  Communicated with nsg prior to session.  Pt agreeable to PT session  Chief Complaint: Decreased mobility  Patient goals: Return to PLOF    Pain Ratin/10   Location - Side: Right     Location: wrist          Objective:    Patient found with: peripheral IV, SCD, FCD     Cognitive Exam:  Oriented to: Person, Place, Time and Situation    Follows Commands/attention: Follows multistep  commands  Communication: clear/fluent  Safety awareness/insight to disability: intact    Physical Exam:  Skin integrity: Covered by surgical dressing  Edema: RLE mild   RUE moderate    Sensation:   Intact    Lower Extremity Strength:  Right Lower Extremity: 2/5 grossly  Left Lower Extremity: 3/5 grossly     Gross motor coordination: Impaired by strength deficits    Functional Mobility:  Bed Mobility:  Supine to Sit: Maximum Assistance  Sit to Supine: Maximum Assistance    Transfers:  Sit <> Stand Assistance: Maximum Assistance  Sit <> Stand Assistive Device: Platform, Rolling Walker  Chair<> Mat Technique: Stand Pivot  Chair<>Mat Assistance: Maximum Assistance  Chair <> Mat Assistive Device: No Assistive Device      Balance:   Static Sit: FAIR-: Maintains without assist but inconsistent   Dynamic Sit: FAIR (-): Cannot move trunk without losing balance  Static Stand: POOR: Needs MODERATE assist to maintain  Dynamic stand: 0: N/A    Therapeutic Activities and Exercises:  White board updated    Therex, 20x  · Ankle pumps      Pt/family educated on:  · Importance of early mobility for improving outcomes following hip replacement  · Common signs and symptoms associated with hip replacement  · Incorporating hip precautions into functional movement  · Rationale behind hip precautions following hip replacement Sx  · HEP  · PT POC      AM-PAC 6 CLICK MOBILITY  How much help from another person does this patient currently need?   1 = Unable, Total/Dependent Assistance  2 = A lot, Maximum/Moderate Assistance  3 = A little, Minimum/Contact Guard/Supervision  4 = None, Modified [place]/Independent    Turning over in bed (including adjusting bedclothes, sheets and blankets)?: 2  Sitting down on and standing up from a chair with arms (e.g., wheelchair, bedside  commode, etc.): 2  Moving from lying on back to sitting on the side of the bed?: 2  Moving to and from a bed to a chair (including a wheelchair)?: 2  Need to walk in hospital room?: 1  Climbing 3-5 steps with a railing?: 1  Total Score: 10     AM-PAC Raw Score CMS G-Code Modifier Level of Impairment Assistance   6 % Total / Unable   7 - 9 CM 80 - 100% Maximal Assist   10 - 14 CL 60 - 80% Moderate Assist   15 - 19 CK 40 - 60% Moderate Assist   20 - 22 CJ 20 - 40% Minimal Assist   23 CI 1-20% SBA / CGA   24 CH 0% Independent/ Mod I     Patient left up in chair with all lines intact, call button in reach and nsg notified.    Assessment:   Kaity Rebolledo is a 83 y.o. female with a medical diagnosis of Displaced fracture of right femoral neck.  Pt with fair tolerance to initial session.  Pt self limiting throughout session 2/2 increased apprehension with OOB mobility.  Pt is a good candidate to benefit from skilled PT services to maximize her functional independence and to ensure safe return to the home environment.    Rehab identified problem list/impairments: Rehab identified problem list/impairments: weakness, impaired endurance, impaired self care skills, impaired functional mobilty, gait instability, impaired balance, impaired cognition, decreased coordination, pain, decreased safety awareness, impaired skin, edema, decreased ROM, orthopedic precautions    Rehab potential is fair.    Activity tolerance: Fair    Discharge recommendations: Discharge Facility/Level Of Care Needs: nursing facility, skilled     Barriers to discharge: Barriers to Discharge: None    Equipment recommendations: Equipment Needed After Discharge:  (TBD)     GOALS:   Physical Therapy Goals        Problem: Physical Therapy Goal    Goal Priority Disciplines Outcome Goal Variances Interventions   Physical Therapy Goal     PT/OT, PT Ongoing (interventions implemented as appropriate)     Description:  Goals to be met by: 5/4     Patient  will increase functional independence with mobility by performin. Supine to sit with MInimal Assistance  2. Sit to supine with MInimal Assistance  3. Sit to stand transfer with Moderate Assistance  4. Bed to chair transfer with Moderate Assistance using appropriate AD  5. Stand for 5 minutes with Contact Guard Assistance using appropriate AD  6. (I) with HEP                PLAN:    Patient to be seen daily to address the above listed problems via gait training, therapeutic activities, therapeutic exercises, neuromuscular re-education  Plan of Care expires: 17  Plan of Care reviewed with: patient          Gianfranco Stauffer, PT  2017

## 2017-04-24 NOTE — PLAN OF CARE
Problem: Patient Care Overview  Goal: Plan of Care Review  Outcome: Ongoing (interventions implemented as appropriate)  Patient AAOX3, VSS. Family at bedside. Two side rails up, bed locked, call light within reach. No falls noted as these precautions remain. Patient is free of skin breakdown as patient moves well independently. Pain controlled well with PRN meds. Hourly rounds made and no complaints at this time noted. Will resume with plan of care.

## 2017-04-25 ENCOUNTER — OUTPATIENT CASE MANAGEMENT (OUTPATIENT)
Dept: ADMINISTRATIVE | Facility: OTHER | Age: 82
End: 2017-04-25

## 2017-04-25 PROBLEM — S72.009D ENCOUNTER FOR AFTERCARE FOR HEALING CLOSED TRAUMATIC FRACTURE OF HIP: Status: ACTIVE | Noted: 2017-04-25

## 2017-04-25 PROBLEM — E55.9 VITAMIN D DEFICIENCY DISEASE: Status: ACTIVE | Noted: 2017-04-25

## 2017-04-25 PROBLEM — E83.39 HYPOPHOSPHATEMIA: Status: ACTIVE | Noted: 2017-04-25

## 2017-04-25 PROBLEM — Z86.79 HISTORY OF ATRIAL FIBRILLATION: Status: RESOLVED | Noted: 2017-03-28 | Resolved: 2017-04-25

## 2017-04-25 LAB
ALBUMIN SERPL BCP-MCNC: 2.1 G/DL
ALP SERPL-CCNC: 62 U/L
ALT SERPL W/O P-5'-P-CCNC: 10 U/L
ANION GAP SERPL CALC-SCNC: 6 MMOL/L
AST SERPL-CCNC: 28 U/L
BASOPHILS # BLD AUTO: 0.02 K/UL
BASOPHILS NFR BLD: 0.2 %
BILIRUB SERPL-MCNC: 0.6 MG/DL
BUN SERPL-MCNC: 16 MG/DL
CALCIUM SERPL-MCNC: 8.7 MG/DL
CHLORIDE SERPL-SCNC: 95 MMOL/L
CO2 SERPL-SCNC: 28 MMOL/L
CREAT SERPL-MCNC: 0.7 MG/DL
DIFFERENTIAL METHOD: ABNORMAL
EOSINOPHIL # BLD AUTO: 0.2 K/UL
EOSINOPHIL NFR BLD: 2.2 %
ERYTHROCYTE [DISTWIDTH] IN BLOOD BY AUTOMATED COUNT: 14.6 %
EST. GFR  (AFRICAN AMERICAN): >60 ML/MIN/1.73 M^2
EST. GFR  (NON AFRICAN AMERICAN): >60 ML/MIN/1.73 M^2
GLUCOSE SERPL-MCNC: 92 MG/DL
HCT VFR BLD AUTO: 28.7 %
HGB BLD-MCNC: 9.9 G/DL
LYMPHOCYTES # BLD AUTO: 1 K/UL
LYMPHOCYTES NFR BLD: 9.7 %
MAGNESIUM SERPL-MCNC: 1.7 MG/DL
MCH RBC QN AUTO: 30.5 PG
MCHC RBC AUTO-ENTMCNC: 34.5 %
MCV RBC AUTO: 88 FL
MONOCYTES # BLD AUTO: 0.9 K/UL
MONOCYTES NFR BLD: 8.9 %
NEUTROPHILS # BLD AUTO: 7.8 K/UL
NEUTROPHILS NFR BLD: 78.6 %
PHOSPHATE SERPL-MCNC: 2.2 MG/DL
PLATELET # BLD AUTO: 186 K/UL
PMV BLD AUTO: 10.2 FL
POTASSIUM SERPL-SCNC: 4.3 MMOL/L
PROT SERPL-MCNC: 4.9 G/DL
RBC # BLD AUTO: 3.25 M/UL
SODIUM SERPL-SCNC: 129 MMOL/L
WBC # BLD AUTO: 9.96 K/UL

## 2017-04-25 PROCEDURE — 36415 COLL VENOUS BLD VENIPUNCTURE: CPT

## 2017-04-25 PROCEDURE — 84100 ASSAY OF PHOSPHORUS: CPT

## 2017-04-25 PROCEDURE — 99232 SBSQ HOSP IP/OBS MODERATE 35: CPT | Mod: ,,, | Performed by: INTERNAL MEDICINE

## 2017-04-25 PROCEDURE — 94640 AIRWAY INHALATION TREATMENT: CPT

## 2017-04-25 PROCEDURE — 63600175 PHARM REV CODE 636 W HCPCS: Performed by: HOSPITALIST

## 2017-04-25 PROCEDURE — 25000003 PHARM REV CODE 250: Performed by: INTERNAL MEDICINE

## 2017-04-25 PROCEDURE — 97530 THERAPEUTIC ACTIVITIES: CPT

## 2017-04-25 PROCEDURE — 80053 COMPREHEN METABOLIC PANEL: CPT

## 2017-04-25 PROCEDURE — 83735 ASSAY OF MAGNESIUM: CPT

## 2017-04-25 PROCEDURE — 85025 COMPLETE CBC W/AUTO DIFF WBC: CPT

## 2017-04-25 PROCEDURE — 99900035 HC TECH TIME PER 15 MIN (STAT)

## 2017-04-25 PROCEDURE — 27000221 HC OXYGEN, UP TO 24 HOURS

## 2017-04-25 PROCEDURE — 97110 THERAPEUTIC EXERCISES: CPT

## 2017-04-25 PROCEDURE — 25000003 PHARM REV CODE 250: Performed by: HOSPITALIST

## 2017-04-25 PROCEDURE — 97535 SELF CARE MNGMENT TRAINING: CPT

## 2017-04-25 PROCEDURE — 11000001 HC ACUTE MED/SURG PRIVATE ROOM

## 2017-04-25 RX ORDER — TRAMADOL HYDROCHLORIDE 50 MG/1
50 TABLET ORAL EVERY 6 HOURS PRN
Status: DISCONTINUED | OUTPATIENT
Start: 2017-04-25 | End: 2017-04-29

## 2017-04-25 RX ORDER — SODIUM,POTASSIUM PHOSPHATES 280-250MG
1 POWDER IN PACKET (EA) ORAL
Status: COMPLETED | OUTPATIENT
Start: 2017-04-25 | End: 2017-04-26

## 2017-04-25 RX ADMIN — CITALOPRAM HYDROBROMIDE 20 MG: 20 TABLET ORAL at 08:04

## 2017-04-25 RX ADMIN — STANDARDIZED SENNA CONCENTRATE AND DOCUSATE SODIUM 1 TABLET: 8.6; 5 TABLET, FILM COATED ORAL at 08:04

## 2017-04-25 RX ADMIN — Medication 3 ML: at 06:04

## 2017-04-25 RX ADMIN — POTASSIUM & SODIUM PHOSPHATES POWDER PACK 280-160-250 MG 1 PACKET: 280-160-250 PACK at 04:04

## 2017-04-25 RX ADMIN — POTASSIUM & SODIUM PHOSPHATES POWDER PACK 280-160-250 MG 1 PACKET: 280-160-250 PACK at 11:04

## 2017-04-25 RX ADMIN — PANTOPRAZOLE SODIUM 1200 MG: 40 TABLET, DELAYED RELEASE ORAL at 08:04

## 2017-04-25 RX ADMIN — IPRATROPIUM BROMIDE 0.5 MG: 0.5 SOLUTION RESPIRATORY (INHALATION) at 04:04

## 2017-04-25 RX ADMIN — THERA TABS 1 TABLET: TAB at 08:04

## 2017-04-25 RX ADMIN — METOPROLOL SUCCINATE 50 MG: 50 TABLET, EXTENDED RELEASE ORAL at 08:04

## 2017-04-25 RX ADMIN — Medication 3 ML: at 02:04

## 2017-04-25 RX ADMIN — LEVALBUTEROL 0.31 MG: 0.63 SOLUTION RESPIRATORY (INHALATION) at 12:04

## 2017-04-25 RX ADMIN — LEVALBUTEROL 0.31 MG: 0.63 SOLUTION RESPIRATORY (INHALATION) at 04:04

## 2017-04-25 RX ADMIN — IPRATROPIUM BROMIDE 0.5 MG: 0.5 SOLUTION RESPIRATORY (INHALATION) at 08:04

## 2017-04-25 RX ADMIN — ENOXAPARIN SODIUM 40 MG: 100 INJECTION SUBCUTANEOUS at 04:04

## 2017-04-25 RX ADMIN — CYANOCOBALAMIN TAB 250 MCG 250 MCG: 250 TAB at 08:04

## 2017-04-25 RX ADMIN — IPRATROPIUM BROMIDE 0.5 MG: 0.5 SOLUTION RESPIRATORY (INHALATION) at 12:04

## 2017-04-25 RX ADMIN — HYDROMORPHONE HYDROCHLORIDE 0.5 MG: 1 INJECTION, SOLUTION INTRAMUSCULAR; INTRAVENOUS; SUBCUTANEOUS at 10:04

## 2017-04-25 RX ADMIN — OXYCODONE HYDROCHLORIDE 5 MG: 5 TABLET ORAL at 08:04

## 2017-04-25 RX ADMIN — IPRATROPIUM BROMIDE 0.5 MG: 0.5 SOLUTION RESPIRATORY (INHALATION) at 09:04

## 2017-04-25 RX ADMIN — POTASSIUM & SODIUM PHOSPHATES POWDER PACK 280-160-250 MG 1 PACKET: 280-160-250 PACK at 08:04

## 2017-04-25 NOTE — PROGRESS NOTES
Noted that patient is currently admitted in the hospital. This RN reviewed the chart.. Partially completed initial assessment for OPCM via chart review. Will follow up with patient/caregiver once discharged from the hospital.  CARLYN Wall, OPCM-RN

## 2017-04-25 NOTE — PT/OT/SLP PROGRESS
Physical Therapy  Treatment    Kaity Rebolledo   MRN: [id number]   Admitting Diagnosis: Displaced fracture of right femoral neck    PT Received On: 04/25/17  PT Start Time: 0928     PT Stop Time: 1000    PT Total Time (min): 32 min       Billable Minutes:  Therapeutic Activity 32    Treatment Type: Treatment  PT/PTA: PTA     PTA Visit Number: 1       General Precautions: Standard, fall  Orthopedic Precautions: RLE weight bearing as tolerated, RLE posterior precautions, RUE non weight bearing   Braces:           Subjective:  Communicated with NSG prior to session.  Patient stated I need to urinate. Patient's daughter stated she was in a w/c most of the time before coming in to the hospital.     Pain Rating:  (complained of pain but never gave pain scale.)        Location: gluteal          Objective:   Patient found with: oxygen    Functional Mobility:  Bed Mobility: Patient UIC       Transfers:  Sit <> Stand Assistance: Total Assistance  Sit <> Stand Assistive Device: Platform, Rolling Walker (R PRW)  Toilet Transfer Technique: Stand Pivot  Toilet Transfer Assistance: Total Assistance  Toilet Transfer Assistive Device: No Assistive Device    Gait: Patient unable         Balance:   Static Sit: POOR+: Needs MINIMAL assist to maintain  Dynamic Sit: POOR: N/A  Static Stand: POOR: Needs MODERATE assist to maintain  Dynamic stand: POOR: N/A     Therapeutic Activities and Exercises:  Patient tolerated static standing 10 sec with R PRW mod assistance.  Patient recalled 0/3 hip precautions  Patient educated on hip precautions  Patient performed AP 15 reps.  Patient was lethargic and kept closing her eyes with other exercises  Patient was lethargic during treatment session and kept closing her eyes.         AM-PAC 6 CLICK MOBILITY  How much help from another person does this patient currently need?   1 = Unable, Total/Dependent Assistance  2 = A lot, Maximum/Moderate Assistance  3 = A little, Minimum/Contact Guard/Supervision  4 =  None, Modified [place]/Independent    Turning over in bed (including adjusting bedclothes, sheets and blankets)?: 2  Sitting down on and standing up from a chair with arms (e.g., wheelchair, bedside commode, etc.): 2  Moving from lying on back to sitting on the side of the bed?: 2  Moving to and from a bed to a chair (including a wheelchair)?: 2  Need to walk in hospital room?: 1  Climbing 3-5 steps with a railing?: 1  Total Score: 10    AM-PAC Raw Score CMS G-Code Modifier Level of Impairment Assistance   6 % Total / Unable   7 - 9 CM 80 - 100% Maximal Assist   10 - 14 CL 60 - 80% Moderate Assist   15 - 19 CK 40 - 60% Moderate Assist   20 - 22 CJ 20 - 40% Minimal Assist   23 CI 1-20% SBA / CGA   24 CH 0% Independent/ Mod I     Patient left up in chair with all lines intact and daughter present.    Assessment:  Kaity Rebolledo is a 83 y.o. female with a medical diagnosis of Displaced fracture of right femoral neck and presents with decrease strength, mobility and balance.  Patient requires assistance with all mobility and transfers.  Patient demonstrated poor standing balance.  Patient will require 24 hour assistance upon discharge from hospital.  Patient would benefit from further IP therapy..    Rehab identified problem list/impairments: Rehab identified problem list/impairments: weakness, impaired endurance, impaired balance, gait instability, impaired functional mobilty, impaired self care skills, pain, decreased ROM, decreased lower extremity function, orthopedic precautions    Rehab potential is fair.    Activity tolerance: Fair    Discharge recommendations: Discharge Facility/Level Of Care Needs: nursing facility, skilled     Barriers to discharge: Barriers to Discharge: Inaccessible home environment, Decreased caregiver support    Equipment recommendations: Equipment Needed After Discharge:  (TBD)     GOALS:   Physical Therapy Goals        Problem: Physical Therapy Goal    Goal Priority  Disciplines Outcome Goal Variances Interventions   Physical Therapy Goal     PT/OT, PT Ongoing (interventions implemented as appropriate)     Description:  Goals to be met by:      Patient will increase functional independence with mobility by performin. Supine to sit with MInimal Assistance  2. Sit to supine with MInimal Assistance  3. Sit to stand transfer with Moderate Assistance  4. Bed to chair transfer with Moderate Assistance using appropriate AD  5. Stand for 5 minutes with Contact Guard Assistance using appropriate AD  6. (I) with HEP                PLAN:    Patient to be seen daily  to address the above listed problems via gait training, therapeutic activities, therapeutic exercises, neuromuscular re-education  Plan of Care expires: 17  Plan of Care reviewed with: patient, daughter         Yves Armijo II, PTA  2017

## 2017-04-25 NOTE — PLAN OF CARE
Problem: Occupational Therapy Goal  Goal: Occupational Therapy Goal  Goals to be met by: 05/04     Patient will increase functional independence with ADLs by performing:    UE Dressing with Minimal Assistance.  Grooming while seated with Stand-by Assistance.  Toileting from bedside commode with Moderate Assistance for hygiene and clothing management.   Supine to sit with Minimal Assistance.  Stand pivot transfers with Minimal Assistance.  Increased functional strength to WFL for ADLs and functional transfers.   Outcome: Ongoing (interventions implemented as appropriate)     Pt was agreeable to OT and was noted to make progress towards her goals in therapy.  Pt's goals remain appropriate at this time.  She will continue to benefit from skilled OT services in order to assist her with increasing her safety and level of independence with self care and mobility tasks.      Alley Botello, OT  4/25/2017

## 2017-04-25 NOTE — PT/OT/SLP PROGRESS
"Occupational Therapy  Treatment    Kaity Rebolledo   MRN: [id number]   Admitting Diagnosis: Displaced fracture of right femoral neck    OT Date of Treatment: 04/25/17   OT Start Time: 1100 (1242)  OT Stop Time: 1123 (1301)  OT Total Time (min): 23 min + 19 min = 42 min total (2 visits)    Billable Minutes:  Self Care/Home Management 10 min, Therapeutic Activity 22 min and Therapeutic Exercise 10 min    General Precautions: Standard, fall  Orthopedic Precautions: RUE non weight bearing, RLE weight bearing as tolerated, RLE posterior precautions  Braces:  (splint on R UE)         Subjective:  Communicated with nurse prior to session.  "I'm sorry honey.  I'm just so tired."    Pain Rating:  (pt c/o pain during transfer but did not give numerical value)   Pain Addressed: Reposition, Pre-medicate for activity, Distraction       Objective:  Patient found with: oxygen, SCD, FCD     Functional Mobility:  Bed Mobility:  Scooting/Bridging: Maximum Assistance  Sit to Supine: Maximum Assistance    Transfers:   Sit <> Stand Assistance: Total Assistance  Sit <> Stand Assistive Device: No Assistive Device  Bed <> Chair Technique: Stand Pivot  Bed <> Chair Transfer Assistance: Maximum Assistance  ** Pt needed total assist to stand due to NWB on R UE, however, able to take a few steps to transfer onto bed.       Activities of Daily Living:  UE Dressing Level of Assistance: Maximum assistance - [facility]    LE Dressing Level of Assistance: Total assistance    Grooming Position: Seated  Grooming Level of Assistance: Minimum assistance (washing face)     Balance:   Static Sit: FAIR-: Maintains without assist but inconsistent   Dynamic Sit: FAIR: Cannot move trunk without losing balance  Static Stand: 0: Needs MAXIMAL assist to maintain   Dynamic stand: 0: N/A    Therapeutic Activities and Exercises:  · Pt completed limited self care and func mobility tasks as noted above.  Pt noted with increased fatigue and participation - OT required " to continually awaken pt to participate in exercises.   · Pt completed 1 set of 10 reps of B UE ROM exercises in order to work towards increasing her UB strength/endurance to assist with mobility and self care skills.  Pt completed the following exercises: shoulder flex/ext, elbow flex/ext, forearm sup/pro, and hand pumps.   Exercises performed sitting in bedside chair.  Pt needed AAROM to perform shoulder flexion and hand pumps on R UE (elbow and forearm not applicable due to long arm cast/splint).  · Pt and family educated on hip precaution, WB restrictions on R UE and safe handling/transfers.       AM-PAC 6 CLICK ADL   How much help from another person does this patient currently need?   1 = Unable, Total/Dependent Assistance  2 = A lot, Maximum/Moderate Assistance  3 = A little, Minimum/Contact Guard/Supervision  4 = None, Modified [place]/Independent    Putting on and taking off regular lower body clothing? : 1  Bathing (including washing, rinsing, drying)?: 1  Toileting, which includes using toilet, bedpan, or urinal? : 2  Putting on and taking off regular upper body clothing?: 2  Taking care of personal grooming such as brushing teeth?: 3  Eating meals?: 3  Total Score: 12     AM-PAC Raw Score CMS G-Code Modifier Level of Impairment Assistance   6 % Total / Unable   7 - 9 CM 80 - 100% Maximal Assist   10 - 14 CL 60 - 80% Moderate Assist   15 - 19 CK 40 - 60% Moderate Assist   20 - 22 CJ 20 - 40% Minimal Assist   23 CI 1-20% SBA / CGA   24 CH 0% Independent/ Mod I     Patient left up in chair at end of first session and supine in bed with HOB elevated after second visit with all lines intact, call button in reach and daughter and friend present    ASSESSMENT:  Kaity Rebolledo is a 83 y.o. female with a medical diagnosis of Displaced fracture of right femoral neck and presents with pain, decreased level of awareness, decreased safety awareness, edema, decreased func mobility and decreased self  care. Pt's goals remain appropriate at this time.  She will continue to benefit from skilled OT services in order to assist her with increasing her safety and level of independence with self care and mobility tasks.          Rehab identified problem list/impairments: Rehab identified problem list/impairments: weakness, impaired endurance, impaired self care skills, impaired functional mobilty, gait instability, impaired balance, impaired cognition, decreased coordination, decreased upper extremity function, decreased lower extremity function, pain, decreased safety awareness, decreased ROM, impaired coordination, impaired fine motor, impaired skin, edema, orthopedic precautions, impaired joint extensibility    Rehab potential is fair.    Activity tolerance: Fair    Discharge recommendations: Discharge Facility/Level Of Care Needs: nursing facility, skilled     Barriers to discharge: Barriers to Discharge: Inaccessible home environment, Decreased caregiver support    Equipment recommendations:  (TBD at next level of care)     GOALS:   Occupational Therapy Goals        Problem: Occupational Therapy Goal    Goal Priority Disciplines Outcome Interventions   Occupational Therapy Goal     OT, PT/OT Ongoing (interventions implemented as appropriate)    Description:  Goals to be met by: 05/04     Patient will increase functional independence with ADLs by performing:    UE Dressing with Minimal Assistance.  Grooming while seated with Stand-by Assistance.  Toileting from bedside commode with Moderate Assistance for hygiene and clothing management.   Supine to sit with Minimal Assistance.  Stand pivot transfers with Minimal Assistance.  Increased functional strength to WFL for ADLs and functional transfers.                Plan:  Patient to be seen 4 x/week to address the above listed problems via self-care/home management, therapeutic activities, therapeutic exercises  Plan of Care expires:    Plan of Care reviewed with:  daughter, patient         Alley NASH Ayan, OT  04/25/2017

## 2017-04-25 NOTE — CLINICAL REVIEW
Dr. Krishnan, on call for IM-H, paged and notified of pts inability to void past guzman d/c'd at 12 noon.Bladder scan done which showed <300 cc.  Order noted. Will continue to monitor.

## 2017-04-25 NOTE — PLAN OF CARE
Problem: Patient Care Overview  Goal: Plan of Care Review  Outcome: Ongoing (interventions implemented as appropriate)  Pt laying in bed comfortably, daughter at bedside, safety precautions intact, IV intact no signs of infection or irration, redness noted on bottom, patient turning maintained, complaints of pain was managed with pain medications, surgical sight intact no complications noted, fall precautions maintained, call light in reach, bed lowest position, breaks locked, patient continuing will goals, will continue to monitor and continue plan of care.

## 2017-04-25 NOTE — PLAN OF CARE
Problem: Physical Therapy Goal  Goal: Physical Therapy Goal  Goals to be met by:      Patient will increase functional independence with mobility by performin. Supine to sit with MInimal Assistance  2. Sit to supine with MInimal Assistance  3. Sit to stand transfer with Moderate Assistance  4. Bed to chair transfer with Moderate Assistance using appropriate AD  5. Stand for 5 minutes with Contact Guard Assistance using appropriate AD  6. (I) with HEP   Patient goals remain appropriate.  Patient will continue to benefit from further PT services.  Yves Armijo, PTA

## 2017-04-25 NOTE — PROGRESS NOTES
Daily Orthopaedic Progress Note    Kaity Rebolledo is a 83 y.o. female admitted on 4/21/2017  Hospital Day: 3  Post Op Day: 2 Days Post-Op      The patient was seen and examined this morning at the bedside. DIEGO overnight. In a chair this AM. Moderate assist with PT yesterday. Right arm pain worse than hip pain    +voiding  +flatus  +Tolerating PO  - fevers/chills     PHYSICAL EXAM:  Awake/alert/oriented x3, No acute distress, Afebrile, Vital signs stable  Good inspiratory effort with unlaboured breathing  Dressings Clean,Dry,Intact right hip  Palpable distal pulses  Neurovascularly intact  sugartong splint right radius, neurovascularly intact. Some bruising and swelling       Vitals:    04/25/17 0300 04/25/17 0406 04/25/17 0454 04/25/17 0700   BP:  130/60     BP Location:  Left arm     Patient Position:  Lying     BP Method:  Automatic     Pulse: 92 94 71 107   Resp:  17 18    Temp:  98.4 °F (36.9 °C)     TempSrc:  Oral     SpO2:  97% (!) 94%    Weight:       Height:         I/O last 3 completed shifts:  In: 1230 [P.O.:480; I.V.:750]  Out: 1050 [Urine:1050]    Recent Labs  Lab 04/23/17  0507 04/23/17  1129 04/24/17  0416 04/25/17  0419   CALCIUM 8.4* 7.9* 8.3* 8.7   PROT 5.2*  --  4.9* 4.9*   * 131* 129* 129*   K 5.2* 4.9 4.4 4.3   CO2 28 25 24 28    98 98 95   BUN 25* 25* 22 16   CREATININE 0.8 0.9 0.9 0.7       Recent Labs  Lab 04/23/17  1129 04/24/17  0416 04/25/17  0419   WBC 23.95* 10.47 9.96   RBC 2.88* 2.61* 3.25*   HGB 8.7* 7.7* 9.9*   HCT 25.8* 24.1* 28.7*    221 186       Recent Labs  Lab 04/23/17  0507   INR 1.0       A/P: 83 y.o. female 2 Days Post-Op s/p right hip hemiarthroplasty, right distal radius fracture  -Continue with current pain control regimen  -Continue with current physical therapy plan, WBAT MARIELY EPPS RUE. Platform walker  -Continue with DVT prophylaxis, Lovenox  -will plan for ORIF distal radius at a later date once she is more comfortable on the walker     Dispo:  sabra with PT    Mateusz Castillo M.D. PGY3  [facility]   Orthopaedic Surgery Resident

## 2017-04-26 PROBLEM — I25.10 CORONARY ARTERY DISEASE INVOLVING NATIVE CORONARY ARTERY OF NATIVE HEART WITHOUT ANGINA PECTORIS: Status: ACTIVE | Noted: 2017-04-26

## 2017-04-26 LAB
ANION GAP SERPL CALC-SCNC: 9 MMOL/L
BUN SERPL-MCNC: 15 MG/DL
CALCIUM SERPL-MCNC: 8.5 MG/DL
CHLORIDE SERPL-SCNC: 94 MMOL/L
CO2 SERPL-SCNC: 28 MMOL/L
CREAT SERPL-MCNC: 0.7 MG/DL
EST. GFR  (AFRICAN AMERICAN): >60 ML/MIN/1.73 M^2
EST. GFR  (NON AFRICAN AMERICAN): >60 ML/MIN/1.73 M^2
GLUCOSE SERPL-MCNC: 81 MG/DL
PHOSPHATE SERPL-MCNC: 2.7 MG/DL
POTASSIUM SERPL-SCNC: 4.8 MMOL/L
SODIUM SERPL-SCNC: 131 MMOL/L

## 2017-04-26 PROCEDURE — 94799 UNLISTED PULMONARY SVC/PX: CPT

## 2017-04-26 PROCEDURE — 94640 AIRWAY INHALATION TREATMENT: CPT

## 2017-04-26 PROCEDURE — 99900031 HC PATIENT EDUCATION (STAT)

## 2017-04-26 PROCEDURE — 11000001 HC ACUTE MED/SURG PRIVATE ROOM

## 2017-04-26 PROCEDURE — 97110 THERAPEUTIC EXERCISES: CPT

## 2017-04-26 PROCEDURE — 25000003 PHARM REV CODE 250: Performed by: HOSPITALIST

## 2017-04-26 PROCEDURE — 25000003 PHARM REV CODE 250: Performed by: INTERNAL MEDICINE

## 2017-04-26 PROCEDURE — 84100 ASSAY OF PHOSPHORUS: CPT

## 2017-04-26 PROCEDURE — 27000221 HC OXYGEN, UP TO 24 HOURS

## 2017-04-26 PROCEDURE — 94761 N-INVAS EAR/PLS OXIMETRY MLT: CPT

## 2017-04-26 PROCEDURE — 63600175 PHARM REV CODE 636 W HCPCS: Performed by: HOSPITALIST

## 2017-04-26 PROCEDURE — 80048 BASIC METABOLIC PNL TOTAL CA: CPT

## 2017-04-26 PROCEDURE — 99232 SBSQ HOSP IP/OBS MODERATE 35: CPT | Mod: ,,, | Performed by: INTERNAL MEDICINE

## 2017-04-26 RX ADMIN — LEVALBUTEROL 0.31 MG: 0.63 SOLUTION RESPIRATORY (INHALATION) at 07:04

## 2017-04-26 RX ADMIN — METOPROLOL SUCCINATE 50 MG: 50 TABLET, EXTENDED RELEASE ORAL at 08:04

## 2017-04-26 RX ADMIN — IPRATROPIUM BROMIDE 0.5 MG: 0.5 SOLUTION RESPIRATORY (INHALATION) at 01:04

## 2017-04-26 RX ADMIN — Medication 3 ML: at 10:04

## 2017-04-26 RX ADMIN — LEVALBUTEROL 0.31 MG: 0.63 SOLUTION RESPIRATORY (INHALATION) at 12:04

## 2017-04-26 RX ADMIN — POTASSIUM & SODIUM PHOSPHATES POWDER PACK 280-160-250 MG 1 PACKET: 280-160-250 PACK at 06:04

## 2017-04-26 RX ADMIN — TRAMADOL HYDROCHLORIDE 50 MG: 50 TABLET, COATED ORAL at 02:04

## 2017-04-26 RX ADMIN — PANTOPRAZOLE SODIUM 1200 MG: 40 TABLET, DELAYED RELEASE ORAL at 09:04

## 2017-04-26 RX ADMIN — Medication 3 ML: at 02:04

## 2017-04-26 RX ADMIN — IPRATROPIUM BROMIDE 0.5 MG: 0.5 SOLUTION RESPIRATORY (INHALATION) at 04:04

## 2017-04-26 RX ADMIN — STANDARDIZED SENNA CONCENTRATE AND DOCUSATE SODIUM 1 TABLET: 8.6; 5 TABLET, FILM COATED ORAL at 08:04

## 2017-04-26 RX ADMIN — CITALOPRAM HYDROBROMIDE 20 MG: 20 TABLET ORAL at 08:04

## 2017-04-26 RX ADMIN — TRAMADOL HYDROCHLORIDE 50 MG: 50 TABLET, COATED ORAL at 09:04

## 2017-04-26 RX ADMIN — PANTOPRAZOLE SODIUM 1200 MG: 40 TABLET, DELAYED RELEASE ORAL at 08:04

## 2017-04-26 RX ADMIN — ENOXAPARIN SODIUM 40 MG: 100 INJECTION SUBCUTANEOUS at 05:04

## 2017-04-26 RX ADMIN — LEVALBUTEROL 0.31 MG: 0.63 SOLUTION RESPIRATORY (INHALATION) at 11:04

## 2017-04-26 RX ADMIN — CYANOCOBALAMIN TAB 250 MCG 250 MCG: 250 TAB at 08:04

## 2017-04-26 RX ADMIN — ACETAMINOPHEN 650 MG: 325 TABLET ORAL at 09:04

## 2017-04-26 RX ADMIN — IPRATROPIUM BROMIDE 0.5 MG: 0.5 SOLUTION RESPIRATORY (INHALATION) at 11:04

## 2017-04-26 RX ADMIN — IPRATROPIUM BROMIDE 0.5 MG: 0.5 SOLUTION RESPIRATORY (INHALATION) at 12:04

## 2017-04-26 RX ADMIN — IPRATROPIUM BROMIDE 0.5 MG: 0.5 SOLUTION RESPIRATORY (INHALATION) at 07:04

## 2017-04-26 RX ADMIN — FLUTICASONE FUROATE AND VILANTEROL TRIFENATATE 1 PUFF: 100; 25 POWDER RESPIRATORY (INHALATION) at 03:04

## 2017-04-26 RX ADMIN — IPRATROPIUM BROMIDE 0.5 MG: 0.5 SOLUTION RESPIRATORY (INHALATION) at 08:04

## 2017-04-26 RX ADMIN — THERA TABS 1 TABLET: TAB at 08:04

## 2017-04-26 RX ADMIN — STANDARDIZED SENNA CONCENTRATE AND DOCUSATE SODIUM 1 TABLET: 8.6; 5 TABLET, FILM COATED ORAL at 09:04

## 2017-04-26 RX ADMIN — TIOTROPIUM BROMIDE 18 MCG: 18 CAPSULE ORAL; RESPIRATORY (INHALATION) at 09:04

## 2017-04-26 RX ADMIN — TRAMADOL HYDROCHLORIDE 50 MG: 50 TABLET, COATED ORAL at 08:04

## 2017-04-26 NOTE — PROGRESS NOTES
CM called into room by patient and patient's daughter Joy. Patient and patient's daughter requested [facility] as their number 1 choice. CM to call MD to obtain SNF consult. CM to update SW.    Hali Strong RN, CM [facility]  [phone]

## 2017-04-26 NOTE — ASSESSMENT & PLAN NOTE
Patient's blood pressure is controlled here in the hospital over past 24 hours. Goal for blood pressure is SBP < 150 and DBP < 90 as patient > or = 60 years of age with no diabetes or advanced kidney disease based on JNC 8 guidelines. Plan to continue Toprol XL 50 mg po daily. Home medication of Lisinopril on hold post-op and will plan to restart Lisinopril 2.5 mg po daily after all surgeries complete. Plan to continue to monitor patient's blood pressure routinely while patient is hospitalized.

## 2017-04-26 NOTE — ASSESSMENT & PLAN NOTE
Encounter for aftercare for healing closed traumatic fracture of hip  Patient is POD # 2. Patient reports moderate pain in right hip. Plan is to continue PT/OT for gait training and strengthening and restoration of ADL's. Patient is FWB/WBAT: right lower extremity, posterior hip precautions as per Orthopedic recommendations. Plan to continue Lovenox 40 mg subcutaneous daily for DVT prophylaxis after hip fracture surgery. Plan to restart Eliquis for long term anticoagulation for atrial fibrillation and can use for DVT prophylaxis after all surgery's complete but patient needs ORIF of right wrist so will use Lovenox for now until all surgeries complete. Orthopedics is following and managing surgical site. Pain need to be addressed more throroughly today as narcotics causing somnolence so will discontinue IV Dilaudid and oral Oxy IR as needed and use Tramadol 50 mg po every 6 hours as needed for moderate to severe pain and Tylenol 650 mg po every 6 hours as needed for mild pain.  with Oxy IR and IV Morphine prn. PT/OT recommending skilled nursing facility placement and  working on choice of skilled facility with patient and family.

## 2017-04-26 NOTE — SUBJECTIVE & OBJECTIVE
Interval History: Patient somnolent on exam today. Daughter expressed concern patient is being oversedated by narcotics and requested alteration in pain medications. Daughter reports patient is very sensitive to narcotics and tends to get easily sedated with narcotics in the past. Patient unable to void overnight and I+O cath done this am with about 400 cc of urine noted.     Review of Systems   Constitutional: Negative for chills and fever.   Respiratory: Negative for cough, shortness of breath and wheezing.    Cardiovascular: Negative for chest pain and palpitations.   Gastrointestinal: Negative for abdominal pain, constipation, diarrhea and nausea.   Genitourinary: Positive for difficulty urinating. Negative for flank pain.   Musculoskeletal: Positive for myalgias (Right wrist and right hip pain).   Skin: Positive for pallor. Negative for rash.   Neurological: Positive for weakness (Generalized). Negative for dizziness and light-headedness.   Psychiatric/Behavioral: Positive for decreased concentration. Negative for confusion.     Objective:     Vital Signs (Most Recent):  Temp: 97.6 °F (36.4 °C) (04/25/17 2033)  Pulse: 101 (04/25/17 2033)  Resp: 18 (04/25/17 2033)  BP: 100/61 (04/25/17 2033)  SpO2: 95 % (04/25/17 2033) Vital Signs (24h Range):  Temp:  [97.5 °F (36.4 °C)-98.4 °F (36.9 °C)] 97.6 °F (36.4 °C)  Pulse:  [] 101  Resp:  [16-20] 18  SpO2:  [94 %-98 %] 95 %  BP: (100-138)/(54-70) 100/61     Weight: 49.4 kg (109 lb)  Body mass index is 18.14 kg/(m^2).    Intake/Output Summary (Last 24 hours) at 04/25/17 2250  Last data filed at 04/25/17 1936   Gross per 24 hour   Intake                0 ml   Output             1250 ml   Net            -1250 ml      Physical Exam   Constitutional: She is oriented to person, place, and time. She appears listless. She appears cachectic. No distress.   HENT:   Head: Normocephalic and atraumatic.   Mouth/Throat: No oropharyngeal exudate.   Eyes: No scleral icterus.    Neck: No JVD present.   Cardiovascular: Normal rate and regular rhythm.  Exam reveals no gallop and no friction rub.    No murmur heard.  Pulmonary/Chest: Effort normal and breath sounds normal. She has no wheezes.   Abdominal: Soft. Bowel sounds are normal. She exhibits no distension. There is no tenderness.   Musculoskeletal: She exhibits tenderness (to hip).   Neurological: She is oriented to person, place, and time. She appears listless.   Skin: Skin is warm and dry. No rash noted.   Surgical bandage to right hip and splint to right forearm   Psychiatric: She has a normal mood and affect. Thought content normal.       Significant Labs:   BMP:   Recent Labs  Lab 04/25/17  0419   GLU 92   *   K 4.3   CL 95   CO2 28   BUN 16   CREATININE 0.7   CALCIUM 8.7   MG 1.7     CBC:   Recent Labs  Lab 04/24/17  0416 04/25/17  0419   WBC 10.47 9.96   HGB 7.7* 9.9*   HCT 24.1* 28.7*    186     Significant Imaging: I have reviewed all pertinent imaging results/findings within the past 24 hours.

## 2017-04-26 NOTE — ASSESSMENT & PLAN NOTE
Vitamin D deficiency  Controlled. Vitamin D level low at 23 consistent with mild deficiency. Will start Vitamin D 2000 units daily to replace.

## 2017-04-26 NOTE — PROGRESS NOTES
Daily Orthopaedic Progress Note    Kaity Rebolledo is a 83 y.o. female admitted on 4/21/2017  Hospital Day: 4  Post Op Day: 3 Days Post-Op      The patient was seen and examined this morning at the bedside. DIEGO overnight. Mod assist with PT yesterday. In and out cath overnight.     +voiding, some retention   +flatus  +Tolerating PO  - fevers/chills     PHYSICAL EXAM:  Awake/alert/oriented x3, No acute distress, Afebrile, Vital signs stable  Good inspiratory effort with unlaboured breathing  Dressings Clean,Dry,Intact right hip  Palpable distal pulses  Neurovascularly intact  sugartong splint right radius, neurovascularly intact. Some bruising and swelling       Vitals:    04/26/17 0135 04/26/17 0300 04/26/17 0400 04/26/17 0434   BP:   (!) 113/53    BP Location:       Patient Position:       BP Method:       Pulse: 86 79 78 86   Resp: 18  17 16   Temp:   98 °F (36.7 °C)    TempSrc:   Oral    SpO2: 98%  97% 98%   Weight:       Height:         I/O last 3 completed shifts:  In: 1230 [P.O.:480; I.V.:750]  Out: 1050 [Urine:1050]    Recent Labs  Lab 04/24/17  0416 04/25/17  0419 04/26/17  0426   CALCIUM 8.3* 8.7 8.5*   PROT 4.9* 4.9*  --    * 129* 131*   K 4.4 4.3 4.8   CO2 24 28 28   CL 98 95 94*   BUN 22 16 15   CREATININE 0.9 0.7 0.7       Recent Labs  Lab 04/23/17  1129 04/24/17  0416 04/25/17  0419   WBC 23.95* 10.47 9.96   RBC 2.88* 2.61* 3.25*   HGB 8.7* 7.7* 9.9*   HCT 25.8* 24.1* 28.7*    221 186     No results for input(s): INR, APTT in the last 72 hours.    Invalid input(s): PT    A/P: 83 y.o. female 3 Days Post-Op s/p right hip hemiarthroplasty, right distal radius fracture  -Continue with current pain control regimen  -Continue with current physical therapy plan, WBAT RLE. NWB RUE. Platform walker  -Continue with DVT prophylaxis, Lovenox  -will plan for ORIF distal radius at a later date once she is more comfortable on the walker   -if continues with retention, may need guzman back in     Dispo:  sabra with PT    Mateusz Castillo M.D. PGY3  [facility]   Orthopaedic Surgery Resident

## 2017-04-26 NOTE — PLAN OF CARE
Problem: Physical Therapy Goal  Goal: Physical Therapy Goal  Goals to be met by:      Patient will increase functional independence with mobility by performin. Supine to sit with MInimal Assistance  2. Sit to supine with MInimal Assistance  3. Sit to stand transfer with Moderate Assistance  4. Bed to chair transfer with Moderate Assistance using appropriate AD  5. Stand for 5 minutes with Contact Guard Assistance using appropriate AD  6. (I) with HEP   Outcome: Ongoing (interventions implemented as appropriate)  Goals remain appropriate. Continue with plan of care.

## 2017-04-26 NOTE — ASSESSMENT & PLAN NOTE
Orthopedics evaluated and recommend operative repair but no surgical date set. Will continue to have patient in splint that was applied by Orthopedics with NWB to right wrist. Pain control.

## 2017-04-26 NOTE — PT/OT/SLP PROGRESS
Physical Therapy  Treatment    Kaity Rebolledo   MRN: [id number]   Admitting Diagnosis: Displaced fracture of right femoral neck    PT Received On: 04/26/17  PT Start Time: 1120     PT Stop Time: 1138    PT Total Time (min): 18 min       Billable Minutes:  Therapeutic Exercise 18    Treatment Type: Treatment  PT/PTA: PTA     PTA Visit Number: 2       General Precautions: Standard, fall  Orthopedic Precautions: RUE non weight bearing, RLE weight bearing as tolerated, RLE posterior precautions   Braces:  (splint on RUE)    Subjective:  Communicated with nsg Mary Ann prior to session.  Pt agreeable to therapy.     Pain Rating:  (Pt too lethargic to rate, but complains of pain in her hip. )    Objective:   Patient found with: SCD, oxygen with HOB elevated. Therapy limited 2/2 lethargy. Nsg reported that the pt received Tramadol around 8 or 9 am.     Functional Mobility:  Bed Mobility:   Attempted, but not performed 2/2 lethargy.     Transfers:  Unable to attempt 2/2 lethargy.    Gait:   Unable to attempt 2/2 lethargy.    Balance:   Unable to attempt 2/2 lethargy.    Therapeutic Activities and Exercises:  Donned/doffed SCD.   Pt performed BLE therex x 15   -ankle pumps AROM  -quad sets PROM/AAROM with vcs and tcs to initiate   -gluteal sets attempted, but unable to perform 2/2 lethargy  Educated the patient for her hip precautions 2/2 pt unable to recall.   Repositioning of trunk and legs to promote compliance and safety with hip precautions.     AM-PAC 6 CLICK MOBILITY  How much help from another person does this patient currently need?   1 = Unable, Total/Dependent Assistance  2 = A lot, Maximum/Moderate Assistance  3 = A little, Minimum/Contact Guard/Supervision  4 = None, Modified [place]/Independent    Turning over in bed (including adjusting bedclothes, sheets and blankets)?: 2  Sitting down on and standing up from a chair with arms (e.g., wheelchair, bedside commode, etc.): 2  Moving from lying on back to sitting  "on the side of the bed?: 2  Moving to and from a bed to a chair (including a wheelchair)?: 2  Need to walk in hospital room?: 1  Climbing 3-5 steps with a railing?: 1  Total Score: 10    AM-PAC Raw Score CMS G-Code Modifier Level of Impairment Assistance   6 % Total / Unable   7 - 9 CM 80 - 100% Maximal Assist   10 - 14 CL 60 - 80% Moderate Assist   15 - 19 CK 40 - 60% Moderate Assist   20 - 22 CJ 20 - 40% Minimal Assist   23 CI 1-20% SBA / CGA   24 CH 0% Independent/ Mod I     Patient left HOB elevated with all lines intact and call button in reach.    Assessment:  Kaity Rebolledo is a 83 y.o. female with a medical diagnosis of Displaced fracture of right femoral neck and presents with the rehab identified deficits below. The patient's daughter reports that the patient had a fall one week after being discharged from SNF 2/2 attempting to get into bed on her own resulting in a broken hip and wrist. Kaity was found pigeon toed + a L lateral lean of the trunk + knees rotating medially, HOB elevated. Pt was "righted" then asked if she knew her precautions, but the pt could not say what they were and attempted to read them off the WB, but was unable. The patient's session was limited by lethargy. She was left in bed with blanket rolls between her ankles and knees to facilitate more safe posturing as per her hip precautions. The patient would continue to benefit from skilled PT to address the deficits in her POC.     Rehab identified problem list/impairments: Rehab identified problem list/impairments: weakness, impaired endurance, impaired self care skills, impaired functional mobilty, gait instability, impaired balance, impaired cognition, decreased coordination, decreased lower extremity function, decreased upper extremity function, decreased safety awareness, pain, orthopedic precautions    Rehab potential is good.    Activity tolerance: Poor    Discharge recommendations: Discharge Facility/Level Of Care " Needs: nursing facility, skilled     Barriers to discharge: Barriers to Discharge: Inaccessible home environment, Decreased caregiver support    Equipment recommendations: Equipment Needed After Discharge:  (TBD at next level of care)     GOALS:   Physical Therapy Goals        Problem: Physical Therapy Goal    Goal Priority Disciplines Outcome Goal Variances Interventions   Physical Therapy Goal     PT/OT, PT Ongoing (interventions implemented as appropriate)     Description:  Goals to be met by:      Patient will increase functional independence with mobility by performin. Supine to sit with MInimal Assistance  2. Sit to supine with MInimal Assistance  3. Sit to stand transfer with Moderate Assistance  4. Bed to chair transfer with Moderate Assistance using appropriate AD  5. Stand for 5 minutes with Contact Guard Assistance using appropriate AD  6. (I) with HEP                PLAN:    Patient to be seen daily  to address the above listed problems via gait training, therapeutic activities, therapeutic exercises, neuromuscular re-education  Plan of Care expires: 17  Plan of Care reviewed with: patient, daughter         Zhao RIDLEY Tyron, PTA  2017

## 2017-04-26 NOTE — ASSESSMENT & PLAN NOTE
Controlled. No signs of volume overload or acute decompensated heart failure. Recent Echo from  March 2017 showed EF 55 - 60%. Plan to continue to hold Lasix perioperatively to prevent VIDAL and also due to hyponatremia. Monitor volume status daily.

## 2017-04-26 NOTE — ASSESSMENT & PLAN NOTE
Controlled. No angina or active chest pain. Aspirin on hold until all surgeries complete and then will restart. Will continue Toprol XL 50 mg daily to treat chronic CAD.

## 2017-04-26 NOTE — CLINICAL REVIEW
Estefany mckoy, on call for IM-H, paged and notified of pts inability to void past 8 hrs. Since last in and out cath. Bladder scan done- showed 674 cc retained. Order noted. Will continue to monitor.

## 2017-04-26 NOTE — ASSESSMENT & PLAN NOTE
Chronic hypoxemic respiratory failure  Patient at baseline and currently on 3 liters of oxygen as on at home. Pulse ox 95% on 3 liters and goal is to keep oxygen > 88%. Plan to continue Breo MDI 1 puff daily + Spiriva 1 puff daily to treat chronic COPD. Patient with signs to indicate any acute exacerbation of COPD.

## 2017-04-26 NOTE — PLAN OF CARE
Problem: Patient Care Overview  Goal: Plan of Care Review  Outcome: Ongoing (interventions implemented as appropriate)  Patient laying in bed comfortably, supine position, head of bed elevated 30-45 degrees, IV saline locked no signs of infection and irrititaion, SCD's on, Breaks locked, side rails upx2, bed in lowest position, patient was due to void by 11am was not able to void by 11am, indwelling catheter placed, patient complains of pain patient was given prn pain meds, fall precautions in place, careplan ongoing, will continue to monitor.

## 2017-04-26 NOTE — ASSESSMENT & PLAN NOTE
Controlled. Patient in normal sinus rhythm. Will continue Toprol XL for rate control. Eliquis on hold for long term anticoagulation until all surgeries complete and then plan to restart.

## 2017-04-26 NOTE — ASSESSMENT & PLAN NOTE
Anticoagulant long-term use  Controlled. Patient in normal sinus rhythm. Will continue Toprol XL for rate control. Eliquis on hold for long term anticoagulation until all surgeries complete and then plan to restart.

## 2017-04-26 NOTE — NURSING
[facility] paged x2 for patient inability to void post in and out catheter will monitor.

## 2017-04-26 NOTE — ASSESSMENT & PLAN NOTE
Patient with urinary retention post-op and likely related to narcotics and will decrease narcotics. Monitor I+O and straight cath prn.

## 2017-04-26 NOTE — ASSESSMENT & PLAN NOTE
Anemia of chronic disease  On admission to hospital, patient was noted to have Hgb of 8.8. After surgery patient's Hgb level dropped to 7.7 as expected related to hip surgery and patient transfused 1 unit PRBC on 4/24 and repeat Hgb on 4/25 improved to 9.9. Patient has no signs of active bleeding and hemodynamically stable. Patient is asymptomatic. Goal is keep Hgb >7. Monitor daily H/H post-op.

## 2017-04-26 NOTE — PROGRESS NOTES
Progress Note  [facility] Medicine    [facility] Medicine Service: H  Admit Date: 4/21/2017  Encounter Date: 04/25/2017  EMELIA: 4/28/2017    2 Days Post-Op from right hip hemiarthroplasty for fracture     Orthopedic Surgeon: Dr. Justin Freeman  Weight Bearing Status: WBAT with posterior hip precautions right lower extremity    Follow-up Visit For: Displaced fracture of right femoral neck    HPI:  83 year old female with h/o advanced COPD, choronic hypoxic respiratory failure on home oxygen 3L, CAD, recent diagnosis of Afib last month and currently on eliquis, chronic diastolic CHF, HTN, Debility with mostly wheel chair bound status brought to ED  with right hip and right forearm pain after a mechanical fall at [facility]. Tanner recently was hospitalized at [facility] at the end of march for acute on chronic hypoxic and hypercapneic respiratory failuer due to COPD exacerbation. She was treated with BIPAP, steroid and nebs during hospital stay and discahred to [facility] on 04/04/17 for deconditioning. She was getting PT at SNF and started on 10 days course of doxycycline for suspected penumonia based on CXR finding. She was seen at pulmonary clinic by Dr. Christian on 04/13 who started on prednisone 20 mg daily x 5 days, then 10 mg daily x 5 days and then stop. She was discharged from SNF to assisted living with home health on 04/14/17. She had multiple recent admissions for COPD exacerbation and durigh last admit she was made DNR per daughter at bedside. Daughter states she has been livign at assisted living for last 2 years and mostly wheel charir bound due to COPD and debility.     4/21 around 8 pm while she was walking from family room to her bed she got tangled with oxygen tubing, lost balnace and fell. She tried to prevent the fall with her right hand. Denies hitting her head or LOC. Imaing in ED showed right distal radial and ulnar fractuer, and right femoral neck fracture. Orthopedic reduced the  radial fx at bedside and placed splint. Plan to have surgical fixation of right femoral neck fracture.           Pain scale: Patient with 3/10 aching pain in right hip.    Interval History: Patient somnolent on exam today. Daughter expressed concern patient is being oversedated by narcotics and requested alteration in pain medications. Daughter reports patient is very sensitive to narcotics and tends to get easily sedated with narcotics in the past. Patient unable to void overnight and I+O cath done this am with about 400 cc of urine noted.     Review of Systems   Constitutional: Negative for chills and fever.   Respiratory: Negative for cough, shortness of breath and wheezing.    Cardiovascular: Negative for chest pain and palpitations.   Gastrointestinal: Negative for abdominal pain, constipation, diarrhea and nausea.   Genitourinary: Positive for difficulty urinating. Negative for flank pain.   Musculoskeletal: Positive for myalgias (Right wrist and right hip pain).   Skin: Positive for pallor. Negative for rash.   Neurological: Positive for weakness (Generalized). Negative for dizziness and light-headedness.   Psychiatric/Behavioral: Positive for decreased concentration. Negative for confusion.     Objective:     Vital Signs (Most Recent):  Temp: 97.6 °F (36.4 °C) (04/25/17 2033)  Pulse: 101 (04/25/17 2033)  Resp: 18 (04/25/17 2033)  BP: 100/61 (04/25/17 2033)  SpO2: 95 % (04/25/17 2033) Vital Signs (24h Range):  Temp:  [97.5 °F (36.4 °C)-98.4 °F (36.9 °C)] 97.6 °F (36.4 °C)  Pulse:  [] 101  Resp:  [16-20] 18  SpO2:  [94 %-98 %] 95 %  BP: (100-138)/(54-70) 100/61     Weight: 49.4 kg (109 lb)  Body mass index is 18.14 kg/(m^2).    Intake/Output Summary (Last 24 hours) at 04/25/17 2250  Last data filed at 04/25/17 1936   Gross per 24 hour   Intake                0 ml   Output             1250 ml   Net            -1250 ml      Physical Exam   Constitutional: She is oriented to person, place, and time. She  appears listless. She appears cachectic. No distress.   HENT:   Head: Normocephalic and atraumatic.   Mouth/Throat: No oropharyngeal exudate.   Eyes: No scleral icterus.   Neck: No JVD present.   Cardiovascular: Normal rate and regular rhythm.  Exam reveals no gallop and no friction rub.    No murmur heard.  Pulmonary/Chest: Effort normal and breath sounds normal. She has no wheezes.   Abdominal: Soft. Bowel sounds are normal. She exhibits no distension. There is no tenderness.   Musculoskeletal: She exhibits tenderness (to hip).   Neurological: She is oriented to person, place, and time. She appears listless.   Skin: Skin is warm and dry. No rash noted.   Surgical bandage to right hip and splint to right forearm   Psychiatric: She has a normal mood and affect. Thought content normal.       Significant Labs:   BMP:   Recent Labs  Lab 04/25/17 0419   GLU 92   *   K 4.3   CL 95   CO2 28   BUN 16   CREATININE 0.7   CALCIUM 8.7   MG 1.7     CBC:   Recent Labs  Lab 04/24/17 0416 04/25/17 0419   WBC 10.47 9.96   HGB 7.7* 9.9*   HCT 24.1* 28.7*    186     Significant Imaging: I have reviewed all pertinent imaging results/findings within the past 24 hours.      ASSESSMENT/PLAN:     Acute blood loss anemia  Anemia of chronic disease  On admission to hospital, patient was noted to have Hgb of 8.8. After surgery patient's Hgb level dropped to 7.7 as expected related to hip surgery and patient transfused 1 unit PRBC on 4/24 and repeat Hgb on 4/25 improved to 9.9. Patient has no signs of active bleeding and hemodynamically stable. Patient is asymptomatic. Goal is keep Hgb >7. Monitor daily H/H post-op.     * Displaced fracture of right femoral neck s/p hemiarthroplasty on 4/23/2017  Encounter for aftercare for healing closed traumatic fracture of hip  Patient is POD # 2. Patient reports moderate pain in right hip. Plan is to continue PT/OT for gait training and strengthening and restoration of ADL's. Patient is  FWB/WBAT: right lower extremity, posterior hip precautions as per Orthopedic recommendations. Plan to continue Lovenox 40 mg subcutaneous daily for DVT prophylaxis after hip fracture surgery. Plan to restart Eliquis for long term anticoagulation for atrial fibrillation and can use for DVT prophylaxis after all surgery's complete but patient needs ORIF of right wrist so will use Lovenox for now until all surgeries complete. Orthopedics is following and managing surgical site. Pain need to be addressed more throroughly today as narcotics causing somnolence so will discontinue IV Dilaudid and oral Oxy IR as needed and use Tramadol 50 mg po every 6 hours as needed for moderate to severe pain and Tylenol 650 mg po every 6 hours as needed for mild pain.  with Oxy IR and IV Morphine prn. PT/OT recommending skilled nursing facility placement and  working on choice of skilled facility with patient and family.      Hypophosphatemia  Will treat with Neutra-Phos 1 packet po for 4 dose(s) today and recheck level in the am.     Closed Colles' fracture of right radius  Orthopedics evaluated and recommend operative repair but no surgical date set. Will continue to have patient in splint that was applied by Orthopedics with NWB to right wrist. Pain control.      Essential hypertension  Patient's blood pressure is controlled here in the hospital over past 24 hours. Goal for blood pressure is SBP < 150 and DBP < 90 as patient > or = 60 years of age with no diabetes or advanced kidney disease based on JNC 8 guidelines. Plan to continue Toprol XL 50 mg po daily. Home medication of Lisinopril on hold post-op and will plan to restart Lisinopril 2.5 mg po daily after all surgeries complete. Plan to continue to monitor patient's blood pressure routinely while patient is hospitalized.     Chronic obstructive pulmonary disease  Chronic hypoxemic respiratory failure  Patient at baseline and currently on 3 liters of oxygen as on at  home. Pulse ox 95% on 3 liters and goal is to keep oxygen > 88%. Plan to continue Breo MDI 1 puff daily + Spiriva 1 puff daily to treat chronic COPD. Patient with signs to indicate any acute exacerbation of COPD.     Coronary artery disease involving native coronary artery without angina pectoris  Controlled. No angina or active chest pain. Aspirin on hold until all surgeries complete and then will restart. Will continue Toprol XL 50 mg daily to treat chronic CAD.       Hyponatremia  Controlled and stable at 129. Likely related to hypovolemia. Patient asymptomatic and will monitor with daily BMP. Encourage oral intake.      Paroxysmal atrial fibrillation  Long term anticoagulant use  Controlled. Patient in normal sinus rhythm. Will continue Toprol XL for rate control. Eliquis on hold for long term anticoagulation until all surgeries complete and then plan to restart.         Chronic diastolic heart failure  Controlled. No signs of volume overload or acute decompensated heart failure. Recent Echo from  March 2017 showed EF 55 - 60%. Plan to continue to hold Lasix perioperatively to prevent VIDAL and also due to hyponatremia. Monitor volume status daily.     Osteoporosis  Vitamin D deficiency  Controlled. Vitamin D level low at 23 consistent with mild deficiency. Will start Vitamin D 2000 units daily to replace.     Urinary retention  Patient with urinary retention post-op and likely related to narcotics and will decrease narcotics. Monitor I+O and straight cath prn.       Anxiety and depression  Controlled. Continue Celexa daily to treat. Xanax as needed for anxiety.       Anticipated Disposition: Skilled Nursing Facility    Time spent in care of patient (Greater than 1/2 spent in direct face-to-face contact): 24 minutes      Cecile Woo MD  Department of Hospital Medicine

## 2017-04-27 LAB
ANION GAP SERPL CALC-SCNC: 6 MMOL/L
BUN SERPL-MCNC: 10 MG/DL
CALCIUM SERPL-MCNC: 8.6 MG/DL
CHLORIDE SERPL-SCNC: 95 MMOL/L
CO2 SERPL-SCNC: 29 MMOL/L
CREAT SERPL-MCNC: 0.7 MG/DL
EST. GFR  (AFRICAN AMERICAN): >60 ML/MIN/1.73 M^2
EST. GFR  (NON AFRICAN AMERICAN): >60 ML/MIN/1.73 M^2
GLUCOSE SERPL-MCNC: 91 MG/DL
HCT VFR BLD AUTO: 28.5 %
HGB BLD-MCNC: 9.1 G/DL
PHOSPHATE SERPL-MCNC: 2.5 MG/DL
POTASSIUM SERPL-SCNC: 4.7 MMOL/L
SODIUM SERPL-SCNC: 130 MMOL/L

## 2017-04-27 PROCEDURE — 11000001 HC ACUTE MED/SURG PRIVATE ROOM

## 2017-04-27 PROCEDURE — 94640 AIRWAY INHALATION TREATMENT: CPT

## 2017-04-27 PROCEDURE — 36415 COLL VENOUS BLD VENIPUNCTURE: CPT

## 2017-04-27 PROCEDURE — 97110 THERAPEUTIC EXERCISES: CPT

## 2017-04-27 PROCEDURE — 84100 ASSAY OF PHOSPHORUS: CPT

## 2017-04-27 PROCEDURE — 85018 HEMOGLOBIN: CPT

## 2017-04-27 PROCEDURE — 97535 SELF CARE MNGMENT TRAINING: CPT

## 2017-04-27 PROCEDURE — 25000003 PHARM REV CODE 250: Performed by: HOSPITALIST

## 2017-04-27 PROCEDURE — 63600175 PHARM REV CODE 636 W HCPCS: Performed by: HOSPITALIST

## 2017-04-27 PROCEDURE — 27000221 HC OXYGEN, UP TO 24 HOURS

## 2017-04-27 PROCEDURE — 80048 BASIC METABOLIC PNL TOTAL CA: CPT

## 2017-04-27 PROCEDURE — 99232 SBSQ HOSP IP/OBS MODERATE 35: CPT | Mod: ,,, | Performed by: INTERNAL MEDICINE

## 2017-04-27 PROCEDURE — 85014 HEMATOCRIT: CPT

## 2017-04-27 PROCEDURE — 25000003 PHARM REV CODE 250: Performed by: INTERNAL MEDICINE

## 2017-04-27 PROCEDURE — 97530 THERAPEUTIC ACTIVITIES: CPT

## 2017-04-27 RX ORDER — SODIUM,POTASSIUM PHOSPHATES 280-250MG
1 POWDER IN PACKET (EA) ORAL
Status: COMPLETED | OUTPATIENT
Start: 2017-04-27 | End: 2017-04-28

## 2017-04-27 RX ORDER — POLYETHYLENE GLYCOL 3350 17 G/17G
17 POWDER, FOR SOLUTION ORAL 2 TIMES DAILY
Status: DISCONTINUED | OUTPATIENT
Start: 2017-04-27 | End: 2017-05-02 | Stop reason: HOSPADM

## 2017-04-27 RX ADMIN — ENOXAPARIN SODIUM 40 MG: 100 INJECTION SUBCUTANEOUS at 05:04

## 2017-04-27 RX ADMIN — LEVALBUTEROL 0.31 MG: 0.63 SOLUTION RESPIRATORY (INHALATION) at 04:04

## 2017-04-27 RX ADMIN — TRAMADOL HYDROCHLORIDE 50 MG: 50 TABLET, COATED ORAL at 11:04

## 2017-04-27 RX ADMIN — POTASSIUM & SODIUM PHOSPHATES POWDER PACK 280-160-250 MG 1 PACKET: 280-160-250 PACK at 05:04

## 2017-04-27 RX ADMIN — CITALOPRAM HYDROBROMIDE 20 MG: 20 TABLET ORAL at 08:04

## 2017-04-27 RX ADMIN — SODIUM CHLORIDE 1000 ML: 0.9 INJECTION, SOLUTION INTRAVENOUS at 03:04

## 2017-04-27 RX ADMIN — Medication 3 ML: at 06:04

## 2017-04-27 RX ADMIN — CYANOCOBALAMIN TAB 250 MCG 250 MCG: 250 TAB at 08:04

## 2017-04-27 RX ADMIN — PANTOPRAZOLE SODIUM 1200 MG: 40 TABLET, DELAYED RELEASE ORAL at 08:04

## 2017-04-27 RX ADMIN — PANTOPRAZOLE SODIUM 1200 MG: 40 TABLET, DELAYED RELEASE ORAL at 09:04

## 2017-04-27 RX ADMIN — LEVALBUTEROL 0.31 MG: 0.63 SOLUTION RESPIRATORY (INHALATION) at 06:04

## 2017-04-27 RX ADMIN — TIOTROPIUM BROMIDE 18 MCG: 18 CAPSULE ORAL; RESPIRATORY (INHALATION) at 08:04

## 2017-04-27 RX ADMIN — FLUTICASONE FUROATE AND VILANTEROL TRIFENATATE 1 PUFF: 100; 25 POWDER RESPIRATORY (INHALATION) at 08:04

## 2017-04-27 RX ADMIN — IPRATROPIUM BROMIDE 0.5 MG: 0.5 SOLUTION RESPIRATORY (INHALATION) at 12:04

## 2017-04-27 RX ADMIN — STANDARDIZED SENNA CONCENTRATE AND DOCUSATE SODIUM 1 TABLET: 8.6; 5 TABLET, FILM COATED ORAL at 08:04

## 2017-04-27 RX ADMIN — IPRATROPIUM BROMIDE 0.5 MG: 0.5 SOLUTION RESPIRATORY (INHALATION) at 07:04

## 2017-04-27 RX ADMIN — THERA TABS 1 TABLET: TAB at 08:04

## 2017-04-27 RX ADMIN — IPRATROPIUM BROMIDE 0.5 MG: 0.5 SOLUTION RESPIRATORY (INHALATION) at 11:04

## 2017-04-27 RX ADMIN — Medication 3 ML: at 10:04

## 2017-04-27 RX ADMIN — METOPROLOL SUCCINATE 50 MG: 50 TABLET, EXTENDED RELEASE ORAL at 08:04

## 2017-04-27 RX ADMIN — IPRATROPIUM BROMIDE 0.5 MG: 0.5 SOLUTION RESPIRATORY (INHALATION) at 03:04

## 2017-04-27 RX ADMIN — POTASSIUM & SODIUM PHOSPHATES POWDER PACK 280-160-250 MG 1 PACKET: 280-160-250 PACK at 10:04

## 2017-04-27 RX ADMIN — POLYETHYLENE GLYCOL 3350 17 G: 17 POWDER, FOR SOLUTION ORAL at 08:04

## 2017-04-27 RX ADMIN — STANDARDIZED SENNA CONCENTRATE AND DOCUSATE SODIUM 1 TABLET: 8.6; 5 TABLET, FILM COATED ORAL at 09:04

## 2017-04-27 RX ADMIN — IPRATROPIUM BROMIDE 0.5 MG: 0.5 SOLUTION RESPIRATORY (INHALATION) at 04:04

## 2017-04-27 RX ADMIN — IPRATROPIUM BROMIDE 0.5 MG: 0.5 SOLUTION RESPIRATORY (INHALATION) at 06:04

## 2017-04-27 RX ADMIN — LEVALBUTEROL 0.31 MG: 0.63 SOLUTION RESPIRATORY (INHALATION) at 11:04

## 2017-04-27 NOTE — ASSESSMENT & PLAN NOTE
Patient with urinary retention post-op and likely related to narcotics. Joshua catheter placed today as unable to void despite several I+O caths and re attempt voiding trial in 1 week.

## 2017-04-27 NOTE — ASSESSMENT & PLAN NOTE
Encounter for aftercare for healing closed traumatic fracture of hip  Patient is POD # 4. Patient reports minimal pain in right hip. Plan is to continue PT/OT for gait training and strengthening and restoration of ADL's. Patient is FWB/WBAT: right lower extremity, posterior hip precautions as per Orthopedic recommendations. Plan to continue Lovenox 40 mg subcutaneous daily for DVT prophylaxis after hip fracture surgery. Plan to restart Eliquis for long term anticoagulation for atrial fibrillation and can use for DVT prophylaxis after all surgery's complete but patient may need right wrist repair in near future so for now Eliquis on hold. Orthopedics is following and managing surgical site. Will continue Tramadol 50 mg po every 6 hours as needed for moderate to severe pain and Tylenol 650 mg po every 6 hours as needed for mild pain.  PT/OT recommending skilled nursing facility placement and  working on choice of skilled facility with patient and family.

## 2017-04-27 NOTE — PLAN OF CARE
Problem: Occupational Therapy Goal  Goal: Occupational Therapy Goal  Goals to be met by: 05/04     Patient will increase functional independence with ADLs by performing:    UE Dressing with Minimal Assistance.  Grooming while seated with Stand-by Assistance.  Toileting from bedside commode with Moderate Assistance for hygiene and clothing management.   Supine to sit with Minimal Assistance.  Stand pivot transfers with Minimal Assistance.  Increased functional strength to WFL for ADLs and functional transfers.   Cont. POC

## 2017-04-27 NOTE — ASSESSMENT & PLAN NOTE
Encounter for aftercare for healing closed traumatic fracture of hip  Patient is POD # 3. Patient reports minimal pain in right hip. Plan is to continue PT/OT for gait training and strengthening and restoration of ADL's. Patient is FWB/WBAT: right lower extremity, posterior hip precautions as per Orthopedic recommendations. Plan to continue Lovenox 40 mg subcutaneous daily for DVT prophylaxis after hip fracture surgery. Plan to restart Eliquis for long term anticoagulation for atrial fibrillation and can use for DVT prophylaxis after all surgery's complete but patient needs ORIF of right wrist so will use Lovenox for now until all surgeries complete. Orthopedics is following and managing surgical site. Will continue Tramadol 50 mg po every 6 hours as needed for moderate to severe pain and Tylenol 650 mg po every 6 hours as needed for mild pain.   PT/OT recommending skilled nursing facility placement and  working on choice of skilled facility with patient and family.

## 2017-04-27 NOTE — NURSING
Pt's family requesting bowel program, pt has not had BM in 1 wk. Notified Dr. Woo, per MD will look into chart.  No new orders at this time.

## 2017-04-27 NOTE — ASSESSMENT & PLAN NOTE
Anticoagulant long-term use  Controlled. Patient in normal sinus rhythm. Will continue Toprol XL for rate control. Eliquis on hold for long term anticoagulation as may need operative repair of right wrist in near future.

## 2017-04-27 NOTE — ASSESSMENT & PLAN NOTE
Controlled and Sodium at 130 today slightly decreased from 131 yesterday. Likely related to hypovolemia as patient with poor oral intake. Will bolus 1 liter of normal saline today. Patient asymptomatic and will monitor with daily BMP. Encourage oral intake.

## 2017-04-27 NOTE — PT/OT/SLP PROGRESS
Physical Therapy  Treatment    Kaity Rebolledo   MRN: [id number]   Admitting Diagnosis: Displaced fracture of right femoral neck    PT Received On: 17  PT Start Time: 1036     PT Stop Time: 1110    PT Total Time (min): 34 min       Billable Minutes:  Therapeutic Activity 26 and Neuromuscular Re-education 8    Treatment Type: Treatment  PT/PTA: PTA     PTA Visit Number: 3       General Precautions: Standard, fall  Orthopedic Precautions: RUE non weight bearing, RLE weight bearing as tolerated, RLE posterior precautions   Braces:  (Splint for R UE)         Subjective:  Communicated with NSG prior to session.  Patient more alert and wanted to sit [facility]    Pain Ratin/10  Location - Side: Right     Location: leg          Objective:   Patient found with: SCD, FCD, oxygen    Functional Mobility:  Bed Mobility:   Supine to Sit: Maximum Assistance    Transfers:  Sit <> Stand Assistance: Maximum Assistance  Sit <> Stand Assistive Device: Platform, Rolling Walker, No Assistive Device (one trial with R PRW and one trials without AD)  Bed <> Chair Technique: Stand Pivot  Bed <> Chair Assistance: Maximum Assistance  Bed <> Chair Assistive Device: No Assistive Device    Gait:   Gait Distance: patient unable      Balance:   Static Sit: POOR+: Needs MINIMAL assist to maintain  Dynamic Sit: POOR: N/A  Static Stand: 0: Needs MAXIMAL assist to maintain   Dynamic stand: 0: N/A     Therapeutic Activities and Exercises:  Patient tolerated sitting EOB 7-8 min with SBA> mod assistance for Left lateral posterior lean   Tolerated static standing with R PRW and no AD 10-20 sec each trial with total assistance. Patient required seated rest break b/w trails.  Noted increase R LE pain with WB'ing   Patient recalled 0/3 hip precautions  Patient performed AP,GS and QS x1-15 reps with R LE    AM-PAC 6 CLICK MOBILITY  How much help from another person does this patient currently need?   1 = Unable, Total/Dependent Assistance  2 = A lot,  Maximum/Moderate Assistance  3 = A little, Minimum/Contact Guard/Supervision  4 = None, Modified [place]/Independent    Turning over in bed (including adjusting bedclothes, sheets and blankets)?: 2  Sitting down on and standing up from a chair with arms (e.g., wheelchair, bedside commode, etc.): 2  Moving from lying on back to sitting on the side of the bed?: 2  Moving to and from a bed to a chair (including a wheelchair)?: 2  Need to walk in hospital room?: 1  Climbing 3-5 steps with a railing?: 1  Total Score: 10    AM-PAC Raw Score CMS G-Code Modifier Level of Impairment Assistance   6 % Total / Unable   7 - 9 CM 80 - 100% Maximal Assist   10 - 14 CL 60 - 80% Moderate Assist   15 - 19 CK 40 - 60% Moderate Assist   20 - 22 CJ 20 - 40% Minimal Assist   23 CI 1-20% SBA / CGA   24 CH 0% Independent/ Mod I     Patient left up in chair with all lines intact and Daughter present.    Assessment:  Kaity Rebolledo is a 83 y.o. female with a medical diagnosis of Displaced fracture of right femoral neck and presents with decrease strength, mobility, balance and endurance.  Patient more alert and able to actively participate in therapy. Patient requires assistance with all mobility and transfers.  Patient demonstrates poor sitting and standing balance. Patient will require 24 hour assistance upon discharge from hospital.     Rehab identified problem list/impairments: Rehab identified problem list/impairments: weakness, impaired endurance, impaired functional mobilty, impaired balance, gait instability, pain, decreased lower extremity function, impaired self care skills, decreased ROM, orthopedic precautions    Rehab potential is fair.    Activity tolerance: Fair    Discharge recommendations: Discharge Facility/Level Of Care Needs: nursing facility, skilled     Barriers to discharge: Barriers to Discharge: Inaccessible home environment, Decreased caregiver support    Equipment recommendations: Equipment Needed  After Discharge:  (TBD)     GOALS:   Physical Therapy Goals        Problem: Physical Therapy Goal    Goal Priority Disciplines Outcome Goal Variances Interventions   Physical Therapy Goal     PT/OT, PT Ongoing (interventions implemented as appropriate)     Description:  Goals to be met by: 5/     Patient will increase functional independence with mobility by performin. Supine to sit with MInimal Assistance  2. Sit to supine with MInimal Assistance  3. Sit to stand transfer with Moderate Assistance  4. Bed to chair transfer with Moderate Assistance using appropriate AD  5. Stand for 5 minutes with Contact Guard Assistance using appropriate AD  6. (I) with HEP                PLAN:    Patient to be seen daily  to address the above listed problems via gait training, therapeutic activities, therapeutic exercises, neuromuscular re-education  Plan of Care expires: 17  Plan of Care reviewed with: patient, daughter         Yves Armijo ANGEL, PTA  2017

## 2017-04-27 NOTE — PROGRESS NOTES
Daily Orthopaedic Progress Note    Kaity Rebolledo is a 83 y.o. female admitted on 4/21/2017  Hospital Day: 5  Post Op Day: 4 Days Post-Op      The patient was seen and examined this morning at the bedside. Guzman put back in yesterday. PT limited due to lethargy yesterday.     +guzman   +flatus  +Tolerating PO, poor appetite   - fevers/chills     PHYSICAL EXAM:  Awake/alert/oriented x3, No acute distress, Afebrile, Vital signs stable  Good inspiratory effort with unlaboured breathing  Dressings Clean,Dry,Intact right hip  Palpable distal pulses  Neurovascularly intact  sugartong splint right radius, neurovascularly intact. Some bruising and swelling       Vitals:    04/27/17 0429 04/27/17 0700 04/27/17 0733 04/27/17 0846   BP: (!) 102/54      BP Location: Left arm  Left arm    Patient Position: Lying  Lying    BP Method: Automatic  Manual    Pulse: 90 90 93 91   Resp: 18  18 14   Temp: 98.2 °F (36.8 °C)      TempSrc: Oral      SpO2: 95%  (!) 94%    Weight:       Height:         I/O last 3 completed shifts:  In: 1080 [P.O.:1080]  Out: 2675 [Urine:2675]    Recent Labs  Lab 04/25/17 0419 04/26/17  0426 04/27/17  0603   CALCIUM 8.7 8.5* 8.6*   PROT 4.9*  --   --    * 131* 130*   K 4.3 4.8 4.7   CO2 28 28 29   CL 95 94* 95   BUN 16 15 10   CREATININE 0.7 0.7 0.7       Recent Labs  Lab 04/25/17  0419 04/27/17  0603   WBC 9.96  --    RBC 3.25*  --    HGB 9.9* 9.1*   HCT 28.7* 28.5*     --      No results for input(s): INR, APTT in the last 72 hours.    Invalid input(s): PT    A/P: 83 y.o. female 4 Days Post-Op s/p right hip hemiarthroplasty, right distal radius fracture  -Continue with current pain control regimen  -Continue with current physical therapy plan, WBAT RLE. NWB RUE. Platform walker  -Continue with DVT prophylaxis, Lovenox  -will plan for ORIF distal radius at a later date once she is more comfortable on the walker. Can also treat non-op if necessary knowing she is at risk for malunion       Dispo:  mobilize with PT    Zac Castillo MD  Orthopedic Surgery, PGY3  [phone] (p)     Attg Note:  I agree with the above assessment and plan.    Santy Spann MD

## 2017-04-27 NOTE — PROGRESS NOTES
Progress Note  [facility]    [facility] Service: H  Admit Date: 4/21/2017  Encounter Date: 04/26/2017  EMELIA: 4/28/2017    3 Days Post-Op from right hip hemiarthroplasty for fracture     Orthopedic Surgeon: Dr. 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 Tanner recently was hospitalized at [facility] at the end of march for acute on chronic hypoxic and hypercapneic respiratory failuer due to COPD exacerbation. She was treated with BIPAP, steroid and nebs during hospital stay and discahred to [facility] on 04/04/17 for deconditioning. She was getting PT at SNF and started on 10 days course of doxycycline for suspected penumonia based on CXR finding. She was seen at pulmonary clinic by Dr. Christian on 04/13 who started on prednisone 20 mg daily x 5 days, then 10 mg daily x 5 days and then stop. She was discharged from SNF to assisted living with home health on 04/14/17. She had multiple recent admissions for COPD exacerbation and durigh last admit she was made DNR per daughter at bedside. Daughter states she has been livign at assisted living for last 2 years and mostly wheel charir bound due to COPD and debility.     4/21 around 8 pm while she was walking from family room to her bed she got tangled with oxygen tubing, lost balnace and fell. She tried to prevent the fall with her right hand. Denies hitting her head or LOC. Imaing in ED showed right distal radial and ulnar fractuer, and right femoral neck fracture. Orthopedic reduced the  radial fx at bedside and placed splint. Plan to have surgical fixation of right femoral neck fracture.           Pain scale: Patient with 2/10 aching pain in right hip.    Interval History: Patient more alert today after decreasing and adjusting pain medication. Patient denies any current pain in right hip and reports right arm feels heavy from having cast on that arm. Daughter at bedside concerned as patient is not eating much. I encouraged patient about eating as nutrition important in her recovery and patient stated she was going to try and eat more. Patient with issues with urinary retention overnight and this am and required (3) I+O caths and patient with > 600 with each I+O cath so Joshua placed back in today.     Review of Systems   Constitutional: Positive for appetite change (Decreased). Negative for chills and fever.   Respiratory: Negative for cough, shortness of breath and wheezing.    Cardiovascular: Negative for chest pain and palpitations.   Gastrointestinal: Negative for abdominal pain, constipation, diarrhea and nausea.   Genitourinary: Positive for difficulty urinating. Negative for flank pain.   Musculoskeletal: Positive for myalgias (Right wrist and right hip pain).   Skin: Positive for pallor. Negative for rash.   Neurological: Positive for weakness (Generalized). Negative for dizziness and light-headedness.   Psychiatric/Behavioral: Negative for confusion.     Objective:     Vital Signs (Most Recent):  Temp: 97.4 °F (36.3 °C) (04/26/17 1606)  Pulse: 79 (04/26/17 1900)  Resp: 10 (04/26/17 1640)  BP: (!) 97/50 (04/26/17 1606)  SpO2: 100 % (04/26/17 1640) Vital Signs (24h Range):  Temp:  [97.4 °F (36.3 °C)-98.2 °F (36.8 °C)] 97.4 °F (36.3 °C)  Pulse:  [] 79  Resp:  [10-18] 10  SpO2:  [94 %-100 %] 100 %  BP: ()/(50-61) 97/50     Weight: 49.4 kg (109 lb)  Body mass index is 18.14 kg/(m^2).    Intake/Output Summary (Last 24 hours) at 04/26/17 1936  Last data filed at 04/26/17 1400   Gross  per 24 hour   Intake             1080 ml   Output              675 ml   Net              405 ml      Physical Exam   Constitutional: She is oriented to person, place, and time. She appears listless. She appears cachectic. No distress.   HENT:   Head: Normocephalic and atraumatic.   Mouth/Throat: No oropharyngeal exudate.   Eyes: No scleral icterus.   Neck: No JVD present.   Cardiovascular: Normal rate and regular rhythm.  Exam reveals no gallop and no friction rub.    No murmur heard.  Pulmonary/Chest: Effort normal and breath sounds normal. She has no wheezes.   Abdominal: Soft. Bowel sounds are normal. She exhibits no distension. There is no tenderness.   Musculoskeletal: She exhibits tenderness (to hip).   Neurological: She is oriented to person, place, and time. She appears listless.   Skin: Skin is warm and dry. No rash noted.   Surgical bandage to right hip and splint to right forearm   Psychiatric: She has a normal mood and affect. Thought content normal.       Significant Labs:   BMP:   Recent Labs  Lab 04/25/17  0419 04/26/17  0426   GLU 92 81   * 131*   K 4.3 4.8   CL 95 94*   CO2 28 28   BUN 16 15   CREATININE 0.7 0.7   CALCIUM 8.7 8.5*   MG 1.7  --      CBC:   Recent Labs  Lab 04/25/17  0419   WBC 9.96   HGB 9.9*   HCT 28.7*          Significant Imaging: I have reviewed all pertinent imaging results/findings within the past 24 hours.      ASSESSMENT/PLAN:     Acute blood loss anemia  Anemia of chronic disease  Controlled. On admission to hospital, patient was noted to have Hgb of 8.8. After surgery patient's Hgb level dropped to 7.7 as expected related to hip surgery and patient transfused 1 unit PRBC on 4/24 and repeat Hgb on 4/25 improved to 9.9. Patient has no signs of active bleeding and hemodynamically stable. Patient is asymptomatic. Goal is keep Hgb >7. Monitor daily H/H post-op.     * Displaced fracture of right femoral neck s/p hemiarthroplasty on 4/23/2017  Encounter for aftercare  for healing closed traumatic fracture of hip  Patient is POD # 3. Patient reports minimal pain in right hip. Plan is to continue PT/OT for gait training and strengthening and restoration of ADL's. Patient is FWB/WBAT: right lower extremity, posterior hip precautions as per Orthopedic recommendations. Plan to continue Lovenox 40 mg subcutaneous daily for DVT prophylaxis after hip fracture surgery. Plan to restart Eliquis for long term anticoagulation for atrial fibrillation and can use for DVT prophylaxis after all surgery's complete but patient needs ORIF of right wrist so will use Lovenox for now until all surgeries complete. Orthopedics is following and managing surgical site. Will continue Tramadol 50 mg po every 6 hours as needed for moderate to severe pain and Tylenol 650 mg po every 6 hours as needed for mild pain.   PT/OT recommending skilled nursing facility placement and  working on choice of skilled facility with patient and family.      Hypophosphatemia  Patient treated with Neutra-Phos 1 packet po for 4 dose(s) on 4/25 and recheck level in the am, 4/27.    Closed Colles' fracture of right radius  Orthopedics evaluated and recommend operative repair but no surgical date set. Will continue to have patient in splint that was applied by Orthopedics with NWB to right wrist. Pain control. Will discuss with ortho timing of surgery for right wrist.       Essential hypertension  Patient's blood pressure is controlled here in the hospital over past 24 hours. Goal for blood pressure is SBP < 150 and DBP < 90 as patient > or = 60 years of age with no diabetes or advanced kidney disease based on JNC 8 guidelines. Plan to continue Toprol XL 50 mg po daily. Home medication of Lisinopril on hold post-op and will plan to restart Lisinopril 2.5 mg po daily after all surgeries complete. Plan to continue to monitor patient's blood pressure routinely while patient is hospitalized.     Chronic obstructive  pulmonary disease  Chronic hypoxemic respiratory failure  Patient at baseline and currently on 3 liters of oxygen as on at home. Pulse ox 100% on 3 liters and goal is to keep oxygen > 88%. Plan to continue Breo MDI 1 puff daily + Spiriva 1 puff daily to treat chronic COPD. Patient with signs to indicate any acute exacerbation of COPD.     Coronary artery disease involving native coronary artery without angina pectoris  Controlled. No angina or active chest pain. Aspirin on hold until all surgeries complete and then will restart. Will continue Toprol XL 50 mg daily to treat chronic CAD.       Hyponatremia  Improved to 131 from 129 on 4/25. Likely related to hypovolemia. Patient asymptomatic and will monitor with daily BMP. Encourage oral intake.      Paroxysmal atrial fibrillation  Anticoagulant long-term use  Controlled. Patient in normal sinus rhythm. Will continue Toprol XL for rate control. Eliquis on hold for long term anticoagulation until all surgeries complete and then plan to restart.         Chronic diastolic heart failure  Controlled. No signs of volume overload or acute decompensated heart failure. Recent Echo from  March 2017 showed EF 55 - 60%. Plan to continue to hold Lasix perioperatively to prevent VIDAL and also due to hyponatremia. Monitor volume status daily.     Osteoporosis  Vitamin D deficiency  Controlled. Vitamin D level low at 23 consistent with mild deficiency. Will continue Vitamin D 2000 units daily to replace.     Urinary retention  Patient with urinary retention post-op and likely related to narcotics. Joshua catheter placed today as unable to void despite several I+O caths and re attempt voiding trial in 1 week.       Anxiety and depression  Controlled. Continue Celexa daily to treat. Xanax as needed for anxiety.       Anticipated Disposition: Skilled Nursing Facility    Time spent in care of patient (Greater than 1/2 spent in direct face-to-face contact): 22 minutes      Cecile SOLIS  MD [place]  Department of Hospital Medicine

## 2017-04-27 NOTE — ASSESSMENT & PLAN NOTE
Chronic hypoxemic respiratory failure  Patient at baseline and currently on 3 liters of oxygen as on at home. Pulse ox 100% on 3 liters and goal is to keep oxygen > 88%. Plan to continue Breo MDI 1 puff daily + Spiriva 1 puff daily to treat chronic COPD. Patient with signs to indicate any acute exacerbation of COPD.

## 2017-04-27 NOTE — PROGRESS NOTES
Progress Note  [facility]    [facility] Medicine Service: H  Admit Date: 4/21/2017  Encounter Date: 04/27/2017  EMELIA: 4/28/2017    4 Days Post-Op from right hip hemiarthroplasty for fracture     Orthopedic Surgeon: Dr. 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 Tanner recently was hospitalized at [facility] at the end of march for acute on chronic hypoxic and hypercapneic respiratory failuer due to COPD exacerbation. She was treated with BIPAP, steroid and nebs during hospital stay and discahred to [facility] on 04/04/17 for deconditioning. She was getting PT at SNF and started on 10 days course of doxycycline for suspected penumonia based on CXR finding. She was seen at pulmonary clinic by Dr. Christian on 04/13 who started on prednisone 20 mg daily x 5 days, then 10 mg daily x 5 days and then stop. She was discharged from SNF to assisted living with home health on 04/14/17. She had multiple recent admissions for COPD exacerbation and durigh last admit she was made DNR per daughter at bedside. Daughter states she has been livign at assisted living for last 2 years and mostly wheel charir bound due to COPD and debility.     4/21 around 8 pm while she was walking from family room to her bed she got tangled with oxygen tubing, lost balnace and fell. She tried to prevent the fall with her right hand. Denies hitting her head or LOC. Imaing in ED showed right distal radial and ulnar fractuer, and right femoral neck fracture. Orthopedic reduced the  radial fx at bedside and placed splint. Plan to have surgical fixation of right femoral neck fracture.             Interval History: Patient tired this afternoon but has been up in chair most of day. I discussed with Orthopedics, Dr. Castillo about right wrist fracture and plan at this point is for no operative repair and to be re-evaluated in 1-2 weeks to determine if long term needs any operative repair of wrist. Patient not eating very well. Joshua remains in place for urinary retention.     Review of Systems   Constitutional: Positive for appetite change (Decreased). Negative for chills and fever.   Respiratory: Negative for cough, shortness of breath and wheezing.    Cardiovascular: Negative for chest pain and palpitations.   Gastrointestinal: Negative for abdominal pain, constipation, diarrhea and nausea.   Genitourinary: Positive for difficulty urinating. Negative for flank pain.   Musculoskeletal: Positive for myalgias (Right wrist and right hip pain).   Skin: Positive for pallor. Negative for rash.   Neurological: Positive for weakness (Generalized). Negative for dizziness and light-headedness.   Psychiatric/Behavioral: Negative for confusion.     Objective:     Vital Signs (Most Recent):  Temp: 97.5 °F (36.4 °C) (04/27/17 0800)  Pulse: 94 (04/27/17 1257)  Resp: 18 (04/27/17 1257)  BP: (!) 109/58 (04/27/17 0800)  SpO2: 97 % (04/27/17 1257) Vital Signs (24h Range):  Temp:  [97.4 °F (36.3 °C)-98.2 °F (36.8 °C)] 97.5 °F (36.4 °C)  Pulse:  [72-94] 94  Resp:  [10-18] 18  SpO2:  [93 %-100 %] 97 %  BP: ()/(50-58) 109/58     Weight: 49.4 kg (109 lb)  Body mass index is 18.14 kg/(m^2).    Intake/Output Summary (Last 24 hours) at 04/27/17 1337  Last data filed at 04/27/17 0700   Gross per 24 hour   Intake              480 ml   Output             1150 ml   Net             -670 ml      Physical Exam   Constitutional: She appears listless. She appears cachectic. No distress.   HENT:   Mouth/Throat: No oropharyngeal  exudate.   Eyes: No scleral icterus.   Cardiovascular: Normal rate and regular rhythm.  Exam reveals no gallop and no friction rub.    No murmur heard.  Pulmonary/Chest: Effort normal and breath sounds normal. She has no wheezes.   Abdominal: Soft. Bowel sounds are normal. She exhibits no distension. There is no tenderness.   Musculoskeletal: She exhibits tenderness (to hip).   Neurological: She appears listless.   Oriented to person and place not time   Skin: Skin is warm and dry. No rash noted.   Surgical bandage to right hip and splint to right forearm   Psychiatric: She has a normal mood and affect. Thought content normal.       Significant Labs:   BMP:   Recent Labs      04/25/17   0419   04/27/17   0603   GLU  92   < >  91   NA  129*   < >  130*   K  4.3   < >  4.7   CL  95   < >  95   CO2  28   < >  29   BUN  16   < >  10   CREATININE  0.7   < >  0.7   CALCIUM  8.7   < >  8.6*   ANIONGAP  6*   < >  6*   MG  1.7   --    --    PHOS  2.2*   < >  2.5*    < > = values in this interval not displayed.      CBC:   Recent Labs  Lab 04/27/17  0603   HGB 9.1*   HCT 28.5*       Significant Imaging: I have reviewed all pertinent imaging results/findings within the past 24 hours.      ASSESSMENT/PLAN:     Acute blood loss anemia  Anemia of chronic disease  Controlled. On admission to hospital, patient was noted to have Hgb of 8.8. After surgery patient's Hgb level dropped to 7.7 as expected related to hip surgery and patient transfused 1 unit PRBC on 4/24 and repeat Hgb on 4/25 improved to 9.9 and Hgb 9.1 today. Patient has no signs of active bleeding and hemodynamically stable. Patient is asymptomatic. Goal is keep Hgb >7. Monitor daily H/H post-op.     * Displaced fracture of right femoral neck s/p hemiarthroplasty on 4/23/2017  Encounter for aftercare for healing closed traumatic fracture of hip  Patient is POD # 4. Patient reports minimal pain in right hip. Plan is to continue PT/OT for gait training and strengthening  and restoration of ADL's. Patient is FWB/WBAT: right lower extremity, posterior hip precautions as per Orthopedic recommendations. Plan to continue Lovenox 40 mg subcutaneous daily for DVT prophylaxis after hip fracture surgery. Plan to restart Eliquis for long term anticoagulation for atrial fibrillation and can use for DVT prophylaxis after all surgery's complete but patient may need right wrist repair in near future so for now Eliquis on hold. Orthopedics is following and managing surgical site. Will continue Tramadol 50 mg po every 6 hours as needed for moderate to severe pain and Tylenol 650 mg po every 6 hours as needed for mild pain.  PT/OT recommending skilled nursing facility placement and  working on choice of skilled facility with patient and family.      Hypophosphatemia  Patient treated with Neutra-Phos 1 packet po for 4 dose(s) on 4/25 and level improved but still slightly low at 2.2 so will order Neutra-Phos today to replete.     Closed Colles' fracture of right radius  Orthopedics evaluated and recommends non-operative management of wrist fracture at this time. Will continue to have patient in splint that was applied by Orthopedics with NWB to right wrist. Pain control.     Essential hypertension  Patient's blood pressure is controlled here in the hospital over past 24 hours. Goal for blood pressure is SBP < 150 and DBP < 90 as patient > or = 60 years of age with no diabetes or advanced kidney disease based on JNC 8 guidelines. Plan to continue Toprol XL 50 mg po daily. Home medication of Lisinopril on hold post-op and will hold for now as patient remains normotensive. Plan to continue to monitor patient's blood pressure routinely while patient is hospitalized.     Chronic obstructive pulmonary disease  Chronic hypoxemic respiratory failure  Patient at baseline and currently on 3 liters of oxygen as on at home. Pulse ox 100% on 3 liters and goal is to keep oxygen > 88%. Plan to continue  Anjelicao MDI 1 puff daily + Spiriva 1 puff daily to treat chronic COPD. Patient with signs to indicate any acute exacerbation of COPD.     Coronary artery disease involving native coronary artery without angina pectoris  Controlled. No angina or active chest pain. Will continue Toprol XL 50 mg daily to treat chronic CAD.       Hyponatremia  Controlled and Sodium at 130 today slightly decreased from 131 yesterday. Likely related to hypovolemia as patient with poor oral intake. Will bolus 1 liter of normal saline today. Patient asymptomatic and will monitor with daily BMP. Encourage oral intake.      Paroxysmal atrial fibrillation  Anticoagulant long-term use  Controlled. Patient in normal sinus rhythm. Will continue Toprol XL for rate control. Eliquis on hold for long term anticoagulation as may need operative repair of right wrist in near future.         Chronic diastolic heart failure  Controlled. No signs of volume overload or acute decompensated heart failure. Recent Echo from  March 2017 showed EF 55 - 60%. Plan to continue to hold Lasix perioperatively to prevent VIDAL and also due to hyponatremia. Monitor volume status daily.     Osteoporosis  Vitamin D deficiency  Controlled. Vitamin D level low at 23 consistent with mild deficiency. Will continue Vitamin D 2000 units daily to replace.     Urinary retention  Patient with urinary retention post-op and likely related to narcotics. Joshua catheter replaced on 4/25 as unable to void despite several I+O caths and re attempt voiding trial in 1 week. Will check U/A to look for any signs of infection.       Anxiety and depression  Controlled. Continue Celexa daily to treat. Xanax as needed for anxiety.       Anticipated Disposition: Skilled Nursing Facility    Time spent in care of patient (Greater than 1/2 spent in direct face-to-face contact): 20 minutes      Cecile Woo MD  Department of Hospital Medicine

## 2017-04-27 NOTE — ASSESSMENT & PLAN NOTE
Improved to 131 from 129 on 4/25. Likely related to hypovolemia. Patient asymptomatic and will monitor with daily BMP. Encourage oral intake.

## 2017-04-27 NOTE — PLAN OF CARE
Problem: Patient Care Overview  Goal: Plan of Care Review  Outcome: Ongoing (interventions implemented as appropriate)  Pt resting in bed. Family at bedside. Tolerating small amounts of PO intake. Joshua in place. Patient states adequate pain control with current pain med reg. States understanding of plan of care. Denies needs at present. Safety and fall precautions maintained. Will monitor.

## 2017-04-27 NOTE — ASSESSMENT & PLAN NOTE
Anemia of chronic disease  Controlled. On admission to hospital, patient was noted to have Hgb of 8.8. After surgery patient's Hgb level dropped to 7.7 as expected related to hip surgery and patient transfused 1 unit PRBC on 4/24 and repeat Hgb on 4/25 improved to 9.9. Patient has no signs of active bleeding and hemodynamically stable. Patient is asymptomatic. Goal is keep Hgb >7. Monitor daily H/H post-op.

## 2017-04-27 NOTE — ASSESSMENT & PLAN NOTE
Patient treated with Neutra-Phos 1 packet po for 4 dose(s) on 4/25 and level improved but still slightly low at 2.2 so will order Neutra-Phos today to replete.

## 2017-04-27 NOTE — ASSESSMENT & PLAN NOTE
Patient's blood pressure is controlled here in the hospital over past 24 hours. Goal for blood pressure is SBP < 150 and DBP < 90 as patient > or = 60 years of age with no diabetes or advanced kidney disease based on JNC 8 guidelines. Plan to continue Toprol XL 50 mg po daily. Home medication of Lisinopril on hold post-op and will hold for now as patient remains normotensive. Plan to continue to monitor patient's blood pressure routinely while patient is hospitalized.

## 2017-04-27 NOTE — ASSESSMENT & PLAN NOTE
Orthopedics evaluated and recommend operative repair but no surgical date set. Will continue to have patient in splint that was applied by Orthopedics with NWB to right wrist. Pain control. Will discuss with ortho timing of surgery for right wrist.

## 2017-04-27 NOTE — PT/OT/SLP PROGRESS
Occupational Therapy  Treatment    Kaity Rebolledo   MRN: [id number]   Admitting Diagnosis: Displaced fracture of right femoral neck    OT Date of Treatment: 17   OT Start Time: 1335  OT Stop Time: 1400  OT Total Time (min): 25 min    Billable Minutes:  Self Care/Home Management 13 and Therapeutic Exercise 12    General Precautions: Standard, fall  Orthopedic Precautions: RUE non weight bearing, RLE posterior precautions, RLE weight bearing as tolerated  Braces:           Subjective:  Communicated with RN prior to session.      Pain Ratin/10              Pain Rating Post-Intervention: 0/10    Objective:  Patient found with: FCD     Functional Mobility:  Bed Mobility:       Transfers:   Sit <> Stand Assistance:  (NT- pt wanted to continue to sit up in chair.)        Activities of Daily Living:  Feeding Level of Assistance: Maximum assistance (Using non-dominant hand.)    Grooming Position: Seated (Mod A to brush teeth while sitting up in chair.)                  Balance:   Static Sit: NORMAL: No deviations seen in posture held statically  Dynamic Sit: FAIR+: Maintains balance through MINIMAL excursions of active trunk motion      Therapeutic Activities and Exercises:  PROM to R shoulder performed 1x10 in all planes and tolerated well.  Able to perform R hand flex/ext exercises 1x10 c max cues 2* lethargy.    AM-PAC 6 CLICK ADL   How much help from another person does this patient currently need?   1 = Unable, Total/Dependent Assistance  2 = A lot, Maximum/Moderate Assistance  3 = A little, Minimum/Contact Guard/Supervision  4 = None, Modified [place]/Independent    Putting on and taking off regular lower body clothing? : 1  Bathing (including washing, rinsing, drying)?: 1  Toileting, which includes using toilet, bedpan, or urinal? : 2  Putting on and taking off regular upper body clothing?: 2  Taking care of personal grooming such as brushing teeth?: 2  Eating meals?: 2  Total Score: 10     AM-PAC Raw  Score CMS G-Code Modifier Level of Impairment Assistance   6 % Total / Unable   7 - 9 CM 80 - 100% Maximal Assist   10 - 14 CL 60 - 80% Moderate Assist   15 - 19 CK 40 - 60% Moderate Assist   20 - 22 CJ 20 - 40% Minimal Assist   23 CI 1-20% SBA / CGA   24 CH 0% Independent/ Mod I     Patient left up in chair with all lines intact, call button in reach, RN notified and family present    ASSESSMENT:  Kaity Rebolledo is a 83 y.o. female with a medical diagnosis of Displaced fracture of right femoral neck and presents with deficits in ADL's, functional T/F's, decreased R UE function.  Pt was able to perform R UE PROM shoulder exercises and AROM R hand exercises and tolerated well.  Has 2+/5 R  strength.  Able to perform feeding task c max A and grooming task c mod A.    Rehab identified problem list/impairments: Rehab identified problem list/impairments: impaired self care skills, impaired functional mobilty, weakness, impaired endurance, impaired cognition, orthopedic precautions, decreased upper extremity function, decreased ROM, impaired coordination    Rehab potential is good.    Activity tolerance: Good    Discharge recommendations: Discharge Facility/Level Of Care Needs: nursing facility, skilled     Barriers to discharge: Barriers to Discharge: Inaccessible home environment, Decreased caregiver support    Equipment recommendations:  (TBD)     GOALS:   Occupational Therapy Goals        Problem: Occupational Therapy Goal    Goal Priority Disciplines Outcome Interventions   Occupational Therapy Goal     OT, PT/OT Ongoing (interventions implemented as appropriate)    Description:  Goals to be met by: 05/04     Patient will increase functional independence with ADLs by performing:    UE Dressing with Minimal Assistance.  Grooming while seated with Stand-by Assistance.  Toileting from bedside commode with Moderate Assistance for hygiene and clothing management.   Supine to sit with Minimal  Assistance.  Stand pivot transfers with Minimal Assistance.  Increased functional strength to WFL for ADLs and functional transfers.                Plan:  Patient to be seen 4 x/week to address the above listed problems via self-care/home management, therapeutic activities, therapeutic exercises, cognitive retraining  Plan of Care expires:    Plan of Care reviewed with: patient, daughter         Brenden RIDLEY NEY Thompson  04/27/2017

## 2017-04-27 NOTE — ASSESSMENT & PLAN NOTE
Vitamin D deficiency  Controlled. Vitamin D level low at 23 consistent with mild deficiency. Will continue Vitamin D 2000 units daily to replace.

## 2017-04-27 NOTE — ASSESSMENT & PLAN NOTE
Orthopedics evaluated and recommends non-operative management of wrist fracture at this time. Will continue to have patient in splint that was applied by Orthopedics with NWB to right wrist. Pain control.

## 2017-04-27 NOTE — PLAN OF CARE
Medicare discharge appeals right discussed with patient. IMM signed and placed in patient's chart. Patient verbalized understanding of IMM rights.     04/27/17 0900   Medicare Message   Important Message from Medicare regarding Discharge Appeal Rights Given to patient/caregiver;Explained to patient/caregiver;Signed/date by patient/caregiver   Date IMM was signed 04/27/17   Time IMM was signed 0900

## 2017-04-27 NOTE — SUBJECTIVE & OBJECTIVE
Interval History: Patient more alert today after decreasing and adjusting pain medication. Patient denies any current pain in right hip and reports right arm feels heavy from having cast on that arm. Daughter at bedside concerned as patient is not eating much. I encouraged patient about eating as nutrition important in her recovery and patient stated she was going to try and eat more. Patient with issues with urinary retention overnight and this am and required (3) I+O caths and patient with > 600 with each I+O cath so Joshua placed back in today.     Review of Systems   Constitutional: Positive for appetite change (Decreased). Negative for chills and fever.   Respiratory: Negative for cough, shortness of breath and wheezing.    Cardiovascular: Negative for chest pain and palpitations.   Gastrointestinal: Negative for abdominal pain, constipation, diarrhea and nausea.   Genitourinary: Positive for difficulty urinating. Negative for flank pain.   Musculoskeletal: Positive for myalgias (Right wrist and right hip pain).   Skin: Positive for pallor. Negative for rash.   Neurological: Positive for weakness (Generalized). Negative for dizziness and light-headedness.   Psychiatric/Behavioral: Negative for confusion.     Objective:     Vital Signs (Most Recent):  Temp: 97.4 °F (36.3 °C) (04/26/17 1606)  Pulse: 79 (04/26/17 1900)  Resp: 10 (04/26/17 1640)  BP: (!) 97/50 (04/26/17 1606)  SpO2: 100 % (04/26/17 1640) Vital Signs (24h Range):  Temp:  [97.4 °F (36.3 °C)-98.2 °F (36.8 °C)] 97.4 °F (36.3 °C)  Pulse:  [] 79  Resp:  [10-18] 10  SpO2:  [94 %-100 %] 100 %  BP: ()/(50-61) 97/50     Weight: 49.4 kg (109 lb)  Body mass index is 18.14 kg/(m^2).    Intake/Output Summary (Last 24 hours) at 04/26/17 1936  Last data filed at 04/26/17 1400   Gross per 24 hour   Intake             1080 ml   Output              675 ml   Net              405 ml      Physical Exam   Constitutional: She is oriented to person, place, and  time. She appears listless. She appears cachectic. No distress.   HENT:   Head: Normocephalic and atraumatic.   Mouth/Throat: No oropharyngeal exudate.   Eyes: No scleral icterus.   Neck: No JVD present.   Cardiovascular: Normal rate and regular rhythm.  Exam reveals no gallop and no friction rub.    No murmur heard.  Pulmonary/Chest: Effort normal and breath sounds normal. She has no wheezes.   Abdominal: Soft. Bowel sounds are normal. She exhibits no distension. There is no tenderness.   Musculoskeletal: She exhibits tenderness (to hip).   Neurological: She is oriented to person, place, and time. She appears listless.   Skin: Skin is warm and dry. No rash noted.   Surgical bandage to right hip and splint to right forearm   Psychiatric: She has a normal mood and affect. Thought content normal.       Significant Labs:   BMP:   Recent Labs  Lab 04/25/17 0419 04/26/17  0426   GLU 92 81   * 131*   K 4.3 4.8   CL 95 94*   CO2 28 28   BUN 16 15   CREATININE 0.7 0.7   CALCIUM 8.7 8.5*   MG 1.7  --      CBC:   Recent Labs  Lab 04/25/17 0419   WBC 9.96   HGB 9.9*   HCT 28.7*          Significant Imaging: I have reviewed all pertinent imaging results/findings within the past 24 hours.

## 2017-04-27 NOTE — ASSESSMENT & PLAN NOTE
Patient treated with Neutra-Phos 1 packet po for 4 dose(s) on 4/25 and recheck level in the am, 4/27.

## 2017-04-27 NOTE — ASSESSMENT & PLAN NOTE
Controlled. No angina or active chest pain. Will continue Toprol XL 50 mg daily to treat chronic CAD.

## 2017-04-27 NOTE — PLAN OF CARE
Pt reviewed by [facility]. Pt will need d/c plan for after short SNF stay. Pt pending medical stability.    Pt's plan post discharge is to return to [facility]. Pt transfer to SNF is pending insurance auth.    EVAN informed by pt's care team that pt may be better suited for long term SNF. EVAN spoke with pt's dtr Maryjane who confirms [facility] is their first choice for SNF. Maryjane says she is aware [facility] is short stay. Maryjane says she will discuss with her sisters about other options and call EVAN back.    Return call from Maryjane reports she will contact EVAN tomorrow morning as she is having some difficulty getting ahold of her siblings right now.     Marj Leyva, FER, [facility]  [phone]

## 2017-04-27 NOTE — ASSESSMENT & PLAN NOTE
Patient with urinary retention post-op and likely related to narcotics. Joshua catheter replaced on 4/25 as unable to void despite several I+O caths and re attempt voiding trial in 1 week. Will check U/A to look for any signs of infection.

## 2017-04-27 NOTE — ASSESSMENT & PLAN NOTE
Anemia of chronic disease  Controlled. On admission to hospital, patient was noted to have Hgb of 8.8. After surgery patient's Hgb level dropped to 7.7 as expected related to hip surgery and patient transfused 1 unit PRBC on 4/24 and repeat Hgb on 4/25 improved to 9.9 and Hgb 9.1 today. Patient has no signs of active bleeding and hemodynamically stable. Patient is asymptomatic. Goal is keep Hgb >7. Monitor daily H/H post-op.

## 2017-04-27 NOTE — SUBJECTIVE & OBJECTIVE
Interval History: Patient tired this afternoon but has been up in chair most of day. I discussed with Orthopedics, Dr. Castillo about right wrist fracture and plan at this point is for no operative repair and to be re-evaluated in 1-2 weeks to determine if long term needs any operative repair of wrist. Patient not eating very well. Joshua remains in place for urinary retention.     Review of Systems   Constitutional: Positive for appetite change (Decreased). Negative for chills and fever.   Respiratory: Negative for cough, shortness of breath and wheezing.    Cardiovascular: Negative for chest pain and palpitations.   Gastrointestinal: Negative for abdominal pain, constipation, diarrhea and nausea.   Genitourinary: Positive for difficulty urinating. Negative for flank pain.   Musculoskeletal: Positive for myalgias (Right wrist and right hip pain).   Skin: Positive for pallor. Negative for rash.   Neurological: Positive for weakness (Generalized). Negative for dizziness and light-headedness.   Psychiatric/Behavioral: Negative for confusion.     Objective:     Vital Signs (Most Recent):  Temp: 97.5 °F (36.4 °C) (04/27/17 0800)  Pulse: 94 (04/27/17 1257)  Resp: 18 (04/27/17 1257)  BP: (!) 109/58 (04/27/17 0800)  SpO2: 97 % (04/27/17 1257) Vital Signs (24h Range):  Temp:  [97.4 °F (36.3 °C)-98.2 °F (36.8 °C)] 97.5 °F (36.4 °C)  Pulse:  [72-94] 94  Resp:  [10-18] 18  SpO2:  [93 %-100 %] 97 %  BP: ()/(50-58) 109/58     Weight: 49.4 kg (109 lb)  Body mass index is 18.14 kg/(m^2).    Intake/Output Summary (Last 24 hours) at 04/27/17 1337  Last data filed at 04/27/17 0700   Gross per 24 hour   Intake              480 ml   Output             1150 ml   Net             -670 ml      Physical Exam   Constitutional: She appears listless. She appears cachectic. No distress.   HENT:   Mouth/Throat: No oropharyngeal exudate.   Eyes: No scleral icterus.   Cardiovascular: Normal rate and regular rhythm.  Exam reveals no gallop and  no friction rub.    No murmur heard.  Pulmonary/Chest: Effort normal and breath sounds normal. She has no wheezes.   Abdominal: Soft. Bowel sounds are normal. She exhibits no distension. There is no tenderness.   Musculoskeletal: She exhibits tenderness (to hip).   Neurological: She appears listless.   Oriented to person and place not time   Skin: Skin is warm and dry. No rash noted.   Surgical bandage to right hip and splint to right forearm   Psychiatric: She has a normal mood and affect. Thought content normal.       Significant Labs:   BMP:   Recent Labs      04/25/17   0419   04/27/17   0603   GLU  92   < >  91   NA  129*   < >  130*   K  4.3   < >  4.7   CL  95   < >  95   CO2  28   < >  29   BUN  16   < >  10   CREATININE  0.7   < >  0.7   CALCIUM  8.7   < >  8.6*   ANIONGAP  6*   < >  6*   MG  1.7   --    --    PHOS  2.2*   < >  2.5*    < > = values in this interval not displayed.      CBC:   Recent Labs  Lab 04/27/17  0603   HGB 9.1*   HCT 28.5*       Significant Imaging: I have reviewed all pertinent imaging results/findings within the past 24 hours.

## 2017-04-28 PROBLEM — I48.92 ATRIAL FLUTTER WITH RAPID VENTRICULAR RESPONSE: Status: ACTIVE | Noted: 2017-04-28

## 2017-04-28 PROBLEM — I48.3 TYPICAL ATRIAL FLUTTER: Status: ACTIVE | Noted: 2017-04-28

## 2017-04-28 LAB
ANION GAP SERPL CALC-SCNC: 5 MMOL/L
BUN SERPL-MCNC: 7 MG/DL
CALCIUM SERPL-MCNC: 8.5 MG/DL
CHLORIDE SERPL-SCNC: 96 MMOL/L
CO2 SERPL-SCNC: 34 MMOL/L
CREAT SERPL-MCNC: 0.6 MG/DL
EST. GFR  (AFRICAN AMERICAN): >60 ML/MIN/1.73 M^2
EST. GFR  (NON AFRICAN AMERICAN): >60 ML/MIN/1.73 M^2
GLUCOSE SERPL-MCNC: 87 MG/DL
POTASSIUM SERPL-SCNC: 5.1 MMOL/L
SODIUM SERPL-SCNC: 135 MMOL/L

## 2017-04-28 PROCEDURE — 97530 THERAPEUTIC ACTIVITIES: CPT

## 2017-04-28 PROCEDURE — 36415 COLL VENOUS BLD VENIPUNCTURE: CPT

## 2017-04-28 PROCEDURE — 25000003 PHARM REV CODE 250: Performed by: HOSPITALIST

## 2017-04-28 PROCEDURE — 25000003 PHARM REV CODE 250

## 2017-04-28 PROCEDURE — 97116 GAIT TRAINING THERAPY: CPT

## 2017-04-28 PROCEDURE — 11000001 HC ACUTE MED/SURG PRIVATE ROOM

## 2017-04-28 PROCEDURE — 80048 BASIC METABOLIC PNL TOTAL CA: CPT

## 2017-04-28 PROCEDURE — 63600175 PHARM REV CODE 636 W HCPCS: Performed by: HOSPITALIST

## 2017-04-28 PROCEDURE — 93010 ELECTROCARDIOGRAM REPORT: CPT | Mod: ,,, | Performed by: INTERNAL MEDICINE

## 2017-04-28 PROCEDURE — 99232 SBSQ HOSP IP/OBS MODERATE 35: CPT | Mod: ,,, | Performed by: INTERNAL MEDICINE

## 2017-04-28 PROCEDURE — 93005 ELECTROCARDIOGRAM TRACING: CPT

## 2017-04-28 PROCEDURE — 94640 AIRWAY INHALATION TREATMENT: CPT

## 2017-04-28 PROCEDURE — 25000003 PHARM REV CODE 250: Performed by: INTERNAL MEDICINE

## 2017-04-28 RX ORDER — METOPROLOL TARTRATE 1 MG/ML
INJECTION, SOLUTION INTRAVENOUS
Status: COMPLETED
Start: 2017-04-28 | End: 2017-04-28

## 2017-04-28 RX ORDER — METOPROLOL TARTRATE 1 MG/ML
INJECTION, SOLUTION INTRAVENOUS
Status: DISCONTINUED
Start: 2017-04-28 | End: 2017-04-28 | Stop reason: WASHOUT

## 2017-04-28 RX ORDER — METOPROLOL TARTRATE 1 MG/ML
5 INJECTION, SOLUTION INTRAVENOUS ONCE
Status: COMPLETED | OUTPATIENT
Start: 2017-04-28 | End: 2017-04-28

## 2017-04-28 RX ADMIN — TRAMADOL HYDROCHLORIDE 50 MG: 50 TABLET, COATED ORAL at 09:04

## 2017-04-28 RX ADMIN — LEVALBUTEROL 0.63 MG: 0.63 SOLUTION RESPIRATORY (INHALATION) at 03:04

## 2017-04-28 RX ADMIN — Medication 3 ML: at 06:04

## 2017-04-28 RX ADMIN — Medication 3 ML: at 10:04

## 2017-04-28 RX ADMIN — PANTOPRAZOLE SODIUM 1200 MG: 40 TABLET, DELAYED RELEASE ORAL at 09:04

## 2017-04-28 RX ADMIN — LEVALBUTEROL 0.31 MG: 0.63 SOLUTION RESPIRATORY (INHALATION) at 11:04

## 2017-04-28 RX ADMIN — THERA TABS 1 TABLET: TAB at 09:04

## 2017-04-28 RX ADMIN — Medication 3 ML: at 02:04

## 2017-04-28 RX ADMIN — FLUTICASONE FUROATE AND VILANTEROL TRIFENATATE 1 PUFF: 100; 25 POWDER RESPIRATORY (INHALATION) at 09:04

## 2017-04-28 RX ADMIN — SODIUM CHLORIDE 1000 ML: 0.9 INJECTION, SOLUTION INTRAVENOUS at 11:04

## 2017-04-28 RX ADMIN — POTASSIUM & SODIUM PHOSPHATES POWDER PACK 280-160-250 MG 1 PACKET: 280-160-250 PACK at 09:04

## 2017-04-28 RX ADMIN — IPRATROPIUM BROMIDE 0.5 MG: 0.5 SOLUTION RESPIRATORY (INHALATION) at 11:04

## 2017-04-28 RX ADMIN — TRAMADOL HYDROCHLORIDE 50 MG: 50 TABLET, COATED ORAL at 11:04

## 2017-04-28 RX ADMIN — POTASSIUM & SODIUM PHOSPHATES POWDER PACK 280-160-250 MG 1 PACKET: 280-160-250 PACK at 11:04

## 2017-04-28 RX ADMIN — METOPROLOL TARTRATE: 5 INJECTION INTRAVENOUS at 11:04

## 2017-04-28 RX ADMIN — IPRATROPIUM BROMIDE 0.5 MG: 0.5 SOLUTION RESPIRATORY (INHALATION) at 03:04

## 2017-04-28 RX ADMIN — METOPROLOL SUCCINATE 50 MG: 50 TABLET, EXTENDED RELEASE ORAL at 09:04

## 2017-04-28 RX ADMIN — CYANOCOBALAMIN TAB 250 MCG 250 MCG: 250 TAB at 09:04

## 2017-04-28 RX ADMIN — CITALOPRAM HYDROBROMIDE 20 MG: 20 TABLET ORAL at 09:04

## 2017-04-28 RX ADMIN — METOPROLOL TARTRATE: 1 INJECTION, SOLUTION INTRAVENOUS at 11:04

## 2017-04-28 RX ADMIN — STANDARDIZED SENNA CONCENTRATE AND DOCUSATE SODIUM 1 TABLET: 8.6; 5 TABLET, FILM COATED ORAL at 09:04

## 2017-04-28 RX ADMIN — LEVALBUTEROL: 0.63 SOLUTION RESPIRATORY (INHALATION) at 11:04

## 2017-04-28 RX ADMIN — LEVALBUTEROL 0.31 MG: 0.63 SOLUTION RESPIRATORY (INHALATION) at 03:04

## 2017-04-28 RX ADMIN — METOPROLOL TARTRATE 5 MG: 5 INJECTION, SOLUTION INTRAVENOUS at 12:04

## 2017-04-28 RX ADMIN — POLYETHYLENE GLYCOL 3350 17 G: 17 POWDER, FOR SOLUTION ORAL at 09:04

## 2017-04-28 RX ADMIN — POLYETHYLENE GLYCOL 3350 17 G: 17 POWDER, FOR SOLUTION ORAL at 08:04

## 2017-04-28 RX ADMIN — IPRATROPIUM BROMIDE 0.5 MG: 0.5 SOLUTION RESPIRATORY (INHALATION) at 08:04

## 2017-04-28 NOTE — PLAN OF CARE
Per [facility], pt's SNF request in medical review w/ pt's insurance.    SW received message from Daniella at [facility] reporting pt's SNF request has been denied.    SW informed pt has been approved for SNF. Pt not medically stable, pt will likely transfer Monday.    Marj Leyva LMSW, [facility]  [phone]

## 2017-04-28 NOTE — PT/OT/SLP PROGRESS
Physical Therapy  Treatment    Kaity Rebolledo   MRN: [id number]   Admitting Diagnosis: Displaced fracture of right femoral neck    PT Received On: 17  PT Start Time: 928     PT Stop Time: 100    PT Total Time (min): 33 min       Billable Minutes:  Therapeutic Activity 28 and Therapeutic Exercise 5    Treatment Type: Treatment  PT/PTA: PTA     PTA Visit Number: 4       General Precautions: Standard, fall  Orthopedic Precautions: RUE non weight bearing, RLE weight bearing as tolerated, RLE posterior precautions   Braces:           Subjective:  Communicated with NSG prior to session.  Don't hurt me    Pain Ratin/10        Location: gluteal          Objective:   Patient found with: FCD    Functional Mobility:  Bed Mobility:   Supine to Sit: Maximum Assistance    Transfers:  Sit <> Stand Assistance: Moderate Assistance  Sit <> Stand Assistive Device: Rolling Walker, Platform (R PRW)  Bed <> Chair Technique: Stand Pivot  Bed <> Chair Assistance: Maximum Assistance  Bed <> Chair Assistive Device: No Assistive Device    Gait:   Gait Distance: 6 steps  Assistance 1: Moderate assistance  Gait Assistive Device: Platform walker right, Rolling walker  Gait Pattern: 3-point gait  Gait Deviation(s): decreased mari, increased time in double stance, decreased velocity of limb motion, decreased step length, decreased toe-to-floor clearance, decreased stride length, foot flat, decreased weight-shifting ability, forward lean      Balance:   Static Sit: GOOD: Takes MODERATE challenges from all directions  Dynamic Sit: GOOD-: Maintains balance through MODERATE excursions of active trunk movement,     Static Stand: POOR: Needs MODERATE assist to maintain  Dynamic stand: POOR: N/A     Therapeutic Activities and Exercises:  Patient performed AP,GS and QS x25 reps  Tolerated sitting EOB 5-10 min with SBA  Tolerated static standing with R PRW 30 sec  Patient recalled 0/3 jose precautions       AM-PAC 6 CLICK MOBILITY  How much  help from another person does this patient currently need?   1 = Unable, Total/Dependent Assistance  2 = A lot, Maximum/Moderate Assistance  3 = A little, Minimum/Contact Guard/Supervision  4 = None, Modified [place]les/Independent    Turning over in bed (including adjusting bedclothes, sheets and blankets)?: 2  Sitting down on and standing up from a chair with arms (e.g., wheelchair, bedside commode, etc.): 2  Moving from lying on back to sitting on the side of the bed?: 2  Moving to and from a bed to a chair (including a wheelchair)?: 2  Need to walk in hospital room?: 2  Climbing 3-5 steps with a railing?: 1  Total Score: 11    AM-PAC Raw Score CMS G-Code Modifier Level of Impairment Assistance   6 % Total / Unable   7 - 9 CM 80 - 100% Maximal Assist   10 - 14 CL 60 - 80% Moderate Assist   15 - 19 CK 40 - 60% Moderate Assist   20 - 22 CJ 20 - 40% Minimal Assist   23 CI 1-20% SBA / CGA   24 CH 0% Independent/ Mod I     Patient left up in chair with all lines intact, call button in reach and Daughter present.    Assessment:  Kaity Rebolledo is a 83 y.o. female with a medical diagnosis of Displaced fracture of right femoral neck and presents with decrease strength, mobility, balance and endurance.  Patient able to take step using PRW. Patient required less assistance with mobility and transfers.  Continue to note decrease strength with B LE's.  Patient progressing toward goals.  Patient would benefit from further IP therapy.     Rehab identified problem list/impairments: Rehab identified problem list/impairments: weakness, impaired endurance, gait instability, impaired functional mobilty, impaired balance, impaired self care skills, decreased ROM, decreased lower extremity function, pain, orthopedic precautions    Rehab potential is good.    Activity tolerance: Fair    Discharge recommendations: Discharge Facility/Level Of Care Needs: nursing facility, skilled     Barriers to discharge: Barriers to  Discharge: Inaccessible home environment, Decreased caregiver support    Equipment recommendations: Equipment Needed After Discharge:  (TBD)     GOALS:   Physical Therapy Goals        Problem: Physical Therapy Goal    Goal Priority Disciplines Outcome Goal Variances Interventions   Physical Therapy Goal     PT/OT, PT Ongoing (interventions implemented as appropriate)     Description:  Goals to be met by: 5/4     Patient will increase functional independence with mobility by performin. Supine to sit with MInimal Assistance  2. Sit to supine with MInimal Assistance  3. Sit to stand transfer with Moderate Assistance  4. Bed to chair transfer with Moderate Assistance using appropriate AD  5. Stand for 5 minutes with Contact Guard Assistance using appropriate AD  6. (I) with HEP                PLAN:    Patient to be seen daily  to address the above listed problems via gait training, therapeutic activities, therapeutic exercises, neuromuscular re-education  Plan of Care expires: 17  Plan of Care reviewed with: patient, daughter         Yves Armijo II, PTA  2017

## 2017-04-28 NOTE — CLINICAL REVIEW
Called yo patient's room by nurse. Patient with heart gzfc=[phone]. Dr. Woo notified and to bedside immediately. Order for lopressor 5mg given x2. Lopressor x2 pushed slowly over 5mins each. Beginning b/p=99/58 with NS bolus in progress end b/p=101/58.Patient tolerated well and denies s/s of symptoms. H/R =88 Sr now. See EKG and MD note.

## 2017-04-28 NOTE — PLAN OF CARE
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    1:40 PM  Lokesh from [facility] called back and was given Dr. Woo's [employer] phone number so she can set up peer to peer.    4:10 PM  CM received phone calls from Dr. Woo and [facility], both stating peer to peer was completed and [facility] now approving SNF.  Facility was notified by Lokesh.  Pt will likely go to SNF Monday.

## 2017-04-28 NOTE — PLAN OF CARE
Problem: Physical Therapy Goal  Goal: Physical Therapy Goal  Goals to be met by:      Patient will increase functional independence with mobility by performin. Supine to sit with MInimal Assistance  2. Sit to supine with MInimal Assistance  3. Sit to stand transfer with Moderate Assistance  4. Bed to chair transfer with Moderate Assistance using appropriate AD  5. Stand for 5 minutes with Contact Guard Assistance using appropriate AD  6. New goal: Gait x 10 feet with Minimal Assistance using appropriate AD.   7. (I) with HEP   Outcome: Ongoing (interventions implemented as appropriate)  Gait goal added

## 2017-04-28 NOTE — PLAN OF CARE
Problem: Patient Care Overview  Goal: Plan of Care Review  Outcome: Ongoing (interventions implemented as appropriate)  Patient is awake, alert and oriented. Patient remained free from complaints this shift. Patient is on bedrest with sitter at bedside. Remained free from injury and falls this shift. Bed is in the low and locked position with side rail up x 2. Call light is within reach. Informed to call if need assistance. Will continue to monitor.

## 2017-04-28 NOTE — PROGRESS NOTES
Pt s hr sustaining in 160, b/p 93/46,Dr. Woo notified  And stated she will be right up.1 liter of normal saline given, stat ekg orgered

## 2017-04-29 PROCEDURE — 25000003 PHARM REV CODE 250: Performed by: INTERNAL MEDICINE

## 2017-04-29 PROCEDURE — 94640 AIRWAY INHALATION TREATMENT: CPT

## 2017-04-29 PROCEDURE — 25000003 PHARM REV CODE 250: Performed by: HOSPITALIST

## 2017-04-29 PROCEDURE — 97530 THERAPEUTIC ACTIVITIES: CPT

## 2017-04-29 PROCEDURE — 97112 NEUROMUSCULAR REEDUCATION: CPT

## 2017-04-29 PROCEDURE — 11000001 HC ACUTE MED/SURG PRIVATE ROOM

## 2017-04-29 PROCEDURE — 99232 SBSQ HOSP IP/OBS MODERATE 35: CPT | Mod: ,,, | Performed by: INTERNAL MEDICINE

## 2017-04-29 PROCEDURE — 63600175 PHARM REV CODE 636 W HCPCS: Performed by: HOSPITALIST

## 2017-04-29 PROCEDURE — 25000242 PHARM REV CODE 250 ALT 637 W/ HCPCS: Performed by: HOSPITALIST

## 2017-04-29 RX ORDER — METOPROLOL SUCCINATE 50 MG/1
50 TABLET, EXTENDED RELEASE ORAL 2 TIMES DAILY
Status: DISCONTINUED | OUTPATIENT
Start: 2017-04-29 | End: 2017-05-02 | Stop reason: HOSPADM

## 2017-04-29 RX ADMIN — Medication 3 ML: at 02:04

## 2017-04-29 RX ADMIN — CYANOCOBALAMIN TAB 250 MCG 250 MCG: 250 TAB at 08:04

## 2017-04-29 RX ADMIN — POLYETHYLENE GLYCOL 3350 17 G: 17 POWDER, FOR SOLUTION ORAL at 08:04

## 2017-04-29 RX ADMIN — IPRATROPIUM BROMIDE 0.5 MG: 0.5 SOLUTION RESPIRATORY (INHALATION) at 07:04

## 2017-04-29 RX ADMIN — ACETAMINOPHEN 650 MG: 325 TABLET ORAL at 06:04

## 2017-04-29 RX ADMIN — ACETAMINOPHEN 650 MG: 325 TABLET ORAL at 10:04

## 2017-04-29 RX ADMIN — IPRATROPIUM BROMIDE 0.5 MG: 0.5 SOLUTION RESPIRATORY (INHALATION) at 03:04

## 2017-04-29 RX ADMIN — CITALOPRAM HYDROBROMIDE 20 MG: 20 TABLET ORAL at 08:04

## 2017-04-29 RX ADMIN — STANDARDIZED SENNA CONCENTRATE AND DOCUSATE SODIUM 1 TABLET: 8.6; 5 TABLET, FILM COATED ORAL at 08:04

## 2017-04-29 RX ADMIN — TRAMADOL HYDROCHLORIDE 50 MG: 50 TABLET, COATED ORAL at 08:04

## 2017-04-29 RX ADMIN — ACETAMINOPHEN 650 MG: 325 TABLET ORAL at 01:04

## 2017-04-29 RX ADMIN — THERA TABS 1 TABLET: TAB at 08:04

## 2017-04-29 RX ADMIN — Medication 3 ML: at 10:04

## 2017-04-29 RX ADMIN — PANTOPRAZOLE SODIUM 1200 MG: 40 TABLET, DELAYED RELEASE ORAL at 10:04

## 2017-04-29 RX ADMIN — FLUTICASONE FUROATE AND VILANTEROL TRIFENATATE 1 PUFF: 100; 25 POWDER RESPIRATORY (INHALATION) at 10:04

## 2017-04-29 RX ADMIN — METOPROLOL SUCCINATE 50 MG: 50 TABLET, EXTENDED RELEASE ORAL at 08:04

## 2017-04-29 RX ADMIN — PANTOPRAZOLE SODIUM 1200 MG: 40 TABLET, DELAYED RELEASE ORAL at 08:04

## 2017-04-29 RX ADMIN — APIXABAN 2.5 MG: 2.5 TABLET, FILM COATED ORAL at 10:04

## 2017-04-29 RX ADMIN — IPRATROPIUM BROMIDE 0.5 MG: 0.5 SOLUTION RESPIRATORY (INHALATION) at 08:04

## 2017-04-29 RX ADMIN — METOPROLOL SUCCINATE 50 MG: 50 TABLET, EXTENDED RELEASE ORAL at 10:04

## 2017-04-29 RX ADMIN — LEVALBUTEROL 0.31 MG: 0.63 SOLUTION RESPIRATORY (INHALATION) at 07:04

## 2017-04-29 RX ADMIN — LEVALBUTEROL 0.31 MG: 0.63 SOLUTION RESPIRATORY (INHALATION) at 11:04

## 2017-04-29 RX ADMIN — IPRATROPIUM BROMIDE 0.5 MG: 0.5 SOLUTION RESPIRATORY (INHALATION) at 11:04

## 2017-04-29 RX ADMIN — POLYETHYLENE GLYCOL 3350 17 G: 17 POWDER, FOR SOLUTION ORAL at 10:04

## 2017-04-29 RX ADMIN — STANDARDIZED SENNA CONCENTRATE AND DOCUSATE SODIUM 1 TABLET: 8.6; 5 TABLET, FILM COATED ORAL at 10:04

## 2017-04-29 RX ADMIN — TIOTROPIUM BROMIDE 18 MCG: 18 CAPSULE ORAL; RESPIRATORY (INHALATION) at 11:04

## 2017-04-29 RX ADMIN — APIXABAN 2.5 MG: 2.5 TABLET, FILM COATED ORAL at 08:04

## 2017-04-29 NOTE — PT/OT/SLP PROGRESS
Physical Therapy  Treatment    Kaity Rebolledo   MRN: [id number]   Admitting Diagnosis: Closed displaced fracture of neck of right femur    PT Received On: 17  PT Start Time: 1440     PT Stop Time: 1524    PT Total Time (min): 44 min       Billable Minutes:  Therapeutic Activity 30 and Neuromuscular Re-education 14    Treatment Type: Treatment  PT/PTA: PT     PTA Visit Number: 4       General Precautions: Standard, fall  Orthopedic Precautions: RUE non weight bearing, RLE weight bearing as tolerated, RLE posterior precautions   Braces: N/A         Subjective:  Communicated with nsg prior to session.  -Pt agreeable to PT session    Pain Ratin/10  Location - Side: Right     Location: hip  Pain Addressed: Pre-medicate for activity, Cessation of Activity, Reposition  Pain Rating Post-Intervention: 5/10    Objective:   Patient found with: FCD    Functional Mobility:  Bed Mobility:   Supine to Sit: Maximum Assistance- assist at (B) LE and trunk    Transfers:  Sit <> Stand Assistance: Moderate Assistance x2 trials  Sit <> Stand Assistive Device: Platform, Rolling Walker (R platform)  Bed <> Chair Technique: Stand Pivot  Bed <> Chair Assistance: Maximum Assistance  Bed <> Chair Assistive Device: No Assistive Device    Gait:   Gait Distance: unable to take steps today 2/2 posterior lean and instability in standing    Balance:   Static Sit: FAIR-: Maintains without assist but inconsistent   Dynamic Sit: FAIR-: Cannot move trunk without losing balance  Static Stand: POOR+: Needs MINIMAL assist to maintain  Dynamic stand: POOR: N/A     Therapeutic Activities and Exercises:    -Pt performed 2 standing trials today using (R) platform RW. During both standing trials, PT provided verbal/tactile cues to work on weight-shifting, (B) LE positioning in standing, facilitation to prevent posterior lean, and using (L) UE to push down through walker to off-load (R) LE. Pt remained standing for ~5 minutes on each trial with no LOB. Pt  with mild posterior lean throughout standing trials with improvements on 2nd trial.    -Pt educated on:  A. Breathing technique during functional mobility  B. Performing HEP to reduce risk of developing blood clots  C. Importance of OOB activity to improve functional outcomes  D. Posterior hip precautions- pt recalled 0/3  E. DME mgmt  F. WBing status of (R)UE/(R) LE     AM-PAC 6 CLICK MOBILITY  How much help from another person does this patient currently need?   1 = Unable, Total/Dependent Assistance  2 = A lot, Maximum/Moderate Assistance  3 = A little, Minimum/Contact Guard/Supervision  4 = None, Modified Gary/Independent    Turning over in bed (including adjusting bedclothes, sheets and blankets)?: 2  Sitting down on and standing up from a chair with arms (e.g., wheelchair, bedside commode, etc.): 2  Moving from lying on back to sitting on the side of the bed?: 2  Moving to and from a bed to a chair (including a wheelchair)?: 2  Need to walk in hospital room?: 2  Climbing 3-5 steps with a railing?: 1  Total Score: 11    AM-PAC Raw Score CMS G-Code Modifier Level of Impairment Assistance   6 % Total / Unable   7 - 9 CM 80 - 100% Maximal Assist   10 - 14 CL 60 - 80% Moderate Assist   15 - 19 CK 40 - 60% Moderate Assist   20 - 22 CJ 20 - 40% Minimal Assist   23 CI 1-20% SBA / CGA   24 CH 0% Independent/ Mod I     Patient left up in chair with all lines intact, call button in reach and nsg notified.    Assessment:  Kaity Rebolledo is a 83 y.o. female with a medical diagnosis of Closed displaced fracture of neck of right femur and presents with impaired functional mobility. Pt tolerated session well today. Pt able to perform sit<>stand x2 trials and was able to t/f into chair at end of session. After t/f, pt reported feeling over-heated and sweaty. Pt's BP taken and measured at 116/52 mmHg, HR ~100bpm slowly decreasing to ~85bpm, and O2 sats at 85% and slowly nicolle to 90%. Pt reported feeling better  after ~5 minutes of rest. Pt will cont to benefit from skilled therapy services and remains appropriate for SNF placement upon d/c.    Rehab identified problem list/impairments: Rehab identified problem list/impairments: weakness, impaired endurance, gait instability, decreased lower extremity function, decreased ROM, impaired joint extensibility, orthopedic precautions, pain, impaired functional mobilty    Rehab potential is good.    Activity tolerance: Good    Discharge recommendations: Discharge Facility/Level Of Care Needs: nursing facility, skilled     Barriers to discharge: Barriers to Discharge: Decreased caregiver support, Inaccessible home environment    Equipment recommendations: Equipment Needed After Discharge:  (TBD)     GOALS:   Physical Therapy Goals        Problem: Physical Therapy Goal    Goal Priority Disciplines Outcome Goal Variances Interventions   Physical Therapy Goal     PT/OT, PT Ongoing (interventions implemented as appropriate)     Description:  Goals to be met by: 5/4     Patient will increase functional independence with mobility by performin. Supine to sit with MInimal Assistance  2. Sit to supine with MInimal Assistance  3. Sit to stand transfer with Moderate Assistance-met   3a. Sit<>stand t/f with min (A)  4. Bed to chair transfer with Moderate Assistance using appropriate AD  5. Stand for 5 minutes with Contact Guard Assistance using appropriate AD  6. New goal: Gait  x 10 feet with Minimal Assistance using appropriate AD.   7. (I) with HEP                  PLAN:    Patient to be seen daily  to address the above listed problems via gait training, therapeutic activities, therapeutic exercises, neuromuscular re-education  Plan of Care expires: 17  Plan of Care reviewed with: patient         Jerson Hansen, PT  2017

## 2017-04-29 NOTE — ASSESSMENT & PLAN NOTE
Improved. Patient treated with Neutra-Phos 1 packet po for 4 dose(s) on 4/25 and level improved but still slightly low at 2.2 on 4/27 so given Neutra-Phos to replete and phosphorus level on 4/28 was 2.5 so no further repletion needed.

## 2017-04-29 NOTE — ASSESSMENT & PLAN NOTE
Patient with urinary retention post-op and likely related to narcotics. Joshua catheter replaced on 4/25 as unable to void despite several I+O caths and re attempt voiding trial tomorrow, 4/30.

## 2017-04-29 NOTE — ASSESSMENT & PLAN NOTE
Paroxysmal atrial fibrillation  Anticoagulant long-term use  Resolved. Patient with episode of atrial flutter with RVR on 4/28. Patient given IV Lopressor 5 mg x 2 doses and then converted back to NSR. Increased patient's home dose of Toprol XL from 50 mg po daily to BID on 4/29. Will restart home anticoagulation with Eliquis 2.5 mg po BID today, 4/29 as no surgical intervention planned for right wrist at this time. Patient to continue on telemetry monitoring.

## 2017-04-29 NOTE — SUBJECTIVE & OBJECTIVE
Interval History: Patient this am around 10:15 am while sitting up in bedside chair had increase in HR to 160's on telemetry. I was called and came to bedside. Patient was feeling light headed and BP was 94/56. Patient placed back into bed and immediately felt better. Patient denied any SOB or chest pain. Patient given 1 liter NS bolus and HR improved to 130's. Stat EKG obtained and I reviewed and showed atrial flutter with RVR. Patient given IV Lopressor 5 mg x 2 doses and after second dose patient returned back into normal sinus rhythm with HR in 80's. Patient given her am dose of Toprol XL 50 mg po x 1 dose and since then has remained in NSR on telemetry. Patient is awaiting SNF placement and plan is hopeful discharge on Monday, 5/1. Patient denies any current right hip pain. Patient's appetite improving.     Review of Systems   Constitutional: Negative for chills and fever.   Respiratory: Negative for cough, chest tightness, shortness of breath and wheezing.    Cardiovascular: Negative for chest pain, palpitations and leg swelling.   Gastrointestinal: Negative for abdominal pain, constipation, diarrhea and nausea.   Genitourinary: Positive for difficulty urinating. Negative for flank pain.   Musculoskeletal: Positive for myalgias (Right wrist and right hip pain).   Skin: Positive for pallor. Negative for rash.   Neurological: Positive for weakness (Generalized). Negative for dizziness and light-headedness.   Psychiatric/Behavioral: Negative for confusion.     Objective:     Vital Signs (Most Recent):  Temp: 97 °F (36.1 °C) (04/28/17 1655)  Pulse: 84 (04/28/17 1655)  Resp: 20 (04/28/17 1655)  BP: (!) 115/57 (04/28/17 1655)  SpO2: 100 % (04/28/17 1655) Vital Signs (24h Range):  Temp:  [97 °F (36.1 °C)-98.7 °F (37.1 °C)] 97 °F (36.1 °C)  Pulse:  [] 84  Resp:  [15-24] 20  SpO2:  [86 %-100 %] 100 %  BP: ()/(46-68) 115/57     Weight: 49.4 kg (109 lb)  Body mass index is 18.14 kg/(m^2).    Intake/Output  Summary (Last 24 hours) at 04/28/17 1916  Last data filed at 04/28/17 0903   Gross per 24 hour   Intake                0 ml   Output             1000 ml   Net            -1000 ml      Physical Exam   Constitutional: She appears cachectic. No distress.   HENT:   Mouth/Throat: No oropharyngeal exudate.   Eyes: No scleral icterus.   Cardiovascular: Normal rate and regular rhythm.  Exam reveals no gallop and no friction rub.    No murmur heard.  Pulmonary/Chest: Effort normal and breath sounds normal. She has no wheezes.   Abdominal: Soft. Bowel sounds are normal. She exhibits no distension. There is no tenderness.   Musculoskeletal: She exhibits tenderness (to hip).   Neurological: She is alert.   Oriented to person and place not time   Skin: Skin is warm and dry. No rash noted.   Surgical bandage to right hip and splint to right forearm   Psychiatric: She has a normal mood and affect. Thought content normal.       Significant Labs:   BMP:   Recent Labs      04/27/17   0603  04/28/17   0402   GLU  91  87   NA  130*  135*   K  4.7  5.1   CL  95  96   CO2  29  34*   BUN  10  7*   CREATININE  0.7  0.6   CALCIUM  8.6*  8.5*   ANIONGAP  6*  5*   PHOS  2.5*   --      CBC:   Recent Labs  Lab 04/27/17  0603   HGB 9.1*   HCT 28.5*     Significant Imaging: I have reviewed all pertinent imaging results/findings within the past 24 hours.

## 2017-04-29 NOTE — PROGRESS NOTES
Progress Note  [facility]    [facility] Service: H  Admit Date: 4/21/2017  Encounter Date: 04/29/2017  EMELIA: 4/28/2017    6 Days Post-Op from right hip hemiarthroplasty for fracture     Orthopedic Surgeon: Dr. Justin Freeman  Weight Bearing Status: WBAT with posterior hip precautions right lower extremity    Follow-up Visit For: Closed displaced fracture of neck of right femur    HPI:  83 year old female with h/o advanced COPD, choronic hypoxic respiratory failure on home oxygen 3L, CAD, recent diagnosis of Afib last month and currently on eliquis, chronic diastolic CHF, HTN, Debility with mostly wheel chair bound status brought to ED  with right hip and right forearm pain after a mechanical fall at [facility] living facility. Tanner recently was hospitalized at [facility] at the end of march for acute on chronic hypoxic and hypercapneic respiratory failuer due to COPD exacerbation. She was treated with BIPAP, steroid and nebs during hospital stay and discahred to [facility] on 04/04/17 for deconditioning. She was getting PT at SNF and started on 10 days course of doxycycline for suspected penumonia based on CXR finding. She was seen at pulmonary clinic by Dr. Christian on 04/13 who started on prednisone 20 mg daily x 5 days, then 10 mg daily x 5 days and then stop. She was discharged from SNF to assisted living with home health on 04/14/17. She had multiple recent admissions for COPD exacerbation and durigh last admit she was made DNR per daughter at bedside. Daughter states she has been livign at assisted living for last 2 years and mostly wheel charir bound due to COPD and debility.     4/21 around 8 pm while she was walking from family room to her bed she got tangled with oxygen tubing, lost balnace and fell. She tried to prevent the fall with her right hand. Denies hitting her head or LOC. Imaing in ED showed right distal radial and ulnar fractuer, and right femoral neck fracture. Orthopedic  reduced the radial fx at bedside and placed splint. Plan to have surgical fixation of right femoral neck fracture.           Pain scale: Patient with 2/10 aching pain in right hip.    Interval History: Patient with no further episodes of atrial fib or flutter since yesterday morning and has been in normal sinus rhythm since 11:0 am yesterday morning. Patient's [employer] denial for SNF placement appealed with peer to peer and overturned and patient approved for SNF so now just awaiting placement.     Review of Systems   Constitutional: Negative for chills and fever.   Respiratory: Negative for cough, chest tightness, shortness of breath and wheezing.    Cardiovascular: Negative for chest pain, palpitations and leg swelling.   Gastrointestinal: Negative for abdominal pain, constipation, diarrhea and nausea.   Genitourinary: Positive for difficulty urinating. Negative for flank pain.   Musculoskeletal: Positive for myalgias (Right wrist and right hip pain).   Skin: Positive for pallor. Negative for rash.   Neurological: Positive for weakness (Generalized). Negative for dizziness and light-headedness.   Psychiatric/Behavioral: Negative for confusion.     Objective:     Vital Signs (Most Recent):  Temp: 98.9 °F (37.2 °C) (04/29/17 1207)  Pulse: 97 (04/29/17 1207)  Resp: 16 (04/29/17 1207)  BP: (!) 110/55 (04/29/17 1207)  SpO2: (!) 93 % (04/29/17 1207) Vital Signs (24h Range):  Temp:  [97 °F (36.1 °C)-98.9 °F (37.2 °C)] 98.9 °F (37.2 °C)  Pulse:  [] 97  Resp:  [14-22] 16  SpO2:  [82 %-100 %] 93 %  BP: (110-127)/(6-57) 110/55     Weight: 49.4 kg (109 lb)  Body mass index is 18.14 kg/(m^2).    Intake/Output Summary (Last 24 hours) at 04/29/17 1403  Last data filed at 04/29/17 1300   Gross per 24 hour   Intake              480 ml   Output             2150 ml   Net            -1670 ml      Physical Exam   Constitutional: She appears cachectic. No distress.   HENT:   Mouth/Throat: No oropharyngeal exudate.   Eyes: No  scleral icterus.   Cardiovascular: Normal rate and regular rhythm.  Exam reveals no gallop and no friction rub.    No murmur heard.  Pulmonary/Chest: Effort normal and breath sounds normal. She has no wheezes.   Abdominal: Soft. Bowel sounds are normal. She exhibits no distension. There is no tenderness.   Musculoskeletal: She exhibits tenderness (to hip).   Neurological: She is alert.   Oriented to person and place not time   Skin: Skin is warm and dry. No rash noted.   Surgical bandage to right hip and splint to right forearm   Psychiatric: She has a normal mood and affect. Thought content normal.       Significant Labs:   BMP:   Recent Labs  Lab 04/28/17  0402   GLU 87   *   K 5.1   CL 96   CO2 34*   BUN 7*   CREATININE 0.6   CALCIUM 8.5*     Significant Imaging: I have reviewed all pertinent imaging results/findings within the past 24 hours.      ASSESSMENT/PLAN:     Atrial flutter with rapid ventricular response  Paroxysmal atrial fibrillation  Anticoagulant long-term use  Resolved. Patient with episode of atrial flutter with RVR on 4/28. Patient given IV Lopressor 5 mg x 2 doses and then converted back to NSR. Increased patient's home dose of Toprol XL from 50 mg po daily to BID on 4/29. Will restart home anticoagulation with Eliquis 2.5 mg po BID today, 4/29 as no surgical intervention planned for right wrist at this time. Patient to continue on telemetry monitoring.     * Closed displaced fracture of neck of right femur s/p hemiarthroplasty on 4/23/2017  Encounter for aftercare for healing closed traumatic fracture of hip  Patient is POD # 6. Patient reports minimal pain in right hip. Plan is to continue PT/OT for gait training and strengthening and restoration of ADL's. Patient is FWB/WBAT: right lower extremity, posterior hip precautions as per Orthopedic recommendations. Plan to continue Lovenox 40 mg subcutaneous daily for DVT prophylaxis after hip fracture surgery. Plan to restart Eliquis for long  term anticoagulation for atrial fibrillation today, 4/29 so no longer needs Lovenox for DVT prophylaxis. Orthopedics is following and managing surgical site. Will continue Tylenol 650 mg po every 6 hours as needed for pain as patient is very sensitive to narcotics and could not even tolerate Tramadol as made patient too sedated. PT/OT recommending skilled nursing facility placement and  working on choice of skilled facility with patient and family and hopeful discharge to a skilled facility on 5/1. Received [employer] approval for SNF placement.    Acute blood loss anemia  Anemia of chronic disease  Controlled. On admission to hospital, patient was noted to have Hgb of 8.8. After surgery patient's Hgb level dropped to 7.7 as expected related to hip surgery and patient transfused 1 unit PRBC on 4/24 and repeat Hgb on 4/25 improved to 9.9 and Hgb 9.1 on 4/27. Patient has no signs of active bleeding and hemodynamically stable. Patient is asymptomatic. Goal is keep Hgb >7.      Hypophosphatemia  Improved. Patient treated with Neutra-Phos 1 packet po for 4 dose(s) on 4/25 and level improved but still slightly low at 2.2 on 4/27 so given Neutra-Phos to replete and phosphorus level on 4/28 was 2.5 so no further repletion needed.     Closed Colles' fracture of right radius  Orthopedics evaluated and recommends non-operative management of wrist fracture at this time. Will continue to have patient in splint that was applied by Orthopedics with NWB to right wrist. Pain control.     Essential hypertension  Patient's blood pressure is controlled here in the hospital over past 24 hours. Goal for blood pressure is SBP < 150 and DBP < 90 as patient > or = 60 years of age with no diabetes or advanced kidney disease based on JNC 8 guidelines. Plan to continue Toprol XL. Home medication of Lisinopril on hold post-op and will hold for now as patient remains normotensive. Plan to continue to monitor patient's blood pressure  routinely while patient is hospitalized.     Chronic obstructive pulmonary disease  Chronic hypoxemic respiratory failure  Patient at baseline and currently on 3 liters of oxygen as on at home. Pulse ox 100% on 3 liters and goal is to keep oxygen > 88%. Plan to continue Breo MDI 1 puff daily + Spiriva 1 puff daily to treat chronic COPD. Patient with signs to indicate any acute exacerbation of COPD.     Coronary artery disease involving native coronary artery without angina pectoris  Controlled. No angina or active chest pain. Will continue Toprol XL 50 mg daily to treat chronic CAD.       Hyponatremia  Resolved. Sodium up to 135.       Chronic diastolic heart failure  Controlled. No signs of volume overload or acute decompensated heart failure. Recent Echo from  March 2017 showed EF 55 - 60%. Plan to continue to hold Lasix perioperatively to prevent VIDAL and also due to hyponatremia. Monitor volume status daily.     Osteoporosis  Vitamin D deficiency  Controlled. Vitamin D level low at 23 consistent with mild deficiency. Will continue Vitamin D 2000 units daily to replace.     Urinary retention  Patient with urinary retention post-op and likely related to narcotics. Joshua catheter replaced on 4/25 as unable to void despite several I+O caths and re attempt voiding trial tomorrow, 4/30.      Anxiety and depression  Controlled. Continue Celexa daily to treat. Xanax as needed for anxiety.       Anticipated Disposition: Skilled Nursing Facility on 5/1    Time spent in care of patient (Greater than 1/2 spent in direct face-to-face contact): 20 minutes      Cecile Woo MD  Department of Hospital Medicine

## 2017-04-29 NOTE — ASSESSMENT & PLAN NOTE
Encounter for aftercare for healing closed traumatic fracture of hip  Patient is POD # 5. Patient reports minimal pain in right hip. Plan is to continue PT/OT for gait training and strengthening and restoration of ADL's. Patient is FWB/WBAT: right lower extremity, posterior hip precautions as per Orthopedic recommendations. Plan to continue Lovenox 40 mg subcutaneous daily for DVT prophylaxis after hip fracture surgery. Plan to restart Eliquis for long term anticoagulation for atrial fibrillation in am on 4/29 and will discontinue Lovenox tomorrow. Orthopedics is following and managing surgical site. Will continue Tylenol 650 mg po every 6 hours as needed for pain as patient is very sensitive to narcotics and could not even tolerate Tramadol as made patient too sedated.  PT/OT recommending skilled nursing facility placement and  working on choice of skilled facility with patient and family and hopeful discharge to a skilled facility on 5/1.

## 2017-04-29 NOTE — ASSESSMENT & PLAN NOTE
Paroxysmal atrial fibrillation  Anticoagulant long-term use  Patient with episode of atrial flutter with RVR on 4/28. Patient given IV Lopressor 5 mg x 2 doses and then converted back to NSR. Patient continued on home dose of Toprol XL 50 mg po daily and will not adjust as patient already with SBP in low 100's so not much room to titrate beta blocker. Will restart home anticoagulation with Eliquis 2.5 mg po BID tomorrow, 4/29. Patient to continue on telemetry monitoring.

## 2017-04-29 NOTE — ASSESSMENT & PLAN NOTE
Encounter for aftercare for healing closed traumatic fracture of hip  Patient is POD # 6. Patient reports minimal pain in right hip. Plan is to continue PT/OT for gait training and strengthening and restoration of ADL's. Patient is FWB/WBAT: right lower extremity, posterior hip precautions as per Orthopedic recommendations. Plan to continue Lovenox 40 mg subcutaneous daily for DVT prophylaxis after hip fracture surgery. Plan to restart Eliquis for long term anticoagulation for atrial fibrillation today, 4/29 so no longer needs Lovenox for DVT prophylaxis. Orthopedics is following and managing surgical site. Will continue Tylenol 650 mg po every 6 hours as needed for pain as patient is very sensitive to narcotics and could not even tolerate Tramadol as made patient too sedated. PT/OT recommending skilled nursing facility placement and  working on choice of skilled facility with patient and family and hopeful discharge to a skilled facility on 5/1. Received [employer] approval for SNF placement.

## 2017-04-29 NOTE — SUBJECTIVE & OBJECTIVE
Interval History: Patient with no further episodes of atrial fib or flutter since yesterday morning and has been in normal sinus rhythm since 11:0 am yesterday morning. Patient's [employer] denial for SNF placement appealed with peer to peer and overturned and patient approved for SNF so now just awaiting placement.     Review of Systems   Constitutional: Negative for chills and fever.   Respiratory: Negative for cough, chest tightness, shortness of breath and wheezing.    Cardiovascular: Negative for chest pain, palpitations and leg swelling.   Gastrointestinal: Negative for abdominal pain, constipation, diarrhea and nausea.   Genitourinary: Positive for difficulty urinating. Negative for flank pain.   Musculoskeletal: Positive for myalgias (Right wrist and right hip pain).   Skin: Positive for pallor. Negative for rash.   Neurological: Positive for weakness (Generalized). Negative for dizziness and light-headedness.   Psychiatric/Behavioral: Negative for confusion.     Objective:     Vital Signs (Most Recent):  Temp: 98.9 °F (37.2 °C) (04/29/17 1207)  Pulse: 97 (04/29/17 1207)  Resp: 16 (04/29/17 1207)  BP: (!) 110/55 (04/29/17 1207)  SpO2: (!) 93 % (04/29/17 1207) Vital Signs (24h Range):  Temp:  [97 °F (36.1 °C)-98.9 °F (37.2 °C)] 98.9 °F (37.2 °C)  Pulse:  [] 97  Resp:  [14-22] 16  SpO2:  [82 %-100 %] 93 %  BP: (110-127)/(6-57) 110/55     Weight: 49.4 kg (109 lb)  Body mass index is 18.14 kg/(m^2).    Intake/Output Summary (Last 24 hours) at 04/29/17 1403  Last data filed at 04/29/17 1300   Gross per 24 hour   Intake              480 ml   Output             2150 ml   Net            -1670 ml      Physical Exam   Constitutional: She appears cachectic. No distress.   HENT:   Mouth/Throat: No oropharyngeal exudate.   Eyes: No scleral icterus.   Cardiovascular: Normal rate and regular rhythm.  Exam reveals no gallop and no friction rub.    No murmur heard.  Pulmonary/Chest: Effort normal and breath sounds normal.  She has no wheezes.   Abdominal: Soft. Bowel sounds are normal. She exhibits no distension. There is no tenderness.   Musculoskeletal: She exhibits tenderness (to hip).   Neurological: She is alert.   Oriented to person and place not time   Skin: Skin is warm and dry. No rash noted.   Surgical bandage to right hip and splint to right forearm   Psychiatric: She has a normal mood and affect. Thought content normal.       Significant Labs:   BMP:   Recent Labs  Lab 04/28/17  0402   GLU 87   *   K 5.1   CL 96   CO2 34*   BUN 7*   CREATININE 0.6   CALCIUM 8.5*     Significant Imaging: I have reviewed all pertinent imaging results/findings within the past 24 hours.

## 2017-04-29 NOTE — PLAN OF CARE
Problem: Physical Therapy Goal  Goal: Physical Therapy Goal  Goals to be met by:      Patient will increase functional independence with mobility by performin. Supine to sit with MInimal Assistance  2. Sit to supine with MInimal Assistance  3. Sit to stand transfer with Moderate Assistance-met  3a. Sit<>stand t/f with min (A)  4. Bed to chair transfer with Moderate Assistance using appropriate AD  5. Stand for 5 minutes with Contact Guard Assistance using appropriate AD  6. New goal: Gait x 10 feet with Minimal Assistance using appropriate AD.   7. (I) with HEP   Outcome: Ongoing (interventions implemented as appropriate)  Pt tolerated session well today. Pt able to perform sit<>stand x2 trials and was able to t/f into chair at end of session. After t/f, pt reported feeling over-heated and sweaty. Pt's BP taken and measured at 116/52 mmHg, HR ~100bpm slowly decreasing to ~85bpm, and O2 sats at 85% and slowly nicolle to 90%. Pt reported feeling better after ~5 minutes of rest. Pt will cont to benefit from skilled therapy services and remains appropriate for SNF placement upon d/c.

## 2017-04-29 NOTE — PROGRESS NOTES
Progress Note  [facility]    [facility] Service: H  Admit Date: 4/21/2017  Encounter Date: 04/28/2017  EMELIA: 4/28/2017    5 Days Post-Op from right hip hemiarthroplasty for fracture     Orthopedic Surgeon: Dr. 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 Tanner recently was hospitalized at [facility] at the end of march for acute on chronic hypoxic and hypercapneic respiratory failuer due to COPD exacerbation. She was treated with BIPAP, steroid and nebs during hospital stay and discahred to [facility] on 04/04/17 for deconditioning. She was getting PT at SNF and started on 10 days course of doxycycline for suspected penumonia based on CXR finding. She was seen at pulmonary clinic by Dr. Christian on 04/13 who started on prednisone 20 mg daily x 5 days, then 10 mg daily x 5 days and then stop. She was discharged from SNF to assisted living with home health on 04/14/17. She had multiple recent admissions for COPD exacerbation and durigh last admit she was made DNR per daughter at bedside. Daughter states she has been livign at assisted living for last 2 years and mostly wheel charir bound due to COPD and debility.     4/21 around 8 pm while she was walking from family room to her bed she got tangled with oxygen tubing, lost balnace and fell. She tried to prevent the fall with her right hand. Denies hitting her head or LOC. Imaing in ED showed right distal radial and ulnar fractuer, and right femoral neck fracture. Orthopedic reduced the  radial fx at bedside and placed splint. Plan to have surgical fixation of right femoral neck fracture.           Interval History: Patient this am around 10:15 am while sitting up in bedside chair had increase in HR to 160's on telemetry. I was called and came to bedside. Patient was feeling light headed and BP was 94/56. Patient placed back into bed and immediately felt better. Patient denied any SOB or chest pain. Patient given 1 liter NS bolus and HR improved to 130's. Stat EKG obtained and I reviewed and showed atrial flutter with RVR. Patient given IV Lopressor 5 mg x 2 doses and after second dose patient returned back into normal sinus rhythm with HR in 80's. Patient given her am dose of Toprol XL 50 mg po x 1 dose and since then has remained in NSR on telemetry. Patient is awaiting SNF placement and plan is hopeful discharge on Monday, 5/1. Patient denies any current right hip pain. Patient's appetite improving.     Review of Systems   Constitutional: Negative for chills and fever.   Respiratory: Negative for cough, chest tightness, shortness of breath and wheezing.    Cardiovascular: Negative for chest pain, palpitations and leg swelling.   Gastrointestinal: Negative for abdominal pain, constipation, diarrhea and nausea.   Genitourinary: Positive for difficulty urinating. Negative for flank pain.   Musculoskeletal: Positive for myalgias (Right wrist and right hip pain).   Skin: Positive for pallor. Negative for rash.   Neurological: Positive for weakness (Generalized). Negative for dizziness and light-headedness.   Psychiatric/Behavioral: Negative for confusion.     Objective:     Vital Signs (Most Recent):  Temp: 97 °F (36.1 °C) (04/28/17 1655)  Pulse: 84 (04/28/17 1655)  Resp: 20 (04/28/17 1655)  BP: (!) 115/57 (04/28/17 1655)  SpO2: 100 % (04/28/17 1655) Vital Signs (24h Range):  Temp:  [97 °F (36.1 °C)-98.7 °F (37.1 °C)] 97 °F (36.1 °C)  Pulse:  [] 84  Resp:  [15-24] 20  SpO2:  [86 %-100 %] 100  %  BP: ()/(46-68) 115/57     Weight: 49.4 kg (109 lb)  Body mass index is 18.14 kg/(m^2).    Intake/Output Summary (Last 24 hours) at 04/28/17 1916  Last data filed at 04/28/17 0903   Gross per 24 hour   Intake                0 ml   Output             1000 ml   Net            -1000 ml      Physical Exam   Constitutional: She appears cachectic. No distress.   HENT:   Mouth/Throat: No oropharyngeal exudate.   Eyes: No scleral icterus.   Cardiovascular: Normal rate and regular rhythm.  Exam reveals no gallop and no friction rub.    No murmur heard.  Pulmonary/Chest: Effort normal and breath sounds normal. She has no wheezes.   Abdominal: Soft. Bowel sounds are normal. She exhibits no distension. There is no tenderness.   Musculoskeletal: She exhibits tenderness (to hip).   Neurological: She is alert.   Oriented to person and place not time   Skin: Skin is warm and dry. No rash noted.   Surgical bandage to right hip and splint to right forearm   Psychiatric: She has a normal mood and affect. Thought content normal.       Significant Labs:   BMP:   Recent Labs      04/27/17   0603  04/28/17   0402   GLU  91  87   NA  130*  135*   K  4.7  5.1   CL  95  96   CO2  29  34*   BUN  10  7*   CREATININE  0.7  0.6   CALCIUM  8.6*  8.5*   ANIONGAP  6*  5*   PHOS  2.5*   --      CBC:   Recent Labs  Lab 04/27/17  0603   HGB 9.1*   HCT 28.5*     Significant Imaging: I have reviewed all pertinent imaging results/findings within the past 24 hours.           ASSESSMENT/PLAN:     Atrial flutter with rapid ventricular response  Paroxysmal atrial fibrillation  Anticoagulant long-term use  Patient with episode of atrial flutter with RVR on 4/28. Patient given IV Lopressor 5 mg x 2 doses and then converted back to NSR. Patient continued on home dose of Toprol XL 50 mg po daily and will not adjust as patient already with SBP in low 100's so not much room to titrate beta blocker. Will restart home anticoagulation with Eliquis 2.5 mg po  BID tomorrow, 4/29. Patient to continue on telemetry monitoring.     * Displaced fracture of right femoral neck s/p hemiarthroplasty on 4/23/2017  Encounter for aftercare for healing closed traumatic fracture of hip  Patient is POD # 5. Patient reports minimal pain in right hip. Plan is to continue PT/OT for gait training and strengthening and restoration of ADL's. Patient is FWB/WBAT: right lower extremity, posterior hip precautions as per Orthopedic recommendations. Plan to continue Lovenox 40 mg subcutaneous daily for DVT prophylaxis after hip fracture surgery. Plan to restart Eliquis for long term anticoagulation for atrial fibrillation in am on 4/29 and will discontinue Lovenox tomorrow. Orthopedics is following and managing surgical site. Will continue Tylenol 650 mg po every 6 hours as needed for pain as patient is very sensitive to narcotics and could not even tolerate Tramadol as made patient too sedated.  PT/OT recommending skilled nursing facility placement and  working on choice of skilled facility with patient and family and hopeful discharge to a skilled facility on 5/1.     Acute blood loss anemia  Anemia of chronic disease  Controlled. On admission to hospital, patient was noted to have Hgb of 8.8. After surgery patient's Hgb level dropped to 7.7 as expected related to hip surgery and patient transfused 1 unit PRBC on 4/24 and repeat Hgb on 4/25 improved to 9.9 and Hgb 9.1 on 4/27. Patient has no signs of active bleeding and hemodynamically stable. Patient is asymptomatic. Goal is keep Hgb >7.      Hypophosphatemia  Improved. Patient treated with Neutra-Phos 1 packet po for 4 dose(s) on 4/25 and level improved but still slightly low at 2.2 on 4/27 so given Neutra-Phos to replete and phosphorus level on 4/28 was 2.5 so no further repletion needed.     Closed Colles' fracture of right radius  Orthopedics evaluated and recommends non-operative management of wrist fracture at this time. Will  continue to have patient in splint that was applied by Orthopedics with NWB to right wrist. Pain control.     Essential hypertension  Patient's blood pressure is controlled here in the hospital over past 24 hours. Goal for blood pressure is SBP < 150 and DBP < 90 as patient > or = 60 years of age with no diabetes or advanced kidney disease based on JNC 8 guidelines. Plan to continue Toprol XL 50 mg po daily. Home medication of Lisinopril on hold post-op and will hold for now as patient remains normotensive. Plan to continue to monitor patient's blood pressure routinely while patient is hospitalized.     Chronic obstructive pulmonary disease  Chronic hypoxemic respiratory failure  Patient at baseline and currently on 3 liters of oxygen as on at home. Pulse ox 100% on 3 liters and goal is to keep oxygen > 88%. Plan to continue Breo MDI 1 puff daily + Spiriva 1 puff daily to treat chronic COPD. Patient with signs to indicate any acute exacerbation of COPD.     Coronary artery disease involving native coronary artery without angina pectoris  Controlled. No angina or active chest pain. Will continue Toprol XL 50 mg daily to treat chronic CAD.       Hyponatremia  Controlled and Sodium at 130 and stable. Patient asymptomatic and will monitor with daily BMP. Encourage oral intake.    Chronic diastolic heart failure  Controlled. No signs of volume overload or acute decompensated heart failure. Recent Echo from  March 2017 showed EF 55 - 60%. Plan to continue to hold Lasix perioperatively to prevent VIDAL and also due to hyponatremia. Monitor volume status daily.     Urinary retention  Patient with urinary retention post-op and likely related to narcotics. Joshua catheter replaced on 4/25 as unable to void despite several I+O caths and re attempt voiding trial in 1 week.         Anticipated Disposition: Skilled Nursing Facility    Time spent in care of patient (Greater than 1/2 spent in direct face-to-face contact): 20  minutes      Cecile Woo MD  Department of Hospital Medicine

## 2017-04-29 NOTE — ASSESSMENT & PLAN NOTE
Patient with urinary retention post-op and likely related to narcotics. Joshua catheter replaced on 4/25 as unable to void despite several I+O caths and re attempt voiding trial in 1 week.

## 2017-04-29 NOTE — ASSESSMENT & PLAN NOTE
Patient's blood pressure is controlled here in the hospital over past 24 hours. Goal for blood pressure is SBP < 150 and DBP < 90 as patient > or = 60 years of age with no diabetes or advanced kidney disease based on JNC 8 guidelines. Plan to continue Toprol XL. Home medication of Lisinopril on hold post-op and will hold for now as patient remains normotensive. Plan to continue to monitor patient's blood pressure routinely while patient is hospitalized.

## 2017-04-29 NOTE — ASSESSMENT & PLAN NOTE
Anemia of chronic disease  Controlled. On admission to hospital, patient was noted to have Hgb of 8.8. After surgery patient's Hgb level dropped to 7.7 as expected related to hip surgery and patient transfused 1 unit PRBC on 4/24 and repeat Hgb on 4/25 improved to 9.9 and Hgb 9.1 on 4/27. Patient has no signs of active bleeding and hemodynamically stable. Patient is asymptomatic. Goal is keep Hgb >7.

## 2017-04-29 NOTE — ASSESSMENT & PLAN NOTE
Controlled and Sodium at 130 and stable. Patient asymptomatic and will monitor with daily BMP. Encourage oral intake.

## 2017-04-30 PROBLEM — E83.42 HYPOMAGNESEMIA: Status: ACTIVE | Noted: 2017-04-30

## 2017-04-30 PROBLEM — E87.1 HYPONATREMIA: Status: RESOLVED | Noted: 2017-03-28 | Resolved: 2017-04-30

## 2017-04-30 PROBLEM — I48.92 ATRIAL FLUTTER WITH RAPID VENTRICULAR RESPONSE: Status: RESOLVED | Noted: 2017-04-28 | Resolved: 2017-04-30

## 2017-04-30 LAB
ANION GAP SERPL CALC-SCNC: 6 MMOL/L
BUN SERPL-MCNC: 7 MG/DL
CALCIUM SERPL-MCNC: 8.6 MG/DL
CHLORIDE SERPL-SCNC: 96 MMOL/L
CO2 SERPL-SCNC: 33 MMOL/L
CREAT SERPL-MCNC: 0.6 MG/DL
EST. GFR  (AFRICAN AMERICAN): >60 ML/MIN/1.73 M^2
EST. GFR  (NON AFRICAN AMERICAN): >60 ML/MIN/1.73 M^2
GLUCOSE SERPL-MCNC: 75 MG/DL
HCT VFR BLD AUTO: 29.7 %
HGB BLD-MCNC: 9.4 G/DL
MAGNESIUM SERPL-MCNC: 1.5 MG/DL
PHOSPHATE SERPL-MCNC: 3.2 MG/DL
POTASSIUM SERPL-SCNC: 4.5 MMOL/L
SODIUM SERPL-SCNC: 135 MMOL/L

## 2017-04-30 PROCEDURE — 25000003 PHARM REV CODE 250: Performed by: HOSPITALIST

## 2017-04-30 PROCEDURE — 63600175 PHARM REV CODE 636 W HCPCS: Performed by: HOSPITALIST

## 2017-04-30 PROCEDURE — 27000221 HC OXYGEN, UP TO 24 HOURS

## 2017-04-30 PROCEDURE — 97535 SELF CARE MNGMENT TRAINING: CPT

## 2017-04-30 PROCEDURE — 85018 HEMOGLOBIN: CPT

## 2017-04-30 PROCEDURE — 84100 ASSAY OF PHOSPHORUS: CPT

## 2017-04-30 PROCEDURE — 85014 HEMATOCRIT: CPT

## 2017-04-30 PROCEDURE — 63600175 PHARM REV CODE 636 W HCPCS: Performed by: INTERNAL MEDICINE

## 2017-04-30 PROCEDURE — 83735 ASSAY OF MAGNESIUM: CPT

## 2017-04-30 PROCEDURE — 36415 COLL VENOUS BLD VENIPUNCTURE: CPT

## 2017-04-30 PROCEDURE — 80048 BASIC METABOLIC PNL TOTAL CA: CPT

## 2017-04-30 PROCEDURE — 25000003 PHARM REV CODE 250: Performed by: INTERNAL MEDICINE

## 2017-04-30 PROCEDURE — 94640 AIRWAY INHALATION TREATMENT: CPT

## 2017-04-30 PROCEDURE — 11000001 HC ACUTE MED/SURG PRIVATE ROOM

## 2017-04-30 PROCEDURE — 99232 SBSQ HOSP IP/OBS MODERATE 35: CPT | Mod: ,,, | Performed by: INTERNAL MEDICINE

## 2017-04-30 RX ORDER — MAGNESIUM SULFATE HEPTAHYDRATE 40 MG/ML
2 INJECTION, SOLUTION INTRAVENOUS ONCE
Status: COMPLETED | OUTPATIENT
Start: 2017-04-30 | End: 2017-04-30

## 2017-04-30 RX ADMIN — APIXABAN 2.5 MG: 2.5 TABLET, FILM COATED ORAL at 08:04

## 2017-04-30 RX ADMIN — LEVALBUTEROL 0.31 MG: 0.63 SOLUTION RESPIRATORY (INHALATION) at 11:04

## 2017-04-30 RX ADMIN — APIXABAN 2.5 MG: 2.5 TABLET, FILM COATED ORAL at 11:04

## 2017-04-30 RX ADMIN — IPRATROPIUM BROMIDE 0.5 MG: 0.5 SOLUTION RESPIRATORY (INHALATION) at 07:04

## 2017-04-30 RX ADMIN — IPRATROPIUM BROMIDE 0.5 MG: 0.5 SOLUTION RESPIRATORY (INHALATION) at 01:04

## 2017-04-30 RX ADMIN — LEVALBUTEROL 0.31 MG: 0.63 SOLUTION RESPIRATORY (INHALATION) at 02:04

## 2017-04-30 RX ADMIN — LEVALBUTEROL 0.31 MG: 0.63 SOLUTION RESPIRATORY (INHALATION) at 07:04

## 2017-04-30 RX ADMIN — STANDARDIZED SENNA CONCENTRATE AND DOCUSATE SODIUM 1 TABLET: 8.6; 5 TABLET, FILM COATED ORAL at 08:04

## 2017-04-30 RX ADMIN — CITALOPRAM HYDROBROMIDE 20 MG: 20 TABLET ORAL at 08:04

## 2017-04-30 RX ADMIN — CYANOCOBALAMIN TAB 250 MCG 250 MCG: 250 TAB at 09:04

## 2017-04-30 RX ADMIN — LEVALBUTEROL 0.31 MG: 0.63 SOLUTION RESPIRATORY (INHALATION) at 01:04

## 2017-04-30 RX ADMIN — Medication 3 ML: at 02:04

## 2017-04-30 RX ADMIN — METOPROLOL SUCCINATE 50 MG: 50 TABLET, EXTENDED RELEASE ORAL at 11:04

## 2017-04-30 RX ADMIN — POLYETHYLENE GLYCOL 3350 17 G: 17 POWDER, FOR SOLUTION ORAL at 08:04

## 2017-04-30 RX ADMIN — IPRATROPIUM BROMIDE 0.5 MG: 0.5 SOLUTION RESPIRATORY (INHALATION) at 04:04

## 2017-04-30 RX ADMIN — MAGNESIUM SULFATE IN WATER 2 G: 40 INJECTION, SOLUTION INTRAVENOUS at 08:04

## 2017-04-30 RX ADMIN — PANTOPRAZOLE SODIUM 1200 MG: 40 TABLET, DELAYED RELEASE ORAL at 11:04

## 2017-04-30 RX ADMIN — IPRATROPIUM BROMIDE 0.5 MG: 0.5 SOLUTION RESPIRATORY (INHALATION) at 11:04

## 2017-04-30 RX ADMIN — METOPROLOL SUCCINATE 50 MG: 50 TABLET, EXTENDED RELEASE ORAL at 08:04

## 2017-04-30 RX ADMIN — POLYETHYLENE GLYCOL 3350 17 G: 17 POWDER, FOR SOLUTION ORAL at 11:04

## 2017-04-30 RX ADMIN — PANTOPRAZOLE SODIUM 1200 MG: 40 TABLET, DELAYED RELEASE ORAL at 08:04

## 2017-04-30 RX ADMIN — FLUTICASONE FUROATE AND VILANTEROL TRIFENATATE 1 PUFF: 100; 25 POWDER RESPIRATORY (INHALATION) at 08:04

## 2017-04-30 RX ADMIN — THERA TABS 1 TABLET: TAB at 08:04

## 2017-04-30 RX ADMIN — TIOTROPIUM BROMIDE 18 MCG: 18 CAPSULE ORAL; RESPIRATORY (INHALATION) at 08:04

## 2017-04-30 RX ADMIN — ACETAMINOPHEN 650 MG: 325 TABLET ORAL at 03:04

## 2017-04-30 NOTE — PROGRESS NOTES
Progress Note  [facility] Medicine    [facility] Medicine Service: H  Admit Date: 4/21/2017  Encounter Date: 04/30/2017  EMELIA: 4/28/2017    7 Days Post-Op from right hip hemiarthroplasty for fracture     Orthopedic Surgeon: Dr. 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 Tanner recently was hospitalized at [facility] at the end of march for acute on chronic hypoxic and hypercapneic respiratory failuer due to COPD exacerbation. She was treated with BIPAP, steroid and nebs during hospital stay and discahred to [facility] on 04/04/17 for deconditioning. She was getting PT at SNF and started on 10 days course of doxycycline for suspected penumonia based on CXR finding. She was seen at pulmonary clinic by Dr. Christian on 04/13 who started on prednisone 20 mg daily x 5 days, then 10 mg daily x 5 days and then stop. She was discharged from SNF to assisted living with home health on 04/14/17. She had multiple recent admissions for COPD exacerbation and durigh last admit she was made DNR per daughter at bedside. Daughter states she has been livign at assisted living for last 2 years and mostly wheel charir bound due to COPD and debility.     4/21 around 8 pm while she was walking from family room to her bed she got tangled with oxygen tubing, lost balnace and fell. She tried to prevent the fall with her right hand. Denies hitting her head or LOC. Imaing in ED showed right distal radial and ulnar fractuer, and right femoral neck fracture. Orthopedic  reduced the radial fx at bedside and placed splint. Plan to have surgical fixation of right femoral neck fracture.           Pain scale: Patient with 1/10 aching pain in right hip.    Interval History: Patient remains in normal sinus rhythm on telemetry after brief episode of atrial flutter with RVR on am of 4/28 that converted after 2 doses of IV Lopressor. Patient with Joshua in place due to urinary retention and plan today is to remove Joshua to see if patient can void on her own. Patient reports minimal pain to right hip and wrist and awaiting SNF placement.     Review of Systems   Constitutional: Negative for chills and fever.   Respiratory: Negative for cough, chest tightness, shortness of breath and wheezing.    Cardiovascular: Negative for chest pain, palpitations and leg swelling.   Gastrointestinal: Negative for abdominal pain, constipation, diarrhea and nausea.   Genitourinary: Positive for difficulty urinating. Negative for flank pain.   Musculoskeletal: Positive for myalgias (Right wrist and right hip pain).   Skin: Positive for pallor. Negative for rash.   Neurological: Positive for weakness (Generalized). Negative for dizziness and light-headedness.   Psychiatric/Behavioral: Negative for confusion.     Objective:     Vital Signs (Most Recent):  Temp: 98.6 °F (37 °C) (04/30/17 1158)  Pulse: 89 (04/30/17 1403)  Resp: 16 (04/30/17 1403)  BP: 125/63 (04/30/17 1158)  SpO2: (!) 90 % (04/30/17 1403) Vital Signs (24h Range):  Temp:  [96.6 °F (35.9 °C)-98.6 °F (37 °C)] 98.6 °F (37 °C)  Pulse:  [] 89  Resp:  [14-18] 16  SpO2:  [88 %-98 %] 90 %  BP: ()/(53-77) 125/63     Weight: 49.4 kg (109 lb)  Body mass index is 18.14 kg/(m^2).    Intake/Output Summary (Last 24 hours) at 04/30/17 1501  Last data filed at 04/30/17 1300   Gross per 24 hour   Intake             1155 ml   Output             1350 ml   Net             -195 ml      Physical Exam   Constitutional: She is oriented to person, place, and time.  She appears cachectic. No distress.   HENT:   Mouth/Throat: No oropharyngeal exudate.   Eyes: No scleral icterus.   Cardiovascular: Normal rate and regular rhythm.  Exam reveals no gallop and no friction rub.    No murmur heard.  Pulmonary/Chest: Effort normal and breath sounds normal. She has no wheezes.   Abdominal: Soft. Bowel sounds are normal. She exhibits no distension. There is no tenderness.   Musculoskeletal: She exhibits tenderness (to hip).   Neurological: She is alert and oriented to person, place, and time.   Skin: Skin is warm. No rash noted.   Surgical bandage to right hip and splint to right forearm   Psychiatric: She has a normal mood and affect. Thought content normal.       Significant Labs:   BMP:   Recent Labs  Lab 04/30/17  0403   GLU 75   *   K 4.5   CL 96   CO2 33*   BUN 7*   CREATININE 0.6   CALCIUM 8.6*   MG 1.5*     Phosphorus 3.2    CBC:   Recent Labs  Lab 04/30/17  0403   HGB 9.4*   HCT 29.7*       Significant Imaging: I have reviewed all pertinent imaging results/findings within the past 24 hours.      ASSESSMENT/PLAN:     Paroxysmal atrial fibrillation  Anticoagulant long-term use  Controlled. Patient in normal sinus rhythm on telemetry. Will continue Toprol XL 50 mg po BID for rate control. Patient on Eliquis 2.5 mg po BID for long term anticoagulation for taril fibrillation/aflutter.         * Closed displaced fracture of neck of right femur s/p hemiarthroplasty on 4/23/2017  Encounter for aftercare for healing closed traumatic fracture of hip  Patient is POD # 7. Patient reports minimal pain in right hip. Plan is to continue PT/OT for gait training and strengthening and restoration of ADL's. Patient is FWB/WBAT: right lower extremity, posterior hip precautions as per Orthopedic recommendations. Patient restarted on Eliquis for long term anticoagulation for atrial fibrillation on 4/29 so does not need Lovenox for DVT prophylaxis. Orthopedics is following and managing surgical  site. Will continue Tylenol 650 mg po every 6 hours as needed for pain as patient is very sensitive to narcotics and could not even tolerate Tramadol as made patient too sedated. PT/OT recommending skilled nursing facility placement and  working on choice of skilled facility with patient and family and hopeful discharge to a skilled facility on 5/1. Received [employer] approval for SNF placement.    Closed Colles' fracture of right radius  Controlled. Orthopedics evaluated and recommends non-operative management of wrist fracture at this time. Will continue to have patient in splint that was applied by Orthopedics with NWB to right wrist. Pain control. Follow-up with Orthopedics as outpatient.    Acute blood loss anemia  Anemia of chronic disease  Controlled. On admission to hospital, patient was noted to have Hgb of 8.8. After surgery patient's Hgb level dropped to 7.7 as expected related to hip surgery and patient transfused 1 unit PRBC on 4/24 and repeat Hgb on 4/25 improved to 9.9 and Hgb 9.1 on 4/27 and 9.4 on 4/30. Patient has no signs of active bleeding and hemodynamically stable. Patient is asymptomatic. Goal is keep Hgb >7.      Essential hypertension  Patient's blood pressure is controlled here in the hospital over past 24 hours. Goal for blood pressure is SBP < 150 and DBP < 90 as patient > or = 60 years of age with no diabetes or advanced kidney disease based on JNC 8 guidelines. Plan to continue Toprol XL. Home medication of Lisinopril on hold post-op and will hold for now as patient remains normotensive. Plan to continue to monitor patient's blood pressure routinely while patient is hospitalized.     Chronic obstructive pulmonary disease  Chronic hypoxemic respiratory failure  Patient at baseline and currently on 3 liters of oxygen as on at home. Pulse ox 100% on 3 liters and goal is to keep oxygen > 88%. Plan to continue Breo MDI 1 puff daily + Spiriva 1 puff daily to treat chronic COPD.  Patient with signs to indicate any acute exacerbation of COPD.     Hypophosphatemia  Resolved. Patient treated with Neutra-Phos 1 packet po for 4 dose(s) on 4/25 and level improved but still slightly low at 2.2 on 4/27 so given Neutra-Phos to replete and phosphorus level on 4/28 was 2.5 so no further repletion needed. Phos level 3.2 on 4/30.    Hypomagnesemia  Will treat with Magnesium Sulfate 2 grams IV for 1 dose(s) today.    Coronary artery disease involving native coronary artery without angina pectoris  Controlled. No angina or active chest pain. Will continue Toprol XL 50 mg daily to treat chronic CAD.       Chronic diastolic heart failure  Controlled. No signs of volume overload or acute decompensated heart failure. Recent Echo from  March 2017 showed EF 55 - 60%. Plan to continue to hold Lasix perioperatively to prevent VIDAL.     Osteoporosis  Vitamin D deficiency  Controlled. Vitamin D level low at 23 consistent with mild deficiency. Will continue Vitamin D 2000 units daily to replace.     Urinary retention  Patient with urinary retention post-op and likely related to narcotics. Joshua catheter replaced on 4/25 as unable to void despite several I+O caths and re attempt voiding trial on 4/30.        Anticipated Disposition: Skilled Nursing Facility on 5/1    Time spent in care of patient (Greater than 1/2 spent in direct face-to-face contact): 20 minutes      Cecile Woo MD  Department of Hospital Medicine

## 2017-04-30 NOTE — ASSESSMENT & PLAN NOTE
Anticoagulant long-term use  Controlled. Patient in normal sinus rhythm on telemetry. Will continue Toprol XL 50 mg po BID for rate control. Patient on Eliquis 2.5 mg po BID for long term anticoagulation for taril fibrillation/aflutter.

## 2017-04-30 NOTE — ASSESSMENT & PLAN NOTE
Resolved. Patient treated with Neutra-Phos 1 packet po for 4 dose(s) on 4/25 and level improved but still slightly low at 2.2 on 4/27 so given Neutra-Phos to replete and phosphorus level on 4/28 was 2.5 so no further repletion needed. Phos level 3.2 on 4/30.

## 2017-04-30 NOTE — ASSESSMENT & PLAN NOTE
Controlled. No signs of volume overload or acute decompensated heart failure. Recent Echo from  March 2017 showed EF 55 - 60%. Plan to continue to hold Lasix perioperatively to prevent VIDAL.

## 2017-04-30 NOTE — PLAN OF CARE
Problem: Occupational Therapy Goal  Goal: Occupational Therapy Goal  Goals to be met by: 05/04     Patient will increase functional independence with ADLs by performing:    UE Dressing with Minimal Assistance.  Grooming while seated with Stand-by Assistance.  Toileting from bedside commode with Moderate Assistance for hygiene and clothing management.   Supine to sit with Minimal Assistance.  Stand pivot transfers with Minimal Assistance.  Increased functional strength to WFL for ADLs and functional transfers.   Outcome: Ongoing (interventions implemented as appropriate)  Patient found with breakfast present, sit in BS chair refused.  Patient repositioned and OT present during meal for training in opening containers and BUE GMC during meal.

## 2017-04-30 NOTE — SUBJECTIVE & OBJECTIVE
Interval History: Patient remains in normal sinus rhythm on telemetry after brief episode of atrial flutter with RVR on am of 4/28 that converted after 2 doses of IV Lopressor. Patient with Joshua in place due to urinary retention and plan today is to remove Joshua to see if patient can void on her own. Patient reports minimal pain to right hip and wrist and awaiting SNF placement.     Review of Systems   Constitutional: Negative for chills and fever.   Respiratory: Negative for cough, chest tightness, shortness of breath and wheezing.    Cardiovascular: Negative for chest pain, palpitations and leg swelling.   Gastrointestinal: Negative for abdominal pain, constipation, diarrhea and nausea.   Genitourinary: Positive for difficulty urinating. Negative for flank pain.   Musculoskeletal: Positive for myalgias (Right wrist and right hip pain).   Skin: Positive for pallor. Negative for rash.   Neurological: Positive for weakness (Generalized). Negative for dizziness and light-headedness.   Psychiatric/Behavioral: Negative for confusion.     Objective:     Vital Signs (Most Recent):  Temp: 98.6 °F (37 °C) (04/30/17 1158)  Pulse: 89 (04/30/17 1403)  Resp: 16 (04/30/17 1403)  BP: 125/63 (04/30/17 1158)  SpO2: (!) 90 % (04/30/17 1403) Vital Signs (24h Range):  Temp:  [96.6 °F (35.9 °C)-98.6 °F (37 °C)] 98.6 °F (37 °C)  Pulse:  [] 89  Resp:  [14-18] 16  SpO2:  [88 %-98 %] 90 %  BP: ()/(53-77) 125/63     Weight: 49.4 kg (109 lb)  Body mass index is 18.14 kg/(m^2).    Intake/Output Summary (Last 24 hours) at 04/30/17 1501  Last data filed at 04/30/17 1300   Gross per 24 hour   Intake             1155 ml   Output             1350 ml   Net             -195 ml      Physical Exam   Constitutional: She is oriented to person, place, and time. She appears cachectic. No distress.   HENT:   Mouth/Throat: No oropharyngeal exudate.   Eyes: No scleral icterus.   Cardiovascular: Normal rate and regular rhythm.  Exam reveals no  gallop and no friction rub.    No murmur heard.  Pulmonary/Chest: Effort normal and breath sounds normal. She has no wheezes.   Abdominal: Soft. Bowel sounds are normal. She exhibits no distension. There is no tenderness.   Musculoskeletal: She exhibits tenderness (to hip).   Neurological: She is alert and oriented to person, place, and time.   Skin: Skin is warm. No rash noted.   Surgical bandage to right hip and splint to right forearm   Psychiatric: She has a normal mood and affect. Thought content normal.       Significant Labs:   BMP:   Recent Labs  Lab 04/30/17  0403   GLU 75   *   K 4.5   CL 96   CO2 33*   BUN 7*   CREATININE 0.6   CALCIUM 8.6*   MG 1.5*     Phosphorus 3.2    CBC:   Recent Labs  Lab 04/30/17  0403   HGB 9.4*   HCT 29.7*       Significant Imaging: I have reviewed all pertinent imaging results/findings within the past 24 hours.

## 2017-04-30 NOTE — ASSESSMENT & PLAN NOTE
Encounter for aftercare for healing closed traumatic fracture of hip  Patient is POD # 7. Patient reports minimal pain in right hip. Plan is to continue PT/OT for gait training and strengthening and restoration of ADL's. Patient is FWB/WBAT: right lower extremity, posterior hip precautions as per Orthopedic recommendations. Patient restarted on Eliquis for long term anticoagulation for atrial fibrillation on 4/29 so does not need Lovenox for DVT prophylaxis. Orthopedics is following and managing surgical site. Will continue Tylenol 650 mg po every 6 hours as needed for pain as patient is very sensitive to narcotics and could not even tolerate Tramadol as made patient too sedated. PT/OT recommending skilled nursing facility placement and  working on choice of skilled facility with patient and family and hopeful discharge to a skilled facility on 5/1. Received [employer] approval for SNF placement.

## 2017-04-30 NOTE — ASSESSMENT & PLAN NOTE
Controlled. Orthopedics evaluated and recommends non-operative management of wrist fracture at this time. Will continue to have patient in splint that was applied by Orthopedics with NWB to right wrist. Pain control. Follow-up with Orthopedics as outpatient.

## 2017-04-30 NOTE — ASSESSMENT & PLAN NOTE
Anemia of chronic disease  Controlled. On admission to hospital, patient was noted to have Hgb of 8.8. After surgery patient's Hgb level dropped to 7.7 as expected related to hip surgery and patient transfused 1 unit PRBC on 4/24 and repeat Hgb on 4/25 improved to 9.9 and Hgb 9.1 on 4/27 and 9.4 on 4/30. Patient has no signs of active bleeding and hemodynamically stable. Patient is asymptomatic. Goal is keep Hgb >7.

## 2017-04-30 NOTE — PT/OT/SLP PROGRESS
Occupational Therapy  Treatment    Kaity Rebolledo   MRN: [id number]   Admitting Diagnosis: Closed displaced fracture of neck of right femur    OT Date of Treatment: 17   OT Start Time: 0945  OT Stop Time: 1000  OT Total Time (min): 15 min    Billable Minutes:  Self Care/Home Management 15    General Precautions: Standard, fall  Orthopedic Precautions: RUE non weight bearing, RLE weight bearing as tolerated, RLE posterior precautions  Braces: N/A    Subjective:  Communicated with nurse prior to session.    Pain Ratin/10      Pain Rating Post-Intervention: 0/10    Objective:  Patient found with: FCD     Functional Mobility:  Bed Mobility:  Scooting/Bridging: Maximum Assistance    Activities of Daily Living:  Feeding Level of Assistance: Minimum assistance (Using non-dominant hand)    AM-PAC 6 CLICK ADL   How much help from another person does this patient currently need?   1 = Unable, Total/Dependent Assistance  2 = A lot, Maximum/Moderate Assistance  3 = A little, Minimum/Contact Guard/Supervision  4 = None, Modified [place]/Independent    Putting on and taking off regular lower body clothing? : 1  Bathing (including washing, rinsing, drying)?: 1  Toileting, which includes using toilet, bedpan, or urinal? : 2  Putting on and taking off regular upper body clothing?: 2  Taking care of personal grooming such as brushing teeth?: 2  Eating meals?: 2  Total Score: 10     AM-PAC Raw Score CMS G-Code Modifier Level of Impairment Assistance   6 % Total / Unable   7 - 9 CM 80 - 100% Maximal Assist   10 - 14 CL 60 - 80% Moderate Assist   15 - 19 CK 40 - 60% Moderate Assist   20 - 22 CJ 20 - 40% Minimal Assist   23 CI 1-20% SBA / CGA   24 CH 0% Independent/ Mod I     Patient left HOB elevated with all lines intact, call button in reach and bed alarm on    ASSESSMENT:  Kaity Rebolledo is a 83 y.o. female with a medical diagnosis of Closed displaced fracture of neck of right femur.  Patient found with breakfast  present, sit in BS chair refused.  Patient repositioned and OT present during meal for training in opening containers and BUE GMC during meal. Feeding skills much improved this date from last assessment.    Rehab identified problem list/impairments: Rehab identified problem list/impairments: weakness, impaired endurance, gait instability, decreased lower extremity function, decreased ROM, impaired joint extensibility, orthopedic precautions, impaired functional mobilty, impaired self care skills    Rehab potential is good.    Activity tolerance: Good    Discharge recommendations: Discharge Facility/Level Of Care Needs: nursing facility, skilled     Barriers to discharge: Barriers to Discharge: Decreased caregiver support, Inaccessible home environment    Equipment recommendations:  (TBD)     GOALS:   Occupational Therapy Goals        Problem: Occupational Therapy Goal    Goal Priority Disciplines Outcome Interventions   Occupational Therapy Goal     OT, PT/OT Ongoing (interventions implemented as appropriate)    Description:  Goals to be met by: 05/04     Patient will increase functional independence with ADLs by performing:    UE Dressing with Minimal Assistance.  Grooming while seated with Stand-by Assistance.  Toileting from bedside commode with Moderate Assistance for hygiene and clothing management.   Supine to sit with Minimal Assistance.  Stand pivot transfers with Minimal Assistance.  Increased functional strength to WFL for ADLs and functional transfers.                Plan:  Patient to be seen 4 x/week to address the above listed problems via self-care/home management, therapeutic exercises, therapeutic activities, cognitive retraining  Plan of Care expires:    Plan of Care reviewed with: patient         Dorothy WILCOX Emile, OT  04/30/2017

## 2017-05-01 PROBLEM — E83.39 HYPOPHOSPHATEMIA: Status: RESOLVED | Noted: 2017-04-25 | Resolved: 2017-05-01

## 2017-05-01 PROBLEM — R33.9 URINARY RETENTION: Status: RESOLVED | Noted: 2017-03-28 | Resolved: 2017-05-01

## 2017-05-01 PROBLEM — I25.10 CORONARY ARTERY DISEASE INVOLVING NATIVE CORONARY ARTERY OF NATIVE HEART WITHOUT ANGINA PECTORIS: Status: RESOLVED | Noted: 2017-04-26 | Resolved: 2017-05-01

## 2017-05-01 PROCEDURE — 11000001 HC ACUTE MED/SURG PRIVATE ROOM

## 2017-05-01 PROCEDURE — 25000003 PHARM REV CODE 250: Performed by: HOSPITALIST

## 2017-05-01 PROCEDURE — 97530 THERAPEUTIC ACTIVITIES: CPT

## 2017-05-01 PROCEDURE — 97116 GAIT TRAINING THERAPY: CPT

## 2017-05-01 PROCEDURE — 63600175 PHARM REV CODE 636 W HCPCS: Performed by: HOSPITALIST

## 2017-05-01 PROCEDURE — 97112 NEUROMUSCULAR REEDUCATION: CPT

## 2017-05-01 PROCEDURE — 99232 SBSQ HOSP IP/OBS MODERATE 35: CPT | Mod: ,,, | Performed by: INTERNAL MEDICINE

## 2017-05-01 PROCEDURE — 27000221 HC OXYGEN, UP TO 24 HOURS

## 2017-05-01 PROCEDURE — 25000003 PHARM REV CODE 250: Performed by: INTERNAL MEDICINE

## 2017-05-01 PROCEDURE — 94640 AIRWAY INHALATION TREATMENT: CPT

## 2017-05-01 PROCEDURE — 63600175 PHARM REV CODE 636 W HCPCS: Performed by: INTERNAL MEDICINE

## 2017-05-01 PROCEDURE — 94799 UNLISTED PULMONARY SVC/PX: CPT

## 2017-05-01 RX ORDER — METOPROLOL SUCCINATE 50 MG/1
50 TABLET, EXTENDED RELEASE ORAL 2 TIMES DAILY
Qty: 90 TABLET | Refills: 0 | Status: SHIPPED | OUTPATIENT
Start: 2017-05-01 | End: 2017-07-27 | Stop reason: SDUPTHER

## 2017-05-01 RX ORDER — ACETAMINOPHEN 325 MG/1
650 TABLET ORAL EVERY 6 HOURS PRN
Refills: 0 | COMMUNITY
Start: 2017-05-01

## 2017-05-01 RX ORDER — MAGNESIUM SULFATE HEPTAHYDRATE 40 MG/ML
2 INJECTION, SOLUTION INTRAVENOUS ONCE
Status: COMPLETED | OUTPATIENT
Start: 2017-05-01 | End: 2017-05-01

## 2017-05-01 RX ADMIN — MAGNESIUM SULFATE IN WATER 2 G: 40 INJECTION, SOLUTION INTRAVENOUS at 08:05

## 2017-05-01 RX ADMIN — POLYETHYLENE GLYCOL 3350 17 G: 17 POWDER, FOR SOLUTION ORAL at 08:05

## 2017-05-01 RX ADMIN — IPRATROPIUM BROMIDE 0.5 MG: 0.5 SOLUTION RESPIRATORY (INHALATION) at 07:05

## 2017-05-01 RX ADMIN — LEVALBUTEROL 0.31 MG: 0.63 SOLUTION RESPIRATORY (INHALATION) at 05:05

## 2017-05-01 RX ADMIN — IPRATROPIUM BROMIDE 0.5 MG: 0.5 SOLUTION RESPIRATORY (INHALATION) at 03:05

## 2017-05-01 RX ADMIN — LEVALBUTEROL 0.31 MG: 0.63 SOLUTION RESPIRATORY (INHALATION) at 12:05

## 2017-05-01 RX ADMIN — FLUTICASONE FUROATE AND VILANTEROL TRIFENATATE 1 PUFF: 100; 25 POWDER RESPIRATORY (INHALATION) at 09:05

## 2017-05-01 RX ADMIN — THERA TABS 1 TABLET: TAB at 08:05

## 2017-05-01 RX ADMIN — PANTOPRAZOLE SODIUM 1200 MG: 40 TABLET, DELAYED RELEASE ORAL at 08:05

## 2017-05-01 RX ADMIN — IPRATROPIUM BROMIDE 0.5 MG: 0.5 SOLUTION RESPIRATORY (INHALATION) at 11:05

## 2017-05-01 RX ADMIN — ACETAMINOPHEN 650 MG: 325 TABLET ORAL at 09:05

## 2017-05-01 RX ADMIN — Medication 3 ML: at 02:05

## 2017-05-01 RX ADMIN — TIOTROPIUM BROMIDE 18 MCG: 18 CAPSULE ORAL; RESPIRATORY (INHALATION) at 08:05

## 2017-05-01 RX ADMIN — METOPROLOL SUCCINATE 50 MG: 50 TABLET, EXTENDED RELEASE ORAL at 08:05

## 2017-05-01 RX ADMIN — LEVALBUTEROL 0.31 MG: 0.63 SOLUTION RESPIRATORY (INHALATION) at 07:05

## 2017-05-01 RX ADMIN — CITALOPRAM HYDROBROMIDE 20 MG: 20 TABLET ORAL at 08:05

## 2017-05-01 RX ADMIN — ACETAMINOPHEN 650 MG: 325 TABLET ORAL at 08:05

## 2017-05-01 RX ADMIN — IPRATROPIUM BROMIDE 0.5 MG: 0.5 SOLUTION RESPIRATORY (INHALATION) at 12:05

## 2017-05-01 RX ADMIN — APIXABAN 2.5 MG: 2.5 TABLET, FILM COATED ORAL at 08:05

## 2017-05-01 RX ADMIN — CYANOCOBALAMIN TAB 250 MCG 250 MCG: 250 TAB at 09:05

## 2017-05-01 RX ADMIN — LEVALBUTEROL 0.31 MG: 0.63 SOLUTION RESPIRATORY (INHALATION) at 11:05

## 2017-05-01 RX ADMIN — STANDARDIZED SENNA CONCENTRATE AND DOCUSATE SODIUM 1 TABLET: 8.6; 5 TABLET, FILM COATED ORAL at 08:05

## 2017-05-01 RX ADMIN — Medication 3 ML: at 09:05

## 2017-05-01 NOTE — PLAN OF CARE
Problem: Physical Therapy Goal  Goal: Physical Therapy Goal  Goals to be met by:      Patient will increase functional independence with mobility by performin. Supine to sit with MInimal Assistance  2. Sit to supine with MInimal Assistance  3. Sit to stand transfer with Moderate Assistance-met  3a. Sit<>stand t/f with min (A)  4. Bed to chair transfer with Moderate Assistance using appropriate AD  5. Stand for 5 minutes with Contact Guard Assistance using appropriate AD  6. New goal: Gait x 10 feet with Minimal Assistance using appropriate AD.   7. (I) with HEP   Patient goals remain appropriate.  Patient will continue to benefit from further PT services.  Yves Armijo, PTA

## 2017-05-01 NOTE — ASSESSMENT & PLAN NOTE
Anemia of chronic disease  Controlled. On admission to hospital, patient was noted to have Hgb of 8.8. After surgery patient's Hgb level dropped to 7.7 as expected related to hip surgery and patient transfused 1 unit PRBC on 4/24 and repeat Hgb on 4/25 improved to 9.9 and Hgb 9.1 on 4/27 and 9.4 on 4/30. Patient has no signs of active bleeding and hemodynamically stable and no further blood transfusion needed.

## 2017-05-01 NOTE — PT/OT/SLP PROGRESS
Physical Therapy  Treatment    Kaity Rebolledo   MRN: [id number]   Admitting Diagnosis: Closed displaced fracture of neck of right femur    PT Received On: 17  PT Start Time: 1007     PT Stop Time: 1047    PT Total Time (min): 40 min       Billable Minutes:  Gait Training8, Therapeutic Activity 23 and Neuromuscular Re-education 9    Treatment Type: Treatment  PT/PTA: PTA     PTA Visit Number: 1       General Precautions: Standard, fall  Orthopedic Precautions: RUE non weight bearing, RLE weight bearing as tolerated, RLE posterior precautions   Braces:           Subjective:  Communicated with NSG prior to session.  Feeling a little stronger    Pain Ratin/10                   Objective:   Patient found with: FCD, telemetry, O2 3L NC    Functional Mobility:  Bed Mobility:   Supine to Sit: Maximum Assistance    Transfers:  Sit <> Stand Assistance: Moderate Assistance  Sit <> Stand Assistive Device: Rolling Walker, Platform (R PRW)  Bed <> Chair Technique: Stand Pivot  Bed <> Chair Assistance: Maximum Assistance  Bed <> Chair Assistive Device: No Assistive Device    Gait:   Gait Distance: 3-4 steps  Assistance 1: Moderate assistance  Gait Assistive Device: Rolling walker, Platform walker right  Gait Pattern: 3-point gait  Gait Deviation(s): decreased mari, increased time in double stance, decreased velocity of limb motion, decreased step length, decreased stride length, foot flat, forward lean, decreased weight-shifting ability, excessive knee flexion, hip circumduction. Noted increase SOB post gait training. Patient's /63 HR 97 and O2 sats 93%        Balance:   Static Sit: GOOD: Takes MODERATE challenges from all directions  Dynamic Sit: GOOD-: Maintains balance through MODERATE excursions of active trunk movement,     Static Stand: POOR: Needs MODERATE assist to maintain  Dynamic stand: POOR: N/A     Therapeutic Activities and Exercises:  Patient performed AP,GS,QS x20 reps  Patient recalled 0/3 hip  precautions  Patient tolerated sitting EOB 5 min SBA  Patient tolerated static standing with PRW mod assistance.  After standing approx 1 min patient collapsed back to sitting position       AM-PAC 6 CLICK MOBILITY  How much help from another person does this patient currently need?   1 = Unable, Total/Dependent Assistance  2 = A lot, Maximum/Moderate Assistance  3 = A little, Minimum/Contact Guard/Supervision  4 = None, Modified Hunt/Independent    Turning over in bed (including adjusting bedclothes, sheets and blankets)?: 2  Sitting down on and standing up from a chair with arms (e.g., wheelchair, bedside commode, etc.): 2  Moving from lying on back to sitting on the side of the bed?: 2  Moving to and from a bed to a chair (including a wheelchair)?: 2  Need to walk in hospital room?: 2  Climbing 3-5 steps with a railing?: 1  Total Score: 11    AM-PAC Raw Score CMS G-Code Modifier Level of Impairment Assistance   6 % Total / Unable   7 - 9 CM 80 - 100% Maximal Assist   10 - 14 CL 60 - 80% Moderate Assist   15 - 19 CK 40 - 60% Moderate Assist   20 - 22 CJ 20 - 40% Minimal Assist   23 CI 1-20% SBA / CGA   24 CH 0% Independent/ Mod I     Patient left up in chair with all lines intact, call button in reach and duaghter present.    Assessment:  Kaity Rebolledo is a 83 y.o. female with a medical diagnosis of Closed displaced fracture of neck of right femur and presents with decrease strength, mobility, balance and endurance.  Patient requires assistance with all mobility and transfers.  Patient unable to recall hip precautions.  Patient demonstrates fair sitting and poor standing balance. Patient would benefit from further IP therapy.    Rehab identified problem list/impairments: Rehab identified problem list/impairments: weakness, impaired endurance, gait instability, impaired functional mobilty, impaired balance, impaired self care skills, decreased lower extremity function, decreased upper extremity  function, decreased ROM, orthopedic precautions    Rehab potential is fair.    Activity tolerance: Fair    Discharge recommendations: Discharge Facility/Level Of Care Needs: nursing facility, skilled     Barriers to discharge: Barriers to Discharge: Inaccessible home environment, Decreased caregiver support    Equipment recommendations: Equipment Needed After Discharge: walker, rolling, wheelchair, bedside commode, bath bench     GOALS:   Physical Therapy Goals        Problem: Physical Therapy Goal    Goal Priority Disciplines Outcome Goal Variances Interventions   Physical Therapy Goal     PT/OT, PT Ongoing (interventions implemented as appropriate)     Description:  Goals to be met by: 5     Patient will increase functional independence with mobility by performin. Supine to sit with MInimal Assistance  2. Sit to supine with MInimal Assistance  3. Sit to stand transfer with Moderate Assistance-met   3a. Sit<>stand t/f with min (A)  4. Bed to chair transfer with Moderate Assistance using appropriate AD  5. Stand for 5 minutes with Contact Guard Assistance using appropriate AD  6. New goal: Gait  x 10 feet with Minimal Assistance using appropriate AD.   7. (I) with HEP                  PLAN:    Patient to be seen daily  to address the above listed problems via gait training, therapeutic activities, therapeutic exercises, neuromuscular re-education  Plan of Care expires: 17  Plan of Care reviewed with: patient         Yves Armijo II, PTA  2017

## 2017-05-01 NOTE — PLAN OF CARE
[facility]     Department of Hospital Medicine     [address] [place] [address]     [phone] after hours  [phone] fax       NURSING HOME ORDERS    05/01/2017    Admit to [facility]:  Skilled Bed                                            Diagnoses:  Active Hospital Problems    Diagnosis  POA    *Closed displaced fracture of neck of right femur s/p hemiarthroplasty on 4/23/2017 [S72.001A]  Yes     Priority: 2     Paroxysmal atrial fibrillation [I48.0]  Yes     Priority: 1 - High    Closed Colles' fracture of right radius [S52.531A]  Yes     Priority: 3     Acute blood loss anemia [D62]  No     Priority: 4     Essential hypertension [I10]  Yes     Priority: 5     Chronic obstructive pulmonary disease [J44.9]  Yes     Priority: 6     Hypomagnesemia [E83.42]  No     Priority: 8     Coronary artery disease involving native coronary artery without angina pectoris [I25.10]  Yes     Priority: 9     Chronic diastolic heart failure [I50.32]  Yes     Priority: 10     Anxiety and depression [F41.9, F32.9]  Yes     Priority: 13     Closed fracture of distal end of right radius with routine healing [S52.501D]  Not Applicable    Osteoporosis with current pathological fracture with routine healing [M80.00XD]  Not Applicable    Encounter for aftercare for healing closed traumatic fracture of hip [S72.009D]  Not Applicable    Vitamin D deficiency disease [E55.9]  Yes    Debility [R53.81]  Yes    Anticoagulant long-term use [Z79.01]  Not Applicable    Pulmonary hypertension due to lung disease [I27.2, J98.4]  Yes    Chronic hypoxemic respiratory failure [J96.11]  Yes    Anemia of chronic disease [D63.8]  Yes      Resolved Hospital Problems    Diagnosis Date Resolved POA    Hypophosphatemia [E83.39] 05/01/2017 No     Priority: 7     Osteoporosis [M81.0] 05/01/2017 Yes     Priority: 11     Urinary retention [R33.9] 05/01/2017 Yes      Priority: 12     Atrial flutter with rapid ventricular response [I48.92] 04/30/2017 No    Coronary artery disease involving native coronary artery of native heart without angina pectoris [I25.10] 05/01/2017 Yes    Fall [W19.XXXA] 04/23/2017 Yes    Hyponatremia [E87.1] 04/30/2017 Yes       Allergies:  Review of patient's allergies indicates:   Allergen Reactions    Advair diskus  [fluticasone-salmeterol]      Other reaction(s): Nausea    Morphine Hallucinations    Pravastatin      Other reaction(s): Muscle pain       Vitals: Every shift (Skilled Nursing patients)        Code Status: Full Code     Diet: regular diet      Supplement: House supplement, one can every 8 hours with meals    Activities:   - Up in a chair each morning as tolerated   - Ambulate with assistance to bathroom   - May use platform walker or self-propelled wheelchair   - Weight bearing: FWB/WBAT: right lower extremity, Posterior hip precautions  NWB to right upper extremity and use platform walker to ambulate    LABS:  Per facility protocol    Nursing Precautions:        - Fall precautions per nursing home protocol      CONSULTS:     Physical Therapy to evaluate and treat 5 times a week     Occupational Therapy to evaluate and treat 5 times a week      MISCELLANEOUS CARE:  Oxygen:  Oxygen at 2 L/min nasal canula to be used:  Continuously.    WOUND CARE ORDERS  Keep surgical bandage to right hip and splint to right wrist in place until Orthopedic clinic follow-up                 Medications:     acetaminophen (TYLENOL) 325 MG tablet  Take 2 tablets (650 mg total) by mouth every 6 (six) hours as needed (Mild pain).             albuterol 90 mcg/actuation inhaler  Inhale 2 puffs into the lungs every 6 (six) hours as needed for Wheezing or Shortness of Breath.             apixaban 2.5 mg Tab  Take 1 tablet (2.5 mg total) by mouth 2 (two) times daily.             ascorbic acid (VITAMIN C) 500 MG tablet  Take 500 mg by mouth 2 (two) times  daily.             aspirin (ECOTRIN) 81 MG EC tablet  Take 1 tablet (81 mg total) by mouth once daily.             citalopram (CELEXA) 20 MG tablet  TAKE 1 TABLET BY MOUTH ONCE DAILY             cyanocobalamin (VITAMIN B-12) 100 MCG tablet  Take 1 tablet (100 mcg total) by mouth once daily.             cyproheptadine (PERIACTIN) 4 mg tablet  Take 1 tablet (4 mg total) by mouth 3 (three) times daily as needed.             fluticasone-vilanterol (BREO) 100-25 mcg/dose diskus inhaler  Inhale 1 puff into the lungs once daily.             furosemide (LASIX) 20 MG tablet  Take 1 tablet (20 mg total) by mouth once daily.             guaifenesin (MUCINEX) 600 mg 12 hr tablet  Take 2 tablets (1,200 mg total) by mouth 2 (two) times daily. Okay to dispense box of medication  as prepackaged by .             levalbuterol (XOPENEX) 0.31 mg/3 mL nebulizer solution  Take 3 mLs (0.31 mg total) by nebulization 4 (four) times daily.             metoprolol succinate (TOPROL-XL) 50 MG 24 hr tablet  Take 1 tablet (50 mg total) by mouth 2 (two) times daily.             multivitamin (ONE DAILY MULTIVITAMIN) per tablet  Take 1 tablet by mouth once daily.             nitroGLYCERIN (NITROSTAT) 0.4 MG SL tablet  Place 1 tablet (0.4 mg total) under the tongue every 5 (five) minutes as needed for Chest pain.             potassium chloride (MICRO-K) 10 MEQ CpSR  Take 1 capsule (10 mEq total) by mouth once daily.             senna-docusate 8.6-50 mg (PERICOLACE) 8.6-50 mg per tablet  Take 1 tablet by mouth 2 (two) times daily.             tiotropium (SPIRIVA WITH HANDIHALER) 18 mcg inhalation capsule  Inhale 18 mcg into the lungs once daily. Controller                    Follow-up:   Future Appointments  Date Time Provider Department Center   6/29/2017 3:00 PM Brian Baeza MD [facility] [place] [place] [facility]      Follow-up with Dr. Justin Freeman in [facility] at [facility] in 1-2 weeks. Ambulatory referral sent to set up  follow-up appointment.    _________________________________  Cecile Woo MD  05/01/2017

## 2017-05-01 NOTE — CONSULTS
SNF consult-Patient approved to admit to SNF today. Need [facility] SN[facility] Discharge Readmit orders entered into EPIC prior to calling report. Nurse to call report to [phone] and MD to call report to Dr Martinez at [phone].

## 2017-05-01 NOTE — ASSESSMENT & PLAN NOTE
Controlled. No angina or active chest pain. Will continue Toprol XL 50 mg po BID to treat chronic CAD.

## 2017-05-01 NOTE — ASSESSMENT & PLAN NOTE
Resolved. Patient with urinary retention post-op and likely related to narcotics. Joshua catheter replaced on 4/25 as unable to void despite several I+O caths. Joshua removed on 4/30 and patient now voiding on her own.

## 2017-05-01 NOTE — PT/OT/SLP PROGRESS
Occupational Therapy      Kaity Rebolledo  MRN: [id number]    Patient not seen today secondary to Other (Comment) (Pt just returned to bed after sitting up several hours.). Will follow-up tomorrow.    NEY Panchal  5/1/2017

## 2017-05-01 NOTE — SUBJECTIVE & OBJECTIVE
Interval History: Patient medically stable and awaiting SNF placement at [facility]. Patient with no further episodes of atrial fib or flutter since 4/28. Patient able to void on her own after Joshua removed yesterday. No plans for operative intervention of right wrist at this time by Orthopedics. Family at bedside and aware of plan for discharge. Patient having BM after Miralax started 2 days ago.     Review of Systems   Constitutional: Negative for fever.   Respiratory: Negative for chest tightness and shortness of breath.    Cardiovascular: Negative for chest pain, palpitations and leg swelling.   Gastrointestinal: Negative for abdominal pain, constipation and nausea.   Genitourinary: Negative for flank pain.   Musculoskeletal: Positive for myalgias (Right wrist and right hip pain).   Skin: Positive for pallor. Negative for rash.   Neurological: Positive for weakness (Generalized). Negative for dizziness and light-headedness.   Psychiatric/Behavioral: Negative for confusion.     Objective:     Vital Signs (Most Recent):  Temp: 98 °F (36.7 °C) (05/01/17 1700)  Pulse: 90 (05/01/17 1744)  Resp: 18 (05/01/17 1744)  BP: 139/72 (05/01/17 1700)  SpO2: 97 % (05/01/17 1744) Vital Signs (24h Range):  Temp:  [98 °F (36.7 °C)-99.4 °F (37.4 °C)] 98 °F (36.7 °C)  Pulse:  [76-98] 90  Resp:  [16-20] 18  SpO2:  [91 %-97 %] 97 %  BP: (124-143)/(60-72) 139/72     Weight: 49.4 kg (109 lb)  Body mass index is 18.14 kg/(m^2).  No intake or output data in the 24 hours ending 05/01/17 1819   Physical Exam   Constitutional: She is oriented to person, place, and time. She appears cachectic. No distress.   Cardiovascular: Normal rate and regular rhythm.  Exam reveals no gallop and no friction rub.    No murmur heard.  Pulmonary/Chest: Effort normal and breath sounds normal. She has no wheezes.   Abdominal: Soft. Bowel sounds are normal. She exhibits no distension. There is no tenderness.   Musculoskeletal: She exhibits tenderness  (to hip).   Neurological: She is alert and oriented to person, place, and time.   Skin: Skin is warm. No rash noted.   Surgical bandage to right hip and splint to right forearm   Psychiatric: She has a normal mood and affect. Thought content normal.       Significant Labs:   CBC:   Recent Labs  Lab 04/30/17  0403   HGB 9.4*   HCT 29.7*       Significant Imaging: I have reviewed all pertinent imaging results/findings within the past 24 hours.

## 2017-05-01 NOTE — ASSESSMENT & PLAN NOTE
Controlled. Orthopedics evaluated and recommends non-operative management of wrist fracture at this time. Will continue to have patient in splint that was applied by Orthopedics with NWB to right wrist. Follow-up with Orthopedics as outpatient with Dr. Freeman in 1-2 weeks and ambulatory referral sent via [employer] to Dr. Freeman's office to set up appointment.

## 2017-05-01 NOTE — ASSESSMENT & PLAN NOTE
Controlled. No signs of volume overload or acute decompensated heart failure. Recent Echo from  March 2017 showed EF 55 - 60%. Plan to restart home dose of Lasix 20 mg po daily on discharge to SNF.

## 2017-05-01 NOTE — PLAN OF CARE
9:48 AM. EVAN spoke with pts daughter Maryjane [phone] informed that they are wanting pt to go to [facility]. EVAN left VM with Cynthia [phone] as well as updated note on right care. EVAN sending all updated notes. Pt is medically stable for DC today. Will follow and assist as needed.    10:42 AM. Spoke with Vivian at [facility] with update of pts choice of SNF. EVAN placed call to Lokesh at [facility] [phone] seeking the auth # as requested by Cynthia. EVAN provided contact number for both EVAN and Cynthia.  11:10 AM. EVAN updated Cynthia via voice mail that they need to submit for auth with [facility] in order to receive the auth number.   12:06 PM. EVAN sent NH order and DC order via right [facility].     3:48 PM. EVAN informed there has not been a completed 142 and PASRR due to origional plan for pt to go to [facility]. EVAN called in LOC[facility] and faxed signed PASRR to [facility]. Facility updated and aware. Waiting for 142 form from the state.      Pt ready to transfer once 142 received. Pts daughter updated and aware.         IRMA Talbert, FER  [phone]

## 2017-05-01 NOTE — ASSESSMENT & PLAN NOTE
Anticoagulant long-term use  Patient with episode of atrial flutter with RVR on 4/28. Patient given IV Lopressor 5 mg x 2 doses and then converted back to NSR. Increased patient's home dose of Toprol XL from 50 mg po daily to BID on 4/29 and restarted patient on home dose of Eliquis 2.5 mg po BID on 4/29 for long term anticoagulation for atrial fib as no surgical intervention planned for right wrist at this time.   Patient in normal sinus rhythm on telemetry since 4/28 with no further episodes of atrial fib/flutter. Will continue Toprol XL 50 mg po BID for rate control on discharge. Patient on Eliquis 2.5 mg po BID for long term anticoagulation for atrial fibrillation/aflutter and continue on discharge.

## 2017-05-01 NOTE — ASSESSMENT & PLAN NOTE
Patient's blood pressure is controlled here in the hospital. Goal for blood pressure is SBP < 150 and DBP < 90 as patient > or = 60 years of age with no diabetes or advanced kidney disease based on JNC 8 guidelines. Plan to continue Toprol XL on discharge but discontinue home medication of Lisinopril as patient is normotensive.

## 2017-05-01 NOTE — PROGRESS NOTES
Progress Note  [facility] Medicine    [facility] Medicine Service: H  Admit Date: 4/21/2017  Encounter Date: 05/01/2017  EMELIA: 5/1/2017    8 Days Post-Op from right hip hemiarthroplasty for fracture     Orthopedic Surgeon: Dr. 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 Tanner recently was hospitalized at [facility] at the end of march for acute on chronic hypoxic and hypercapneic respiratory failuer due to COPD exacerbation. She was treated with BIPAP, steroid and nebs during hospital stay and discahred to [facility] on 04/04/17 for deconditioning. She was getting PT at SNF and started on 10 days course of doxycycline for suspected penumonia based on CXR finding. She was seen at pulmonary clinic by Dr. Christian on 04/13 who started on prednisone 20 mg daily x 5 days, then 10 mg daily x 5 days and then stop. She was discharged from SNF to assisted living with home health on 04/14/17. She had multiple recent admissions for COPD exacerbation and durigh last admit she was made DNR per daughter at bedside. Daughter states she has been livign at assisted living for last 2 years and mostly wheel charir bound due to COPD and debility.     4/21 around 8 pm while she was walking from family room to her bed she got tangled with oxygen tubing, lost balnace and fell. She tried to prevent the fall with her right hand. Denies hitting her head or LOC. Imaing in ED showed right distal radial and ulnar fractuer, and right femoral neck fracture. Orthopedic  reduced the radial fx at bedside and placed splint. Plan to have surgical fixation of right femoral neck fracture.           Pain scale: Patient with 2/10 aching pain in right hip.    Interval History: Patient medically stable and awaiting SNF placement at [facility]. Patient with no further episodes of atrial fib or flutter since 4/28. Patient able to void on her own after Joshua removed yesterday. No plans for operative intervention of right wrist at this time by Orthopedics. Family at bedside and aware of plan for discharge. Patient having BM after Miralax started 2 days ago.     Review of Systems   Constitutional: Negative for fever.   Respiratory: Negative for chest tightness and shortness of breath.    Cardiovascular: Negative for chest pain, palpitations and leg swelling.   Gastrointestinal: Negative for abdominal pain, constipation and nausea.   Genitourinary: Negative for flank pain.   Musculoskeletal: Positive for myalgias (Right wrist and right hip pain).   Skin: Positive for pallor. Negative for rash.   Neurological: Positive for weakness (Generalized). Negative for dizziness and light-headedness.   Psychiatric/Behavioral: Negative for confusion.     Objective:     Vital Signs (Most Recent):  Temp: 98 °F (36.7 °C) (05/01/17 1700)  Pulse: 90 (05/01/17 1744)  Resp: 18 (05/01/17 1744)  BP: 139/72 (05/01/17 1700)  SpO2: 97 % (05/01/17 1744) Vital Signs (24h Range):  Temp:  [98 °F (36.7 °C)-99.4 °F (37.4 °C)] 98 °F (36.7 °C)  Pulse:  [76-98] 90  Resp:  [16-20] 18  SpO2:  [91 %-97 %] 97 %  BP: (124-143)/(60-72) 139/72     Weight: 49.4 kg (109 lb)  Body mass index is 18.14 kg/(m^2).  No intake or output data in the 24 hours ending 05/01/17 1819   Physical Exam   Constitutional: She is oriented to person, place, and time. She appears cachectic. No distress.   Cardiovascular: Normal rate and regular rhythm.  Exam reveals no gallop and no friction rub.    No murmur heard.  Pulmonary/Chest: Effort normal  and breath sounds normal. She has no wheezes.   Abdominal: Soft. Bowel sounds are normal. She exhibits no distension. There is no tenderness.   Musculoskeletal: She exhibits tenderness (to hip).   Neurological: She is alert and oriented to person, place, and time.   Skin: Skin is warm. No rash noted.   Surgical bandage to right hip and splint to right forearm   Psychiatric: She has a normal mood and affect. Thought content normal.       Significant Labs:   CBC:   Recent Labs  Lab 04/30/17  0403   HGB 9.4*   HCT 29.7*       Significant Imaging: I have reviewed all pertinent imaging results/findings within the past 24 hours.      ASSESSMENT/PLAN:     Paroxysmal atrial fibrillation  Anticoagulant long-term use  Patient with episode of atrial flutter with RVR on 4/28. Patient given IV Lopressor 5 mg x 2 doses and then converted back to NSR. Increased patient's home dose of Toprol XL from 50 mg po daily to BID on 4/29 and restarted patient on home dose of Eliquis 2.5 mg po BID on 4/29 for long term anticoagulation for atrial fib as no surgical intervention planned for right wrist at this time.   Patient in normal sinus rhythm on telemetry since 4/28 with no further episodes of atrial fib/flutter. Will continue Toprol XL 50 mg po BID for rate control on discharge. Patient on Eliquis 2.5 mg po BID for long term anticoagulation for atrial fibrillation/aflutter and continue on discharge.         * Closed displaced fracture of neck of right femur s/p hemiarthroplasty on 4/23/2017  Encounter for aftercare for healing closed traumatic fracture of hip  Patient is POD # 8. Patient reports minimal pain in right hip and controlled with just Tylenol as needed. Plan is to continue PT/OT for gait training and strengthening and restoration of ADL's on discharge to [facility] Skilled unit. Patient is FWB/WBAT: right lower extremity, posterior hip precautions as per Orthopedic recommendations and to continue on discharge.  Patient restarted on Eliquis for long term anticoagulation for atrial fibrillation on 4/29 so does not need Lovenox for DVT prophylaxis post-op. Will continue Tylenol 650 mg po every 6 hours as needed for pain on discharge as patient is very sensitive to narcotics and could not even tolerate Tramadol as made patient too sedated.     Closed Colles' fracture of right radius  Controlled. Orthopedics evaluated and recommends non-operative management of wrist fracture at this time. Will continue to have patient in splint that was applied by Orthopedics with NWB to right wrist. Follow-up with Orthopedics as outpatient with Dr. Freeman in 1-2 weeks and ambulatory referral sent via [employer] to Dr. Freeman's office to set up appointment.    Acute blood loss anemia  Anemia of chronic disease  Controlled. On admission to hospital, patient was noted to have Hgb of 8.8. After surgery patient's Hgb level dropped to 7.7 as expected related to hip surgery and patient transfused 1 unit PRBC on 4/24 and repeat Hgb on 4/25 improved to 9.9 and Hgb 9.1 on 4/27 and 9.4 on 4/30. Patient has no signs of active bleeding and hemodynamically stable and no further blood transfusion needed.     Essential hypertension  Patient's blood pressure is controlled here in the hospital. Goal for blood pressure is SBP < 150 and DBP < 90 as patient > or = 60 years of age with no diabetes or advanced kidney disease based on JNC 8 guidelines. Plan to continue Toprol XL on discharge but discontinue home medication of Lisinopril as patient is normotensive.     Chronic obstructive pulmonary disease  Chronic hypoxemic respiratory failure  Patient at baseline and currently on 2 liters of oxygen as is on home oxygen prior to admit. Pulse ox % on 2 liters and goal is to keep oxygen > 88%. Plan to continue Breo MDI 1 puff daily + Spiriva 1 puff daily to treat chronic COPD and patient to continue on discharge and discharge on oxygen at 2 liters continuous to  [facility].    Coronary artery disease involving native coronary artery without angina pectoris  Controlled. No angina or active chest pain. Will continue Toprol XL 50 mg po BID to treat chronic CAD.       Chronic diastolic heart failure  Controlled. No signs of volume overload or acute decompensated heart failure. Recent Echo from  March 2017 showed EF 55 - 60%. Plan to restart home dose of Lasix 20 mg po daily on discharge to SNF.       Urinary Retention  Resolved. Patient with urinary retention post-op and likely related to narcotics. Joshua catheter replaced on 4/25 as unable to void despite several I+O caths. Joshua removed on 4/30 and patient now voiding on her own.      Anticipated Disposition: [facility] Skilled Nursing Unit    Time spent in care of patient (Greater than 1/2 spent in direct face-to-face contact): 35 minutes    Cecile Woo MD  Department of Hospital Medicine

## 2017-05-01 NOTE — ASSESSMENT & PLAN NOTE
Encounter for aftercare for healing closed traumatic fracture of hip  Patient is POD # 8. Patient reports minimal pain in right hip and controlled with just Tylenol as needed. Plan is to continue PT/OT for gait training and strengthening and restoration of ADL's on discharge to [facility] Skilled unit. Patient is FWB/WBAT: right lower extremity, posterior hip precautions as per Orthopedic recommendations and to continue on discharge. Patient restarted on Eliquis for long term anticoagulation for atrial fibrillation on 4/29 so does not need Lovenox for DVT prophylaxis post-op. Will continue Tylenol 650 mg po every 6 hours as needed for pain on discharge as patient is very sensitive to narcotics and could not even tolerate Tramadol as made patient too sedated.

## 2017-05-01 NOTE — PLAN OF CARE
Discussed pt. with Dr. Woo. No surgical intervention at this time for radius wrist fracture. Pt. Is stable for transfer to SNF. Reviewed  Selam GORDON note. Daughter wants [facility]. Referral placed per Selam. CM will continue to follow. TIFFANIE Madera RN/CM

## 2017-05-01 NOTE — PLAN OF CARE
Pt. Is POD # 8 S/P right hemiarthroplasty secondary to fall. Plan was to d/c pt. to [facility] SNF but pt.has right distal radius fx. hindering PT progress. Possible surgery. Not medically stable to transfer to SNF/ SW/CM will continue to follow pt's progress. TIFFANIE Madera RN/CM [id number]       05/01/17 0640   Right Care Assessment   Can the patient answer the patient profile reliably? Yes, cognitively intact   How often would a person be available to care for the patient? Often   Describe the patient's ability to walk at the present time. Major restrictions/daily assistance from another person   How does the patient rate their overall health at the present time? Fair   Number of comorbid conditions (as recorded on the chart) Five or more   During the past month, has the patient often been bothered by feeling down, depressed or hopeless? No   During the past month, has the patient often been bothered by little interest or pleasure in doing things? No

## 2017-05-01 NOTE — PROGRESS NOTES
ORTHO PROGRESS NOTE  Admit day: 4/21/2017  Surgery: 4/23/2017      Patient is a 83 y.o. female who is 8 Days Post-Op   Her pain is well-controlled.  She is tolerating PO diet.  No acute events overnight    ROS:  No fever/chills, chest pain, dyspnea, weakness, numbness, abdominal pain, nausea/vomiting, dysuria/hematuria, or weight loss.    P/E:  Temp:  [96.6 °F (35.9 °C)-99.4 °F (37.4 °C)] 99.4 °F (37.4 °C)  Pulse:  [86-98] 88  Resp:  [14-18] 18  SpO2:  [88 %-98 %] 95 %  BP: (121-164)/(56-77) 130/63  Awake/alert/ No acute distress, Afebrile, Vital signs stable  Normocephalic, Atraumatic  No gross deficit of cranial nerves  Palpable distal pulses  Good inspiratory effort with unlaboured breathing  Dressings clean/dry/intact.  Operative extremity is NVI.  RUE:  Splint intact.  Fingers well perfused    Drain Output  04/29 1901 - 04/30 1900  In: 1155   Out: 1350       Recent Labs      04/30/17   0403   HGB  9.4*   HCT  29.7*     Recent Labs      04/30/17   0403   NA  135*   K  4.5   CL  96   CO2  33*   BUN  7*   CREATININE  0.6   CALCIUM  8.6*     No results for input(s): INR, PTT in the last 72 hours.    Invalid input(s): PT      Assessment:  Patient is a 83 y.o.female   she is 8 Days Post-Op s/p right cemented hip hemiarthroplasty for femoral neck fracture.  Also with right distal radius fracture     Plan:  Pain control:  tylenol  Diet: regular  WBAT RLE  NWB L forearm  PT/OT    DVT prophylaxis: resumed apixaban    Dispo: scheduled to go to SNF today.      Addendum:  Discussed possibly operating on distal radius but we will not proceed with this until patient learns to ambulate with platform walker.  If she uses right wrist inappropriately with hardware, she may have surgical-related complications.  Proceed with SNF transfer when clear by primary team      Brian No MD  Orthopedic Surgery PGY-4  Pager [phone]    Attg Note:  I agree with the above assessment and plan.    Santy Spann MD

## 2017-05-01 NOTE — PLAN OF CARE
Problem: Patient Care Overview  Goal: Plan of Care Review  Outcome: Ongoing (interventions implemented as appropriate)  Plan of care reviewed and updated. Pt AA+O. Pt's pain is managed with the medication ordered at this time. Pt's VS are as charted.  No falls this shift. Pt is oriented to room and call system. Will continue to [facility].

## 2017-05-02 ENCOUNTER — OUTPATIENT CASE MANAGEMENT (OUTPATIENT)
Dept: ADMINISTRATIVE | Facility: OTHER | Age: 82
End: 2017-05-02

## 2017-05-02 VITALS
DIASTOLIC BLOOD PRESSURE: 70 MMHG | RESPIRATION RATE: 20 BRPM | OXYGEN SATURATION: 97 % | HEIGHT: 65 IN | WEIGHT: 109 LBS | SYSTOLIC BLOOD PRESSURE: 140 MMHG | TEMPERATURE: 99 F | BODY MASS INDEX: 18.16 KG/M2 | HEART RATE: 82 BPM

## 2017-05-02 PROCEDURE — 97530 THERAPEUTIC ACTIVITIES: CPT

## 2017-05-02 PROCEDURE — 25000003 PHARM REV CODE 250: Performed by: HOSPITALIST

## 2017-05-02 PROCEDURE — 99239 HOSP IP/OBS DSCHRG MGMT >30: CPT | Mod: ,,, | Performed by: HOSPITALIST

## 2017-05-02 PROCEDURE — 63600175 PHARM REV CODE 636 W HCPCS: Performed by: HOSPITALIST

## 2017-05-02 PROCEDURE — 25000003 PHARM REV CODE 250: Performed by: INTERNAL MEDICINE

## 2017-05-02 PROCEDURE — 97110 THERAPEUTIC EXERCISES: CPT

## 2017-05-02 PROCEDURE — 94640 AIRWAY INHALATION TREATMENT: CPT

## 2017-05-02 PROCEDURE — 27000221 HC OXYGEN, UP TO 24 HOURS

## 2017-05-02 PROCEDURE — 97535 SELF CARE MNGMENT TRAINING: CPT

## 2017-05-02 RX ORDER — LEVALBUTEROL INHALATION SOLUTION 0.63 MG/3ML
0.31 SOLUTION RESPIRATORY (INHALATION) 4 TIMES DAILY
Status: DISCONTINUED | OUTPATIENT
Start: 2017-05-02 | End: 2017-05-02 | Stop reason: HOSPADM

## 2017-05-02 RX ADMIN — LEVALBUTEROL 0.31 MG: 0.63 SOLUTION RESPIRATORY (INHALATION) at 09:05

## 2017-05-02 RX ADMIN — CITALOPRAM HYDROBROMIDE 20 MG: 20 TABLET ORAL at 08:05

## 2017-05-02 RX ADMIN — APIXABAN 2.5 MG: 2.5 TABLET, FILM COATED ORAL at 08:05

## 2017-05-02 RX ADMIN — CYANOCOBALAMIN TAB 250 MCG 250 MCG: 250 TAB at 08:05

## 2017-05-02 RX ADMIN — LEVALBUTEROL: 0.63 SOLUTION RESPIRATORY (INHALATION) at 12:05

## 2017-05-02 RX ADMIN — ACETAMINOPHEN 650 MG: 325 TABLET ORAL at 01:05

## 2017-05-02 RX ADMIN — Medication 3 ML: at 05:05

## 2017-05-02 RX ADMIN — IPRATROPIUM BROMIDE 0.5 MG: 0.5 SOLUTION RESPIRATORY (INHALATION) at 09:05

## 2017-05-02 RX ADMIN — TIOTROPIUM BROMIDE 18 MCG: 18 CAPSULE ORAL; RESPIRATORY (INHALATION) at 08:05

## 2017-05-02 RX ADMIN — METOPROLOL SUCCINATE 50 MG: 50 TABLET, EXTENDED RELEASE ORAL at 08:05

## 2017-05-02 RX ADMIN — PANTOPRAZOLE SODIUM 1200 MG: 40 TABLET, DELAYED RELEASE ORAL at 08:05

## 2017-05-02 RX ADMIN — FLUTICASONE FUROATE AND VILANTEROL TRIFENATATE 1 PUFF: 100; 25 POWDER RESPIRATORY (INHALATION) at 09:05

## 2017-05-02 RX ADMIN — ACETAMINOPHEN 650 MG: 325 TABLET ORAL at 02:05

## 2017-05-02 RX ADMIN — THERA TABS 1 TABLET: TAB at 08:05

## 2017-05-02 RX ADMIN — ACETAMINOPHEN 650 MG: 325 TABLET ORAL at 08:05

## 2017-05-02 RX ADMIN — IPRATROPIUM BROMIDE 0.5 MG: 0.5 SOLUTION RESPIRATORY (INHALATION) at 12:05

## 2017-05-02 RX ADMIN — IPRATROPIUM BROMIDE 0.5 MG: 0.5 SOLUTION RESPIRATORY (INHALATION) at 03:05

## 2017-05-02 NOTE — ASSESSMENT & PLAN NOTE
Encounter for aftercare for healing closed traumatic fracture of hip  Patient is POD # 9. Patient reports minimal pain in right hip and controlled with just Tylenol as needed. Plan is to continue PT/OT for gait training and strengthening and restoration of ADL's on discharge to [facility] Skilled unit. Patient is FWB/WBAT: right lower extremity, posterior hip precautions as per Orthopedic recommendations and to continue on discharge. Patient restarted on Eliquis for long term anticoagulation for atrial fibrillation on 4/29 so does not need Lovenox for DVT prophylaxis post-op. Will continue Tylenol 650 mg po every 6 hours as needed for pain on discharge as patient is very sensitive to narcotics and could not even tolerate Tramadol as made patient too sedated.

## 2017-05-02 NOTE — PLAN OF CARE
Problem: Occupational Therapy Goal  Goal: Occupational Therapy Goal  Goals to be met by: 05/04     Patient will increase functional independence with ADLs by performing:    UE Dressing with Minimal Assistance.  Grooming while seated with Stand-by Assistance. MET 5/2/2017  Toileting from bedside commode with Moderate Assistance for hygiene and clothing management.   Supine to sit with Minimal Assistance. MET 5/2/2017  Stand pivot transfers with Minimal Assistance.  Increased functional strength to WFL for ADLs and functional transfers.   Outcome: Ongoing (interventions implemented as appropriate)  Goals remain appropriate. Cont POC.     NEY Amador  5/2/2017  Pager: [phone]

## 2017-05-02 NOTE — ASSESSMENT & PLAN NOTE
Encounter for aftercare for healing closed traumatic fracture of hip  Patient is POD # 9. Patient reports minimal pain in right hip and controlled with just Tylenol as needed. Plan is to continue PT/OT for gait training and strengthening and restoration of ADL's on discharge to [facility] Skilled unit. Patient is FWB/WBAT: right lower extremity, posterior hip precautions as per Orthopedic recommendations and to continue on discharge. Patient restarted on Eliquis for long term anticoagulation for atrial fibrillation on 4/29 so does not need Lovenox for DVT prophylaxis post-op. Will continue Tylenol 650 mg po every 6 hours as needed for pain on discharge as patient is very sensitive to narcotics and could not even tolerate Tramadol as made patient too sedated.

## 2017-05-02 NOTE — DISCHARGE SUMMARY
[facility] Hospital Medicine  Discharge Summary      Patient Name: Kaity Rebolledo  MRN: [id number]  Admission Date: 4/21/2017  Hospital Length of Stay: 10 days  Discharge Date and Time:  05/02/2017 2:50 PM  Attending Physician: Paulette att. 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 Tanner recently was hospitalized at [facility] at the end of march for acute on chronic hypoxic and hypercapneic respiratory failuer due to COPD exacerbation. She was treated with BIPAP, steroid and nebs during hospital stay and discahred to [facility] on 04/04/17 for deconditioning. She was getting PT at SNF and started on 10 days course of doxycycline for suspected penumonia based on CXR finding. She was seen at pulmonary clinic by Dr. Christian on 04/13 who started on prednisone 20 mg daily x 5 days, then 10 mg daily x 5 days and then stop. She was discharged from [facility] to assisted living with home health on 04/14/17. She had multiple recent admissions for COPD exacerbation and durigh last admit she was made DNR per daughter at bedside. Daughter states she has been livign at assisted living for last 2 years and mostly wheel charir bound due to COPD and debility.     4/21 around 8 pm while she was walking from family room to her bed she got tangled with oxygen tubing, lost balnace and fell. She tried to prevent the fall with her right hand. Denies hitting her head or LOC. Imaing in ED showed right distal radial and ulnar fractuer, and right femoral neck  fracture. Orthopedic reduced the radial fx at bedside and placed splint. Plan to have surgical fixation of right femoral neck fracture.           Procedure(s) (LRB):  HEMIARTHROPLASTY RIGHT HIP (Right)      Indwelling Lines/Drains at time of discharge:   Lines/Drains/Airways          No matching active lines, drains, or airways        Hospital Course:   Patient admitted to hospital to the [facility] service with Orthopedic surgery consult. Patient was seen and evaluated by Orthopedic surgery who recommended operative repair of hip fracture. Patient was medically optimized prior to surgery and was taken to OR after optimization on 4/23/2017. Patient underwent right hip hemiarthroplasty by Dr. Justin Freeman. Post-op patient was WBAT with posterior hip precautions to the right lower extremity as per Orthopedics recommendation. Patient was placed on Lovenox 40 mg subcutaneous daily and JENNIFER/SCD's for DVT prophylaxis post-op. PT/OT were consulted and evaluated patient and PT and OT recommending skilled nursing facility placement. Patient also suffered Colles fracture of right radius on admit with original fall at home and Orthopedics evaluated patient and considering possible need for surgical repair but at this time recommendation is for no operative repair with re-evaluation of Colles fracture in 1-2 weeks. Patient has cast to right forearm to remain in place. Patient post-op had difficulty voiding after Joshua removed and Joshua had to be replaced secondary to urinary retention on 4/25. Patient to attempt voiding trial on 4/30. Patient on 4/28 had episode of atrial flutter with RVR with HR in 160's. Patient denied any chest pain or SOB but did feel lightheaded. Patient given IV Lopressor 5 mg x 2 doses and patient converted back into sinus rhythm. Patient did have decrease in SBP to 94-98 while in atrial flutter but able to tolerate IV Lopressor. Patient's home dose of Toprol XL increased from 50 mg po daily to BID treat as  patient with history of atrial fibrillation in 3/2017. Patient restarted on home dose of Eliquis for long term anticoagulation on 4/29. Patient awaiting SNF placement. Voiding trail done on 4/30 and Joshua removed to see if patient can urinate on her own and patient able to void after Joshua removed on 4/30. Patient remains in normal sinus rhythm. Patient to be discharged to [facility].      Consults:   Consults         Status Ordering Provider     Inpatient consult to SN[facility]  Once     Provider:  (Not yet assigned)    ISH Fang          Pending Diagnostic Studies:     None        Final Active Diagnoses:    Diagnosis Date Noted POA    PRINCIPAL PROBLEM:  Closed displaced fracture of neck of right femur s/p hemiarthroplasty on 4/23/2017 [S72.001A] 04/22/2017 Yes    Acute blood loss anemia [D62] 04/24/2017 No    Chronic hypoxemic respiratory failure [J96.11] 02/06/2016 Yes    Paroxysmal atrial fibrillation [I48.0]  Yes    Coronary artery disease involving native coronary artery without angina pectoris [I25.10] 06/16/2016 Yes    Chronic diastolic heart failure [I50.32] 09/02/2016 Yes    Closed Colles' fracture of right radius [S52.531A] 04/22/2017 Yes    Anxiety and depression [F41.9, F32.9]  Yes    Hypomagnesemia [E83.42] 04/30/2017 No    Closed fracture of distal end of right radius with routine healing [S52.501D]  Not Applicable    Osteoporosis with current pathological fracture with routine healing [M80.00XD]  Not Applicable    Encounter for aftercare for healing closed traumatic fracture of hip [S72.009D] 04/25/2017 Not Applicable    Vitamin D deficiency disease [E55.9] 04/25/2017 Yes    Debility [R53.81] 04/06/2017 Yes    Anticoagulant long-term use [Z79.01]  Not Applicable    Pulmonary hypertension due to lung disease [I27.2, J98.4] 03/30/2017 Yes    Chronic obstructive pulmonary disease [J44.9] 06/16/2016 Yes    Anemia of chronic disease [D63.8] 12/16/2015 Yes     Essential hypertension [I10] 08/06/2013 Yes      Problems Resolved During this Admission:    Diagnosis Date Noted Date Resolved POA    Osteoporosis [M81.0] 03/18/2016 05/01/2017 Yes    Atrial flutter with rapid ventricular response [I48.92] 04/28/2017 04/30/2017 No    Coronary artery disease involving native coronary artery of native heart without angina pectoris [I25.10] 04/26/2017 05/01/2017 Yes    Hypophosphatemia [E83.39] 04/25/2017 05/01/2017 No    Fall [W19.XXXA] 04/23/2017 04/23/2017 Yes    Hyponatremia [E87.1] 03/28/2017 04/30/2017 Yes    Urinary retention [R33.9] 03/28/2017 05/01/2017 Yes      * Closed displaced fracture of neck of right femur s/p hemiarthroplasty on 4/23/2017  Encounter for aftercare for healing closed traumatic fracture of hip  Patient is POD # 9. Patient reports minimal pain in right hip and controlled with just Tylenol as needed. Plan is to continue PT/OT for gait training and strengthening and restoration of ADL's on discharge to [facility]. Patient is FWB/WBAT: right lower extremity, posterior hip precautions as per Orthopedic recommendations and to continue on discharge. Patient restarted on Eliquis for long term anticoagulation for atrial fibrillation on 4/29 so does not need Lovenox for DVT prophylaxis post-op. Will continue Tylenol 650 mg po every 6 hours as needed for pain on discharge as patient is very sensitive to narcotics and could not even tolerate Tramadol as made patient too sedated.     Chronic hypoxemic respiratory failure  COPD  On 3LNC home oxygen  - No signs of acute COPD exacerbation   - monitor respiratory status closely during periop period   - Continue nebs and supplemental oxygen to keep sat > 90 %      Acute blood loss anemia  Anemia of chronic disease  Controlled. On admission to hospital, patient was noted to have Hgb of 8.8. After surgery patient's Hgb level dropped to 7.7 as expected related to hip surgery and patient  transfused 1 unit PRBC on 4/24 and repeat Hgb on 4/25 improved to 9.9 and Hgb 9.1 on 4/27 and 9.4 on 4/30. Patient has no signs of active bleeding and hemodynamically stable and no further blood transfusion needed.     Coronary artery disease involving native coronary artery without angina pectoris  Controlled. No angina or active chest pain. Will continue Toprol XL 50 mg po BID to treat chronic CAD.       Paroxysmal atrial fibrillation  Anticoagulant long-term use  Patient with episode of atrial flutter with RVR on 4/28. Patient given IV Lopressor 5 mg x 2 doses and then converted back to NSR. Increased patient's home dose of Toprol XL from 50 mg po daily to BID on 4/29 and restarted patient on home dose of Eliquis 2.5 mg po BID on 4/29 for long term anticoagulation for atrial fib as no surgical intervention planned for right wrist at this time.   Patient in normal sinus rhythm on telemetry since 4/28 with no further episodes of atrial fib/flutter. Will continue Toprol XL 50 mg po BID for rate control on discharge. Patient on Eliquis 2.5 mg po BID for long term anticoagulation for atrial fibrillation/aflutter and continue on discharge.         Chronic diastolic heart failure  Controlled. No signs of volume overload or acute decompensated heart failure. Recent Echo from  March 2017 showed EF 55 - 60%. Plan to restart home dose of Lasix 20 mg po daily on discharge to SNF.     Osteoporosis, resolved as of 5/1/2017  Vitamin D deficiency  Controlled. Vitamin D level low at 23 consistent with mild deficiency. Will continue Vitamin D 2000 units daily to replace.     Anxiety and depression  Controlled. Continue Celexa daily to treat. Xanax as needed for anxiety.     Closed Colles' fracture of right radius  Controlled. Orthopedics evaluated and recommends non-operative management of wrist fracture at this time. Will continue to have patient in splint that was applied by Orthopedics with NWB to right wrist. Follow-up with  Orthopedics as outpatient with Dr. Freeman in 1-2 weeks and ambulatory referral sent via [employer] to Dr. Freeman's office to set up appointment.      Discharged Condition: good    Disposition: Home or Self Care    Follow Up:  Follow-up Information     Follow up with Mason Baeza MD. Go on 6/29/2017.    Specialty:  Pulmonary Disease    Contact information:    [address]  [facility] [id number]  [phone]          Patient Instructions:     Ambulatory referral to Orthopedics   Referral Priority: Urgent Referral Type: Consultation   Referral Reason: Specialty Services Required    Referred to Provider: DANIEL FREEMAN    Number of Visits Requested: 1      Diet general     Activity as tolerated     Call MD for:  temperature >100.4     Call MD for:  increased confusion or weakness     Call MD for:  persistent dizziness, light-headedness, or visual disturbances     Call MD for:  worsening rash     Call MD for:  severe persistent headache     Call MD for:  difficulty breathing or increased cough     Call MD for:  redness, tenderness, or signs of infection (pain, swelling, redness, odor or green/yellow discharge around incision site)     Call MD for:  severe uncontrolled pain     Call MD for:  persistent nausea and vomiting or diarrhea       Medications:  Reconciled Home Medications:   Discharge Medication List as of 5/2/2017  2:36 PM      START taking these medications    Details   acetaminophen (TYLENOL) 325 MG tablet Take 2 tablets (650 mg total) by mouth every 6 (six) hours as needed (Mild pain)., Starting 5/1/2017, Until Discontinued, OTC         CONTINUE these medications which have CHANGED    Details   metoprolol succinate (TOPROL-XL) 50 MG 24 hr tablet Take 1 tablet (50 mg total) by mouth 2 (two) times daily., Starting 5/1/2017, Until Tue 5/1/18, Print         CONTINUE these medications which have NOT CHANGED    Details   albuterol 90 mcg/actuation inhaler Inhale 2 puffs into the lungs every 6 (six) hours as  needed for Wheezing or Shortness of Breath., Starting 1/28/2016, Until Discontinued, Normal      apixaban 2.5 mg Tab Take 1 tablet (2.5 mg total) by mouth 2 (two) times daily., Starting 3/7/2017, Until Discontinued, Normal      ascorbic acid (VITAMIN C) 500 MG tablet Take 500 mg by mouth 2 (two) times daily., Until Discontinued, Historical Med      aspirin (ECOTRIN) 81 MG EC tablet Take 1 tablet (81 mg total) by mouth once daily., Starting 9/10/2015, Until Discontinued, OTC      citalopram (CELEXA) 20 MG tablet TAKE 1 TABLET BY MOUTH ONCE DAILY, Normal      cyanocobalamin (VITAMIN B-12) 100 MCG tablet Take 1 tablet (100 mcg total) by mouth once daily., Starting 6/22/2016, Until Discontinued, Print      cyproheptadine (PERIACTIN) 4 mg tablet Take 1 tablet (4 mg total) by mouth 3 (three) times daily as needed., Starting 12/13/2016, Until Discontinued, Normal      fluticasone-vilanterol (BREO) 100-25 mcg/dose diskus inhaler Inhale 1 puff into the lungs once daily., Starting 4/26/2016, Until Discontinued, Print      furosemide (LASIX) 20 MG tablet Take 1 tablet (20 mg total) by mouth once daily., Starting 3/7/2017, Until Wed 3/7/18, Normal      guaifenesin (MUCINEX) 600 mg 12 hr tablet Take 2 tablets (1,200 mg total) by mouth 2 (two) times daily. Okay to dispense box of medication  as prepackaged by ., Starting 10/20/2016, Until Discontinued, Print      levalbuterol (XOPENEX) 0.31 mg/3 mL nebulizer solution Take 3 mLs (0.31 mg total) by nebulization 4 (four) times daily., Starting 1/3/2017, Until Wed 1/3/18, Normal      multivitamin (ONE DAILY MULTIVITAMIN) per tablet Take 1 tablet by mouth once daily., Until Discontinued, Historical Med      potassium chloride (MICRO-K) 10 MEQ CpSR Take 1 capsule (10 mEq total) by mouth once daily., Starting 4/12/2017, Until Discontinued, Normal      senna-docusate 8.6-50 mg (PERICOLACE) 8.6-50 mg per tablet Take 1 tablet by mouth 2 (two) times daily., Starting 4/12/2017,  Until Discontinued, OTC      tiotropium (SPIRIVA WITH HANDIHALER) 18 mcg inhalation capsule Inhale 18 mcg into the lungs once daily. Controller, Until Discontinued, Historical Med      nitroGLYCERIN (NITROSTAT) 0.4 MG SL tablet Place 1 tablet (0.4 mg total) under the tongue every 5 (five) minutes as needed for Chest pain., Starting 4/12/2017, Until Thu 4/12/18, Normal         STOP taking these medications       alprazolam (XANAX) 0.25 MG tablet Comments:   Reason for Stopping:         doxycycline (VIBRA-TABS) 100 MG tablet Comments:   Reason for Stopping:         lisinopril (PRINIVIL,ZESTRIL) 2.5 MG tablet Comments:   Reason for Stopping:         predniSONE (DELTASONE) 10 MG tablet Comments:   Reason for Stopping:             Time spent on the discharge of patient: 40 minutes    Alexa Humphries MD  Department of Hospital Medicine  [facility]

## 2017-05-02 NOTE — PROGRESS NOTES
[facility] Hospital Medicine  Progress Note    Patient Name: Kaity Rebolledo  MRN: [id number]  Patient Class: IP- Inpatient   Admission Date: 4/21/2017  Length of Stay: 10 days  Attending Physician: No att. 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 Tanner recently was hospitalized at [facility] at the end of march for acute on chronic hypoxic and hypercapneic respiratory failuer due to COPD exacerbation. She was treated with BIPAP, steroid and nebs during hospital stay and discahred to [facility] on 04/04/17 for deconditioning. She was getting PT at SNF and started on 10 days course of doxycycline for suspected penumonia based on CXR finding. She was seen at pulmonary clinic by Dr. Christian on 04/13 who started on prednisone 20 mg daily x 5 days, then 10 mg daily x 5 days and then stop. She was discharged from [facility] to assisted living with home health on 04/14/17. She had multiple recent admissions for COPD exacerbation and durigh last admit she was made DNR per daughter at bedside. Daughter states she has been livign at assisted living for last 2 years and mostly wheel charir bound due to COPD and debility.     4/21 around 8 pm while she was walking from family room to her bed she got tangled with oxygen tubing, lost balnace and fell. She tried to prevent the fall with her right hand. Denies hitting her head or LOC. Imaing in ED showed right distal radial and ulnar fractuer, and right  femoral neck fracture. Orthopedic reduced the radial fx at bedside and placed splint. Plan to have surgical fixation of right femoral neck fracture.           Hospital Course:  Patient admitted to hospital to the [facility] service with Orthopedic surgery consult. Patient was seen and evaluated by Orthopedic surgery who recommended operative repair of hip fracture. Patient was medically optimized prior to surgery and was taken to OR after optimization on 4/23/2017. Patient underwent right hip hemiarthroplasty by Dr. Justin Freeman. Post-op patient was WBAT with posterior hip precautions to the right lower extremity as per Orthopedics recommendation. Patient was placed on Lovenox 40 mg subcutaneous daily and JENNIFER/SCD's for DVT prophylaxis post-op. PT/OT were consulted and evaluated patient and PT and OT recommending skilled nursing facility placement. Patient also suffered Colles fracture of right radius on admit with original fall at home and Orthopedics evaluated patient and considering possible need for surgical repair but at this time recommendation is for no operative repair with re-evaluation of Colles fracture in 1-2 weeks. Patient has cast to right forearm to remain in place. Patient post-op had difficulty voiding after Joshua removed and Joshua had to be replaced secondary to urinary retention on 4/25. Patient to attempt voiding trial on 4/30. Patient on 4/28 had episode of atrial flutter with RVR with HR in 160's. Patient denied any chest pain or SOB but did feel lightheaded. Patient given IV Lopressor 5 mg x 2 doses and patient converted back into sinus rhythm. Patient did have decrease in SBP to 94-98 while in atrial flutter but able to tolerate IV Lopressor. Patient's home dose of Toprol XL increased from 50 mg po daily to BID treat as patient with history of atrial fibrillation in 3/2017. Patient restarted on home dose of Eliquis for long term anticoagulation on 4/29. Patient awaiting SNF placement. Voiding trail  done on 4/30 and Joshua removed to see if patient can urinate on her own and patient able to void after Joshua removed on 4/30. Patient remains in normal sinus rhythm. Patient to be discharged to [facility].     Interval History: Patient medically stable and awaiting SNF placement at [facility]. Patient with no further episodes of atrial fib or flutter since 4/28. Patient able to void on her own after Joshua removed 4/30. No plans for operative intervention of right wrist at this time by Orthopedics. Family at bedside and aware of plan for discharge.     Review of Systems   Constitutional: Negative for fever.   Respiratory: Negative for chest tightness and shortness of breath.    Cardiovascular: Negative for chest pain, palpitations and leg swelling.   Gastrointestinal: Negative for abdominal pain, constipation and nausea.   Genitourinary: Negative for flank pain.   Musculoskeletal: Positive for myalgias (Right wrist and right hip pain).   Skin: Positive for pallor. Negative for rash.   Neurological: Positive for weakness (Generalized). Negative for dizziness and light-headedness.   Psychiatric/Behavioral: Negative for confusion.     Objective:     Vital Signs (Most Recent):  Temp: 98.7 °F (37.1 °C) (05/02/17 1144)  Pulse: 82 (05/02/17 1245)  Resp: 20 (05/02/17 1245)  BP: (!) 140/70 (05/02/17 1144)  SpO2: 97 % (05/02/17 1245) Vital Signs (24h Range):  Temp:  [97.7 °F (36.5 °C)-98.7 °F (37.1 °C)] 98.7 °F (37.1 °C)  Pulse:  [77-97] 82  Resp:  [15-20] 20  SpO2:  [94 %-99 %] 97 %  BP: (122-140)/(70-85) 140/70     Weight: 49.4 kg (109 lb)  Body mass index is 18.14 kg/(m^2).    Intake/Output Summary (Last 24 hours) at 05/02/17 1446  Last data filed at 05/02/17 0403   Gross per 24 hour   Intake              686 ml   Output                0 ml   Net              686 ml      Physical Exam   Constitutional: She is oriented to person, place, and time. She appears cachectic. No distress.   Cardiovascular:  Normal rate and regular rhythm.  Exam reveals no gallop and no friction rub.    No murmur heard.  Pulmonary/Chest: Effort normal and breath sounds normal. She has no wheezes.   Abdominal: Soft. Bowel sounds are normal. She exhibits no distension. There is no tenderness.   Musculoskeletal: She exhibits tenderness (to hip).   Neurological: She is alert and oriented to person, place, and time.   Skin: Skin is warm. No rash noted.   Surgical bandage to right hip and splint to right forearm   Psychiatric: She has a normal mood and affect. Thought content normal.         Assessment/Plan:      * Closed displaced fracture of neck of right femur s/p hemiarthroplasty on 4/23/2017  Encounter for aftercare for healing closed traumatic fracture of hip  Patient is POD # 9. Patient reports minimal pain in right hip and controlled with just Tylenol as needed. Plan is to continue PT/OT for gait training and strengthening and restoration of ADL's on discharge to [facility] Skilled unit. Patient is FWB/WBAT: right lower extremity, posterior hip precautions as per Orthopedic recommendations and to continue on discharge. Patient restarted on Eliquis for long term anticoagulation for atrial fibrillation on 4/29 so does not need Lovenox for DVT prophylaxis post-op. Will continue Tylenol 650 mg po every 6 hours as needed for pain on discharge as patient is very sensitive to narcotics and could not even tolerate Tramadol as made patient too sedated.     Chronic hypoxemic respiratory failure  COPD  On 3LNC home oxygen  - No signs of acute COPD exacerbation   - monitor respiratory status closely during periop period   - Continue nebs and supplemental oxygen to keep sat > 90 %      Acute blood loss anemia  Anemia of chronic disease  Controlled. On admission to hospital, patient was noted to have Hgb of 8.8. After surgery patient's Hgb level dropped to 7.7 as expected related to hip surgery and patient transfused 1 unit PRBC on 4/24  and repeat Hgb on 4/25 improved to 9.9 and Hgb 9.1 on 4/27 and 9.4 on 4/30. Patient has no signs of active bleeding and hemodynamically stable and no further blood transfusion needed.     Coronary artery disease involving native coronary artery without angina pectoris  Controlled. No angina or active chest pain. Will continue Toprol XL 50 mg po BID to treat chronic CAD.       Paroxysmal atrial fibrillation  Anticoagulant long-term use  Patient with episode of atrial flutter with RVR on 4/28. Patient given IV Lopressor 5 mg x 2 doses and then converted back to NSR. Increased patient's home dose of Toprol XL from 50 mg po daily to BID on 4/29 and restarted patient on home dose of Eliquis 2.5 mg po BID on 4/29 for long term anticoagulation for atrial fib as no surgical intervention planned for right wrist at this time.   Patient in normal sinus rhythm on telemetry since 4/28 with no further episodes of atrial fib/flutter. Will continue Toprol XL 50 mg po BID for rate control on discharge. Patient on Eliquis 2.5 mg po BID for long term anticoagulation for atrial fibrillation/aflutter and continue on discharge.         Chronic diastolic heart failure  Controlled. No signs of volume overload or acute decompensated heart failure. Recent Echo from  March 2017 showed EF 55 - 60%. Plan to restart home dose of Lasix 20 mg po daily on discharge to SNF.     Anxiety and depression  Controlled. Continue Celexa daily to treat. Xanax as needed for anxiety.     Closed Colles' fracture of right radius  Controlled. Orthopedics evaluated and recommends non-operative management of wrist fracture at this time. Will continue to have patient in splint that was applied by Orthopedics with NWB to right wrist. Follow-up with Orthopedics as outpatient with Dr. Freeman in 1-2 weeks and ambulatory referral sent via [email or web address] to Dr. Freeman's office to set up appointment.    Urinary retention, resolved as of 5/1/2017  Resolved. Patient with urinary  retention post-op and likely related to narcotics. Joshua catheter replaced on 4/25 as unable to void despite several I+O caths. Joshua removed on 4/30 and patient now voiding on her own.      VTE Risk Mitigation         Ordered     apixaban tablet 2.5 mg  2 times daily     Route:  Oral        04/28/17 1913     Medium Risk of VTE  Once      04/22/17 0916     Place JENNIFER hose  Until discontinued      04/22/17 0553     Place sequential compression device  Until discontinued      04/22/17 0553          Alexa Humphries MD  Department of Hospital Medicine   [facility]

## 2017-05-02 NOTE — ASSESSMENT & PLAN NOTE
Controlled. Orthopedics evaluated and recommends non-operative management of wrist fracture at this time. Will continue to have patient in splint that was applied by Orthopedics with NWB to right wrist. Follow-up with Orthopedics as outpatient with Dr. Freeman in 1-2 weeks and ambulatory referral sent via [employer] to Dr. Freeman's office to set up appointment.

## 2017-05-02 NOTE — PT/OT/SLP PROGRESS
Physical Therapy  Treatment    Kaity Rebolledo   MRN: [id number]   Admitting Diagnosis: Closed displaced fracture of neck of right femur    PT Received On: 17  PT Start Time: 1110     PT Stop Time: 1134    PT Total Time (min): 24 min       Billable Minutes:  Therapeutic Activity 16 and Therapeutic Exercise 8    Treatment Type: Treatment  PT/PTA: PTA     PTA Visit Number: 2       General Precautions: Standard, fall  Orthopedic Precautions: RUE non weight bearing, RLE posterior precautions, RLE weight bearing as tolerated   Braces:           Subjective:  Communicated with NSG prior to session.  I feel worn out    Pain Ratin/10                   Objective:   Patient found with:  (no lines)    Functional Mobility:  Bed Mobility:        Transfers:  Sit <> Stand Assistance: Maximum Assistance  Sit <> Stand Assistive Device: Rolling Walker  Bed <> Chair Technique: Stand Pivot  Bed <> Chair Assistance: Moderate Assistance  Bed <> Chair Assistive Device: No Assistive Device    Gait:   Gait Distance: patient unable secondary to fatigue          Balance:   Static Sit: FAIR: Maintains without assist, but unable to take any challenges   Dynamic Sit: FAIR+: Maintains balance through MINIMAL excursions of active trunk motion  Static Stand: 0: Needs MAXIMAL assist to maintain   Dynamic stand: 0: N/A     Therapeutic Activities and Exercises:  Patient performed AP,GS,QS [id number]  Patient tolerated static standing x2 trials for 15-20 sec each trial using R PRW mod assistance.  Noted patient attempting to elevate L LE while coming to standing position  Patient recalled 1/3 hip precautions         AM-PAC 6 CLICK MOBILITY  How much help from another person does this patient currently need?   1 = Unable, Total/Dependent Assistance  2 = A lot, Maximum/Moderate Assistance  3 = A little, Minimum/Contact Guard/Supervision  4 = None, Modified [place]/Independent    Turning over in bed (including adjusting bedclothes, sheets and  blankets)?: 2  Sitting down on and standing up from a chair with arms (e.g., wheelchair, bedside commode, etc.): 2  Moving from lying on back to sitting on the side of the bed?: 2  Moving to and from a bed to a chair (including a wheelchair)?: 2  Need to walk in hospital room?: 1  Climbing 3-5 steps with a railing?: 1  Total Score: 10    AM-PAC Raw Score CMS G-Code Modifier Level of Impairment Assistance   6 % Total / Unable   7 - 9 CM 80 - 100% Maximal Assist   10 - 14 CL 60 - 80% Moderate Assist   15 - 19 CK 40 - 60% Moderate Assist   20 - 22 CJ 20 - 40% Minimal Assist   23 CI 1-20% SBA / CGA   24 CH 0% Independent/ Mod I     Patient left up in chair with call button in reach and daughter present.    Assessment:  Kaity Rebolledo is a 83 y.o. female with a medical diagnosis of Closed displaced fracture of neck of right femur and presents with decrease strength, mobility, balance and endurance.  Patient requires assistance with all mobility and  Transfers. Noted fair sitting and poor standing balance.  OOB mobility limited secondary to fatigue.  Patient would benefit from further IP therapy.    Rehab identified problem list/impairments: Rehab identified problem list/impairments: weakness, gait instability, impaired balance, impaired self care skills, impaired functional mobilty, decreased upper extremity function, decreased lower extremity function, decreased ROM, orthopedic precautions, decreased safety awareness, impaired endurance    Rehab potential is fair.    Activity tolerance: Fair    Discharge recommendations: Discharge Facility/Level Of Care Needs: nursing facility, skilled     Barriers to discharge: Barriers to Discharge: Inaccessible home environment, Decreased caregiver support    Equipment recommendations: Equipment Needed After Discharge: walker, rolling, wheelchair, bedside commode, bath bench     GOALS:   Physical Therapy Goals        Problem: Physical Therapy Goal    Goal Priority  Disciplines Outcome Goal Variances Interventions   Physical Therapy Goal     PT/OT, PT Ongoing (interventions implemented as appropriate)     Description:  Goals to be met by:      Patient will increase functional independence with mobility by performin. Supine to sit with MInimal Assistance  2. Sit to supine with MInimal Assistance  3. Sit to stand transfer with Moderate Assistance-met   3a. Sit<>stand t/f with min (A)  4. Bed to chair transfer with Moderate Assistance using appropriate AD  5. Stand for 5 minutes with Contact Guard Assistance using appropriate AD  6. New goal: Gait  x 10 feet with Minimal Assistance using appropriate AD.   7. (I) with HEP                  PLAN:    Patient to be seen daily  to address the above listed problems via gait training, therapeutic activities, therapeutic exercises, neuromuscular re-education  Plan of Care expires: 17  Plan of Care reviewed with: patient, daughter         Yves Armijo ANGEL, PTA  2017

## 2017-05-02 NOTE — PT/OT/SLP PROGRESS
"Occupational Therapy  Treatment    Kaity Rebolledo   MRN: [id number]   Admitting Diagnosis: Closed displaced fracture of neck of right femur    OT Date of Treatment: 17   OT Start Time: 936  OT Stop Time: 1003  OT Total Time (min): 27 min    Billable Minutes:  Self Care/Home Management 8 minutes and Therapeutic Activity 16 minutes    General Precautions: Standard, fall  Orthopedic Precautions: RUE non weight bearing, RLE weight bearing as tolerated, RLE posterior precautions    Subjective:  Communicated with RN prior to session.  "Thank you for helping me."    Pain Ratin/10  Location - Side: Right  Location: hip  Pain Addressed: Pre-medicate for activity, Reposition, Cessation of Activity  Pain Rating Post-Intervention: 0/10    Objective:  Patient found with: SCD, pt found supine with HOB elevated and agreeable to therapy.     Functional Mobility:  Bed Mobility:  Rolling/Turning Right: Contact guard assistance, With side rail  Supine to Sit: Minimum Assistance (HOB at 25 degrees)    Transfers:  Sit <> Stand Assistance: Minimum Assistance  Sit <> Stand Assistive Device: No Assistive Device  Bed <> Chair Technique: Stand Pivot  Bed <> Chair Transfer Assistance: Moderate Assistance  Bed <> Chair Assistive Device: No Assistive Device    Functional Ambulation: Mod A with no AD for pivot; Min A to stand, but demo posterior lean requiring mod A for transfer to chair.    Activities of Daily Living:    UE Dressing Level of Assistance: Moderate assistance (gown as robe; A to pull around back and find 2nd sleeve; able to don affected extremity and pull up to shoulder and also able complete task by pushing arm through 2nd sleeve)  Grooming Position: bedside chair, Seated  Grooming Level of Assistance: Supervision (setup to brush teeth)    Balance:   Static Sit: GOOD+: Takes MAXIMAL challenges from all directions.    Dynamic Sit: FAIR+: Maintains balance through MINIMAL excursions of active trunk motion  Static Stand: " POOR+: Needs MINIMAL assist to maintain  Dynamic stand: POOR: N/A    Therapeutic Activities and Exercises:  Pt ed re OT role and POC. Pt performed R roll with CGA and side rail. Pt required Min A to perform supine to sit. Pt sat EOB and donned gown as robe with Mod A. Pt performed sit to stand t/f with Min A and stood for 1 minute. Pt sat EOB and rested. Pt performed stand pivot with Min A to stand with no AD, and Mod A to pivot (A to correct posterior lean) to chair. Pt sat in chair and performed grooming task with setup a to brush teeth. Pt required A to open toothpaste tube 2/2 decreased  and FM strength. Pt able to brush teeth with non-dominant hand.    AM-PAC 6 CLICK ADL   How much help from another person does this patient currently need?   1 = Unable, Total/Dependent Assistance  2 = A lot, Maximum/Moderate Assistance  3 = A little, Minimum/Contact Guard/Supervision  4 = None, Modified [place]/Independent    Putting on and taking off regular lower body clothing? : 1  Bathing (including washing, rinsing, drying)?: 2  Toileting, which includes using toilet, bedpan, or urinal? : 2  Putting on and taking off regular upper body clothing?: 2  Taking care of personal grooming such as brushing teeth?: 3  Eating meals?: 2  Total Score: 12     AM-PAC Raw Score CMS G-Code Modifier Level of Impairment Assistance   6 % Total / Unable   7 - 9 CM 80 - 100% Maximal Assist   10 - 14 CL 60 - 80% Moderate Assist   15 - 19 CK 40 - 60% Moderate Assist   20 - 22 CJ 20 - 40% Minimal Assist   23 CI 1-20% SBA / CGA   24 CH 0% Independent/ Mod I     Patient left up in chair with all lines intact, call button in reach and daughter present    ASSESSMENT:  Kaity Rebolledo is a 83 y.o. female with a medical diagnosis of Closed displaced fracture of neck of right femur and presents with the impairments listed below. Pt participates well and is motivated to return to [facility]. Pt requiring Mod A for stand pivot transfers, but is  able to perform most self care tasks while seated. Pt requires significant A for LBD and requires Setup A for grooming and feeding. Pt is a good candidate for therapy in the SNF setting to maximize independence in self care and mobility. Pt would benefit from cont OT to progress toward goals of (I).    Rehab identified problem list/impairments: Rehab identified problem list/impairments: weakness, impaired endurance, impaired self care skills, impaired functional mobilty, gait instability, impaired balance, decreased coordination, decreased upper extremity function, decreased lower extremity function, decreased safety awareness, decreased ROM, orthopedic precautions    Rehab potential is good.    Activity tolerance: Fair    Discharge recommendations: Discharge Facility/Level Of Care Needs: nursing facility, skilled     Barriers to discharge: Barriers to Discharge: Inaccessible home environment, Decreased caregiver support    Equipment recommendations: bath bench, bedside commode, wheelchair     GOALS:   Occupational Therapy Goals        Problem: Occupational Therapy Goal    Goal Priority Disciplines Outcome Interventions   Occupational Therapy Goal     OT, PT/OT Ongoing (interventions implemented as appropriate)    Description:  Goals to be met by: 05/04     Patient will increase functional independence with ADLs by performing:    UE Dressing with Minimal Assistance.  Grooming while seated with Stand-by Assistance.   MET 5/2/2017  Toileting from bedside commode with Moderate Assistance for hygiene and clothing management.   Supine to sit with Minimal Assistance.    MET 5/2/2017  Stand pivot transfers with Minimal Assistance.  Increased functional strength to WFL for ADLs and functional transfers.                 Plan:  Patient to be seen 4 x/week to address the above listed problems via self-care/home management, therapeutic activities, therapeutic exercises, cognitive retraining  Plan of Care expires:    Plan of  Care reviewed with: patient, daughter    LEONARDA AmadorYENI  5/2/2017  Pager: [phone]

## 2017-05-02 NOTE — ASSESSMENT & PLAN NOTE
Controlled. Orthopedics evaluated and recommends non-operative management of wrist fracture at this time. Will continue to have patient in splint that was applied by Orthopedics with NWB to right wrist. Follow-up with Orthopedics as outpatient with Dr. Freeman in 1-2 weeks and ambulatory referral sent via [employer] to Dr. Freeman's office to set up appointment.

## 2017-05-02 NOTE — PROGRESS NOTES
Noted that patient is currently admitted in the hospital. This RN reviewed the chart. This RN called [facility] at [phone] and asked to speak with Selam Talbert. Transferred to [facility] phone, voice message left on voice mail to the [facility] asking to be notified of discharge plan and also asked for [facility] to notify patient/caregiver that this RN will be  contacting patient/caregiver once patient is discharged. Partially completed initial assessment for OPCM via chart review. Will follow up with patient/caregiver once discharged from the hospital.  According to Selam, patient will be d/c to [facility] as a senior living patient.  CARLYN Wall, OPCM-RN

## 2017-05-02 NOTE — SUBJECTIVE & OBJECTIVE
Interval History: Patient medically stable and awaiting SNF placement at [facility]. Patient with no further episodes of atrial fib or flutter since 4/28. Patient able to void on her own after Joshua removed 4/30. No plans for operative intervention of right wrist at this time by Orthopedics. Family at bedside and aware of plan for discharge.     Review of Systems   Constitutional: Negative for fever.   Respiratory: Negative for chest tightness and shortness of breath.    Cardiovascular: Negative for chest pain, palpitations and leg swelling.   Gastrointestinal: Negative for abdominal pain, constipation and nausea.   Genitourinary: Negative for flank pain.   Musculoskeletal: Positive for myalgias (Right wrist and right hip pain).   Skin: Positive for pallor. Negative for rash.   Neurological: Positive for weakness (Generalized). Negative for dizziness and light-headedness.   Psychiatric/Behavioral: Negative for confusion.     Objective:     Vital Signs (Most Recent):  Temp: 98.7 °F (37.1 °C) (05/02/17 1144)  Pulse: 82 (05/02/17 1245)  Resp: 20 (05/02/17 1245)  BP: (!) 140/70 (05/02/17 1144)  SpO2: 97 % (05/02/17 1245) Vital Signs (24h Range):  Temp:  [97.7 °F (36.5 °C)-98.7 °F (37.1 °C)] 98.7 °F (37.1 °C)  Pulse:  [77-97] 82  Resp:  [15-20] 20  SpO2:  [94 %-99 %] 97 %  BP: (122-140)/(70-85) 140/70     Weight: 49.4 kg (109 lb)  Body mass index is 18.14 kg/(m^2).    Intake/Output Summary (Last 24 hours) at 05/02/17 1446  Last data filed at 05/02/17 0403   Gross per 24 hour   Intake              686 ml   Output                0 ml   Net              686 ml      Physical Exam   Constitutional: She is oriented to person, place, and time. She appears cachectic. No distress.   Cardiovascular: Normal rate and regular rhythm.  Exam reveals no gallop and no friction rub.    No murmur heard.  Pulmonary/Chest: Effort normal and breath sounds normal. She has no wheezes.   Abdominal: Soft. Bowel sounds are normal. She  exhibits no distension. There is no tenderness.   Musculoskeletal: She exhibits tenderness (to hip).   Neurological: She is alert and oriented to person, place, and time.   Skin: Skin is warm. No rash noted.   Surgical bandage to right hip and splint to right forearm   Psychiatric: She has a normal mood and affect. Thought content normal.

## 2017-05-02 NOTE — PLAN OF CARE
POD 8 s/p R CHARISMA. PT/OT recommended SNF placement. Plans to transfer/discharge patient to [facility] today. Patient was a previous resident at [facility] in [place], [place] prior to admit. SW and CM will continue to follow for any additional needs. Discharge and follow-up instructions to be completed by the bedside nurse.    Future Appointments  Date Time Provider Department Center   6/29/2017 3:00 PM Brian Baeza MD [facility]      05/02/17 0931   Final Note   Assessment Type Final Discharge Note   Discharge Disposition SNF  ([facility] SNF)   Discharge planning education complete? Yes   What phone number can be called within the next 1-3 days to see how you are doing after discharge? ([phone]- daughter's cell phone (Maryjane))   Hospital Follow Up  Appt(s) scheduled? Yes   Discharge plans and expectations educations in teach back method with documentation complete? Yes   Offered [facility] -- Bedside Delivery? Yes   Discharge/Hospital Encounter Summary to (non-[facility]) PCP n/a   Referral to Outpatient Case Management complete? n/a   Referral to / orders for Home Health Complete? n/a   30 day supply of medicines given at discharge, if documented non-compliance / non-adherence? n/a   Any social issues identified prior to discharge? No   Did you assess the readiness or willingness of the family or caregiver to support self management of care? Yes

## 2017-05-02 NOTE — PLAN OF CARE
Problem: Patient Care Overview  Goal: Plan of Care Review  Outcome: Ongoing (interventions implemented as appropriate)  Pt AAOX4, VSS. Surgical site CDI. Sitter at bedside. Pt is resting quietly now. No s/s infection - pt moves independently well. Pain managed with PRN medications. IS education complete. S/FCD's on and functioning properly. Fall precautions maintained. Will resume care.

## 2017-05-02 NOTE — PLAN OF CARE
"11:00 AM. [place]. EVAN has been in contact with [facility] noting the only thing pending pts transfer to [facility] is the 142 form. Will send to facility as soon as available. Pts mary jane Coker updated and aware.     11:32 AM. 142 sent to [facility] via right care. SW left VM with admissions as well as left message via right care. Will set up transport as soon as room number is given.    11:50 AM. EVAN set up transportation with [facility] [phone] speaking with Eliza to be here within 2 hours. [facility] will provide 3L o2 for transport. EVAN informed pts nurse Radha [phone] to call report to [phone] to the "400 matute nurse" pt is going to room 404.     EVAN updated pts shahrzad.     IRMA Talbert, FER  [id number]    "

## 2017-05-03 NOTE — PT/OT/SLP DISCHARGE
Occupational Therapy Discharge Summary    Kaity Rebolledo  MRN: [id number]   Closed displaced fracture of neck of right femur   Patient Discharged from acute Occupational Therapy on 5/2/17.  Please refer to prior OT note dated on 5/2/17 for functional status.     Assessment:   Patient appropriate for care in another setting.  GOALS:   Occupational Therapy Goals        Problem: Occupational Therapy Goal    Goal Priority Disciplines Outcome Interventions   Occupational Therapy Goal     OT, PT/OT Ongoing (interventions implemented as appropriate)    Description:  Goals to be met by: 05/04     Patient will increase functional independence with ADLs by performing:    UE Dressing with Minimal Assistance.  Grooming while seated with Stand-by Assistance.   MET 5/2/2017  Toileting from bedside commode with Moderate Assistance for hygiene and clothing management.   Supine to sit with Minimal Assistance.    MET 5/2/2017  Stand pivot transfers with Minimal Assistance.  Increased functional strength to WFL for ADLs and functional transfers.               Reasons for Discontinuation of Therapy Services  Transfer to alternate level of care.      Plan:  Patient Discharged to: Skilled Nursing Facility.    NEY Amador  5/3/2017  Pager: [phone]

## 2017-05-04 ENCOUNTER — PATIENT OUTREACH (OUTPATIENT)
Dept: ADMINISTRATIVE | Facility: CLINIC | Age: 82
End: 2017-05-04
Payer: MEDICARE

## 2017-05-05 ENCOUNTER — PATIENT MESSAGE (OUTPATIENT)
Dept: ORTHOPEDICS | Facility: CLINIC | Age: 82
End: 2017-05-05

## 2017-05-05 DIAGNOSIS — S52.501D CLOSED FRACTURE OF DISTAL END OF RIGHT RADIUS WITH ROUTINE HEALING, UNSPECIFIED FRACTURE MORPHOLOGY, SUBSEQUENT ENCOUNTER: Primary | ICD-10-CM

## 2017-05-09 ENCOUNTER — HOSPITAL ENCOUNTER (OUTPATIENT)
Dept: RADIOLOGY | Facility: HOSPITAL | Age: 82
Discharge: HOME | End: 2017-05-09
Attending: ORTHOPAEDIC SURGERY
Payer: MEDICARE

## 2017-05-09 ENCOUNTER — OUTPATIENT CASE MANAGEMENT (OUTPATIENT)
Dept: ADMINISTRATIVE | Facility: OTHER | Age: 82
End: 2017-05-09

## 2017-05-09 ENCOUNTER — OFFICE VISIT (OUTPATIENT)
Dept: ORTHOPEDICS | Facility: CLINIC | Age: 82
End: 2017-05-09
Payer: MEDICARE

## 2017-05-09 DIAGNOSIS — S72.001A CLOSED DISPLACED FRACTURE OF NECK OF RIGHT FEMUR: Primary | ICD-10-CM

## 2017-05-09 DIAGNOSIS — S52.531D CLOSED COLLES' FRACTURE OF RIGHT RADIUS WITH ROUTINE HEALING, SUBSEQUENT ENCOUNTER: ICD-10-CM

## 2017-05-09 DIAGNOSIS — S52.501D CLOSED FRACTURE OF DISTAL END OF RIGHT RADIUS WITH ROUTINE HEALING, UNSPECIFIED FRACTURE MORPHOLOGY, SUBSEQUENT ENCOUNTER: ICD-10-CM

## 2017-05-09 PROCEDURE — 1160F RVW MEDS BY RX/DR IN RCRD: CPT | Mod: S$GLB,,, | Performed by: ORTHOPAEDIC SURGERY

## 2017-05-09 PROCEDURE — 1159F MED LIST DOCD IN RCRD: CPT | Mod: S$GLB,,, | Performed by: ORTHOPAEDIC SURGERY

## 2017-05-09 PROCEDURE — 73110 X-RAY EXAM OF WRIST: CPT | Mod: 26,RT,, | Performed by: RADIOLOGY

## 2017-05-09 PROCEDURE — 99999 PR PBB SHADOW E&M-EST. PATIENT-LVL I: CPT | Mod: PBBFAC,,, | Performed by: ORTHOPAEDIC SURGERY

## 2017-05-09 PROCEDURE — 1157F ADVNC CARE PLAN IN RCRD: CPT | Mod: S$GLB,,, | Performed by: ORTHOPAEDIC SURGERY

## 2017-05-09 PROCEDURE — 99214 OFFICE O/P EST MOD 30 MIN: CPT | Mod: 24,S$GLB,, | Performed by: ORTHOPAEDIC SURGERY

## 2017-05-09 NOTE — PHYSICIAN QUERY
"PT Name: Kaity Rebolledo  MR #: [id number]     Physician Query Form - Documentation Clarification      CDS/: Lucinda Edwards               Contact information: [email or web address]    This form is a permanent document in the medical record.     Query Date: May 9, 2017    By submitting this query, we are merely seeking further clarification of documentation. Please utilize your independent clinical judgment when addressing the question(s) below.    The Medical record reflects the following:    Supporting Clinical Findings Location in Medical Record   "She appears cachectic"   4/25/17 Hospital Medicine Progress Note                                                                                Doctor, Please specify diagnosis or diagnoses associated with above clinical findings.  *Please clarify whether cachexia was:    - ruled in  - ruled out.    Provider Use Only          Ruled in                                                                                                                     [  ] Clinically undetermined            "

## 2017-05-09 NOTE — PROGRESS NOTES
Talked to daughter Maryjane Chan to update plan of care for OPCM.  According to Maryjane, patient is currently in rehab at [facility] for approximately two weeks.  Daughter Maryjane stated that she will be out of the country next week and to call her sister Lu Razo at [phone] to update plan of care for her mother in the future while Maryjane is vacationing.  Encouraged Maryjane to call this RN if having any questions/concerns.  Will continue to follow.  CARLYN Wall, OPCM-RN

## 2017-05-09 NOTE — PROGRESS NOTES
"  CC: "right wrist", right hip     HPI: Patient is a 83 y.o. female with PMHx of COPD (on home oxygen), CHF (last EF 55%), Afib (on Eliquis) who presents for followup.  She had a fall from standing height on 4/21/17 and sustained a right femoral neck fracture and a right distal radius fracture.  Her right distal radius fracture was expertly reduced by Dr. Castillo.  She underwent right hip cemented hemiarthroplasty on 4/23/17 by Dr. Freeman.  She has been at rehab and walking minimally.    No other complaints today.  She is accompanied by her daughters.    Review of Systems   Constitution: Negative. Negative for chills, fever and night sweats.   HENT: neg congestion.    Eyes: Negative for blurred vision.  Cardiovascular: Negative for chest pain and syncope.   Hematologic/Lymphatic: Does not bruise/bleed easily.   Skin: Negative for dry skin, itching and rash.   Musculoskeletal: +fall.  No weakness  Gastrointestinal: Negative for abdominal pain.  Normal bowel function.  Genitourinary: Normal bladder function   Neurological: No dizziness, loss of balance, seizures.   Psychiatric/Behavioral: Negative for depression.        Past Medical History:   Diagnosis Date    Anticoagulant long-term use     Anxiety and depression     Chronic diastolic heart failure 9/2/2016    Chronic hypoxemic respiratory failure 2/6/2016    Chronic obstructive pulmonary disease 6/16/2016    Closed bilateral fracture of pubic rami 3/18/2016    Closed Colles' fracture of right radius 4/22/2017    Closed displaced fracture of neck of right femur s/p hemiarthroplasty on 4/23/2017 4/22/2017    COPD (chronic obstructive pulmonary disease)     Coronary artery disease involving native coronary artery without angina pectoris 6/16/2016    Displaced fracture of right femoral neck s/p hemiarthroplasty on 4/23/2017 4/22/2017    Essential hypertension 8/6/2013    Fall     patient  in  wheel  chair    Hyperlipidemia     Osteoporosis 3/18/2016 "    Paroxysmal atrial fibrillation     Pneumonia     Pulmonary hypertension due to lung disease 3/30/2017    Rotator cuff injury     R s/p therapy    Vitamin D deficiency disease 4/25/2017     Past Surgical History:   Procedure Laterality Date    CATARACT EXTRACTION BILATERAL W/ ANTERIOR VITRECTOMY      EYE SURGERY      HIP FRACTURE SURGERY Right 04/23/2017    Hemiarthroplasty for femoral neck fracture    LEG SURGERY       Current Outpatient Prescriptions on File Prior to Visit   Medication Sig Dispense Refill    acetaminophen (TYLENOL) 325 MG tablet Take 2 tablets (650 mg total) by mouth every 6 (six) hours as needed (Mild pain).  0    albuterol 90 mcg/actuation inhaler Inhale 2 puffs into the lungs every 6 (six) hours as needed for Wheezing or Shortness of Breath. 6.7 g 4    apixaban 2.5 mg Tab Take 1 tablet (2.5 mg total) by mouth 2 (two) times daily. 60 tablet 1    ascorbic acid (VITAMIN C) 500 MG tablet Take 500 mg by mouth 2 (two) times daily.      aspirin (ECOTRIN) 81 MG EC tablet Take 1 tablet (81 mg total) by mouth once daily.  0    citalopram (CELEXA) 20 MG tablet TAKE 1 TABLET BY MOUTH ONCE DAILY 30 tablet 2    cyanocobalamin (VITAMIN B-12) 100 MCG tablet Take 1 tablet (100 mcg total) by mouth once daily. 90 tablet 3    cyproheptadine (PERIACTIN) 4 mg tablet Take 1 tablet (4 mg total) by mouth 3 (three) times daily as needed. 30 tablet 2    fluticasone-vilanterol (BREO) 100-25 mcg/dose diskus inhaler Inhale 1 puff into the lungs once daily. 60 each 12    furosemide (LASIX) 20 MG tablet Take 1 tablet (20 mg total) by mouth once daily. 30 tablet 1    guaifenesin (MUCINEX) 600 mg 12 hr tablet Take 2 tablets (1,200 mg total) by mouth 2 (two) times daily. Okay to dispense box of medication  as prepackaged by . 60 tablet 6    levalbuterol (XOPENEX) 0.31 mg/3 mL nebulizer solution Take 3 mLs (0.31 mg total) by nebulization 4 (four) times daily. 300 mL 12    metoprolol succinate  (TOPROL-XL) 50 MG 24 hr tablet Take 1 tablet (50 mg total) by mouth 2 (two) times daily. 90 tablet 0    multivitamin (ONE DAILY MULTIVITAMIN) per tablet Take 1 tablet by mouth once daily.      nitroGLYCERIN (NITROSTAT) 0.4 MG SL tablet Place 1 tablet (0.4 mg total) under the tongue every 5 (five) minutes as needed for Chest pain. 30 tablet 1    potassium chloride (MICRO-K) 10 MEQ CpSR Take 1 capsule (10 mEq total) by mouth once daily. 30 capsule 3    senna-docusate 8.6-50 mg (PERICOLACE) 8.6-50 mg per tablet Take 1 tablet by mouth 2 (two) times daily.      tiotropium (SPIRIVA WITH HANDIHALER) 18 mcg inhalation capsule Inhale 18 mcg into the lungs once daily. Controller       No current facility-administered medications on file prior to visit.      Review of patient's allergies indicates:   Allergen Reactions    Advair diskus  [fluticasone-salmeterol]      Other reaction(s): Nausea    Morphine Hallucinations    Pravastatin      Other reaction(s): Muscle pain     No family history on file.  Social History     Social History    Marital status:      Spouse name: N/A    Number of children: N/A    Years of education: N/A     Occupational History    Not on file.     Social History Main Topics    Smoking status: Former Smoker     Packs/day: 1.00     Years: 50.00     Types: Cigarettes     Quit date: 3/13/2003    Smokeless tobacco: Never Used    Alcohol use No    Drug use: No    Sexual activity: Not Currently     Birth control/ protection: Post-menopausal     Other Topics Concern    Not on file     Social History Narrative       Physical Exam:  LMP  (LMP Unknown)  Gen:  No acute distress  Head:  Normocephalic.  Atraumatic.  Neuro:  Intact  CV:  Peripherally well-perfused.  Pulses 2+ bilaterally.  Lungs:  Normal respiratory effort.  Abdomen:  Soft, non-tender, non-distended  Extremities:  No cyanosis, clubbing, or edema.  RLE:  Incision c/d/i.  No erythema or drainage.  No groin pain with hip  ROM.  RUE:  In sugar tong splint.  Fingers without swelling.  AIN, PIN, median, radial, ulnar nerves intact    IMAGING:   Xray right distal radius shows acceptable maintenance of reduction of distal radius fracture.      ASSESSMENT/PLAN:  83 year old female:  1.  s/p right hip hemiarthroplasty on 4/23/17 by Dr. Freeman.  Doing well.  Incision is well-healed.  Discussed with Dr. Freeman and will cancel her appt that was scheduled for next week.  I will follow her for the hip since I am following her distal radius fracture already    2.  Closed right distal radius fracture.  Continue non-op treatment.  F/u in 2 weeks with Katerina Knowles with xrays out of splint.  Will place in [place] cast then.

## 2017-05-12 ENCOUNTER — PATIENT OUTREACH (OUTPATIENT)
Dept: ADMINISTRATIVE | Facility: CLINIC | Age: 82
End: 2017-05-12
Payer: MEDICARE

## 2017-05-16 ENCOUNTER — TELEPHONE (OUTPATIENT)
Dept: FAMILY MEDICINE | Facility: CLINIC | Age: 82
End: 2017-05-16

## 2017-05-16 NOTE — TELEPHONE ENCOUNTER
She can take every 4-6 hr and watch out for constipation and give metamucil/miralax over the counter  daily

## 2017-05-16 NOTE — TELEPHONE ENCOUNTER
----- Message from Aylin Mahoneyopshire sent at 5/16/2017  2:11 PM CDT -----  Contact: [facility] [phone]   Patient is complaining of pain to right arm where she has a fracture. Patient was taking norco 5-325 every 4 hrs and nurse would like to know if that can be resumed. Please advise.

## 2017-05-18 NOTE — TELEPHONE ENCOUNTER
Unable to leave a message for pt and the number that was left for Home Health is a pt's personal cell number.

## 2017-05-23 ENCOUNTER — OUTPATIENT CASE MANAGEMENT (OUTPATIENT)
Dept: ADMINISTRATIVE | Facility: OTHER | Age: 82
End: 2017-05-23

## 2017-05-23 NOTE — PROGRESS NOTES
Please note this patient was mailed a Patient Satisfaction Discharge Survey on 5-23-17        Thank you,      Carlotta Licona, SSC

## 2017-05-23 NOTE — PROGRESS NOTES
Talked to Lu Sharma, daughter of patient, phone number [phone] to update plan of care for OPCM.  Lu stated that patient is currently a resident of [facility] and that the family has set up round-the-clock sitters to care for their mother.  Lu also stated that [facility] is visiting patient three times a week:  RN and Pt.  Lu stated that her mother has an upcoming appointment with TONA Ram tomorrow at 145p.  Encouraged Lu to call this RN if having any questions/concerns.  Since all needs are being met at this time, patient will be dis-enrolled.  CARLYN Wall, OPCM-RN

## 2017-05-24 ENCOUNTER — HOSPITAL ENCOUNTER (OUTPATIENT)
Dept: RADIOLOGY | Facility: HOSPITAL | Age: 82
Discharge: HOME | End: 2017-05-24
Attending: ORTHOPAEDIC SURGERY
Payer: MEDICARE

## 2017-05-24 ENCOUNTER — OFFICE VISIT (OUTPATIENT)
Dept: ORTHOPEDICS | Facility: CLINIC | Age: 82
End: 2017-05-24
Payer: MEDICARE

## 2017-05-24 DIAGNOSIS — Z98.890 STATUS POST SURGERY: ICD-10-CM

## 2017-05-24 DIAGNOSIS — S52.501D CLOSED FRACTURE OF DISTAL END OF RIGHT RADIUS WITH ROUTINE HEALING, UNSPECIFIED FRACTURE MORPHOLOGY, SUBSEQUENT ENCOUNTER: ICD-10-CM

## 2017-05-24 DIAGNOSIS — S72.001A CLOSED DISPLACED FRACTURE OF NECK OF RIGHT FEMUR: ICD-10-CM

## 2017-05-24 DIAGNOSIS — Z98.890 STATUS POST SURGERY: Primary | ICD-10-CM

## 2017-05-24 DIAGNOSIS — S52.531D CLOSED COLLES' FRACTURE OF RIGHT RADIUS WITH ROUTINE HEALING, SUBSEQUENT ENCOUNTER: ICD-10-CM

## 2017-05-24 PROCEDURE — 99024 POSTOP FOLLOW-UP VISIT: CPT | Mod: S$GLB,,, | Performed by: PHYSICIAN ASSISTANT

## 2017-05-24 PROCEDURE — 29065 APPL CST SHO TO HAND LNG ARM: CPT | Mod: S$GLB,,, | Performed by: PHYSICIAN ASSISTANT

## 2017-05-24 PROCEDURE — 73110 X-RAY EXAM OF WRIST: CPT | Mod: TC,RT

## 2017-05-24 PROCEDURE — 99999 PR PBB SHADOW E&M-EST. PATIENT-LVL III: CPT | Mod: PBBFAC,,, | Performed by: PHYSICIAN ASSISTANT

## 2017-05-24 PROCEDURE — 73110 X-RAY EXAM OF WRIST: CPT | Mod: 26,RT,, | Performed by: RADIOLOGY

## 2017-05-24 RX ORDER — TRAMADOL HYDROCHLORIDE 50 MG/1
50 TABLET ORAL
Qty: 60 TABLET | Refills: 0 | Status: SHIPPED | OUTPATIENT
Start: 2017-05-24 | End: 2017-06-03

## 2017-05-25 NOTE — PROGRESS NOTES
Ms. Rebolledo is a 83 year old female who fell from standing height on 4/21/17 and sustained a right femoral neck fracture ( treated with cemented hemiarthroplasty on 4/23/17 by Dr. Freeman)  and a right distal radius fracture (treated nonoperatively, reduced in ED).      She is  here today for follow up of her distal radius fracture, treated non-operatively. Patient stated that her wrist is doing ok.   She is at a nursing home. She is recieving therapy there.   She stated that since the pain in her hip and wrist has decreased she has had some soreness in her left knee. She also stated that her therapist has also noticed that when she walks her knee tends to turn inward.  Patient stated that she is ambulating with the assistance of a walker.    Pain is somewhat controlled. She is taking Tylenol for her pain , but request something slightly stronger.    she denies fever, chills, and sweats since the time of the surgery.     Physical exam:   WRIST: dressing taken down. Moderate swelling, diffuse bruising, mild TTP at fracturr site, NVI  LEFT KNEE: scarring ( previous surgery, years ago in [place], varun placed into tibia) no TTP, no swelling, no deformity, full range of motion.     RADS: WRIST: Healing displaced fractures of the distal radius and ulna remain unchanged position as compared to the previous studies    Assessment:  Post-op visit ( 4 weeks)    Plan:  WRIST: placed into [place] cast   - NWB   - pain medication: tramadol written   - return to clinic in 2 weeks at time x-ray of wrist needed OOC as well as hip and left knee if continued pain.

## 2017-05-26 ENCOUNTER — TELEPHONE (OUTPATIENT)
Dept: ORTHOPEDICS | Facility: CLINIC | Age: 82
End: 2017-05-26

## 2017-05-26 NOTE — TELEPHONE ENCOUNTER
Ortho Telephone Triage Call   1975  Patient C/O: R hand pain since soft cast was replaced with ([place])  cast on 5/24/17 and pt's fingers  were swollen yesterday morning. Pt is resident at [facility] in [place], [place].   HX: R Radius fracture 4/21/17  Triage Advice: Advised that [facility] will be contacted for current status and that Ortho Triage will follow up with daughter.   Resolution: Daughter states understanding. [facility] contact number provided: [phone]

## 2017-05-26 NOTE — TELEPHONE ENCOUNTER
Ortho Telephone Triage Follow Up Call  [phone]  Message left for Ayah Doe RN/Devan Fontenot to return call.

## 2017-05-31 ENCOUNTER — OFFICE VISIT (OUTPATIENT)
Dept: ORTHOPEDICS | Facility: CLINIC | Age: 82
End: 2017-05-31
Payer: MEDICARE

## 2017-05-31 ENCOUNTER — TELEPHONE (OUTPATIENT)
Dept: ORTHOPEDICS | Facility: CLINIC | Age: 82
End: 2017-05-31

## 2017-05-31 DIAGNOSIS — Z47.89 CAST DISCOMFORT: Primary | ICD-10-CM

## 2017-05-31 PROCEDURE — 99499 UNLISTED E&M SERVICE: CPT | Mod: S$GLB,,, | Performed by: PHYSICIAN ASSISTANT

## 2017-05-31 NOTE — TELEPHONE ENCOUNTER
Ortho Telephone Triage Call  11:08  Patient C/O: R elbow pain. Daughter, Lu, referred to Devan Fontenot. Spoke with TIFFANIE Elizabeth RN/Devan Fontenot who reports that pt has c/o R elbow pain and that upper arm above cast is bruised and swollen. Pt's right hand is swollen. Coloration and cap refill is WNL. Confirms that pt compliance in elevation of RUE has been limited  r/t pt intolerance.   Resolution: TONA Monk notified. Instructs that pt come to Ortho Clinic today at 1:00pm for RUE/cast check. Daughter, Maryjane, notified per TIFFANIE Elizabeth RN. Maryjane confirms that she will be bringing pt to Ortho Clinic at 1:00pm today.

## 2017-06-01 DIAGNOSIS — J44.9 CHRONIC OBSTRUCTIVE PULMONARY DISEASE, UNSPECIFIED COPD TYPE: ICD-10-CM

## 2017-06-01 RX ORDER — FLUTICASONE FUROATE AND VILANTEROL 100; 25 UG/1; UG/1
1 POWDER RESPIRATORY (INHALATION) DAILY
Qty: 60 EACH | Refills: 12 | Status: SHIPPED | OUTPATIENT
Start: 2017-06-01 | End: 2017-06-07 | Stop reason: SDUPTHER

## 2017-06-01 NOTE — TELEPHONE ENCOUNTER
Patient is requesting a refill for Breo with 12 refills sent to [facility] pharmacy.

## 2017-06-02 NOTE — PROGRESS NOTES
Patient presents to clinic due to rubbing of the cast on her elbow as well as increased swelling of her hand.  Reports that she has been elevating it as much as she can.     Cast taken down, mild redness to her elbow no skin breakdown, moderate swelling to fingers, patient was moving her had a wrist at appointment constantly advised to not do so     Cast change.     - return to clinic at next appointmemt

## 2017-06-05 ENCOUNTER — PATIENT MESSAGE (OUTPATIENT)
Dept: FAMILY MEDICINE | Facility: CLINIC | Age: 82
End: 2017-06-05

## 2017-06-05 DIAGNOSIS — J44.9 CHRONIC OBSTRUCTIVE PULMONARY DISEASE, UNSPECIFIED COPD TYPE: ICD-10-CM

## 2017-06-05 DIAGNOSIS — M54.50 LUMBAR SPINE PAIN: Primary | ICD-10-CM

## 2017-06-05 RX ORDER — CYPROHEPTADINE HYDROCHLORIDE 4 MG/1
4 TABLET ORAL 3 TIMES DAILY PRN
Qty: 30 TABLET | Refills: 2 | Status: SHIPPED | OUTPATIENT
Start: 2017-06-05 | End: 2017-07-12 | Stop reason: SDUPTHER

## 2017-06-06 RX ORDER — ALBUTEROL SULFATE 90 UG/1
2 AEROSOL, METERED RESPIRATORY (INHALATION) EVERY 6 HOURS PRN
Qty: 6.7 G | Refills: 4 | Status: SHIPPED | OUTPATIENT
Start: 2017-06-06 | End: 2017-10-25 | Stop reason: SDUPTHER

## 2017-06-07 DIAGNOSIS — J44.9 CHRONIC OBSTRUCTIVE PULMONARY DISEASE, UNSPECIFIED COPD TYPE: ICD-10-CM

## 2017-06-07 RX ORDER — FLUTICASONE FUROATE AND VILANTEROL 100; 25 UG/1; UG/1
1 POWDER RESPIRATORY (INHALATION) DAILY
Qty: 60 EACH | Refills: 12 | Status: SHIPPED | OUTPATIENT
Start: 2017-06-07 | End: 2018-08-02 | Stop reason: SDUPTHER

## 2017-06-08 ENCOUNTER — OFFICE VISIT (OUTPATIENT)
Dept: ORTHOPEDICS | Facility: CLINIC | Age: 82
End: 2017-06-08
Payer: MEDICARE

## 2017-06-08 ENCOUNTER — HOSPITAL ENCOUNTER (OUTPATIENT)
Dept: RADIOLOGY | Facility: HOSPITAL | Age: 82
Discharge: HOME | End: 2017-06-08
Attending: ORTHOPAEDIC SURGERY
Payer: MEDICARE

## 2017-06-08 DIAGNOSIS — S72.001A CLOSED DISPLACED FRACTURE OF NECK OF RIGHT FEMUR: ICD-10-CM

## 2017-06-08 DIAGNOSIS — Z98.890 STATUS POST SURGERY: ICD-10-CM

## 2017-06-08 DIAGNOSIS — S52.531D CLOSED COLLES' FRACTURE OF RIGHT RADIUS WITH ROUTINE HEALING, SUBSEQUENT ENCOUNTER: ICD-10-CM

## 2017-06-08 DIAGNOSIS — Z98.890 STATUS POST SURGERY: Primary | ICD-10-CM

## 2017-06-08 PROCEDURE — 73110 X-RAY EXAM OF WRIST: CPT | Mod: TC,RT

## 2017-06-08 PROCEDURE — 73110 X-RAY EXAM OF WRIST: CPT | Mod: 26,RT,, | Performed by: RADIOLOGY

## 2017-06-08 PROCEDURE — 99999 PR PBB SHADOW E&M-EST. PATIENT-LVL III: CPT | Mod: PBBFAC,,, | Performed by: PHYSICIAN ASSISTANT

## 2017-06-08 PROCEDURE — 29065 APPL CST SHO TO HAND LNG ARM: CPT | Mod: S$GLB,,, | Performed by: PHYSICIAN ASSISTANT

## 2017-06-08 PROCEDURE — 73502 X-RAY EXAM HIP UNI 2-3 VIEWS: CPT | Mod: 26,RT,, | Performed by: RADIOLOGY

## 2017-06-08 PROCEDURE — 99024 POSTOP FOLLOW-UP VISIT: CPT | Mod: S$GLB,,, | Performed by: PHYSICIAN ASSISTANT

## 2017-06-08 PROCEDURE — 73502 X-RAY EXAM HIP UNI 2-3 VIEWS: CPT | Mod: TC,RT

## 2017-06-09 NOTE — PROGRESS NOTES
Ms. Rebolledo is a 83 year old female who fell from standing height on 4/21/17 and sustained a right femoral neck fracture ( treated with cemented hemiarthroplasty on 4/23/17 by Dr. Freeman)  and a right distal radius fracture (treated nonoperatively, reduced in ED).      She is  here today for follow up of her distal radius fracture, treated non-operatively and her hip hemiarthroplasty 04/23/2017 by . Patient stated that she is doing ok.   She is at a nursing home. She is recieving therapy there.   She stated that the pain in her hip and wrist has decreased, though she does not thing that the wrist is healed she stated that she can feel it moving.  Patient stated that she is ambulating with the assistance of a walker.    Pain controlled. She is taking Tylenol and occasional tramado for her pain.    she denies fever, chills, and sweats since the time of the surgery.     Physical exam:   WRIST: dressing taken down. Moderate swelling, diffuse bruising, mild TTP at fractur site, NVI  HIP: no TTP, full range of motion of knee and ankle, NVI     RADS: WRIST: Healing displaced fractures of the distal radius and ulna change position as compared to the previous studies  HIP: no hardware failure noted.     Assessment:  Post-op visit ( 6 weeks)    Plan:  WRIST: placed into [place] cast   - NWB   - pain medication:   No refill needed  HIP: PT/OT, range of motion as tolerated with hip precautions, WBAT    - return to clinic in 3 weeks at time x-ray of wrist needed OOC consider placemtn into [place].

## 2017-06-21 PROBLEM — J84.9 ILD (INTERSTITIAL LUNG DISEASE): Status: ACTIVE | Noted: 2017-06-21

## 2017-06-21 PROBLEM — A41.9 SEPSIS: Status: ACTIVE | Noted: 2017-06-20

## 2017-06-22 PROBLEM — L98.429 STAGE 2 SKIN ULCER OF SACRAL REGION: Status: ACTIVE | Noted: 2017-06-22

## 2017-06-23 PROBLEM — L98.429 STAGE 2 SKIN ULCER OF SACRAL REGION: Status: ACTIVE | Noted: 2017-06-23

## 2017-06-26 ENCOUNTER — OUTPATIENT CASE MANAGEMENT (OUTPATIENT)
Dept: ADMINISTRATIVE | Facility: OTHER | Age: 82
End: 2017-06-26

## 2017-06-26 ENCOUNTER — TELEPHONE (OUTPATIENT)
Dept: FAMILY MEDICINE | Facility: CLINIC | Age: 82
End: 2017-06-26

## 2017-06-26 ENCOUNTER — TELEPHONE (OUTPATIENT)
Dept: PRIMARY CARE CLINIC | Facility: CLINIC | Age: 82
End: 2017-06-26

## 2017-06-26 NOTE — TELEPHONE ENCOUNTER
----- Message from Julia Villasenor sent at 6/26/2017  8:55 AM CDT -----  Clare, , [facility], called.    No. [phone]    Please call patient to schedule a hospital follow up appointment.   No. [phone]   She was discharged on Sunday, 6/25/17.  She had fever and lactic acid increase.

## 2017-06-26 NOTE — PROGRESS NOTES
Thank you for the referral.   For your information:    The following patient has been assigned to Zehra Wall RN with Outpatient Complex Care Management for high risk screening.    Reason:   Sepsis, due to unspecified organism  Anemia of chronic disease  Chronic hypoxemic respiratory failure  HCAP (healthcare-associated pneumonia)  Chronic obstructive pulmonary disease with acute exacerbation    Please contact [facility] at [phone] with any questions.    Thank you,  Meghana De La Garza

## 2017-06-26 NOTE — TELEPHONE ENCOUNTER
Rec'd a referral for pt to see Dr. Saldana in [facility] from STEVEN Alves LPN with Dr. Soriano's ofc. Called davida Bray scheduled for 7/3.

## 2017-06-27 ENCOUNTER — PATIENT OUTREACH (OUTPATIENT)
Dept: ADMINISTRATIVE | Facility: CLINIC | Age: 82
End: 2017-06-27

## 2017-06-27 ENCOUNTER — OUTPATIENT CASE MANAGEMENT (OUTPATIENT)
Dept: ADMINISTRATIVE | Facility: OTHER | Age: 82
End: 2017-06-27

## 2017-06-27 ENCOUNTER — TELEPHONE (OUTPATIENT)
Dept: ORTHOPEDICS | Facility: CLINIC | Age: 82
End: 2017-06-27

## 2017-06-27 NOTE — PATIENT INSTRUCTIONS
Bacteremia, Suspected (Adult)  Your fever today is probably due to a viral illness. However, sometimes fever can be an early sign of a more serious bacterial infection. Bacteremia is a bacterial infection that has spread to the bloodstream. This is serious because it can spread to other organs, including the kidneys, brain, and lungs.  Your healthcare provider will perform tests (cultures) to check for bacteremia. Until the test results are known you should watch for the signs listed below.  Causes  Bacteremia usually starts with a typical infection, but it then spreads to the blood. Almost any type of infection can cause bacteremia, including a urinary tract infection, skin infection, gastrointestinal problem, surgical complication, or pneumonia.  Symptoms  At first symptoms may seem like any typical infection or illness, but then they worsen. Symptoms of bacteremia can include:  · Fever and chills  · Loss of appetite  · Nausea or vomiting  · Trouble breathing or fast breathing  · Fast heart rate  · Feeling lightheaded or faint  · Skin rashes or blotches  Home care  Follow these guidelines when caring for yourself at home.  · Rest at home for the first 2 to 3 days. When resuming activity, don't let yourself become overly tired.  · You can take acetaminophen or ibuprofen for pain, unless you were given a different pain medicine to use. (Note: If you have chronic liver or kidney disease or have ever had a stomach ulcer or gastrointestinal bleeding, talk with your healthcare provider before using these medicines. Also talk to your provider if you are taking medicine to prevent blood clots.) Aspirin should never be given to anyone younger than 18 years of age who is ill with a viral infection or fever. It may cause severe liver or brain damage.  · If you were given antibiotics, take them until they are used up, or your healthcare provider tells you to stop. It is important to finish the antibiotics even though you feel  better. This is to make sure the infection has cleared.  · Your appetite may be poor, so a light diet is fine. Avoid dehydration by drinking 6 to 8 glasses of fluid per day (such as water, soft drinks, sports drinks, juices, tea, or soup).  Follow-up care  Follow up with your healthcare provider, or as advised.  · If a culture was done, you will be notified if your treatment needs to be changed. You can call as directed for the results.  · If X-rays, a CT, or an ultrasound were done, a specialist will review them. You will be notified of any findings that may affect your care.  Call 911  Contact emergency services right away if any of these occur:  · Trouble breathing or swallowing, or wheezing  · Chest pain  · Confusion or sudden change in behavior  · Extreme drowsiness or trouble awakening  · Fainting or loss of consciousness  · Rapid heart rate  · Low blood pressure  · Vomiting blood, or large amounts of blood in stool  · Seizure  When to seek medical advice  Call your healthcare provider right away if any of these occur:  · Cough with lots of colored sputum (mucus), or blood in your sputum  · Severe headache  · Severe face, neck, throat, or ear pain  · Pain in the right lower abdomen  · Weakness, dizziness, repeated vomiting, or diarrhea  · Joint pain or a new rash  · Burning when urinating  · Fever of 100.4°F (38°C) or higher  Date Last Reviewed: 7/30/2015  © 0073-9386 [employer]. [address] [place] [address], [place] [address]. All rights reserved. This information is not intended as a substitute for professional medical care. Always follow your healthcare professional's instructions.

## 2017-06-27 NOTE — TELEPHONE ENCOUNTER
----- Message from Alex Posadas sent at 6/27/2017  8:54 AM CDT -----  Contact: Joy/[phone]  Daughter called in stating that they need to r/s Pt's Post-op to 6/28, or in the morning on 6/29. Please return call and advise    Thank you

## 2017-06-27 NOTE — PROGRESS NOTES
Talked to daughter June Sharma about patient's plan of care.  Patient does remain at [facility] in room 206 and patient has private sitters 24/7.  June has declined OPCM for her mother.  Encouraged patient to call this RN if having any questions/concerns.  Case closed.  CARLYN Wall, OPCM-RN

## 2017-06-27 NOTE — PROGRESS NOTES
C3 nurse attempted to contact patient. No answer. The following message was left for the patient to return the call:  Good afternoon  I am a nurse calling on behalf of [facility] from the [facility].  This is a Transitional Care Call for Kaity Rebolledo. When you have a moment please contact us at [phone] or [phone] Monday through Friday, between the hours of 8 am to 4 pm. We look forward to speaking with you. On behalf of [facility] have a nice day.    The patient has a scheduled HOSFU appointment with Fernanda Saldana on 07/03/17 @ 1400. Message sent to Physician staff.

## 2017-06-29 ENCOUNTER — OFFICE VISIT (OUTPATIENT)
Dept: ORTHOPEDICS | Facility: CLINIC | Age: 82
End: 2017-06-29
Payer: MEDICARE

## 2017-06-29 ENCOUNTER — HOSPITAL ENCOUNTER (OUTPATIENT)
Dept: RADIOLOGY | Facility: HOSPITAL | Age: 82
Discharge: HOME | End: 2017-06-29
Attending: ORTHOPAEDIC SURGERY
Payer: MEDICARE

## 2017-06-29 DIAGNOSIS — S52.531D CLOSED COLLES' FRACTURE OF RIGHT RADIUS WITH ROUTINE HEALING, SUBSEQUENT ENCOUNTER: ICD-10-CM

## 2017-06-29 DIAGNOSIS — S72.001A CLOSED DISPLACED FRACTURE OF NECK OF RIGHT FEMUR: ICD-10-CM

## 2017-06-29 DIAGNOSIS — Z98.890 STATUS POST SURGERY: Primary | ICD-10-CM

## 2017-06-29 DIAGNOSIS — Z98.890 STATUS POST SURGERY: ICD-10-CM

## 2017-06-29 PROCEDURE — 99999 PR PBB SHADOW E&M-EST. PATIENT-LVL III: CPT | Mod: PBBFAC,,, | Performed by: PHYSICIAN ASSISTANT

## 2017-06-29 PROCEDURE — 73110 X-RAY EXAM OF WRIST: CPT | Mod: TC,RT

## 2017-06-29 PROCEDURE — 99024 POSTOP FOLLOW-UP VISIT: CPT | Mod: S$GLB,,, | Performed by: PHYSICIAN ASSISTANT

## 2017-06-29 PROCEDURE — 73110 X-RAY EXAM OF WRIST: CPT | Mod: 26,RT,, | Performed by: RADIOLOGY

## 2017-06-30 DIAGNOSIS — J18.9 PNEUMONIA DUE TO INFECTIOUS ORGANISM, UNSPECIFIED LATERALITY, UNSPECIFIED PART OF LUNG: Primary | ICD-10-CM

## 2017-06-30 NOTE — PROGRESS NOTES
Ms. Rebolledo is a 83 year old female who fell from standing height on 4/21/17 and sustained a right femoral neck fracture ( treated with cemented hemiarthroplasty on 4/23/17 by Dr. Freeman)  and a right distal radius fracture (treated nonoperatively, reduced in ED).      She is  here today for follow up of her distal radius fracture, treated non-operatively and her hip hemiarthroplasty 04/23/2017 by . Patient stated that she is doing ok.   She is at a  home. She is receiving home health therapy there.   She stated that the pain in her hip and wrist has decreased and are feeling well  Patient stated that she is ambulating with the assistance of a walker.    Pain controlled. She is taking Tylenol and occasional tramadol for her pain.    she denies fever, chills, and sweats since the time of the surgery.     Physical exam:   WRIST: dressing taken down. mild swelling, bruising resolved, mild TTP at fracture site, NVI  HIP: no TTP, full range of motion of knee and ankle, NVI     RADS: WRIST: Healing displaced fractures of the distal radius and ulna change position as compared to the previous studies    Assessment:  Post-op visit ( 9 weeks)    Plan:  WRIST: placed into [place] splint with remove for ROm   - NWB   - pain medication: No refill needed  HIP: PT/OT, range of motion as tolerated with hip precautions, WBAT    - return to clinic in 3 weeks at time x-ray of wrist and hip  needed

## 2017-07-03 ENCOUNTER — TELEPHONE (OUTPATIENT)
Dept: PRIMARY CARE CLINIC | Facility: CLINIC | Age: 82
End: 2017-07-03

## 2017-07-03 ENCOUNTER — HOSPITAL ENCOUNTER (OUTPATIENT)
Dept: RADIOLOGY | Facility: HOSPITAL | Age: 82
Discharge: HOME | End: 2017-07-03
Attending: INTERNAL MEDICINE
Payer: MEDICARE

## 2017-07-03 ENCOUNTER — PATIENT MESSAGE (OUTPATIENT)
Dept: PRIMARY CARE CLINIC | Facility: CLINIC | Age: 82
End: 2017-07-03

## 2017-07-03 ENCOUNTER — OFFICE VISIT (OUTPATIENT)
Dept: PRIMARY CARE CLINIC | Facility: CLINIC | Age: 82
End: 2017-07-03
Payer: MEDICARE

## 2017-07-03 VITALS
SYSTOLIC BLOOD PRESSURE: 93 MMHG | HEART RATE: 100 BPM | TEMPERATURE: 99 F | DIASTOLIC BLOOD PRESSURE: 60 MMHG | BODY MASS INDEX: 17.98 KG/M2 | OXYGEN SATURATION: 90 % | WEIGHT: 108 LBS

## 2017-07-03 DIAGNOSIS — R30.0 DYSURIA: Primary | ICD-10-CM

## 2017-07-03 DIAGNOSIS — D72.829 LEUKOCYTOSIS, UNSPECIFIED TYPE: ICD-10-CM

## 2017-07-03 DIAGNOSIS — J18.9 HCAP (HEALTHCARE-ASSOCIATED PNEUMONIA): Primary | ICD-10-CM

## 2017-07-03 DIAGNOSIS — Z79.01 ANTICOAGULANT LONG-TERM USE: ICD-10-CM

## 2017-07-03 DIAGNOSIS — I48.0 PAROXYSMAL ATRIAL FIBRILLATION: ICD-10-CM

## 2017-07-03 DIAGNOSIS — I10 ESSENTIAL HYPERTENSION: ICD-10-CM

## 2017-07-03 DIAGNOSIS — I50.33 ACUTE ON CHRONIC DIASTOLIC HEART FAILURE: ICD-10-CM

## 2017-07-03 DIAGNOSIS — J44.1 CHRONIC OBSTRUCTIVE PULMONARY DISEASE WITH ACUTE EXACERBATION: ICD-10-CM

## 2017-07-03 DIAGNOSIS — J18.9 PNEUMONIA DUE TO INFECTIOUS ORGANISM, UNSPECIFIED LATERALITY, UNSPECIFIED PART OF LUNG: ICD-10-CM

## 2017-07-03 PROCEDURE — 71020 XR CHEST PA AND LATERAL: CPT | Mod: TC

## 2017-07-03 PROCEDURE — 1159F MED LIST DOCD IN RCRD: CPT | Mod: S$GLB,,, | Performed by: INTERNAL MEDICINE

## 2017-07-03 PROCEDURE — 71020 XR CHEST PA AND LATERAL: CPT | Mod: 26,,, | Performed by: RADIOLOGY

## 2017-07-03 PROCEDURE — 99999 PR PBB SHADOW E&M-EST. PATIENT-LVL III: CPT | Mod: PBBFAC,,, | Performed by: INTERNAL MEDICINE

## 2017-07-03 PROCEDURE — 1126F AMNT PAIN NOTED NONE PRSNT: CPT | Mod: S$GLB,,, | Performed by: INTERNAL MEDICINE

## 2017-07-03 PROCEDURE — 99499 UNLISTED E&M SERVICE: CPT | Mod: S$GLB,,, | Performed by: INTERNAL MEDICINE

## 2017-07-03 PROCEDURE — 99215 OFFICE O/P EST HI 40 MIN: CPT | Mod: S$GLB,,, | Performed by: INTERNAL MEDICINE

## 2017-07-03 PROCEDURE — 1157F ADVNC CARE PLAN IN RCRD: CPT | Mod: S$GLB,,, | Performed by: INTERNAL MEDICINE

## 2017-07-03 RX ORDER — FUROSEMIDE 20 MG/1
20 TABLET ORAL 2 TIMES DAILY
Qty: 60 TABLET | Refills: 1 | Status: SHIPPED | OUTPATIENT
Start: 2017-07-03 | End: 2017-09-05 | Stop reason: SDUPTHER

## 2017-07-03 NOTE — TELEPHONE ENCOUNTER
----- Message from Celine Frankel sent at 7/3/2017  9:41 AM CDT -----  Contact: [phone]/Maryjane pt's daughter   Pt's daughter its requesting to speak with the nurse in regarding pt's problems , states she doesn't want to discuss them in front of the pt . Pt has an appointment today at 2:00 pm . Please advise

## 2017-07-03 NOTE — TELEPHONE ENCOUNTER
Pt daughter called requesting an antibiotic called in for the possibility of a UTI, and explained that she was unable to give a urine specimen to the lab. Spoke to  and she will not call in an antibiotic without a urine specimen, pt dtr notified and stated understanding. She also explained that they brought the speci cup home and will bring in a clean catch urine from home. I informed her that it has to be within two hours of obtaining it, she stated understanding and agreed to comply.

## 2017-07-03 NOTE — TELEPHONE ENCOUNTER
Spoke with daughter regarding concerns that she does not want to discuss in front of her mother during visit today. 1) Feels like the antidepressant is mild and not helping, pt having mood swings. 2) Difficulty urinating, and does have poor hygiene, not wanting to bathe daily. 3 ) Edema BLE, 4) refusal to wear right cast, says that her doctor told her she doesn't need it any longer. Pt daughter Maryjane is asking if you can please address these issues during the visit.

## 2017-07-05 ENCOUNTER — PATIENT MESSAGE (OUTPATIENT)
Dept: PRIMARY CARE CLINIC | Facility: CLINIC | Age: 82
End: 2017-07-05

## 2017-07-05 ENCOUNTER — OFFICE VISIT (OUTPATIENT)
Dept: PULMONOLOGY | Facility: CLINIC | Age: 82
End: 2017-07-05
Payer: MEDICARE

## 2017-07-05 VITALS
WEIGHT: 105 LBS | BODY MASS INDEX: 18.61 KG/M2 | OXYGEN SATURATION: 95 % | SYSTOLIC BLOOD PRESSURE: 122 MMHG | HEIGHT: 63 IN | DIASTOLIC BLOOD PRESSURE: 72 MMHG | HEART RATE: 76 BPM

## 2017-07-05 DIAGNOSIS — J43.2 CENTRILOBULAR EMPHYSEMA: Primary | ICD-10-CM

## 2017-07-05 DIAGNOSIS — J96.01 ACUTE RESPIRATORY FAILURE WITH HYPOXIA: ICD-10-CM

## 2017-07-05 PROBLEM — D62 ACUTE BLOOD LOSS ANEMIA: Status: RESOLVED | Noted: 2017-04-24 | Resolved: 2017-07-05

## 2017-07-05 PROBLEM — E83.42 HYPOMAGNESEMIA: Status: RESOLVED | Noted: 2017-04-30 | Resolved: 2017-07-05

## 2017-07-05 PROBLEM — A41.9 SEPSIS: Status: RESOLVED | Noted: 2017-06-20 | Resolved: 2017-07-05

## 2017-07-05 PROCEDURE — 99499 UNLISTED E&M SERVICE: CPT | Mod: S$GLB,,, | Performed by: INTERNAL MEDICINE

## 2017-07-05 PROCEDURE — 1157F ADVNC CARE PLAN IN RCRD: CPT | Mod: S$GLB,,, | Performed by: INTERNAL MEDICINE

## 2017-07-05 PROCEDURE — 99999 PR PBB SHADOW E&M-EST. PATIENT-LVL III: CPT | Mod: PBBFAC,,, | Performed by: INTERNAL MEDICINE

## 2017-07-05 PROCEDURE — 1126F AMNT PAIN NOTED NONE PRSNT: CPT | Mod: S$GLB,,, | Performed by: INTERNAL MEDICINE

## 2017-07-05 PROCEDURE — 99214 OFFICE O/P EST MOD 30 MIN: CPT | Mod: S$GLB,,, | Performed by: INTERNAL MEDICINE

## 2017-07-05 PROCEDURE — 1159F MED LIST DOCD IN RCRD: CPT | Mod: S$GLB,,, | Performed by: INTERNAL MEDICINE

## 2017-07-05 NOTE — PROGRESS NOTES
[facility]  New Visit Progress Note   Recent Hospital Discharge     PRESENTING HISTORY     Chief Complaint/Reason for Admission:  Follow up Hospital Discharge   No chief complaint on file.    PCP: Samuel Soriano MD    History of Present Illness:  Ms. Kaity Rebolledo is a 83 y.o. female who was recently admitted to the hospital.    ___________________________________________________________________    Today:  Presents to [facility] for hospital FU.  Recently hospitalized for PNA with related sepsis.   Responded well to supportive treatment; ultimately discharged to complete a course of empiric Levaquin and tapering prednisone.  SNF placement was recommended, but the patient and her family elected to return to her assisted living center.     She is accompanied today by her daughter.   Patient is NOT receiving home health services presently.  Patient is in a wheelchair presently; ambulates on a limited bases at home with a walker.  No falls reported.  Is in wheelchair most of the day. Has a stage 1 sacral decubitus. Has a dressing in place on decubitus.   Patient and family aren't sure who, if anyone, is monitoring this wound and conducting dressing changes.   Patient tells me she believes it hasn't been changed since her hospital stay. It seems as if it is NOT being changed when the patient showers.     Requires assistance with ADL's, specifically showering and dressing.  Daughter tells me patient is resistant to personal hygiene and is often declining her showers or sponge baths.   Is toileting unassisted, but having trouble with toilet hygiene- has difficulty wiping herself, partly due to a healing radial fx. (She just recently had cast removed and is now in a brace).  Is continent of urine and stool- does not wear adult diaper.   Patient having some urinary frequency and hesitancy, but denies dysuria.     Family has brought her medication list from the assisted living center which I have reviewed.  She  completed the prescribed course of Levaquin and Prednisone.  She is currently only on her chronic medications.  All medications are managed and dispensed by workers at the living center.       Review of Systems  General ROS: negative for chills, fever or weight loss  Psychological ROS: negative for hallucination, depression or suicidal ideation  Ophthalmic ROS: negative for blurry vision, photophobia or eye pain  ENT ROS: negative for epistaxis, sore throat or rhinorrhea  Respiratory ROS: no cough, shortness of breath, or wheezing  Cardiovascular ROS: no chest pain or dyspnea on exertion  Gastrointestinal ROS: no abdominal pain, change in bowel habits, or black/ bloody stools  Genito-Urinary ROS: no dysuria, trouble voiding, or hematuria  Musculoskeletal ROS: + gait disturbance + generalized muscular weakness  Neurological ROS: no syncope or seizures; no ataxia  Dermatological ROS: negative for pruritis, rash and jaundice + wound to sacrum          PAST HISTORY:     Past Medical History:   Diagnosis Date    Anticoagulant long-term use     Anxiety and depression     Chronic diastolic heart failure 9/2/2016    Chronic hypoxemic respiratory failure 2/6/2016    Chronic obstructive pulmonary disease 6/16/2016    Closed bilateral fracture of pubic rami 3/18/2016    Closed Colles' fracture of right radius 4/22/2017    Closed displaced fracture of neck of right femur s/p hemiarthroplasty on 4/23/2017 4/22/2017    COPD (chronic obstructive pulmonary disease)     Coronary artery disease involving native coronary artery without angina pectoris 6/16/2016    Displaced fracture of right femoral neck s/p hemiarthroplasty on 4/23/2017 4/22/2017    Essential hypertension 8/6/2013    Fall     patient  in  wheel  chair    Hyperlipidemia     Osteoporosis 3/18/2016    Paroxysmal atrial fibrillation     Pneumonia     Pulmonary hypertension due to lung disease 3/30/2017    Rotator cuff injury     R s/p therapy     Vitamin D deficiency disease 2017       Past Surgical History:   Procedure Laterality Date    CATARACT EXTRACTION BILATERAL W/ ANTERIOR VITRECTOMY      EYE SURGERY      HIP FRACTURE SURGERY Right 2017    Hemiarthroplasty for femoral neck fracture    LEG SURGERY         No family history on file.    Social History     Social History    Marital status:      Spouse name: N/A    Number of children: N/A    Years of education: N/A     Social History Main Topics    Smoking status: Former Smoker     Packs/day: 1.00     Years: 50.00     Types: Cigarettes     Quit date: 3/13/2003    Smokeless tobacco: Never Used    Alcohol use No    Drug use: No    Sexual activity: Not Currently     Birth control/ protection: Post-menopausal     Other Topics Concern    None     Social History Narrative    None       MEDICATIONS & ALLERGIES:     Current Outpatient Prescriptions on File Prior to Visit   Medication Sig Dispense Refill    acetaminophen (TYLENOL) 325 MG tablet Take 2 tablets (650 mg total) by mouth every 6 (six) hours as needed (Mild pain).  0    albuterol 90 mcg/actuation inhaler Inhale 2 puffs into the lungs every 6 (six) hours as needed for Wheezing or Shortness of Breath. 6.7 g 4    alprazolam (XANAX) 0.25 MG tablet Take 1 tablet (0.25 mg total) by mouth daily as needed for Anxiety. 30 tablet 0    apixaban 2.5 mg Tab Take 1 tablet (2.5 mg total) by mouth 2 (two) times daily. 60 tablet 1    ascorbic acid (VITAMIN C) 500 MG tablet Take 500 mg by mouth 2 (two) times daily.      aspirin (ECOTRIN) 81 MG EC tablet Take 1 tablet (81 mg total) by mouth once daily.  0    [] benzonatate (TESSALON) 100 MG capsule Take 1 capsule (100 mg total) by mouth 3 (three) times daily as needed for Cough. 30 capsule 0    citalopram (CELEXA) 20 MG tablet TAKE 1 TABLET BY MOUTH ONCE DAILY 30 tablet 2    cyanocobalamin (VITAMIN B-12) 100 MCG tablet Take 1 tablet (100 mcg total) by mouth once daily. 90  tablet 3    cyproheptadine (PERIACTIN) 4 mg tablet Take 1 tablet (4 mg total) by mouth 3 (three) times daily as needed. 30 tablet 2    fluticasone-vilanterol (BREO) 100-25 mcg/dose diskus inhaler Inhale 1 puff into the lungs once daily. 60 each 12    guaifenesin (MUCINEX) 600 mg 12 hr tablet Take 2 tablets (1,200 mg total) by mouth 2 (two) times daily. Okay to dispense box of medication  as prepackaged by . 60 tablet 6    ipratropium (ATROVENT) 0.02 % nebulizer solution Take 2.5 mLs (500 mcg total) by nebulization every 4 (four) hours as needed for Wheezing. Rescue 120 vial 3    levalbuterol (XOPENEX) 1.25 mg/0.5 mL nebulizer solution Take 0.5 mLs (1.25 mg total) by nebulization every 4 (four) hours. 180 each 3    metoprolol succinate (TOPROL-XL) 50 MG 24 hr tablet Take 1 tablet (50 mg total) by mouth 2 (two) times daily. 90 tablet 0    multivitamin (ONE DAILY MULTIVITAMIN) per tablet Take 1 tablet by mouth once daily.      nitroGLYCERIN (NITROSTAT) 0.4 MG SL tablet Place 1 tablet (0.4 mg total) under the tongue every 5 (five) minutes as needed for Chest pain. 30 tablet 1    potassium chloride (MICRO-K) 10 MEQ CpSR Take 1 capsule (10 mEq total) by mouth once daily. 30 capsule 3    senna-docusate 8.6-50 mg (PERICOLACE) 8.6-50 mg per tablet Take 1 tablet by mouth 2 (two) times daily.      tiotropium (SPIRIVA WITH HANDIHALER) 18 mcg inhalation capsule Inhale 18 mcg into the lungs once daily. Controller       No current facility-administered medications on file prior to visit.         Review of patient's allergies indicates:   Allergen Reactions    Advair diskus  [fluticasone-salmeterol]      Other reaction(s): Nausea    Morphine Hallucinations    Pravastatin      Other reaction(s): Muscle pain       OBJECTIVE:     Vital Signs:  Vitals:    07/03/17 1346   BP: 93/60   Pulse: 100   Temp: 99 °F (37.2 °C)     Wt Readings from Last 1 Encounters:   07/03/17 1346 49 kg (108 lb 0.4 oz)     Body mass  index is 17.98 kg/m².       Physical Exam:  BP 93/60 (BP Location: Left arm, Patient Position: Sitting)   Pulse 100   Temp 99 °F (37.2 °C) (Oral)   Wt 49 kg (108 lb 0.4 oz)   LMP  (LMP Unknown)   SpO2 (!) 90% Comment: 3 liters oxygen via nasal cannula  BMI 17.98 kg/m²   General appearance: alert, cooperative, no distress  + skin unusually warm to touch  Constitutional:Oriented to person, place + appears thin and frail   HEENT: Normocephalic, atraumatic, neck symmetrical, no nasal discharge   Eyes: conjunctivae/corneas clear, PERRL, EOM's intact  Lungs: clear to auscultation bilaterally, no dullness to percussion bilaterally  + NC oxygen in place  Heart: regular rate and rhythm without rub; no displacement of the PMI   Abdomen: soft, non-tender; bowel sounds normoactive; no organomegaly  Extremities: extremities symmetric; no clubbing, cyanosis,   + 1 bilateral LE edema   + right wrist/forearm in brace  Integument: Skin color, texture, turgor normal; no rashes; hair distrubution normal  + stage 1 sacral decubitus- I removed dressing in office, debris dried stool noted under dressing  Neurologic: Alert and oriented X 2, normal strength  Psychiatric: no pressured speech; normal affect; + evidence of mildly impaired cognition     Laboratory  Lab Results   Component Value Date    WBC 17.95 (H) 07/03/2017    HGB 9.0 (L) 07/03/2017    HCT 28.4 (L) 07/03/2017    MCV 93 07/03/2017     (H) 07/03/2017     BMP  Lab Results   Component Value Date     07/03/2017    K 4.0 07/03/2017    CL 90 (L) 07/03/2017    CO2 37 (H) 07/03/2017    BUN 13 07/03/2017    CREATININE 1.1 07/03/2017    CALCIUM 9.5 07/03/2017    ANIONGAP 12 07/03/2017    ESTGFRAFRICA 54 (A) 07/03/2017    EGFRNONAA 47 (A) 07/03/2017     Lab Results   Component Value Date    ALT 19 06/21/2017    AST 16 06/21/2017    ALKPHOS 72 06/21/2017    BILITOT 0.4 06/21/2017     Lab Results   Component Value Date    INR 1.2 06/20/2017    INR 1.0 04/23/2017    INR  1.0 04/22/2017     Lab Results   Component Value Date    HGBA1C 5.3 03/28/2017     No results for input(s): POCTGLUCOSE in the last 72 hours.    Diagnostic Results:  Chest X Ray 7/3/17:  Results: The lung are deeply inflated with flattening of the hemidiaphragms on the lateral view suggestive of COPD.  No focal parenchymal or pleural abnormality seen .    The cardiac silhouette and pulmonary vascularity appear within normal limits .   No evidence of hilar lymph node enlargement.  No accumulation of pleural fluid or pneumothorax.  The osseous structures demonstrate age related degenerative changes.      ASSESSMENT & PLAN:     HIGH RISK CONDITION(S):  Patient is currently on drug therapy requiring intensive monitoring for toxicity: Apixaban therapy    HCAP (healthcare-associated pneumonia)  - recent PNA and related sepsis   - received Zithromax, Rocephin in hospital; completed empiric Levaquin course as outpatient  - repeat chest X ray shows resolution on PNA; patient without cough or dyspnea, and stable on chronic home oxygen 3 L NC    Leukocytosis, unspecified type  - patient noted to have elevated WBC on today's labs, along with low grade temp  - source currently unclear but I suspect urinary tract infection based on hx of frequency, hesitancy, and given recent hx of poor hygiene   - patient unable to provide urine sample in lab following office appointment today    Chronic obstructive pulmonary disease with acute exacerbation  - completed course of tapering Prednisone  - exacerbation resolved, patient stable on chronic 3 L NC supplemental oxygen    Acute on chronic diastolic heart failure  - some LE edema noted today, will increase dose of Lasix  -     furosemide (LASIX) 20 MG tablet; Take 1 tablet (20 mg total) by mouth 2 (two) times daily.  Dispense: 60 tablet; Refill: 1    Essential hypertension  - blood pressure under good control at present     Paroxysmal atrial fibrillation  - rate controlled with Toprol  -  anticoagulated with Apixaban    Anticoagulant long-term use  - on Apixaban due to atrial fibrillation       I will see patient again in [facility] 7/13/17, sooner if needed.  Family has taken sterile urine specimen cup back to assisted living center. Will attempt to provide clean catch urine specimen.   If unable to do so in reasonable amount of time, will ask patient to return to our clinic for In/Out cath specimen.   Instructions for the patient:      Scheduled Follow-up :  Future Appointments  Date Time Provider Department Center   7/5/2017 11:00 AM Brian Baeza MD [facility] PULMS[facility]   7/13/2017 2:00 PM Fernanda Saldana MD [facility] IM ABIGAIL [facility]   7/27/2017 10:15 AM Katerina Knowles PA-C [facility] ORTHO [facility]       Post Visit Medication List:     Medication List          Accurate as of 7/3/17 11:59 PM. If you have any questions, ask your nurse or doctor.               CHANGE how you take these medications    furosemide 20 MG tablet  Commonly known as:  LASIX  Take 1 tablet (20 mg total) by mouth 2 (two) times daily.  What changed:  when to take this  Changed by:  Fernanda Saldana MD        CONTINUE taking these medications    acetaminophen 325 MG tablet  Commonly known as:  TYLENOL  Take 2 tablets (650 mg total) by mouth every 6 (six) hours as needed (Mild pain).     albuterol 90 mcg/actuation inhaler  Inhale 2 puffs into the lungs every 6 (six) hours as needed for Wheezing or Shortness of Breath.     alprazolam 0.25 MG tablet  Commonly known as:  XANAX  Take 1 tablet (0.25 mg total) by mouth daily as needed for Anxiety.     apixaban 2.5 mg Tab  Take 1 tablet (2.5 mg total) by mouth 2 (two) times daily.     aspirin 81 MG EC tablet  Commonly known as:  ECOTRIN  Take 1 tablet (81 mg total) by mouth once daily.     benzonatate 100 MG capsule  Commonly known as:  TESSALON  Take 1 capsule (100 mg total) by mouth 3 (three) times daily as needed for Cough.     citalopram 20 MG tablet  Commonly known  as:  CELEXA  TAKE 1 TABLET BY MOUTH ONCE DAILY     cyanocobalamin 100 MCG tablet  Commonly known as:  VITAMIN B-12  Take 1 tablet (100 mcg total) by mouth once daily.     cyproheptadine 4 mg tablet  Commonly known as:  PERIACTIN  Take 1 tablet (4 mg total) by mouth 3 (three) times daily as needed.     fluticasone-vilanterol 100-25 mcg/dose diskus inhaler  Commonly known as:  BREO  Inhale 1 puff into the lungs once daily.     guaifenesin 600 mg 12 hr tablet  Commonly known as:  MUCINEX  Take 2 tablets (1,200 mg total) by mouth 2 (two) times daily. Okay to dispense box of medication  as prepackaged by .     ipratropium 0.02 % nebulizer solution  Commonly known as:  ATROVENT  Take 2.5 mLs (500 mcg total) by nebulization every 4 (four) hours as needed for Wheezing. Rescue     levalbuterol 1.25 mg/0.5 mL nebulizer solution  Commonly known as:  XOPENEX  Take 0.5 mLs (1.25 mg total) by nebulization every 4 (four) hours.     metoprolol succinate 50 MG 24 hr tablet  Commonly known as:  TOPROL-XL  Take 1 tablet (50 mg total) by mouth 2 (two) times daily.     nitroGLYCERIN 0.4 MG SL tablet  Commonly known as:  NITROSTAT  Place 1 tablet (0.4 mg total) under the tongue every 5 (five) minutes as needed for Chest pain.     ONE DAILY MULTIVITAMIN per tablet  Generic drug:  multivitamin     potassium chloride 10 MEQ Cpsr  Commonly known as:  MICRO-K  Take 1 capsule (10 mEq total) by mouth once daily.     senna-docusate 8.6-50 mg 8.6-50 mg per tablet  Commonly known as:  PERICOLACE  Take 1 tablet by mouth 2 (two) times daily.     SPIRIVA WITH HANDIHALER 18 mcg inhalation capsule  Generic drug:  tiotropium     VITAMIN C 500 MG tablet  Generic drug:  ascorbic acid (vitamin C)        STOP taking these medications    levoFLOXacin 750 MG tablet  Commonly known as:  LEVAQUIN  Stopped by:  Fernanda Saldana MD     predniSONE 20 MG tablet  Commonly known as:  DELTASONE  Stopped by:  Fernanda Saldana MD           Where to Get  Your Medications      You can get these medications from any pharmacy    Bring a paper prescription for each of these medications  · furosemide 20 MG tablet         Signing Physician:  Fernanda Saldana MD

## 2017-07-05 NOTE — TELEPHONE ENCOUNTER
From Dr. Saldana : I am unable to reach either of the patients daughters. Please let family know that if Ms Rebolledo is unable to provide a clean catch urine sample by the end of today they need to bring her back to us for an IN/Out Cath. She doesn't need an office appt to see me, she can just get the urine collected and leave. Thanks.       Call placed to Dtr Maryjane, unable to reach her at home or mobile, LM on mobile number for her to call back and on pt portal.

## 2017-07-05 NOTE — PROGRESS NOTES
"Subjective:       Patient ID: Kaity Rebolledo is a 83 y.o. female.    Chief Complaint: COPD    HPI 84 yo female comes for follow up on her COPD was recently hospitalized with "pneumonia" She is feeling well and her recent chest x-ray is clear with no infiltrates or any abnormalities. She states that she is feeling  Well, recently fell fracturing her right arm and hip,, has recovered remarkably well. Alert and oriented today on 3 liters of oxygen, Sa02:95%. Nothing  To change her BUN is slightly elevated and she does have bilateral ankle edema, needs to address that, is currently in an assisted living facility in [place].       No flowsheet data found.]  Review of Systems   Constitutional: Negative.    HENT: Negative.    Eyes: Negative.    Respiratory: Negative.         Compensated respiratory failure due to COPD   Cardiovascular: Negative.         CHF   Genitourinary: Negative.    Musculoskeletal: Negative.         Recent right arm and hip fracture   Skin: Negative.    Gastrointestinal: Negative.    Neurological: Negative.    Psychiatric/Behavioral: Negative.        Objective:      Physical Exam   Constitutional: She is oriented to person, place, and time. She appears well-developed and well-nourished. No distress.   HENT:   Head: Normocephalic and atraumatic.   Right Ear: External ear normal.   Left Ear: External ear normal.   Nose: Nose normal.   Mouth/Throat: Oropharynx is clear and moist.   Eyes: Conjunctivae and EOM are normal. Pupils are equal, round, and reactive to light.   Neck: Normal range of motion. Neck supple. No JVD present. No thyromegaly present.   Cardiovascular: Normal rate, regular rhythm, normal heart sounds and intact distal pulses.  Exam reveals no gallop.    No murmur heard.  Pulmonary/Chest: Breath sounds normal. No stridor. No respiratory distress. She has no wheezes. She has no rales. She exhibits no tenderness.   Clear without wheezes Chest x-ray of July 3rd clear with hyperinflation "   Abdominal: Soft. Bowel sounds are normal. She exhibits no distension and no mass. There is no tenderness. There is no rebound and no guarding.   Musculoskeletal: Normal range of motion. She exhibits edema.   Bilateral ankle edema   Lymphadenopathy:     She has no cervical adenopathy.   Neurological: She is alert and oriented to person, place, and time. She has normal reflexes. She displays normal reflexes. No cranial nerve deficit.   Skin: Skin is warm and dry. No rash noted.   Psychiatric: She has a normal mood and affect. Her behavior is normal. Judgment and thought content normal.   Nursing note and vitals reviewed.          Assessment:       No diagnosis found.    Outpatient Encounter Prescriptions as of 2017   Medication Sig Dispense Refill    acetaminophen (TYLENOL) 325 MG tablet Take 2 tablets (650 mg total) by mouth every 6 (six) hours as needed (Mild pain).  0    albuterol 90 mcg/actuation inhaler Inhale 2 puffs into the lungs every 6 (six) hours as needed for Wheezing or Shortness of Breath. 6.7 g 4    alprazolam (XANAX) 0.25 MG tablet Take 1 tablet (0.25 mg total) by mouth daily as needed for Anxiety. 30 tablet 0    apixaban 2.5 mg Tab Take 1 tablet (2.5 mg total) by mouth 2 (two) times daily. 60 tablet 1    ascorbic acid (VITAMIN C) 500 MG tablet Take 500 mg by mouth 2 (two) times daily.      aspirin (ECOTRIN) 81 MG EC tablet Take 1 tablet (81 mg total) by mouth once daily.  0    [] benzonatate (TESSALON) 100 MG capsule Take 1 capsule (100 mg total) by mouth 3 (three) times daily as needed for Cough. 30 capsule 0    citalopram (CELEXA) 20 MG tablet TAKE 1 TABLET BY MOUTH ONCE DAILY 30 tablet 2    cyanocobalamin (VITAMIN B-12) 100 MCG tablet Take 1 tablet (100 mcg total) by mouth once daily. 90 tablet 3    cyproheptadine (PERIACTIN) 4 mg tablet Take 1 tablet (4 mg total) by mouth 3 (three) times daily as needed. 30 tablet 2    fluticasone-vilanterol (BREO) 100-25 mcg/dose diskus  inhaler Inhale 1 puff into the lungs once daily. 60 each 12    furosemide (LASIX) 20 MG tablet Take 1 tablet (20 mg total) by mouth 2 (two) times daily. 60 tablet 1    guaifenesin (MUCINEX) 600 mg 12 hr tablet Take 2 tablets (1,200 mg total) by mouth 2 (two) times daily. Okay to dispense box of medication  as prepackaged by . 60 tablet 6    ipratropium (ATROVENT) 0.02 % nebulizer solution Take 2.5 mLs (500 mcg total) by nebulization every 4 (four) hours as needed for Wheezing. Rescue 120 vial 3    levalbuterol (XOPENEX) 1.25 mg/0.5 mL nebulizer solution Take 0.5 mLs (1.25 mg total) by nebulization every 4 (four) hours. 180 each 3    metoprolol succinate (TOPROL-XL) 50 MG 24 hr tablet Take 1 tablet (50 mg total) by mouth 2 (two) times daily. 90 tablet 0    multivitamin (ONE DAILY MULTIVITAMIN) per tablet Take 1 tablet by mouth once daily.      nitroGLYCERIN (NITROSTAT) 0.4 MG SL tablet Place 1 tablet (0.4 mg total) under the tongue every 5 (five) minutes as needed for Chest pain. 30 tablet 1    potassium chloride (MICRO-K) 10 MEQ CpSR Take 1 capsule (10 mEq total) by mouth once daily. 30 capsule 3    senna-docusate 8.6-50 mg (PERICOLACE) 8.6-50 mg per tablet Take 1 tablet by mouth 2 (two) times daily.      tiotropium (SPIRIVA WITH HANDIHALER) 18 mcg inhalation capsule Inhale 18 mcg into the lungs once daily. Controller       No facility-administered encounter medications on file as of 7/5/2017.      No orders of the defined types were placed in this encounter.    Plan:         Pulmonary Status is stable,  No changes warranted. Needs to address ankle edema. GFR is a bit low.

## 2017-07-10 ENCOUNTER — TELEPHONE (OUTPATIENT)
Dept: FAMILY MEDICINE | Facility: CLINIC | Age: 82
End: 2017-07-10

## 2017-07-12 ENCOUNTER — TELEPHONE (OUTPATIENT)
Dept: PRIMARY CARE CLINIC | Facility: CLINIC | Age: 82
End: 2017-07-12

## 2017-07-12 ENCOUNTER — TELEPHONE (OUTPATIENT)
Dept: FAMILY MEDICINE | Facility: CLINIC | Age: 82
End: 2017-07-12

## 2017-07-12 ENCOUNTER — TELEPHONE (OUTPATIENT)
Dept: OTOLARYNGOLOGY | Facility: CLINIC | Age: 82
End: 2017-07-12

## 2017-07-12 DIAGNOSIS — J34.89 RHINORRHEA: ICD-10-CM

## 2017-07-12 DIAGNOSIS — R63.0 POOR APPETITE: Primary | ICD-10-CM

## 2017-07-12 DIAGNOSIS — R63.4 ABNORMAL WEIGHT LOSS: ICD-10-CM

## 2017-07-12 RX ORDER — CYPROHEPTADINE HYDROCHLORIDE 4 MG/1
TABLET ORAL
Qty: 90 TABLET | Refills: 5 | Status: ON HOLD | OUTPATIENT
Start: 2017-07-12 | End: 2017-12-27 | Stop reason: SDUPTHER

## 2017-07-12 RX ORDER — CYPROHEPTADINE HYDROCHLORIDE 4 MG/1
TABLET ORAL
Qty: 30 TABLET | Refills: 5 | Status: SHIPPED | OUTPATIENT
Start: 2017-07-12 | End: 2017-07-12 | Stop reason: SDUPTHER

## 2017-07-12 NOTE — TELEPHONE ENCOUNTER
Rec'd call from  nurse Sandra, to discuss sacral decub has healed from stage 1, now pink and blanchable. She had mentioned that pt has been refusing Mepilex dressing as she felt was uncomfortable. Discussed to continue with the zinc oxide as previously ordered and to order a waffle cushion for w/c.

## 2017-07-12 NOTE — TELEPHONE ENCOUNTER
Call placed to pt dtr Maryjane to offer another appt time on 7/17 or 7/18, unable to reach her at this time, left msg on mobile number.

## 2017-07-12 NOTE — TELEPHONE ENCOUNTER
----- Message from Julia Villasenor sent at 7/12/2017  7:47 AM CDT -----  No. [phone]   Patient cancelled the hospital follow up appointment on 7/13/17.   She would like to reschedule for 7/17 or 7/18 in the early afternoon.   Please call.

## 2017-07-12 NOTE — TELEPHONE ENCOUNTER
Jim asked me to look over a message sent from [facility] concerning Ms. Kaity Rebolledo.  The pharmacy, [facility], needed clarification of why she is on Periactin( Cyprohetadine.  I spent quite a bit of time reviewing Dr. Soriano's notes.  On December 1, 2016 he had noted that the patient had lost a substantial amount of weight from 136 pounds to 108 pounds due to a lack of appetite.  I also saw some instances where upon the patient had some upper respiratory symptoms such as nasal congestion and rhinorrhea.  I called the nurse at [facility] and she told me that they are giving the patient Periactin 4 mg 3 times a day.  I will renew this and send the order to [facility] pharmacy: [phone].  I actually sent the prescription electronically to her pharmacy to reflect a 3 times a day dosing with 5 refills.

## 2017-07-13 ENCOUNTER — TELEPHONE (OUTPATIENT)
Dept: PRIMARY CARE CLINIC | Facility: CLINIC | Age: 82
End: 2017-07-13

## 2017-07-13 NOTE — TELEPHONE ENCOUNTER
Rec'd  from dtr Maryjane on PC vm. Call placed to daughter and unable to reach her. Left message for her to either call back or to reach out to clinic via pt portal.

## 2017-07-13 NOTE — TELEPHONE ENCOUNTER
Rec'd call on PC line from dtr Maryjane. Returned call, unable to reach her. Left msg on VM for her to return call to clinic.

## 2017-07-17 ENCOUNTER — TELEPHONE (OUTPATIENT)
Dept: PRIMARY CARE CLINIC | Facility: CLINIC | Age: 82
End: 2017-07-17

## 2017-07-17 ENCOUNTER — OFFICE VISIT (OUTPATIENT)
Dept: PRIMARY CARE CLINIC | Facility: CLINIC | Age: 82
End: 2017-07-17
Payer: MEDICARE

## 2017-07-17 ENCOUNTER — LAB VISIT (OUTPATIENT)
Dept: LAB | Facility: HOSPITAL | Age: 82
End: 2017-07-17
Attending: INTERNAL MEDICINE
Payer: MEDICARE

## 2017-07-17 VITALS
BODY MASS INDEX: 18.86 KG/M2 | DIASTOLIC BLOOD PRESSURE: 57 MMHG | SYSTOLIC BLOOD PRESSURE: 90 MMHG | WEIGHT: 106.5 LBS | TEMPERATURE: 98 F | HEART RATE: 111 BPM

## 2017-07-17 DIAGNOSIS — I48.0 PAROXYSMAL ATRIAL FIBRILLATION: ICD-10-CM

## 2017-07-17 DIAGNOSIS — L98.429 STAGE 2 SKIN ULCER OF SACRAL REGION: ICD-10-CM

## 2017-07-17 DIAGNOSIS — R50.9 FEVER, UNSPECIFIED FEVER CAUSE: ICD-10-CM

## 2017-07-17 DIAGNOSIS — R35.0 URINARY FREQUENCY: Primary | ICD-10-CM

## 2017-07-17 DIAGNOSIS — Z79.01 ANTICOAGULANT LONG-TERM USE: ICD-10-CM

## 2017-07-17 DIAGNOSIS — J18.9 HCAP (HEALTHCARE-ASSOCIATED PNEUMONIA): ICD-10-CM

## 2017-07-17 DIAGNOSIS — D72.829 LEUKOCYTOSIS, UNSPECIFIED TYPE: Primary | ICD-10-CM

## 2017-07-17 DIAGNOSIS — I10 ESSENTIAL HYPERTENSION: ICD-10-CM

## 2017-07-17 PROCEDURE — 1157F ADVNC CARE PLAN IN RCRD: CPT | Mod: S$GLB,,, | Performed by: INTERNAL MEDICINE

## 2017-07-17 PROCEDURE — 1126F AMNT PAIN NOTED NONE PRSNT: CPT | Mod: S$GLB,,, | Performed by: INTERNAL MEDICINE

## 2017-07-17 PROCEDURE — 99999 PR PBB SHADOW E&M-EST. PATIENT-LVL IV: CPT | Mod: PBBFAC,,, | Performed by: INTERNAL MEDICINE

## 2017-07-17 PROCEDURE — 99214 OFFICE O/P EST MOD 30 MIN: CPT | Mod: S$GLB,,, | Performed by: INTERNAL MEDICINE

## 2017-07-17 PROCEDURE — 1159F MED LIST DOCD IN RCRD: CPT | Mod: S$GLB,,, | Performed by: INTERNAL MEDICINE

## 2017-07-17 PROCEDURE — 99499 UNLISTED E&M SERVICE: CPT | Mod: S$GLB,,, | Performed by: INTERNAL MEDICINE

## 2017-07-17 RX ORDER — TIOTROPIUM BROMIDE 18 UG/1
18 CAPSULE ORAL; RESPIRATORY (INHALATION) DAILY
Qty: 30 CAPSULE | Refills: 5 | Status: SHIPPED | OUTPATIENT
Start: 2017-07-17 | End: 2017-07-20 | Stop reason: SDUPTHER

## 2017-07-17 NOTE — TELEPHONE ENCOUNTER
----- Message from Zina Soto sent at 7/14/2017 11:39 AM CDT -----  Contact: [phone]/self  Pt would like to speak with the nurse to reschedule her appointment.  Please advise

## 2017-07-17 NOTE — TELEPHONE ENCOUNTER
Spoke to Maryjane and scheduled an appointment for labs at 1:00 with appt with Dr. Saldana at 1:30

## 2017-07-19 PROBLEM — S52.531A CLOSED COLLES' FRACTURE OF RIGHT RADIUS: Status: RESOLVED | Noted: 2017-04-22 | Resolved: 2017-07-19

## 2017-07-19 PROBLEM — S72.009D ENCOUNTER FOR AFTERCARE FOR HEALING CLOSED TRAUMATIC FRACTURE OF HIP: Status: RESOLVED | Noted: 2017-04-25 | Resolved: 2017-07-19

## 2017-07-19 NOTE — PROGRESS NOTES
Subjective:       Patient ID: Kaity Rebolledo is a 84 y.o. female.    Chief Complaint: No chief complaint on file.    HPI:  Returns to [facility] for continued hospital FU.  Recently hospitalized for PNA with related sepsis.   Responded well to supportive treatment; ultimately discharged to complete a course of empiric Levaquin and tapering prednisone.  SNF placement was recommended, but the patient and her family elected to return to her assisted living center.     Patient accompanied today by her daughter.  Patients appearance and functional status have improved since our last office visit.  She tells me she is exercising daily at her assisted living center.  She is receiving daily attention to her sacral area and the previous wound is now completely healed; she is also now pain free in this area.   Denies dyspnea, cough, chest pain, fever, sweats.     Labs today show previously noted leukocytosis has resolved. Urinalysis without evidence of infection.     Review of Systems  General ROS: negative for chills, fever or weight loss  Psychological ROS: negative for hallucination, depression or suicidal ideation  Ophthalmic ROS: negative for blurry vision, photophobia or eye pain  ENT ROS: negative for epistaxis, sore throat or rhinorrhea  Respiratory ROS: no cough, shortness of breath, or wheezing  Cardiovascular ROS: no chest pain or dyspnea on exertion  Gastrointestinal ROS: no abdominal pain, change in bowel habits, or black/ bloody stools  Genito-Urinary ROS: no dysuria, trouble voiding, or hematuria  + urinary frequency  Musculoskeletal ROS: negative for gait disturbance or muscular weakness  Neurological ROS: no syncope or seizures; no ataxia  Dermatological ROS: negative for pruritis, rash and jaundice        Objective:      Vitals:    07/17/17 1403   BP: (!) 90/57   Pulse: (!) 111   Temp: 98.3 °F (36.8 °C)     Physical Exam:  General appearance: alert, cooperative, no distress  Constitutional:Oriented to  person, place, and time +appears thin, frail   HEENT: Normocephalic, atraumatic, neck symmetrical, no nasal discharge   Eyes: conjunctivae/corneas clear, PERRL, EOM's intact  Lungs: clear to auscultation bilaterally, no dullness to percussion bilaterally  Heart: + increased rate and regular rhythm without rub; no displacement of the PMI   Abdomen: soft, non-tender; bowel sounds normoactive; no organomegaly  Extremities: extremities symmetric; no clubbing, cyanosis, or edema  Integument: Skin color, texture, turgor normal; no rashes; hair distrubution normal  - previously noted sacral wound now fully healed  Neurologic: Alert and oriented X 3, normal strength  Psychiatric: no pressured speech; normal affect; no evidence of impaired cognition    Results for [facility] [place] (MRN [id number]) as of 7/19/2017 12:19   Ref. Range 7/17/2017 13:43   WBC Latest Ref Range: 3.90 - 12.70 K/uL 9.97   RBC Latest Ref Range: 4.00 - 5.40 M/uL 2.91 (L)   Hemoglobin Latest Ref Range: 12.0 - 16.0 g/dL 8.7 (L)   Hematocrit Latest Ref Range: 37.0 - 48.5 % 27.8 (L)   MCV Latest Ref Range: 82 - 98 fL 96   MCH Latest Ref Range: 27.0 - 31.0 pg 29.9   MCHC Latest Ref Range: 32.0 - 36.0 % 31.3 (L)   RDW Latest Ref Range: 11.5 - 14.5 % 17.1 (H)   Platelets Latest Ref Range: 150 - 350 K/uL 358 (H)   Results for [facility] [place] (MRN [id number]) as of 7/19/2017 12:19   Ref. Range 7/17/2017 13:43   Sodium Latest Ref Range: 136 - 145 mmol/L 139   Potassium Latest Ref Range: 3.5 - 5.1 mmol/L 4.5   Chloride Latest Ref Range: 95 - 110 mmol/L 95   CO2 Latest Ref Range: 23 - 29 mmol/L 33 (H)   Anion Gap Latest Ref Range: 8 - 16 mmol/L 11   BUN, Bld Latest Ref Range: 8 - 23 mg/dL 14   Creatinine Latest Ref Range: 0.5 - 1.4 mg/dL 1.0   eGFR if non African American Latest Ref Range: >60 mL/min/1.73 m^2 52 (A)   eGFR if African American Latest Ref Range: >60 mL/min/1.73 m^2 60   Glucose Latest Ref Range: 70 - 110 mg/dL 197 (H)   Calcium Latest Ref Range: 8.7 - 10.5  mg/dL 9.4     Results for [facility] [place] (MRN [id number]) as of 7/19/2017 12:19   Ref. Range 7/18/2017 11:05   Specimen UA Unknown Urine, Clean Catch   Color, UA Latest Ref Range: Yellow, Straw, Gisel  Yellow   pH, UA Latest Ref Range: 5.0 - 8.0  7.0   Specific Gravity, UA Latest Ref Range: 1.005 - 1.030  1.015   Appearance, UA Latest Ref Range: Clear  Clear   Protein, UA Latest Ref Range: Negative  Negative   Glucose, UA Latest Ref Range: Negative  Negative   Ketones, UA Latest Ref Range: Negative  Negative   Occult Blood UA Latest Ref Range: Negative  Negative   Nitrite, UA Latest Ref Range: Negative  Negative   Urobilinogen, UA Latest Ref Range: <2.0 EU/dL Negative   Bilirubin (UA) Latest Ref Range: Negative  Negative   Leukocytes, UA Latest Ref Range: Negative  Negative     Assessment:       1. Urinary frequency    2. HCAP (healthcare-associated pneumonia)    3. Essential hypertension    4. Paroxysmal atrial fibrillation    5. Anticoagulant long-term use    6. Stage 2 skin ulcer of sacral region        Plan:       Diagnoses and all orders for this visit:    Urinary frequency  -     URINALYSIS;Reviewed; no evidence of infection  -     Urine culture; Future    HCAP (healthcare-associated pneumonia)  - completed prescribed course of empiric Levaquin  - stable on chronic home oxygen 3 L NC    Essential hypertension  - blood pressure under good control at present     Paroxysmal atrial fibrillation  - rate controlled with Toprol  - anticoagulated with Apixaban    Anticoagulant long-term use  - on Apixaban due to atrial fibrillation     Stage 2 skin ulcer of sacral region  - healed    Patient will be released from [facility].   Has FU scheduled with Dr Soriano 9/11/17.

## 2017-07-20 DIAGNOSIS — J44.9 CHRONIC OBSTRUCTIVE PULMONARY DISEASE, UNSPECIFIED COPD TYPE: Primary | ICD-10-CM

## 2017-07-20 RX ORDER — TIOTROPIUM BROMIDE 18 UG/1
18 CAPSULE ORAL; RESPIRATORY (INHALATION) EVERY MORNING
Qty: 90 CAPSULE | Refills: 4 | Status: SHIPPED | OUTPATIENT
Start: 2017-07-20 | End: 2018-08-17 | Stop reason: SDUPTHER

## 2017-07-27 ENCOUNTER — HOSPITAL ENCOUNTER (OUTPATIENT)
Dept: RADIOLOGY | Facility: HOSPITAL | Age: 82
Discharge: HOME | End: 2017-07-27
Attending: ORTHOPAEDIC SURGERY
Payer: MEDICARE

## 2017-07-27 ENCOUNTER — OFFICE VISIT (OUTPATIENT)
Dept: ORTHOPEDICS | Facility: CLINIC | Age: 82
End: 2017-07-27
Payer: MEDICARE

## 2017-07-27 DIAGNOSIS — Z98.890 STATUS POST SURGERY: Primary | ICD-10-CM

## 2017-07-27 DIAGNOSIS — Z98.890 STATUS POST SURGERY: ICD-10-CM

## 2017-07-27 DIAGNOSIS — I50.22 CHRONIC SYSTOLIC HEART FAILURE: Chronic | ICD-10-CM

## 2017-07-27 PROCEDURE — 73502 X-RAY EXAM HIP UNI 2-3 VIEWS: CPT | Mod: TC,RT

## 2017-07-27 PROCEDURE — 99024 POSTOP FOLLOW-UP VISIT: CPT | Mod: S$GLB,,, | Performed by: PHYSICIAN ASSISTANT

## 2017-07-27 PROCEDURE — 73110 X-RAY EXAM OF WRIST: CPT | Mod: 26,RT,, | Performed by: RADIOLOGY

## 2017-07-27 PROCEDURE — 73502 X-RAY EXAM HIP UNI 2-3 VIEWS: CPT | Mod: 26,RT,, | Performed by: RADIOLOGY

## 2017-07-27 PROCEDURE — 73110 X-RAY EXAM OF WRIST: CPT | Mod: TC,RT

## 2017-07-27 PROCEDURE — 99999 PR PBB SHADOW E&M-EST. PATIENT-LVL I: CPT | Mod: PBBFAC,,, | Performed by: PHYSICIAN ASSISTANT

## 2017-07-27 RX ORDER — METOPROLOL SUCCINATE 50 MG/1
100 TABLET, EXTENDED RELEASE ORAL DAILY
Qty: 60 TABLET | Refills: 11 | Status: ON HOLD | OUTPATIENT
Start: 2017-07-27 | End: 2019-01-01 | Stop reason: HOSPADM

## 2017-07-31 NOTE — PROGRESS NOTES
Ms. Rebolledo is a 83 year old female who fell from standing height on 4/21/17 and sustained a right femoral neck fracture ( treated with cemented hemiarthroplasty on 4/23/17 by Dr. Freeman)  and a right distal radius fracture (treated nonoperatively, reduced in ED).      She is  here today for follow up of her distal radius fracture, treated non-operatively and her hip hemiarthroplasty 04/23/2017 by . Patient stated that she is doing ok.   She is at a  home. She is receiving home health therapy there.   She stated that she is doing very good  Patient stated that she is ambulating with the assistance of a walker.    Pain controlled. She is taking Tylenol and occasional tramadol for her pain.    she denies fever, chills, and sweats since the time of the surgery.     Physical exam:   WRIST: dressing taken down. mild swelling,  mild TTP at fracture site, decreased range of motion in all planes NVI  HIP: no TTP, full range of motion of knee and ankle, NVI     RADS: WRIST: Healing displaced fractures of the distal radius and ulna change position as compared to the previous studies    Assessment:  Post-op visit ( 12 weeks)    Plan:  WRIST: - work on  ROm   - NWB   - pain medication: No refill needed  HIP: PT/OT, range of motion as tolerated with hip precautions, WBAT    - return to clinic in 6 weeks if any increase in pain.

## 2017-08-14 RX ORDER — POTASSIUM CHLORIDE 750 MG/1
10 CAPSULE, EXTENDED RELEASE ORAL DAILY
Qty: 30 CAPSULE | Refills: 3 | Status: SHIPPED | OUTPATIENT
Start: 2017-08-14 | End: 2017-12-12 | Stop reason: SDUPTHER

## 2017-08-14 NOTE — TELEPHONE ENCOUNTER
----- Message from Aylin Mosley sent at 8/14/2017 12:06 PM CDT -----  Contact: [phone]/   Pharmacy would like to speak with you this patient's prescriptions. Please advise.

## 2017-08-17 ENCOUNTER — TELEPHONE (OUTPATIENT)
Dept: FAMILY MEDICINE | Facility: CLINIC | Age: 82
End: 2017-08-17

## 2017-08-17 NOTE — TELEPHONE ENCOUNTER
----- Message from Kia Horton sent at 8/17/2017  1:51 PM CDT -----  Contact: Kyleigh Calling from [facility] [phone]  Calling to talk to nurse concerning patients orders. Please advice

## 2017-08-17 NOTE — TELEPHONE ENCOUNTER
Informed Kyleigh that pt need to take twice a day however if BP systolic 100-110 - she can take just once a day

## 2017-08-22 ENCOUNTER — TELEPHONE (OUTPATIENT)
Dept: FAMILY MEDICINE | Facility: CLINIC | Age: 82
End: 2017-08-22

## 2017-08-22 NOTE — TELEPHONE ENCOUNTER
----- Message from Candi Saul sent at 8/22/2017 10:51 AM CDT -----  Contact: [phone] Pt's Nurse Kyleigh Arizmendi pt's nurse would like to speak with you about pt's medication. Please advise.

## 2017-08-22 NOTE — TELEPHONE ENCOUNTER
Spoke to Kyleigh from [facility] [place] and will fax over pt's medication list for Dr. Soriano to review her Metoprolol.

## 2017-09-05 DIAGNOSIS — I50.33 ACUTE ON CHRONIC DIASTOLIC HEART FAILURE: ICD-10-CM

## 2017-09-05 RX ORDER — FUROSEMIDE 20 MG/1
20 TABLET ORAL 2 TIMES DAILY
Qty: 90 TABLET | Refills: 3 | Status: SHIPPED | OUTPATIENT
Start: 2017-09-05 | End: 2018-07-13 | Stop reason: SDUPTHER

## 2017-09-11 ENCOUNTER — LAB VISIT (OUTPATIENT)
Dept: LAB | Facility: HOSPITAL | Age: 82
End: 2017-09-11
Attending: FAMILY MEDICINE
Payer: MEDICARE

## 2017-09-11 ENCOUNTER — OFFICE VISIT (OUTPATIENT)
Dept: FAMILY MEDICINE | Facility: CLINIC | Age: 82
End: 2017-09-11
Attending: FAMILY MEDICINE
Payer: MEDICARE

## 2017-09-11 VITALS
WEIGHT: 110 LBS | OXYGEN SATURATION: 91 % | SYSTOLIC BLOOD PRESSURE: 103 MMHG | BODY MASS INDEX: 19.49 KG/M2 | DIASTOLIC BLOOD PRESSURE: 63 MMHG | HEART RATE: 112 BPM | HEIGHT: 63 IN

## 2017-09-11 DIAGNOSIS — M80.00XD OSTEOPOROSIS WITH CURRENT PATHOLOGICAL FRACTURE WITH ROUTINE HEALING, UNSPECIFIED OSTEOPOROSIS TYPE, SUBSEQUENT ENCOUNTER: ICD-10-CM

## 2017-09-11 DIAGNOSIS — D75.839 THROMBOCYTOSIS: ICD-10-CM

## 2017-09-11 DIAGNOSIS — M79.605 CHRONIC PAIN OF BOTH LOWER EXTREMITIES: ICD-10-CM

## 2017-09-11 DIAGNOSIS — D53.9 NUTRITIONAL ANEMIA: ICD-10-CM

## 2017-09-11 DIAGNOSIS — J84.9 ILD (INTERSTITIAL LUNG DISEASE): ICD-10-CM

## 2017-09-11 DIAGNOSIS — I10 ESSENTIAL HYPERTENSION: ICD-10-CM

## 2017-09-11 DIAGNOSIS — I48.0 PAROXYSMAL ATRIAL FIBRILLATION: ICD-10-CM

## 2017-09-11 DIAGNOSIS — I25.10 CORONARY ARTERY DISEASE INVOLVING NATIVE CORONARY ARTERY OF NATIVE HEART WITHOUT ANGINA PECTORIS: ICD-10-CM

## 2017-09-11 DIAGNOSIS — F32.A ANXIETY AND DEPRESSION: ICD-10-CM

## 2017-09-11 DIAGNOSIS — I10 ESSENTIAL HYPERTENSION: Primary | ICD-10-CM

## 2017-09-11 DIAGNOSIS — S72.001A CLOSED DISPLACED FRACTURE OF NECK OF RIGHT FEMUR: ICD-10-CM

## 2017-09-11 DIAGNOSIS — E78.5 HYPERLIPIDEMIA, UNSPECIFIED HYPERLIPIDEMIA TYPE: ICD-10-CM

## 2017-09-11 DIAGNOSIS — G89.29 CHRONIC PAIN OF BOTH LOWER EXTREMITIES: ICD-10-CM

## 2017-09-11 DIAGNOSIS — M79.604 CHRONIC PAIN OF BOTH LOWER EXTREMITIES: ICD-10-CM

## 2017-09-11 DIAGNOSIS — D63.8 ANEMIA OF CHRONIC DISEASE: ICD-10-CM

## 2017-09-11 DIAGNOSIS — F41.9 ANXIETY AND DEPRESSION: ICD-10-CM

## 2017-09-11 LAB
ALBUMIN SERPL BCP-MCNC: 3.6 G/DL
ALP SERPL-CCNC: 64 U/L
ALT SERPL W/O P-5'-P-CCNC: 10 U/L
ANION GAP SERPL CALC-SCNC: 6 MMOL/L
AST SERPL-CCNC: 22 U/L
BASOPHILS # BLD AUTO: 0.04 K/UL
BASOPHILS NFR BLD: 0.4 %
BILIRUB SERPL-MCNC: 0.2 MG/DL
BUN SERPL-MCNC: 11 MG/DL
CALCIUM SERPL-MCNC: 9.9 MG/DL
CHLORIDE SERPL-SCNC: 95 MMOL/L
CO2 SERPL-SCNC: 40 MMOL/L
CREAT SERPL-MCNC: 0.9 MG/DL
DIFFERENTIAL METHOD: ABNORMAL
EOSINOPHIL # BLD AUTO: 0.1 K/UL
EOSINOPHIL NFR BLD: 0.9 %
ERYTHROCYTE [DISTWIDTH] IN BLOOD BY AUTOMATED COUNT: 13.4 %
EST. GFR  (AFRICAN AMERICAN): >60 ML/MIN/1.73 M^2
EST. GFR  (NON AFRICAN AMERICAN): 59 ML/MIN/1.73 M^2
FERRITIN SERPL-MCNC: 87 NG/ML
GLUCOSE SERPL-MCNC: 101 MG/DL
HCT VFR BLD AUTO: 33.2 %
HGB BLD-MCNC: 10.1 G/DL
IRON SERPL-MCNC: 43 UG/DL
LYMPHOCYTES # BLD AUTO: 1.8 K/UL
LYMPHOCYTES NFR BLD: 19.2 %
MCH RBC QN AUTO: 30 PG
MCHC RBC AUTO-ENTMCNC: 30.4 G/DL
MCV RBC AUTO: 99 FL
MONOCYTES # BLD AUTO: 0.9 K/UL
MONOCYTES NFR BLD: 9.3 %
NEUTROPHILS # BLD AUTO: 6.6 K/UL
NEUTROPHILS NFR BLD: 69.9 %
PLATELET # BLD AUTO: 354 K/UL
PMV BLD AUTO: 10.3 FL
POTASSIUM SERPL-SCNC: 4.4 MMOL/L
PROT SERPL-MCNC: 7 G/DL
RBC # BLD AUTO: 3.37 M/UL
SATURATED IRON: 13 %
SODIUM SERPL-SCNC: 141 MMOL/L
TOTAL IRON BINDING CAPACITY: 326 UG/DL
TRANSFERRIN SERPL-MCNC: 220 MG/DL
VIT B12 SERPL-MCNC: 870 PG/ML
WBC # BLD AUTO: 9.49 K/UL

## 2017-09-11 PROCEDURE — 82728 ASSAY OF FERRITIN: CPT

## 2017-09-11 PROCEDURE — 85025 COMPLETE CBC W/AUTO DIFF WBC: CPT

## 2017-09-11 PROCEDURE — 83540 ASSAY OF IRON: CPT

## 2017-09-11 PROCEDURE — 3008F BODY MASS INDEX DOCD: CPT | Mod: S$GLB,,, | Performed by: FAMILY MEDICINE

## 2017-09-11 PROCEDURE — 1125F AMNT PAIN NOTED PAIN PRSNT: CPT | Mod: S$GLB,,, | Performed by: FAMILY MEDICINE

## 2017-09-11 PROCEDURE — 1157F ADVNC CARE PLAN IN RCRD: CPT | Mod: S$GLB,,, | Performed by: FAMILY MEDICINE

## 2017-09-11 PROCEDURE — 3074F SYST BP LT 130 MM HG: CPT | Mod: S$GLB,,, | Performed by: FAMILY MEDICINE

## 2017-09-11 PROCEDURE — 36415 COLL VENOUS BLD VENIPUNCTURE: CPT

## 2017-09-11 PROCEDURE — 84466 ASSAY OF TRANSFERRIN: CPT

## 2017-09-11 PROCEDURE — 99499 UNLISTED E&M SERVICE: CPT | Mod: S$GLB,,, | Performed by: FAMILY MEDICINE

## 2017-09-11 PROCEDURE — 80053 COMPREHEN METABOLIC PANEL: CPT

## 2017-09-11 PROCEDURE — 82607 VITAMIN B-12: CPT

## 2017-09-11 PROCEDURE — 99999 PR PBB SHADOW E&M-EST. PATIENT-LVL III: CPT | Mod: PBBFAC,,, | Performed by: FAMILY MEDICINE

## 2017-09-11 PROCEDURE — 1159F MED LIST DOCD IN RCRD: CPT | Mod: S$GLB,,, | Performed by: FAMILY MEDICINE

## 2017-09-11 PROCEDURE — 3078F DIAST BP <80 MM HG: CPT | Mod: S$GLB,,, | Performed by: FAMILY MEDICINE

## 2017-09-11 PROCEDURE — 99214 OFFICE O/P EST MOD 30 MIN: CPT | Mod: S$GLB,,, | Performed by: FAMILY MEDICINE

## 2017-09-11 RX ORDER — TRAMADOL HYDROCHLORIDE 50 MG/1
50 TABLET ORAL
Qty: 30 TABLET | Refills: 2 | Status: SHIPPED | OUTPATIENT
Start: 2017-09-11

## 2017-09-11 RX ORDER — TRAMADOL HYDROCHLORIDE 50 MG/1
50 TABLET ORAL EVERY 6 HOURS PRN
COMMUNITY
End: 2017-09-11 | Stop reason: SDUPTHER

## 2017-09-11 NOTE — PROGRESS NOTES
Subjective:       Patient ID: Kaity Rebolledo is a 84 y.o. female.    Chief Complaint: Leg Pain; Leg Swelling; and Medication Management    82 yr old pleasant white female with paroxysmal AFIB,  COPD (oxygen dependent), HTN, HLD, Anemia, Osteoporosis, h/o pneumonia, OA knee B/L, presents today for 12 week follow up. C/o pedal edema which is chronic. Mild pain in legs which is again chronic.      She again had a fall after I saw her and had minor fracture of right femur. Her Orthopedic surgeon did not recommend surgery and she is in Rehab. Doing well.    Paroxysmal afib - RHYTHM CONTROLLED - ON ELIQUIS FOR ANTICOAGULATION      1. COPD/SOB/Fatigue - she has chronic COPD - and she has multiple hospitalizations - she is currently on ambulatory oxygen. She had pneumonia which is complicated by pleural effusion and she was out in 03/15. She had follow up chest x ray last visit and it was normal.       2. Anemia - chronic - intermittent - her H/H varying from 8-12/24-36 since last 8 years.  she was never told she is anemic and she only takes multivitamin - she is not bleeding from anywhere else and she denies any rectal bleed or melena. c/o fatigue and tiredness - seen Hematology/Oncology - no intervention necessary      3. Weight loss/lack of appetite - since last 3-4 months - tried boost  And ensure recently and it is helping - no other symptoms except as above - of note she used to weigh 136 lb 3-4 years ago and now she is 104 lb      4. B/L knee pain - chronic - follows orthopedics - she had knee injections 6 months ago and she will make another appointment soon - on tramadol once a day as needed      5. HTN - controlled - on ACE - - controlled - watch BP      6. HLD - controlled- on diet alone - no new issues    7/. CHF - controlled - EF 50 - improving    8. Low back pain/buttock pain - onset 3-4 days ago and gradually worsening - no fall or trauma - she had some coughing spell few days ago prior to onset and this one  started - tried some muscle relaxant and it did not help - has osteoporosis and was on reclast and she stopped few years ago      History as below - reviewed           Shortness of Breath   This is a chronic problem. The current episode started more than 1 year ago. The problem occurs constantly. The problem has been unchanged. Associated symptoms include leg swelling. Pertinent negatives include no chest pain, coryza, ear pain, fever, headaches, orthopnea, rash, rhinorrhea, sore throat, swollen glands, vomiting or wheezing. Nothing aggravates the symptoms. She has tried beta agonist inhalers and steroid inhalers for the symptoms. The treatment provided mild relief. Her past medical history is significant for allergies, chronic lung disease, COPD and pneumonia. There is no history of aspirin allergies, asthma, bronchiolitis, CAD, DVT, a heart failure, PE or a recent surgery.   Fatigue   This is a chronic problem. The current episode started more than 1 year ago. The problem occurs constantly. The problem has been gradually worsening. Associated symptoms include arthralgias and myalgias. Pertinent negatives include no chest pain, chills, congestion, coughing, diaphoresis, fatigue, fever, headaches, nausea, rash, sore throat, swollen glands, vomiting or weakness. Nothing aggravates the symptoms. Treatments tried: boost or ensure. The treatment provided mild relief.   Hypertension   This is a chronic problem. The current episode started more than 1 year ago. The problem has been gradually improving since onset. The problem is controlled. Associated symptoms include anxiety and shortness of breath. Pertinent negatives include no chest pain, headaches, orthopnea or palpitations. There are no associated agents to hypertension. Risk factors for coronary artery disease include post-menopausal state. Past treatments include ACE inhibitors. The current treatment provides significant improvement. There are no compliance  problems.  There is no history of angina, CAD/MI, heart failure, PVD, renovascular disease, retinopathy or a thyroid problem. There is no history of chronic renal disease, hypercortisolism, hyperparathyroidism or pheochromocytoma.   Hyperlipidemia   This is a chronic problem. The current episode started more than 1 year ago. The problem is controlled. Recent lipid tests were reviewed and are normal. She has no history of chronic renal disease, hypothyroidism, liver disease or obesity. There are no known factors aggravating her hyperlipidemia. Associated symptoms include myalgias and shortness of breath. Pertinent negatives include no chest pain. She is currently on no antihyperlipidemic treatment. The current treatment provides moderate improvement of lipids. There are no compliance problems.  Risk factors for coronary artery disease include dyslipidemia, hypertension and post-menopausal.   Anemia   Presents for follow-up visit. There has been no bruising/bleeding easily, confusion, fever, light-headedness, pallor, palpitations or pica. Signs of blood loss that are not present include hematochezia and melena. Past treatments include changes in diet. There is no history of chronic renal disease, heart failure, hypothyroidism or recent surgery. There are no compliance problems.    Anxiety   Presents for initial visit. Onset was 1 to 4 weeks ago. The problem has been unchanged. Symptoms include shortness of breath. Patient reports no chest pain, confusion, decreased concentration, dizziness, nausea, nervous/anxious behavior, palpitations or suicidal ideas. Symptoms occur constantly. The severity of symptoms is moderate. The symptoms are aggravated by family issues. The quality of sleep is poor. Nighttime awakenings: occasional.     Risk factors include a major life event and recent illness. Her past medical history is significant for anemia, anxiety/panic attacks and chronic lung disease. There is no history of asthma  or CAD. Past treatments include nothing. The treatment provided no relief.     Review of Systems   Constitutional: Negative.  Negative for activity change, chills, diaphoresis, fatigue, fever and unexpected weight change.   HENT: Negative.  Negative for congestion, ear discharge, ear pain, hearing loss, rhinorrhea, sore throat and voice change.    Eyes: Negative.  Negative for pain, discharge and visual disturbance.   Respiratory: Positive for shortness of breath. Negative for cough, chest tightness and wheezing.    Cardiovascular: Positive for leg swelling. Negative for chest pain, palpitations and orthopnea.   Gastrointestinal: Negative.  Negative for abdominal distention, anal bleeding, constipation, hematochezia, melena, nausea and vomiting.   Endocrine: Negative.  Negative for cold intolerance, polydipsia and polyuria.   Genitourinary: Negative.  Negative for decreased urine volume, difficulty urinating, dysuria, frequency, menstrual problem and vaginal pain.   Musculoskeletal: Positive for arthralgias, back pain and myalgias. Negative for gait problem.   Skin: Negative.  Negative for color change, pallor, rash and wound.   Allergic/Immunologic: Negative.  Negative for environmental allergies and immunocompromised state.   Neurological: Negative.  Negative for dizziness, tremors, seizures, speech difficulty, weakness, light-headedness and headaches.   Hematological: Negative.  Negative for adenopathy. Does not bruise/bleed easily.   Psychiatric/Behavioral: Negative.  Negative for agitation, confusion, decreased concentration, hallucinations, self-injury and suicidal ideas. The patient is not nervous/anxious.        PMH/PSH/FH/SH/MED/ALLERGY reviewed    Objective:       Vitals:    09/11/17 1311   BP: 103/63   Pulse: (!) 112       Physical Exam   Constitutional: She is oriented to person, place, and time. She appears well-developed and well-nourished. No distress.   HENT:   Head: Normocephalic and atraumatic.    Eyes: EOM are normal. Pupils are equal, round, and reactive to light.   Neck: Normal range of motion. Neck supple.   Cardiovascular: Normal rate, regular rhythm, normal heart sounds and intact distal pulses.  Exam reveals no gallop and no friction rub.    No murmur heard.  Pulmonary/Chest: Effort normal. Wheezes: generalized wheezing.   Coarse B/L breath sounds   Abdominal: Soft. Bowel sounds are normal. She exhibits no distension. There is no tenderness. No hernia.   Musculoskeletal: She exhibits no edema. Tenderness: B/L knee TTP and restricted ROM.   TTP left gluteal area and ischium. No TTp left hip or SLRT neg   Neurological: She is alert and oriented to person, place, and time. She has normal reflexes. She displays normal reflexes. No cranial nerve deficit. She exhibits normal muscle tone. Coordination normal.   Skin: Skin is warm and dry. She is not diaphoretic.   Psychiatric: She has a normal mood and affect. Her behavior is normal. Judgment and thought content normal.       Assessment:       1. Essential hypertension    2. ILD (interstitial lung disease)    3. Anxiety and depression    4. Hyperlipidemia, unspecified hyperlipidemia type    5. Coronary artery disease involving native coronary artery of native heart without angina pectoris    6. Paroxysmal atrial fibrillation    7. Anemia of chronic disease    8. Osteoporosis with current pathological fracture with routine healing, unspecified osteoporosis type, subsequent encounter    9. Closed displaced fracture of neck of right femur s/p hemiarthroplasty on 4/23/2017    10. Thrombocytosis    11. Chronic pain of both lower extremities    12. Nutritional anemia         Plan:       Kaity was seen today for leg pain, leg swelling and medication management.    Diagnoses and all orders for this visit:    Essential hypertension  -     CBC auto differential; Future  -     Comprehensive metabolic panel; Future    ILD (interstitial lung disease)    Anxiety and  depression    Hyperlipidemia, unspecified hyperlipidemia type    Coronary artery disease involving native coronary artery of native heart without angina pectoris    Paroxysmal atrial fibrillation    Anemia of chronic disease  -     CBC auto differential; Future  -     Iron and TIBC; Future  -     Ferritin; Future  -     Vitamin B12; Future    Osteoporosis with current pathological fracture with routine healing, unspecified osteoporosis type, subsequent encounter    Closed displaced fracture of neck of right femur s/p hemiarthroplasty on 4/23/2017  -     tramadol (ULTRAM) 50 mg tablet; Take 1 tablet (50 mg total) by mouth every 24 hours as needed for Pain.    Thrombocytosis  -     CBC auto differential; Future    Chronic pain of both lower extremities  -     tramadol (ULTRAM) 50 mg tablet; Take 1 tablet (50 mg total) by mouth every 24 hours as needed for Pain.    Nutritional anemia   -     Vitamin B12; Future    paroxysmal AFIB  -rhythm controlled  -on Eliquis daily    OP  -resume reclast  -dexa    chronic anemia  -refer Hem/Onc for possible infusions  -continue iron      COPD/SOB  -stable  -normal sats  -continue current management with portable O2    Fatigue/weight loss/anemia  -likely multifactorial  -DD Anemia, Vit D deficiency, debility  -replace vitamins, labs  -ensure and boost daily  -ER precautions given for fluids      Depression  -improving  -continue celexa 20 mg/day    OA B/L knee  -follows orthopedics and gets knee injections  -tylenol for mild pain and tramadol for severe pain  -avoid NSAIDS    HTN  -controlled    HLD  -controlled    Spent adequate time in obtaining history and explaining differentials    40 minutes spent during this visit of which greater than 50% devoted to face-face counseling and coordination of care regarding diagnosis and management plan    Return in about 3 months (around 12/11/2017), or if symptoms worsen or fail to improve.

## 2017-09-22 ENCOUNTER — TELEPHONE (OUTPATIENT)
Dept: FAMILY MEDICINE | Facility: CLINIC | Age: 82
End: 2017-09-22

## 2017-09-22 NOTE — TELEPHONE ENCOUNTER
----- Message from Julia Villasenor sent at 9/22/2017  2:08 PM CDT -----  Patient's daughter, Joy, called.   No. [phone]   Joy wants to start getting Mrs. Rebolledo's oxygen through [facility].  She has questions.   Please call.

## 2017-09-25 ENCOUNTER — TELEPHONE (OUTPATIENT)
Dept: FAMILY MEDICINE | Facility: CLINIC | Age: 82
End: 2017-09-25

## 2017-09-25 NOTE — TELEPHONE ENCOUNTER
----- Message from Kia Horton sent at 9/25/2017  9:08 AM CDT -----  Contact: Joy Fontana Daughter [phone]  Patient Returning Your Phone Call

## 2017-09-25 NOTE — TELEPHONE ENCOUNTER
Spoke to pt's daughter, Joy and stated pt need a O2 test, Saturation level test, order and clinical notes for pt to start getting her Oxygen from [facility]. Pt was scheduled for an appt with Dr. Soriano for next week.

## 2017-09-28 PROBLEM — J44.1 COPD WITH ACUTE EXACERBATION: Status: ACTIVE | Noted: 2017-09-28

## 2017-10-01 PROBLEM — L89.601 DECUBITUS ULCER OF HEEL, STAGE 1: Status: ACTIVE | Noted: 2017-10-01

## 2017-10-03 PROBLEM — E87.6 HYPOKALEMIA: Status: RESOLVED | Noted: 2017-10-03 | Resolved: 2017-10-03

## 2017-10-03 PROBLEM — E87.6 HYPOKALEMIA: Status: ACTIVE | Noted: 2017-10-03

## 2017-10-03 PROBLEM — A41.9 SEPSIS: Status: RESOLVED | Noted: 2017-06-20 | Resolved: 2017-10-03

## 2017-10-06 ENCOUNTER — TELEPHONE (OUTPATIENT)
Dept: FAMILY MEDICINE | Facility: CLINIC | Age: 82
End: 2017-10-06

## 2017-10-06 NOTE — TELEPHONE ENCOUNTER
----- Message from Gina Steiner sent at 10/6/2017 10:07 AM CDT -----  Contact: Joy Fontana (daughter)/ [phone]  Patient would like to be seen for a sooner appointment per hospital follow up.  Please advise

## 2017-10-16 ENCOUNTER — OFFICE VISIT (OUTPATIENT)
Dept: FAMILY MEDICINE | Facility: CLINIC | Age: 82
End: 2017-10-16
Attending: FAMILY MEDICINE
Payer: MEDICARE

## 2017-10-16 VITALS
WEIGHT: 108.44 LBS | SYSTOLIC BLOOD PRESSURE: 98 MMHG | OXYGEN SATURATION: 90 % | HEART RATE: 85 BPM | HEIGHT: 65 IN | BODY MASS INDEX: 18.07 KG/M2 | DIASTOLIC BLOOD PRESSURE: 60 MMHG

## 2017-10-16 DIAGNOSIS — Z09 HOSPITAL DISCHARGE FOLLOW-UP: Primary | ICD-10-CM

## 2017-10-16 DIAGNOSIS — I25.10 CORONARY ARTERY DISEASE INVOLVING NATIVE CORONARY ARTERY OF NATIVE HEART WITHOUT ANGINA PECTORIS: ICD-10-CM

## 2017-10-16 DIAGNOSIS — D63.8 ANEMIA OF CHRONIC DISEASE: ICD-10-CM

## 2017-10-16 DIAGNOSIS — I10 ESSENTIAL HYPERTENSION: ICD-10-CM

## 2017-10-16 DIAGNOSIS — I48.0 PAROXYSMAL ATRIAL FIBRILLATION: ICD-10-CM

## 2017-10-16 DIAGNOSIS — M80.00XD OSTEOPOROSIS WITH CURRENT PATHOLOGICAL FRACTURE WITH ROUTINE HEALING, UNSPECIFIED OSTEOPOROSIS TYPE, SUBSEQUENT ENCOUNTER: ICD-10-CM

## 2017-10-16 DIAGNOSIS — F32.A ANXIETY AND DEPRESSION: ICD-10-CM

## 2017-10-16 DIAGNOSIS — D75.839 THROMBOCYTOSIS: ICD-10-CM

## 2017-10-16 DIAGNOSIS — I50.32 CHRONIC DIASTOLIC HEART FAILURE: ICD-10-CM

## 2017-10-16 DIAGNOSIS — Z23 IMMUNIZATION DUE: ICD-10-CM

## 2017-10-16 DIAGNOSIS — J44.9 CHRONIC OBSTRUCTIVE PULMONARY DISEASE, UNSPECIFIED COPD TYPE: ICD-10-CM

## 2017-10-16 DIAGNOSIS — F41.9 ANXIETY AND DEPRESSION: ICD-10-CM

## 2017-10-16 DIAGNOSIS — S72.001A CLOSED DISPLACED FRACTURE OF NECK OF RIGHT FEMUR: ICD-10-CM

## 2017-10-16 PROCEDURE — 90662 IIV NO PRSV INCREASED AG IM: CPT | Mod: S$GLB,,, | Performed by: FAMILY MEDICINE

## 2017-10-16 PROCEDURE — 99214 OFFICE O/P EST MOD 30 MIN: CPT | Mod: S$GLB,,, | Performed by: FAMILY MEDICINE

## 2017-10-16 PROCEDURE — 99999 PR PBB SHADOW E&M-EST. PATIENT-LVL V: CPT | Mod: PBBFAC,,, | Performed by: FAMILY MEDICINE

## 2017-10-16 PROCEDURE — 99499 UNLISTED E&M SERVICE: CPT | Mod: S$GLB,,, | Performed by: FAMILY MEDICINE

## 2017-10-16 PROCEDURE — G0008 ADMIN INFLUENZA VIRUS VAC: HCPCS | Mod: S$GLB,,, | Performed by: FAMILY MEDICINE

## 2017-10-16 RX ORDER — PREDNISONE 20 MG/1
20 TABLET ORAL 4 TIMES DAILY
COMMUNITY
End: 2017-11-27 | Stop reason: SDUPTHER

## 2017-10-16 RX ORDER — BENZONATATE 100 MG/1
100 CAPSULE ORAL 3 TIMES DAILY PRN
Status: ON HOLD | COMMUNITY
End: 2018-03-19

## 2017-10-16 NOTE — PROGRESS NOTES
Subjective:       Patient ID: Kaity Rebolledo is a 84 y.o. female.    Chief Complaint: Hospital Follow Up    HPI  Review of Systems    Objective:      Physical Exam    Assessment:       No diagnosis found.    Plan:

## 2017-10-16 NOTE — PROGRESS NOTES
Subjective:       Patient ID: Kaity Rebolledo is a 84 y.o. female.    Chief Complaint: Hospital Follow Up    84 yr old pleasant white female with paroxysmal AFIB,  COPD (oxygen dependent), HTN, HLD, Anemia, Osteoporosis, h/o pneumonia, OA knee B/L, presents today for post hospital DC follow up. Mild pain in legs which is again chronic.    Hospital course: she was admitted in [facility] on 9/28 for COPD exacerbation. She stayed for 5 days and had AFIB W/RVR while she was in hospital and she was placed in ICU as well./ She was later discharged on 10/03. Please discharge notre for details. She has no issues since discharge.      Paroxysmal afib - RHYTHM CONTROLLED - ON ELIQUIS FOR ANTICOAGULATION      1. COPD/SOB/Fatigue - she has chronic COPD - and she has multiple hospitalizations - she is currently on ambulatory oxygen. She had pneumonia which is complicated by pleural effusion and she was out in 03/15. She had follow up chest x ray last visit and it was normal.       2. Anemia - chronic - intermittent - her H/H varying from 8-12/24-36 since last 8 years.  she was never told she is anemic and she only takes multivitamin - she is not bleeding from anywhere else and she denies any rectal bleed or melena. c/o fatigue and tiredness - seen Hematology/Oncology - no intervention necessary      3. Weight loss/lack of appetite - since last 3-4 months - tried boost  And ensure recently and it is helping - no other symptoms except as above - of note she used to weigh 136 lb 3-4 years ago and now she is 104 lb      4. B/L knee pain - chronic - follows orthopedics - she had knee injections 6 months ago and she will make another appointment soon - on tramadol once a day as needed      5. HTN - controlled - on ACE - - controlled - watch BP      6. HLD - controlled- on diet alone - no new issues    7/. CHF - controlled - EF 50 - improving    8. Low back pain/buttock pain - onset 3-4 days ago and gradually worsening - no fall or  trauma - she had some coughing spell few days ago prior to onset and this one started - tried some muscle relaxant and it did not help - has osteoporosis and was on reclast and she stopped few years ago      History as below - reviewed           Shortness of Breath   This is a chronic problem. The current episode started more than 1 year ago. The problem occurs constantly. The problem has been unchanged. Associated symptoms include leg swelling. Pertinent negatives include no chest pain, coryza, ear pain, fever, headaches, neck pain, orthopnea, rash, rhinorrhea, sore throat, swollen glands, vomiting or wheezing. Nothing aggravates the symptoms. She has tried beta agonist inhalers and steroid inhalers for the symptoms. The treatment provided mild relief. Her past medical history is significant for allergies, chronic lung disease, COPD and pneumonia. There is no history of aspirin allergies, asthma, bronchiolitis, CAD, DVT, a heart failure, PE or a recent surgery.   Fatigue   This is a chronic problem. The current episode started more than 1 year ago. The problem occurs constantly. The problem has been gradually worsening. Associated symptoms include arthralgias and myalgias. Pertinent negatives include no chest pain, chills, congestion, coughing, diaphoresis, fatigue, fever, headaches, joint swelling, nausea, neck pain, rash, sore throat, swollen glands, vomiting or weakness. Nothing aggravates the symptoms. Treatments tried: boost or ensure. The treatment provided mild relief.   Hypertension   This is a chronic problem. The current episode started more than 1 year ago. The problem has been gradually improving since onset. The problem is controlled. Associated symptoms include anxiety and shortness of breath. Pertinent negatives include no chest pain, headaches, neck pain, orthopnea or palpitations. There are no associated agents to hypertension. Risk factors for coronary artery disease include post-menopausal state.  Past treatments include ACE inhibitors. The current treatment provides significant improvement. There are no compliance problems.  There is no history of angina, CAD/MI, heart failure, PVD, renovascular disease, retinopathy or a thyroid problem. There is no history of chronic renal disease, hypercortisolism, hyperparathyroidism or pheochromocytoma.   Hyperlipidemia   This is a chronic problem. The current episode started more than 1 year ago. The problem is controlled. Recent lipid tests were reviewed and are normal. She has no history of chronic renal disease, hypothyroidism, liver disease or obesity. There are no known factors aggravating her hyperlipidemia. Associated symptoms include myalgias and shortness of breath. Pertinent negatives include no chest pain. She is currently on no antihyperlipidemic treatment. The current treatment provides moderate improvement of lipids. There are no compliance problems.  Risk factors for coronary artery disease include dyslipidemia, hypertension and post-menopausal.   Anemia   Presents for follow-up visit. There has been no bruising/bleeding easily, confusion, fever, light-headedness, pallor, palpitations or pica. Signs of blood loss that are not present include hematochezia and melena. Past treatments include changes in diet. There is no history of chronic renal disease, heart failure, hypothyroidism or recent surgery. There are no compliance problems.    Anxiety   Presents for initial visit. Onset was 1 to 4 weeks ago. The problem has been unchanged. Symptoms include shortness of breath. Patient reports no chest pain, confusion, decreased concentration, dizziness, nausea, nervous/anxious behavior, palpitations or suicidal ideas. Symptoms occur constantly. The severity of symptoms is moderate. The symptoms are aggravated by family issues. The quality of sleep is poor. Nighttime awakenings: occasional.     Risk factors include a major life event and recent illness. Her past  medical history is significant for anemia, anxiety/panic attacks and chronic lung disease. There is no history of asthma or CAD. Past treatments include nothing. The treatment provided no relief.     Review of Systems   Constitutional: Negative.  Negative for activity change, chills, diaphoresis, fatigue, fever and unexpected weight change.   HENT: Negative.  Negative for congestion, ear discharge, ear pain, hearing loss, rhinorrhea, sore throat, trouble swallowing and voice change.    Eyes: Negative.  Negative for pain, discharge and visual disturbance.   Respiratory: Positive for shortness of breath. Negative for cough, chest tightness and wheezing.    Cardiovascular: Positive for leg swelling. Negative for chest pain, palpitations and orthopnea.   Gastrointestinal: Negative.  Negative for abdominal distention, anal bleeding, blood in stool, constipation, diarrhea, hematochezia, melena, nausea and vomiting.   Endocrine: Negative.  Negative for cold intolerance, polydipsia and polyuria.   Genitourinary: Negative.  Negative for decreased urine volume, difficulty urinating, dysuria, frequency, hematuria, menstrual problem and vaginal pain.   Musculoskeletal: Positive for arthralgias and myalgias. Negative for gait problem, joint swelling and neck pain.   Skin: Negative.  Negative for color change, pallor, rash and wound.   Allergic/Immunologic: Negative.  Negative for environmental allergies and immunocompromised state.   Neurological: Negative.  Negative for dizziness, tremors, seizures, speech difficulty, weakness, light-headedness and headaches.   Hematological: Negative.  Negative for adenopathy. Does not bruise/bleed easily.   Psychiatric/Behavioral: Negative.  Negative for agitation, confusion, decreased concentration, dysphoric mood, hallucinations, self-injury and suicidal ideas. The patient is not nervous/anxious.        PMH/PSH/FH/SH/MED/ALLERGY reviewed    Objective:       Vitals:    10/16/17 1538   BP:  98/60   Pulse: 85       Physical Exam   Constitutional: She is oriented to person, place, and time. She appears well-developed and well-nourished. No distress.   HENT:   Head: Normocephalic and atraumatic.   Eyes: EOM are normal. Pupils are equal, round, and reactive to light.   Neck: Normal range of motion. Neck supple.   Cardiovascular: Normal rate, regular rhythm, normal heart sounds and intact distal pulses.  Exam reveals no gallop and no friction rub.    No murmur heard.  Pulmonary/Chest: Effort normal. She has wheezes (occasional wheezing).   Abdominal: Soft. Bowel sounds are normal. She exhibits no distension. There is no tenderness. No hernia.   Musculoskeletal: She exhibits no edema. Tenderness: B/L knee TTP and restricted ROM.   TTP left gluteal area and ischium. No TTp left hip or SLRT neg   Neurological: She is alert and oriented to person, place, and time. She has normal reflexes. She displays normal reflexes. No cranial nerve deficit. She exhibits normal muscle tone. Coordination normal.   Skin: Skin is warm and dry. She is not diaphoretic.   Psychiatric: She has a normal mood and affect. Her behavior is normal. Judgment and thought content normal.       Assessment:       1. Hospital discharge follow-up    2. Coronary artery disease involving native coronary artery of native heart without angina pectoris    3. Chronic diastolic heart failure    4. Paroxysmal atrial fibrillation    5. Essential hypertension    6. Anemia of chronic disease    7. Thrombocytosis    8. Osteoporosis with current pathological fracture with routine healing, unspecified osteoporosis type, subsequent encounter    9. Closed displaced fracture of neck of right femur s/p hemiarthroplasty on 4/23/2017    10. Anxiety and depression    11. Chronic obstructive pulmonary disease, unspecified COPD type    12. Immunization due        Plan:       Kaity was seen today for hospital follow up.    Diagnoses and all orders for this  visit:    Hospital discharge follow-up  -     CBC auto differential; Future  -     Comprehensive metabolic panel; Future    Coronary artery disease involving native coronary artery of native heart without angina pectoris  -     CBC auto differential; Future  -     Comprehensive metabolic panel; Future  -     Lipid panel; Future    Chronic diastolic heart failure    Paroxysmal atrial fibrillation  -     CBC auto differential; Future  -     Comprehensive metabolic panel; Future  -     Lipid panel; Future    Essential hypertension  -     CBC auto differential; Future  -     Comprehensive metabolic panel; Future  -     Lipid panel; Future    Anemia of chronic disease  -     CBC auto differential; Future    Thrombocytosis  -     CBC auto differential; Future    Osteoporosis with current pathological fracture with routine healing, unspecified osteoporosis type, subsequent encounter    Closed displaced fracture of neck of right femur s/p hemiarthroplasty on 4/23/2017    Anxiety and depression    Chronic obstructive pulmonary disease, unspecified COPD type  -     X-Ray Chest PA And Lateral; Future    Immunization due  -     Influenza - High Dose (65+) (PF) ()      Hospital DC follow up  -doing well    paroxysmal AFIB  -rhythm controlled  -on Eliquis daily    OP  -resume reclast  -dexa    chronic anemia  -refer Hem/Onc for possible infusions  -continue iron      COPD/SOB  -stable  -normal sats  -continue current management with portable O2    Fatigue/weight loss/anemia  -likely multifactorial  -DD Anemia, Vit D deficiency, debility  -replace vitamins, labs  -ensure and boost daily  -ER precautions given for fluids      Depression  -improving  -continue celexa 20 mg/day    OA B/L knee  -follows orthopedics and gets knee injections  -tylenol for mild pain and tramadol for severe pain  -avoid NSAIDS    HTN  -controlled    HLD  -controlled    Spent adequate time in obtaining history and explaining differentials    40 minutes  spent during this visit of which greater than 50% devoted to face-face counseling and coordination of care regarding diagnosis and management plan    Return in about 3 months (around 1/16/2018), or if symptoms worsen or fail to improve.

## 2017-10-17 RX ORDER — LEVALBUTEROL 1.25 MG/.5ML
1.25 SOLUTION, CONCENTRATE RESPIRATORY (INHALATION) 2 TIMES DAILY PRN
Qty: 180 EACH | Refills: 3 | Status: ON HOLD | OUTPATIENT
Start: 2017-10-17 | End: 2017-12-31

## 2017-10-17 NOTE — TELEPHONE ENCOUNTER
----- Message from Juanita Mcdaniel sent at 10/17/2017  9:36 AM CDT -----  Contact: ms peralta [phone] with [facility]   lev albuterol 1.25 mg/0.55 ml nebulizer solution    Please call with clarification on orders

## 2017-10-25 DIAGNOSIS — J44.9 CHRONIC OBSTRUCTIVE PULMONARY DISEASE, UNSPECIFIED COPD TYPE: ICD-10-CM

## 2017-10-25 RX ORDER — ALBUTEROL SULFATE 90 UG/1
2 AEROSOL, METERED RESPIRATORY (INHALATION) EVERY 6 HOURS PRN
Qty: 6.7 G | Refills: 4 | Status: SHIPPED | OUTPATIENT
Start: 2017-10-25 | End: 2017-11-27 | Stop reason: SDUPTHER

## 2017-12-12 ENCOUNTER — OFFICE VISIT (OUTPATIENT)
Dept: FAMILY MEDICINE | Facility: CLINIC | Age: 82
End: 2017-12-12
Attending: FAMILY MEDICINE
Payer: MEDICARE

## 2017-12-12 VITALS
SYSTOLIC BLOOD PRESSURE: 100 MMHG | WEIGHT: 110.69 LBS | DIASTOLIC BLOOD PRESSURE: 60 MMHG | OXYGEN SATURATION: 97 % | BODY MASS INDEX: 18.42 KG/M2 | HEART RATE: 70 BPM | TEMPERATURE: 98 F

## 2017-12-12 DIAGNOSIS — D75.839 THROMBOCYTOSIS: ICD-10-CM

## 2017-12-12 DIAGNOSIS — I10 ESSENTIAL HYPERTENSION: ICD-10-CM

## 2017-12-12 DIAGNOSIS — D63.8 ANEMIA OF CHRONIC DISEASE: ICD-10-CM

## 2017-12-12 DIAGNOSIS — M80.00XD OSTEOPOROSIS WITH CURRENT PATHOLOGICAL FRACTURE WITH ROUTINE HEALING, UNSPECIFIED OSTEOPOROSIS TYPE, SUBSEQUENT ENCOUNTER: ICD-10-CM

## 2017-12-12 DIAGNOSIS — I50.32 CHRONIC DIASTOLIC HEART FAILURE: ICD-10-CM

## 2017-12-12 DIAGNOSIS — I48.0 PAROXYSMAL ATRIAL FIBRILLATION: ICD-10-CM

## 2017-12-12 DIAGNOSIS — F41.9 ANXIETY AND DEPRESSION: Primary | ICD-10-CM

## 2017-12-12 DIAGNOSIS — I25.10 CORONARY ARTERY DISEASE INVOLVING NATIVE CORONARY ARTERY OF NATIVE HEART WITHOUT ANGINA PECTORIS: ICD-10-CM

## 2017-12-12 DIAGNOSIS — E78.5 HYPERLIPIDEMIA, UNSPECIFIED HYPERLIPIDEMIA TYPE: ICD-10-CM

## 2017-12-12 DIAGNOSIS — J84.9 ILD (INTERSTITIAL LUNG DISEASE): ICD-10-CM

## 2017-12-12 DIAGNOSIS — I27.23 PULMONARY HYPERTENSION DUE TO LUNG DISEASE: ICD-10-CM

## 2017-12-12 DIAGNOSIS — F32.A ANXIETY AND DEPRESSION: Primary | ICD-10-CM

## 2017-12-12 PROCEDURE — 99999 PR PBB SHADOW E&M-EST. PATIENT-LVL III: CPT | Mod: PBBFAC,,, | Performed by: FAMILY MEDICINE

## 2017-12-12 PROCEDURE — 99499 UNLISTED E&M SERVICE: CPT | Mod: S$GLB,,, | Performed by: FAMILY MEDICINE

## 2017-12-12 PROCEDURE — 99214 OFFICE O/P EST MOD 30 MIN: CPT | Mod: S$GLB,,, | Performed by: FAMILY MEDICINE

## 2017-12-12 RX ORDER — POTASSIUM CHLORIDE 750 MG/1
10 CAPSULE, EXTENDED RELEASE ORAL DAILY
Qty: 30 CAPSULE | Refills: 3 | Status: ON HOLD | OUTPATIENT
Start: 2017-12-12 | End: 2018-07-20 | Stop reason: HOSPADM

## 2017-12-12 NOTE — TELEPHONE ENCOUNTER
----- Message from Zina Soto sent at 12/12/2017 10:35 AM CST -----  Contact: Kyleigh Fontenot  [phone]  She's requesting a refill for KLORCON 10meq to be sent to [facility] [phone]  Please advise

## 2017-12-12 NOTE — PROGRESS NOTES
Subjective:       Patient ID: Kaity Rebolledo is a 84 y.o. female.    Chief Complaint: Follow-up (3 month)    84 yr old pleasant white female with paroxysmal AFIB,  COPD (oxygen dependent), HTN, HLD, Anemia, Osteoporosis, h/o pneumonia, OA knee B/L, presents today for post ER follow up. Mild pain in legs which is again chronic.    she presented at [facility] on 11/27 for COPD exacerbation. She had COPD exacerbation and bronchitis. She felt better with breathing treatment and oxygena nd sent home on antibiotics and steroids. She has no issues since discharge.      Paroxysmal afib - RHYTHM CONTROLLED - ON ELIQUIS FOR ANTICOAGULATION      1. COPD/SOB/Fatigue - she has chronic COPD - and she has multiple hospitalizations - she is currently on ambulatory oxygen. She had pneumonia which is complicated by pleural effusion and she was out in 03/15. She had follow up chest x ray last visit and it was normal.       2. Anemia - chronic - intermittent - her H/H varying from 8-12/24-36 since last 8 years.  she was never told she is anemic and she only takes multivitamin - she is not bleeding from anywhere else and she denies any rectal bleed or melena. c/o fatigue and tiredness - seen Hematology/Oncology - no intervention necessary      3. Weight loss/lack of appetite - since last 3-4 months - tried boost  And ensure recently and it is helping - no other symptoms except as above - of note she used to weigh 136 lb 3-4 years ago and now she is 104 lb      4. B/L knee pain - chronic - follows orthopedics - she had knee injections 6 months ago and she will make another appointment soon - on tramadol once a day as needed      5. HTN - controlled - on ACE - - controlled - watch BP      6. HLD - controlled- on diet alone - no new issues    7/. CHF - controlled - EF 50 - improving    8. Low back pain/buttock pain - onset 3-4 days ago and gradually worsening - no fall or trauma - she had some coughing spell few days ago prior to  onset and this one started - tried some muscle relaxant and it did not help - has osteoporosis and was on reclast and she stopped few years ago      History as below - reviewed           Shortness of Breath   This is a chronic problem. The current episode started more than 1 year ago. The problem occurs constantly. The problem has been unchanged. Pertinent negatives include no chest pain, coryza, ear pain, fever, headaches, leg swelling, neck pain, orthopnea, rash, rhinorrhea, sore throat, swollen glands, vomiting or wheezing. Nothing aggravates the symptoms. She has tried beta agonist inhalers and steroid inhalers for the symptoms. The treatment provided mild relief. Her past medical history is significant for allergies, chronic lung disease, COPD and pneumonia. There is no history of aspirin allergies, asthma, bronchiolitis, CAD, DVT, a heart failure, PE or a recent surgery.   Fatigue   This is a chronic problem. The current episode started more than 1 year ago. The problem occurs constantly. The problem has been gradually worsening. Associated symptoms include arthralgias and myalgias. Pertinent negatives include no chest pain, chills, congestion, coughing, diaphoresis, fatigue, fever, headaches, joint swelling, nausea, neck pain, rash, sore throat, swollen glands, vomiting or weakness. Nothing aggravates the symptoms. Treatments tried: boost or ensure. The treatment provided mild relief.   Hypertension   This is a chronic problem. The current episode started more than 1 year ago. The problem has been gradually improving since onset. The problem is controlled. Associated symptoms include anxiety and shortness of breath. Pertinent negatives include no chest pain, headaches, neck pain, orthopnea or palpitations. There are no associated agents to hypertension. Risk factors for coronary artery disease include post-menopausal state. Past treatments include ACE inhibitors. The current treatment provides significant  improvement. There are no compliance problems.  There is no history of angina, CAD/MI, heart failure, PVD, renovascular disease, retinopathy or a thyroid problem. There is no history of chronic renal disease, hypercortisolism, hyperparathyroidism or pheochromocytoma.   Hyperlipidemia   This is a chronic problem. The current episode started more than 1 year ago. The problem is controlled. Recent lipid tests were reviewed and are normal. She has no history of chronic renal disease, hypothyroidism, liver disease or obesity. There are no known factors aggravating her hyperlipidemia. Associated symptoms include myalgias and shortness of breath. Pertinent negatives include no chest pain. She is currently on no antihyperlipidemic treatment. The current treatment provides moderate improvement of lipids. There are no compliance problems.  Risk factors for coronary artery disease include dyslipidemia, hypertension and post-menopausal.   Anemia   Presents for follow-up visit. There has been no bruising/bleeding easily, confusion, fever, light-headedness, pallor, palpitations or pica. Signs of blood loss that are not present include hematochezia and melena. Past treatments include changes in diet. There is no history of chronic renal disease, heart failure, hypothyroidism or recent surgery. There are no compliance problems.    Anxiety   Presents for initial visit. Onset was 1 to 4 weeks ago. The problem has been unchanged. Symptoms include shortness of breath. Patient reports no chest pain, confusion, decreased concentration, dizziness, nausea, nervous/anxious behavior, palpitations or suicidal ideas. Symptoms occur constantly. The severity of symptoms is moderate. The symptoms are aggravated by family issues. The quality of sleep is poor. Nighttime awakenings: occasional.     Risk factors include a major life event and recent illness. Her past medical history is significant for anemia, anxiety/panic attacks and chronic lung  disease. There is no history of asthma or CAD. Past treatments include nothing. The treatment provided no relief.     Review of Systems   Constitutional: Negative.  Negative for activity change, chills, diaphoresis, fatigue, fever and unexpected weight change.   HENT: Negative.  Negative for congestion, ear discharge, ear pain, hearing loss, rhinorrhea, sore throat and voice change.    Eyes: Negative.  Negative for pain, discharge and visual disturbance.   Respiratory: Positive for shortness of breath. Negative for cough, chest tightness and wheezing.    Cardiovascular: Negative for chest pain, palpitations, orthopnea and leg swelling.   Gastrointestinal: Negative.  Negative for abdominal distention, anal bleeding, constipation, hematochezia, melena, nausea and vomiting.   Endocrine: Negative.  Negative for cold intolerance, polydipsia and polyuria.   Genitourinary: Negative.  Negative for decreased urine volume, difficulty urinating, dysuria, frequency, menstrual problem and vaginal pain.   Musculoskeletal: Positive for arthralgias and myalgias. Negative for gait problem, joint swelling and neck pain.   Skin: Negative.  Negative for color change, pallor, rash and wound.   Allergic/Immunologic: Negative.  Negative for environmental allergies and immunocompromised state.   Neurological: Negative.  Negative for dizziness, tremors, seizures, speech difficulty, weakness, light-headedness and headaches.   Hematological: Negative.  Negative for adenopathy. Does not bruise/bleed easily.   Psychiatric/Behavioral: Negative.  Negative for agitation, confusion, decreased concentration, hallucinations, self-injury and suicidal ideas. The patient is not nervous/anxious.        PMH/PSH/FH/SH/MED/ALLERGY reviewed    Objective:       Vitals:    12/12/17 0934   BP: 100/60   Pulse: 70   Temp: 97.6 °F (36.4 °C)       Physical Exam   Constitutional: She is oriented to person, place, and time. She appears well-developed and  well-nourished. No distress.   HENT:   Head: Normocephalic and atraumatic.   Eyes: EOM are normal. Pupils are equal, round, and reactive to light.   Neck: Normal range of motion. Neck supple.   Cardiovascular: Normal rate, regular rhythm, normal heart sounds and intact distal pulses.  Exam reveals no gallop and no friction rub.    No murmur heard.  Pulmonary/Chest: Effort normal. She has wheezes (occasional wheezing).   Abdominal: Soft. Bowel sounds are normal. She exhibits no distension. There is no tenderness. No hernia.   Musculoskeletal: She exhibits no edema. Tenderness: B/L knee TTP and restricted ROM.   TTP left gluteal area and ischium. No TTp left hip or SLRT neg   Neurological: She is alert and oriented to person, place, and time. She has normal reflexes. She displays normal reflexes. No cranial nerve deficit. She exhibits normal muscle tone. Coordination normal.   Skin: Skin is warm and dry. She is not diaphoretic.   Psychiatric: She has a normal mood and affect. Her behavior is normal. Judgment and thought content normal.       Assessment:       1. Anxiety and depression    2. Pulmonary hypertension due to lung disease    3. ILD (interstitial lung disease)    4. Coronary artery disease involving native coronary artery of native heart without angina pectoris    5. Chronic diastolic heart failure    6. Paroxysmal atrial fibrillation    7. Essential hypertension    8. Hyperlipidemia, unspecified hyperlipidemia type    9. Anemia of chronic disease    10. Thrombocytosis    11. Osteoporosis with current pathological fracture with routine healing, unspecified osteoporosis type, subsequent encounter        Plan:       Kaity was seen today for follow-up.    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    ILD (interstitial lung disease)    Coronary artery disease involving native coronary artery of native heart without angina pectoris    Chronic  diastolic heart failure    Paroxysmal atrial fibrillation    Essential hypertension    Hyperlipidemia, unspecified hyperlipidemia type    Anemia of chronic disease    Thrombocytosis    Osteoporosis with current pathological fracture with routine healing, unspecified osteoporosis type, subsequent encounter        paroxysmal AFIB  -rhythm controlled  -on Eliquis daily    OP  -resume reclast  -dexa    chronic anemia  -improving  -continue iron      COPD/SOB  -stable  -normal sats  -continue current management with portable O2    Fatigue/weight loss/anemia  -likely multifactorial  -DD Anemia, Vit D deficiency, debility  -replace vitamins, labs  -ensure and boost daily  -ER precautions given for fluids      Depression  -improving  -continue celexa 20 mg/day    OA B/L knee  -follows orthopedics and gets knee injections  -tylenol for mild pain and tramadol for severe pain  -avoid NSAIDS    HTN  -controlled    HLD  -controlled    Spent adequate time in obtaining history and explaining differentials    40 minutes spent during this visit of which greater than 50% devoted to face-face counseling and coordination of care regarding diagnosis and management plan    Return in about 3 months (around 3/12/2018), or if symptoms worsen or fail to improve.

## 2017-12-27 ENCOUNTER — TELEPHONE (OUTPATIENT)
Dept: FAMILY MEDICINE | Facility: CLINIC | Age: 82
End: 2017-12-27

## 2017-12-27 DIAGNOSIS — J34.89 RHINORRHEA: ICD-10-CM

## 2017-12-27 DIAGNOSIS — R63.4 ABNORMAL WEIGHT LOSS: ICD-10-CM

## 2017-12-27 DIAGNOSIS — R63.0 POOR APPETITE: ICD-10-CM

## 2017-12-27 PROBLEM — D75.839 THROMBOCYTOSIS: Status: RESOLVED | Noted: 2017-09-11 | Resolved: 2017-12-27

## 2017-12-27 PROBLEM — S72.001A: Status: RESOLVED | Noted: 2017-04-22 | Resolved: 2017-12-27

## 2017-12-27 PROBLEM — J18.9 PNEUMONIA DUE TO INFECTIOUS ORGANISM: Status: ACTIVE | Noted: 2017-12-27

## 2017-12-27 RX ORDER — CYPROHEPTADINE HYDROCHLORIDE 4 MG/1
TABLET ORAL
Qty: 90 TABLET | Refills: 5 | Status: SHIPPED | OUTPATIENT
Start: 2017-12-27

## 2017-12-27 NOTE — TELEPHONE ENCOUNTER
----- Message from Charlene Gutiérrez sent at 12/27/2017  9:24 AM CST -----  Contact: [facility]. [phone]  Pharmacy would like to receive an authorization on a refill for cyproheptadine (PERIACTIN) 4 mg tablet. Please advise.

## 2017-12-28 ENCOUNTER — TELEPHONE (OUTPATIENT)
Dept: FAMILY MEDICINE | Facility: CLINIC | Age: 82
End: 2017-12-28

## 2017-12-28 NOTE — TELEPHONE ENCOUNTER
I received a refill request from [facility] for Periactin 4 mg 3 times daily.  I called [facility] and spoke to a pharmacist as I actually thought this was a nursing home.  The pharmacist told me that they deliver to the nursing home the patient resides at.  Upon reviewing the chart, it appears that the patient is on Periactin to stimulate appetite associated with weight loss.  The pharmacist told me that this prescription was refilled by Dr. Salma Emerson yesterday, so I will refuse the refill as it has already been responded to.

## 2017-12-28 NOTE — TELEPHONE ENCOUNTER
----- Message from Aylin Mosley sent at 12/28/2017 11:08 AM CST -----  Contact: [phone]/ self   Patient needs hospital f/ u in one week. She will discharged tomorrow. Please advise.

## 2017-12-28 NOTE — TELEPHONE ENCOUNTER
I called [facility] as I received the request to refill Periactin on the patient.  I thought this was a nursing home, but it is actually a pharmacy that delivers to nursing homes.  The pharmacist told me that Dr. Saul actually okayed this refill yesterday, so I will deny today's request as it has already been dealt with.

## 2018-01-02 ENCOUNTER — OUTPATIENT CASE MANAGEMENT (OUTPATIENT)
Dept: ADMINISTRATIVE | Facility: OTHER | Age: 83
End: 2018-01-02

## 2018-01-02 ENCOUNTER — PATIENT OUTREACH (OUTPATIENT)
Dept: ADMINISTRATIVE | Facility: CLINIC | Age: 83
End: 2018-01-02

## 2018-01-02 NOTE — PROGRESS NOTES
[facility] Script started with daughter Bri. Called [facility] to speak with patient's nurse Mallory to get list of medications reconcilated.

## 2018-01-02 NOTE — PROGRESS NOTES
Thank you for the referral. The following patient has been assigned to Zehra Wall RN with Outpatient Complex Care Management for high risk screening.    Reason for referral:   Pneumonia due to infectious organism, unspecified laterality, unspecified part of lung  COPD with acute exacerbation    Please contact [facility] at [phone] with any questions.    Thank you,  Meghana De La Garza

## 2018-01-02 NOTE — PATIENT INSTRUCTIONS
Treating Pneumonia  Pneumonia is an infection of one or both of the lungs. Pneumonia:  · Is usually caused by either a virus or a bacteria  · Can be very serious, especially in infants, young children, and older adults. Its also serious for those with other long-term health problems or weakened immune systems.  · Is sometimes treated at home and sometimes in the hospital    Antibiotic medicines  Antibiotics may be prescribed for pneumonia caused by bacteria. They may be pills (oral medicines), or shots (injections). Or they may be given by IV (intravenously) into a vein. If you are taking oral medicines at home:  · Fill your prescription and start taking your medicine as soon as you can.  · You will likely start to feel better in a day or 2, but dont stop taking the antibiotic.  · Use a pill organizer to help you remember to take your medicine.  · Let your healthcare provider know if you have side effects.  · Take your medicine exactly as directed on the label. Talk to your provider or pharmacist if you have any questions.  Antiviral medicines  Antiviral medicine may be prescribed for pneumonia caused by a virus. For example, antiviral medicine may be prescribed for pneumonia caused by the flu virus. Antibiotics do not work against viruses. If you are taking antiviral medicine at home:  · Fill your prescription and start taking your medicine as soon as you can.  · Talk with your provider or pharmacist about possible side effects.  · Take the medicine exactly as instructed.  To relieve symptoms  There are many medicines that can help relieve symptoms of pneumonia. Some are prescription and some are over-the-counter.  Your healthcare provider may recommend:  · Acetaminophen or ibuprofen to lower your fever and to lessen headache or other pain  · Cough medicine to loosen mucus or to reduce coughing  Make sure you check with your healthcare provider or pharmacist before taking any over-the-counter medicines.  Special  treatments  If you are hospitalized for pneumonia, you may have other therapies, including:  · Inhaled medicines to help with breathing or chest congestion  · Supplemental oxygen to increase low oxygen levels  Drink fluids and eat healthy  You should eat healthy to help your body fight the infection. Drinking a lot of fluids helps to replace fluids lost from fever and to loosen mucus in your chest.  · Diet. Make healthy food choices, including fruits and vegetables, lean meats and other proteins, 100% whole grain and low- or no-fat dairy products.  · Fluids. Drink at least 6 to 8 tall glasses a day. Water and 100% fruit or vegetable juice are best.  Get plenty of rest and sleep  You may be more tired than usual for a while. It is important to get enough sleep at night. Its also important to rest during the day. Talk with your healthcare provider if coughing or other symptoms are interfering with your sleep.  Preventing the spread of germs  The best thing you can do to prevent spreading germs is to wash your hands often. You should:  · Rub your hand with soap and water for 20 to 30 seconds.  · Clean in between your fingers, the backs of your hands, and around your nails.  · Dry your hands on a separate towel or use paper towels.  You should also:  · Keep alcohol-based hand  nearby.  · Make sure you also clean surfaces that you touch. Use a product that kills all types of germs.  · Stay away from others until you are feeling better.  When to call your healthcare provider  Call your healthcare provider if you have any of the following:  · Symptoms get worse  · Fever continues  · Shortness of breath gets worse  · Increased mucus or mucus that is darker in color  · Coughing gets worse  · Lips or fingers are bluish in color  · Side effects from your medicine   Date Last Reviewed: 12/1/2016  © 3353-9309 [employer]. [address] [place] [address], [place] [address]. All rights reserved. This information is  not intended as a substitute for professional medical care. Always follow your healthcare professional's instructions.

## 2018-01-03 ENCOUNTER — OUTPATIENT CASE MANAGEMENT (OUTPATIENT)
Dept: ADMINISTRATIVE | Facility: OTHER | Age: 83
End: 2018-01-03

## 2018-01-03 NOTE — PROGRESS NOTES
Talked to daughter June Sharma to attempt to perform initial assessment for OPCM .  Patient does reside at [facility] in room 206.  June has declined OPCM for her mother.  Encouraged patient to call this RN if having any questions/concerns.  Case closed and will dis-enroll patient at this time.  CARLYN Wall, OPCM-RN

## 2018-01-09 ENCOUNTER — TELEPHONE (OUTPATIENT)
Dept: FAMILY MEDICINE | Facility: CLINIC | Age: 83
End: 2018-01-09

## 2018-01-09 DIAGNOSIS — F41.9 ANXIETY AND DEPRESSION: Primary | ICD-10-CM

## 2018-01-09 DIAGNOSIS — F32.A ANXIETY AND DEPRESSION: Primary | ICD-10-CM

## 2018-01-09 RX ORDER — ALPRAZOLAM 0.25 MG/1
0.25 TABLET ORAL 2 TIMES DAILY PRN
Qty: 60 TABLET | Refills: 0 | Status: SHIPPED | OUTPATIENT
Start: 2018-01-09 | End: 2018-03-14

## 2018-01-09 RX ORDER — CITALOPRAM 20 MG/1
20 TABLET, FILM COATED ORAL DAILY
Qty: 30 TABLET | Refills: 2 | Status: SHIPPED | OUTPATIENT
Start: 2018-01-09 | End: 2018-03-14

## 2018-01-09 NOTE — TELEPHONE ENCOUNTER
She has been experiencing irratic behavior, ups and downs, peaks and valleys especially after 5pm . For 3 weeks this has been becoming more frequent. Recent UTI negative, and lab work negative. Requesting possibly increasing Xanax bid. Referral to [facility]. If you do increase her Xanax please [facility].

## 2018-01-09 NOTE — TELEPHONE ENCOUNTER
----- Message from Zina Soto sent at 1/9/2018  8:56 AM CST -----  Contact: daughter/Joy [phone]  Pt mother requesting to speak with you concerning referral and changing medications for the pt.  Please call and advise

## 2018-01-09 NOTE — TELEPHONE ENCOUNTER
We can increase xanax and refer to psychiatry - not sure how fast we can get appt - REFERRAL DONE - PT NEED TO CALL [phone] FOR APPT    CAN SHE GET BACK ON CELEXA - SHE USED TO TAKE 20 MG DAILY IN 2015 FOR ANXIETY AND DEPRESSION??

## 2018-01-09 NOTE — TELEPHONE ENCOUNTER
They would like to start her back on Celexa 20 mg daily. Please send to here [facility] pharmacy.

## 2018-01-09 NOTE — TELEPHONE ENCOUNTER
----- Message from Gina Steiner sent at 1/9/2018  3:24 PM CST -----  Contact: Joy Fontana (daughter)/[phone]  Patient's daughter to relay to Jim that [facility] has received the order for Celexa but they have not received the order for Xanax 2 times per day.    Please call and advise.

## 2018-01-09 NOTE — TELEPHONE ENCOUNTER
----- Message from Maryjane Forrester sent at 1/9/2018  2:55 PM CST -----  Contact: fabian / [phone]   Fabian from [facility] called to see if the prescription for alprazolam (XANAX) 0.25 MG tablet has been sent to the pharmacy. Please advise

## 2018-01-10 ENCOUNTER — TELEPHONE (OUTPATIENT)
Dept: FAMILY MEDICINE | Facility: CLINIC | Age: 83
End: 2018-01-10

## 2018-01-10 NOTE — TELEPHONE ENCOUNTER
Spoke to Glenda and informed her that there was no messages left regarding an increase for Celexa, only Gagandeep. Glenda states that she will speak to the family regarding the family requesting an increase for the Celexa.

## 2018-01-10 NOTE — TELEPHONE ENCOUNTER
----- Message from Cassandra Vickers sent at 1/10/2018  9:23 AM CST -----  Contact: [phone]  Glenda with [facility] requested to speak with the doctor to clarify the patient medication direction. Please call and advise.

## 2018-01-17 ENCOUNTER — PES CALL (OUTPATIENT)
Dept: ADMINISTRATIVE | Facility: CLINIC | Age: 83
End: 2018-01-17

## 2018-03-05 RX ORDER — LEVALBUTEROL 1.25 MG/.5ML
1 SOLUTION, CONCENTRATE RESPIRATORY (INHALATION) EVERY 8 HOURS
Qty: 180 EACH | Refills: 5 | Status: SHIPPED | OUTPATIENT
Start: 2018-03-05 | End: 2018-05-28 | Stop reason: SDUPTHER

## 2018-03-08 ENCOUNTER — NURSE TRIAGE (OUTPATIENT)
Dept: ADMINISTRATIVE | Facility: CLINIC | Age: 83
End: 2018-03-08

## 2018-03-09 NOTE — TELEPHONE ENCOUNTER
Reason for Disposition   General information question, no triage required and triager able to answer question    Protocols used: ST INFORMATION ONLY CALL-A-MELINA Tejada, nurse with [facility], where Kaity resides, called to ask who provided the oxygen concentrator for Kaity.  She is not certain who to call for troubleshooting /repair if needed.  Explained that I cannot readily find that information quickly, unless she can provide the date it was ordered.  She did not know.  Explained to Mallory that all medical equipment coming from any [employer] company should have a permanent tag affixed to the machine with that information on it.  She looked, and said she did locate that.  No other needs at this time.  When I asked if Kaity is alright, is she having any respiratory difficulty, she said no, she is not.  I asked if she does have pulse oximeter available to use to check her sats, and she said she does, but does not need it.  Message to Samuel Soriano MD , pcp, and encouraged her to call back for any additional need.  Message to Dr Soriano. Please contact caller directly with any additional care advice.

## 2018-03-14 ENCOUNTER — OFFICE VISIT (OUTPATIENT)
Dept: FAMILY MEDICINE | Facility: CLINIC | Age: 83
End: 2018-03-14
Attending: FAMILY MEDICINE
Payer: MEDICARE

## 2018-03-14 VITALS
HEIGHT: 65 IN | HEART RATE: 95 BPM | SYSTOLIC BLOOD PRESSURE: 103 MMHG | BODY MASS INDEX: 17.96 KG/M2 | DIASTOLIC BLOOD PRESSURE: 62 MMHG | OXYGEN SATURATION: 90 % | WEIGHT: 107.81 LBS | TEMPERATURE: 99 F

## 2018-03-14 DIAGNOSIS — M80.00XD OSTEOPOROSIS WITH CURRENT PATHOLOGICAL FRACTURE WITH ROUTINE HEALING, UNSPECIFIED OSTEOPOROSIS TYPE, SUBSEQUENT ENCOUNTER: ICD-10-CM

## 2018-03-14 DIAGNOSIS — D63.8 ANEMIA OF CHRONIC DISEASE: ICD-10-CM

## 2018-03-14 DIAGNOSIS — E78.5 HYPERLIPIDEMIA, UNSPECIFIED HYPERLIPIDEMIA TYPE: ICD-10-CM

## 2018-03-14 DIAGNOSIS — D75.839 THROMBOCYTOSIS: ICD-10-CM

## 2018-03-14 DIAGNOSIS — I27.23 PULMONARY HYPERTENSION DUE TO LUNG DISEASE: ICD-10-CM

## 2018-03-14 DIAGNOSIS — F41.9 ANXIETY AND DEPRESSION: ICD-10-CM

## 2018-03-14 DIAGNOSIS — D53.9 NUTRITIONAL ANEMIA: ICD-10-CM

## 2018-03-14 DIAGNOSIS — I10 ESSENTIAL HYPERTENSION: ICD-10-CM

## 2018-03-14 DIAGNOSIS — F32.A ANXIETY AND DEPRESSION: ICD-10-CM

## 2018-03-14 DIAGNOSIS — J44.1 COPD WITH ACUTE EXACERBATION: Primary | ICD-10-CM

## 2018-03-14 DIAGNOSIS — J84.9 ILD (INTERSTITIAL LUNG DISEASE): ICD-10-CM

## 2018-03-14 DIAGNOSIS — I48.0 PAROXYSMAL ATRIAL FIBRILLATION: ICD-10-CM

## 2018-03-14 DIAGNOSIS — I50.32 CHRONIC DIASTOLIC HEART FAILURE: ICD-10-CM

## 2018-03-14 PROCEDURE — 99999 PR PBB SHADOW E&M-EST. PATIENT-LVL III: CPT | Mod: PBBFAC,,, | Performed by: FAMILY MEDICINE

## 2018-03-14 PROCEDURE — 99214 OFFICE O/P EST MOD 30 MIN: CPT | Mod: S$GLB,,, | Performed by: FAMILY MEDICINE

## 2018-03-14 PROCEDURE — 3074F SYST BP LT 130 MM HG: CPT | Mod: CPTII,S$GLB,, | Performed by: FAMILY MEDICINE

## 2018-03-14 PROCEDURE — 3078F DIAST BP <80 MM HG: CPT | Mod: CPTII,S$GLB,, | Performed by: FAMILY MEDICINE

## 2018-03-14 PROCEDURE — 99499 UNLISTED E&M SERVICE: CPT | Mod: S$GLB,,, | Performed by: FAMILY MEDICINE

## 2018-03-14 RX ORDER — MEMANTINE HYDROCHLORIDE 5 MG/1
5 TABLET ORAL 2 TIMES DAILY
COMMUNITY

## 2018-03-14 RX ORDER — SERTRALINE HYDROCHLORIDE 50 MG/1
50 TABLET, FILM COATED ORAL DAILY
COMMUNITY

## 2018-03-14 RX ORDER — PREDNISONE 20 MG/1
60 TABLET ORAL DAILY
Qty: 15 TABLET | Refills: 0 | Status: ON HOLD | OUTPATIENT
Start: 2018-03-14 | End: 2018-03-19

## 2018-03-14 RX ORDER — DOXYCYCLINE 100 MG/1
100 CAPSULE ORAL 2 TIMES DAILY
Qty: 20 CAPSULE | Refills: 0 | Status: ON HOLD | OUTPATIENT
Start: 2018-03-14 | End: 2018-03-19 | Stop reason: HOSPADM

## 2018-03-14 RX ORDER — DONEPEZIL HYDROCHLORIDE 5 MG/1
5 TABLET, FILM COATED ORAL NIGHTLY
COMMUNITY

## 2018-03-14 NOTE — PROGRESS NOTES
Subjective:       Patient ID: Kaity Rebolledo is a 84 y.o. female.    Chief Complaint: Cough and Chest Congestion    84 yr old pleasant white female with paroxysmal AFIB,  COPD (oxygen dependent), HTN, HLD, Anemia, Osteoporosis, h/o pneumonia, OA knee B/L, presents today for post hospital discharge follow up. Currently c/o cough and congestion x 10 days.    she presented at [facility] on 12/2017 for COPD exacerbation and Pneumonia. She had COPD exacerbation and bronchitis. She felt better with breathing treatment and oxygen and in hospital care. She again admitted in hospital last month for worsening dementia. She is now following Psychiatyry who discontinued xanax and he kept her on zoloft, and aricept/nemanda. She seems to be improving.      Paroxysmal afib - RHYTHM CONTROLLED - ON ELIQUIS FOR ANTICOAGULATION      1. COPD/SOB/Fatigue - she has chronic COPD - and she has multiple hospitalizations - she is currently on ambulatory oxygen. She had pneumonia which is complicated by pleural effusion and she was out in 03/15. She had follow up chest x ray last visit and it was normal.       2. Anemia - chronic - intermittent - her H/H varying from 8-12/24-36 since last 8 years.  she was never told she is anemic and she only takes multivitamin - she is not bleeding from anywhere else and she denies any rectal bleed or melena. c/o fatigue and tiredness - seen Hematology/Oncology - no intervention necessary      3. Weight loss/lack of appetite - since last 3-4 months - tried boost  And ensure recently and it is helping - no other symptoms except as above - of note she used to weigh 136 lb 3-4 years ago and now she is 104 lb      4. B/L knee pain - chronic - follows orthopedics - she had knee injections 6 months ago and she will make another appointment soon - on tramadol once a day as needed      5. HTN - controlled - on ACE - - controlled - watch BP      6. HLD - controlled- on diet alone - no new issues    7/. CHF -  controlled - EF 50 - improving    8. Low back pain/buttock pain - onset 3-4 days ago and gradually worsening - no fall or trauma - she had some coughing spell few days ago prior to onset and this one started - tried some muscle relaxant and it did not help - has osteoporosis and was on reclast and she stopped few years ago      History as below - reviewed           Shortness of Breath   This is a chronic problem. The current episode started more than 1 year ago. The problem occurs constantly. The problem has been unchanged. Pertinent negatives include no chest pain, coryza, ear pain, fever, headaches, leg swelling, neck pain, orthopnea, rash, rhinorrhea, sore throat, swollen glands, vomiting or wheezing. Nothing aggravates the symptoms. She has tried beta agonist inhalers and steroid inhalers for the symptoms. The treatment provided mild relief. Her past medical history is significant for allergies, chronic lung disease, COPD and pneumonia. There is no history of aspirin allergies, asthma, bronchiolitis, CAD, DVT, a heart failure, PE or a recent surgery.   Fatigue   This is a chronic problem. The current episode started more than 1 year ago. The problem occurs constantly. The problem has been gradually worsening. Associated symptoms include arthralgias, coughing and myalgias. Pertinent negatives include no chest pain, chills, congestion, diaphoresis, fatigue, fever, headaches, joint swelling, nausea, neck pain, rash, sore throat, swollen glands, vomiting or weakness. Nothing aggravates the symptoms. Treatments tried: boost or ensure. The treatment provided mild relief.   Hypertension   This is a chronic problem. The current episode started more than 1 year ago. The problem has been gradually improving since onset. The problem is controlled. Associated symptoms include anxiety and shortness of breath. Pertinent negatives include no chest pain, headaches, neck pain, orthopnea or palpitations. There are no associated  agents to hypertension. Risk factors for coronary artery disease include post-menopausal state. Past treatments include ACE inhibitors. The current treatment provides significant improvement. There are no compliance problems.  There is no history of angina, CAD/MI, heart failure, PVD, renovascular disease or retinopathy. There is no history of chronic renal disease, hypercortisolism, hyperparathyroidism, pheochromocytoma or a thyroid problem.   Hyperlipidemia   This is a chronic problem. The current episode started more than 1 year ago. The problem is controlled. Recent lipid tests were reviewed and are normal. She has no history of chronic renal disease, hypothyroidism, liver disease or obesity. There are no known factors aggravating her hyperlipidemia. Associated symptoms include myalgias and shortness of breath. Pertinent negatives include no chest pain. She is currently on no antihyperlipidemic treatment. The current treatment provides moderate improvement of lipids. There are no compliance problems.  Risk factors for coronary artery disease include dyslipidemia, hypertension and post-menopausal.   Anemia   Presents for follow-up visit. There has been no bruising/bleeding easily, confusion, fever, light-headedness, pallor, palpitations or pica. Signs of blood loss that are not present include hematochezia and melena. Past treatments include changes in diet. There is no history of chronic renal disease, heart failure, hypothyroidism or recent surgery. There are no compliance problems.    Anxiety   Presents for initial visit. Onset was 1 to 4 weeks ago. The problem has been unchanged. Symptoms include shortness of breath. Patient reports no chest pain, confusion, decreased concentration, dizziness, nausea, nervous/anxious behavior, palpitations or suicidal ideas. Symptoms occur constantly. The severity of symptoms is moderate. The symptoms are aggravated by family issues. The quality of sleep is poor. Nighttime  awakenings: occasional.     Risk factors include a major life event and recent illness. Her past medical history is significant for anemia, anxiety/panic attacks and chronic lung disease. There is no history of asthma or CAD. Past treatments include nothing. The treatment provided no relief.     Review of Systems   Constitutional: Negative.  Negative for activity change, chills, diaphoresis, fatigue, fever and unexpected weight change.   HENT: Negative.  Negative for congestion, ear discharge, ear pain, hearing loss, rhinorrhea, sore throat, trouble swallowing and voice change.    Eyes: Negative.  Negative for pain, discharge and visual disturbance.   Respiratory: Positive for cough and shortness of breath. Negative for chest tightness and wheezing.    Cardiovascular: Negative.  Negative for chest pain, palpitations, orthopnea and leg swelling.   Gastrointestinal: Negative.  Negative for abdominal distention, anal bleeding, blood in stool, constipation, diarrhea, hematochezia, melena, nausea and vomiting.   Endocrine: Negative.  Negative for cold intolerance, polydipsia and polyuria.   Genitourinary: Negative.  Negative for decreased urine volume, difficulty urinating, dysuria, frequency, hematuria, menstrual problem and vaginal pain.   Musculoskeletal: Positive for arthralgias and myalgias. Negative for gait problem, joint swelling and neck pain.   Skin: Negative.  Negative for color change, pallor, rash and wound.   Allergic/Immunologic: Negative.  Negative for environmental allergies and immunocompromised state.   Neurological: Negative.  Negative for dizziness, tremors, seizures, speech difficulty, weakness, light-headedness and headaches.   Hematological: Negative.  Negative for adenopathy. Does not bruise/bleed easily.   Psychiatric/Behavioral: Negative.  Negative for agitation, confusion, decreased concentration, dysphoric mood, hallucinations, self-injury and suicidal ideas. The patient is not  nervous/anxious.        PMH/PSH/FH/SH/MED/ALLERGY reviewed    Objective:       Vitals:    03/14/18 1029   BP: 103/62   Pulse: 95   Temp: 99 °F (37.2 °C)       Physical Exam   Constitutional: She is oriented to person, place, and time. She appears well-developed and well-nourished. No distress.   HENT:   Head: Normocephalic and atraumatic.   Eyes: EOM are normal. Pupils are equal, round, and reactive to light.   Neck: Normal range of motion. Neck supple.   Cardiovascular: Normal rate, regular rhythm, normal heart sounds and intact distal pulses.  Exam reveals no gallop and no friction rub.    No murmur heard.  Pulmonary/Chest: Effort normal. Wheezes: generalized wheezing.   Coarse B/L breath sounds   Abdominal: Soft. Bowel sounds are normal. She exhibits no distension. There is no tenderness. No hernia.   Musculoskeletal: She exhibits no edema. Tenderness: B/L knee TTP and restricted ROM.   TTP left gluteal area and ischium. No TTp left hip or SLRT neg   Neurological: She is alert and oriented to person, place, and time. She has normal reflexes. She displays normal reflexes. No cranial nerve deficit. She exhibits normal muscle tone. Coordination normal.   Skin: Skin is warm and dry. She is not diaphoretic.   Psychiatric: She has a normal mood and affect. Her behavior is normal. Judgment and thought content normal.       Assessment:       1. COPD with acute exacerbation    2. ILD (interstitial lung disease)    3. Pulmonary hypertension due to lung disease    4. Anxiety and depression    5. Essential hypertension    6. Hyperlipidemia, unspecified hyperlipidemia type    7. Chronic diastolic heart failure    8. Paroxysmal atrial fibrillation    9. Anemia of chronic disease    10. Osteoporosis with current pathological fracture with routine healing, unspecified osteoporosis type, subsequent encounter    11. Thrombocytosis    12. Nutritional anemia         Plan:       Kaity was seen today for cough and chest  congestion.    Diagnoses and all orders for this visit:    COPD with acute exacerbation  -     predniSONE (DELTASONE) 20 MG tablet; Take 3 tablets (60 mg total) by mouth once daily.  -     doxycycline (MONODOX) 100 MG capsule; Take 1 capsule (100 mg total) by mouth 2 (two) times daily.    ILD (interstitial lung disease)    Pulmonary hypertension due to lung disease    Anxiety and depression    Essential hypertension  -     CBC auto differential; Future  -     Comprehensive metabolic panel; Future  -     Lipid panel; Future    Hyperlipidemia, unspecified hyperlipidemia type  -     CBC auto differential; Future  -     Comprehensive metabolic panel; Future  -     Lipid panel; Future    Chronic diastolic heart failure    Paroxysmal atrial fibrillation    Anemia of chronic disease  -     CBC auto differential; Future  -     Iron and TIBC; Future  -     Ferritin; Future  -     Vitamin B12; Future  -     Folate; Future    Osteoporosis with current pathological fracture with routine healing, unspecified osteoporosis type, subsequent encounter    Thrombocytosis  -     CBC auto differential; Future    Nutritional anemia   -     Vitamin B12; Future  -     Folate; Future      COPD exacerbation  -prednisone x 5 days and doxy x 10 days    paroxysmal AFIB  -rhythm controlled  -on Eliquis daily    OP  -resume reclast  -dexa    chronic anemia  -improving  -continue iron      COPD/SOB  -stable  -continue current management with portable O2    Fatigue/weight loss/anemia  -likely multifactorial  -DD Anemia, Vit D deficiency, debility  -replace vitamins, labs  -ensure and boost daily  -ER precautions given for fluids      Depression  -improving  -continue celexa 20 mg/day    OA B/L knee  -follows orthopedics and gets knee injections  -tylenol for mild pain and tramadol for severe pain  -avoid NSAIDS    HTN  -controlled    HLD  -controlled    Spent adequate time in obtaining history and explaining differentials    40 minutes spent during  this visit of which greater than 50% devoted to face-face counseling and coordination of care regarding diagnosis and management plan    Follow-up in about 3 months (around 6/14/2018).

## 2018-03-16 PROBLEM — R06.02 SHORTNESS OF BREATH: Status: ACTIVE | Noted: 2018-03-16

## 2018-03-17 PROBLEM — J18.9 PNEUMONIA DUE TO INFECTIOUS ORGANISM: Status: RESOLVED | Noted: 2017-12-27 | Resolved: 2018-03-17

## 2018-03-19 ENCOUNTER — TELEPHONE (OUTPATIENT)
Dept: FAMILY MEDICINE | Facility: CLINIC | Age: 83
End: 2018-03-19

## 2018-03-19 NOTE — TELEPHONE ENCOUNTER
----- Message from Gina Steiner sent at 3/19/2018 10:34 AM CDT -----  Contact: self/[phone]  Patient would like to be seen for a sooner appointment per hospital discharge instructions for a 1 week follow up with her PCP.      Please call and advise.

## 2018-03-20 ENCOUNTER — OUTPATIENT CASE MANAGEMENT (OUTPATIENT)
Dept: ADMINISTRATIVE | Facility: OTHER | Age: 83
End: 2018-03-20

## 2018-03-20 NOTE — PROGRESS NOTES
Thank you for the referral. The following patient has been assigned to Zehra Wall RN with Outpatient Complex Care Management for high risk screening.    Reason for referral:     COPD with acute exacerbation  Essential hypertension  Anemia of chronic disease  Coronary artery disease involving native coronary artery of native heart without angina pectoris  Acute on chronic diastolic heart failure  Paroxysmal atrial fibrillation  Anxiety and depression    Please contact [facility] at [phone] with any questions.    Thank you,    Jing Urban, [facility]  Outpatient Complex Care Mgmt  Ext [phone]

## 2018-03-21 ENCOUNTER — OUTPATIENT CASE MANAGEMENT (OUTPATIENT)
Dept: ADMINISTRATIVE | Facility: OTHER | Age: 83
End: 2018-03-21

## 2018-03-21 NOTE — PROGRESS NOTES
Talked to daughter Maryjane Sharma.  Maryjane stated that all of her mother's needs are met at this time and has declined OPCM services.  Encouraged Maryjane to call this RN if having any questions/concerns.  CARLYN Wall, OPCM-RN

## 2018-03-26 RX ORDER — HYDROXYZINE PAMOATE 50 MG/1
50 CAPSULE ORAL EVERY 8 HOURS PRN
Qty: 30 CAPSULE | Refills: 0 | Status: SHIPPED | OUTPATIENT
Start: 2018-03-26 | End: 2018-04-16 | Stop reason: SDUPTHER

## 2018-04-11 ENCOUNTER — TELEPHONE (OUTPATIENT)
Dept: FAMILY MEDICINE | Facility: CLINIC | Age: 83
End: 2018-04-11

## 2018-04-11 NOTE — TELEPHONE ENCOUNTER
----- Message from Celine Frankel sent at 4/11/2018 10:24 AM CDT -----  Contact: [phone]/Brinda with [facility]   Mrs Parry want to know if the office received paper work from pt's dentist office . Please advise

## 2018-04-12 ENCOUNTER — TELEPHONE (OUTPATIENT)
Dept: FAMILY MEDICINE | Facility: CLINIC | Age: 83
End: 2018-04-12

## 2018-04-12 ENCOUNTER — LAB VISIT (OUTPATIENT)
Dept: LAB | Facility: HOSPITAL | Age: 83
End: 2018-04-12
Attending: FAMILY MEDICINE
Payer: MEDICARE

## 2018-04-12 DIAGNOSIS — N39.0 URINARY TRACT INFECTION WITHOUT HEMATURIA, SITE UNSPECIFIED: Primary | ICD-10-CM

## 2018-04-12 DIAGNOSIS — N39.0 URINARY TRACT INFECTION WITHOUT HEMATURIA, SITE UNSPECIFIED: ICD-10-CM

## 2018-04-12 LAB
BACTERIA #/AREA URNS AUTO: ABNORMAL /HPF
BILIRUB UR QL STRIP: NEGATIVE
CLARITY UR REFRACT.AUTO: ABNORMAL
COLOR UR AUTO: YELLOW
GLUCOSE UR QL STRIP: NEGATIVE
HGB UR QL STRIP: NEGATIVE
HYALINE CASTS UR QL AUTO: 0 /LPF
KETONES UR QL STRIP: NEGATIVE
LEUKOCYTE ESTERASE UR QL STRIP: ABNORMAL
MICROSCOPIC COMMENT: ABNORMAL
NITRITE UR QL STRIP: NEGATIVE
PH UR STRIP: 6 [PH] (ref 5–8)
PROT UR QL STRIP: ABNORMAL
RBC #/AREA URNS AUTO: 0 /HPF (ref 0–4)
SP GR UR STRIP: 1.01 (ref 1–1.03)
SQUAMOUS #/AREA URNS AUTO: 1 /HPF
URN SPEC COLLECT METH UR: ABNORMAL
UROBILINOGEN UR STRIP-ACNC: NEGATIVE EU/DL
WBC #/AREA URNS AUTO: >100 /HPF (ref 0–5)
WBC CLUMPS UR QL AUTO: ABNORMAL

## 2018-04-12 PROCEDURE — 87088 URINE BACTERIA CULTURE: CPT

## 2018-04-12 PROCEDURE — 87086 URINE CULTURE/COLONY COUNT: CPT

## 2018-04-12 PROCEDURE — 87077 CULTURE AEROBIC IDENTIFY: CPT

## 2018-04-12 PROCEDURE — 87186 SC STD MICRODIL/AGAR DIL: CPT

## 2018-04-12 PROCEDURE — 81001 URINALYSIS AUTO W/SCOPE: CPT

## 2018-04-12 RX ORDER — AMOXICILLIN 875 MG/1
875 TABLET, FILM COATED ORAL EVERY 12 HOURS
Qty: 20 TABLET | Refills: 0 | Status: SHIPPED | OUTPATIENT
Start: 2018-04-12 | End: 2018-06-11

## 2018-04-12 NOTE — TELEPHONE ENCOUNTER
Spoke to pt's daughter and requesting antibiotics so that can start until the urine results comes back. Pls advise.

## 2018-04-12 NOTE — TELEPHONE ENCOUNTER
Pt's daughter, Joy came to the office and stated that pt has a possible UTI. Daughter brought in a urine sample. Her symptoms are frequent urination. Pls call daughterJoy with test results once available to [phone]. Pls advise.

## 2018-04-16 ENCOUNTER — TELEPHONE (OUTPATIENT)
Dept: FAMILY MEDICINE | Facility: CLINIC | Age: 83
End: 2018-04-16

## 2018-04-16 LAB — BACTERIA UR CULT: NORMAL

## 2018-04-16 RX ORDER — HYDROXYZINE PAMOATE 50 MG/1
50 CAPSULE ORAL EVERY 8 HOURS PRN
Qty: 30 CAPSULE | Refills: 0 | Status: ON HOLD | OUTPATIENT
Start: 2018-04-16 | End: 2018-07-20 | Stop reason: HOSPADM

## 2018-04-16 NOTE — TELEPHONE ENCOUNTER
Informed Maryjane that Dr. Soriano spoke to Joy and stated to bring pt to the ER for evaluation and possible admission. Maryjane verbalized understanding.

## 2018-04-16 NOTE — TELEPHONE ENCOUNTER
----- Message from Kia Horton sent at 4/16/2018  4:22 PM CDT -----  Contact: Maryjane [phone]  Patient daughter is requesting to talk to nurse concerning personal questions on her mother. Please advice

## 2018-04-22 ENCOUNTER — PATIENT MESSAGE (OUTPATIENT)
Dept: FAMILY MEDICINE | Facility: CLINIC | Age: 83
End: 2018-04-22

## 2018-05-19 DIAGNOSIS — R09.3 THICK SPUTUM: ICD-10-CM

## 2018-05-19 DIAGNOSIS — D53.9 MACROCYTIC ANEMIA: ICD-10-CM

## 2018-05-20 RX ORDER — POTASSIUM CHLORIDE 750 MG/1
TABLET, EXTENDED RELEASE ORAL
Qty: 30 TABLET | Refills: 0 | Status: SHIPPED | OUTPATIENT
Start: 2018-05-20 | End: 2018-06-11 | Stop reason: SDUPTHER

## 2018-05-20 RX ORDER — MENTHOL 5.8 MG/1
LOZENGE ORAL
Qty: 30 TABLET | Refills: 0 | Status: SHIPPED | OUTPATIENT
Start: 2018-05-20 | End: 2018-06-13 | Stop reason: SDUPTHER

## 2018-05-20 RX ORDER — GUAIFENESIN 600 MG/1
TABLET, EXTENDED RELEASE ORAL
Qty: 120 TABLET | Refills: 0 | Status: SHIPPED | OUTPATIENT
Start: 2018-05-20 | End: 2018-06-13 | Stop reason: SDUPTHER

## 2018-05-26 ENCOUNTER — NURSE TRIAGE (OUTPATIENT)
Dept: ADMINISTRATIVE | Facility: CLINIC | Age: 83
End: 2018-05-26

## 2018-05-26 NOTE — TELEPHONE ENCOUNTER
Reason for Disposition   Took another person's prescription drug    Answer Assessment - Initial Assessment Questions  Pt living at an assisted living facility, Saurav is calling to report that the patient took her roommate's medications about 20-30 minutes ago.  She took:    Lasix 40 mg, which is a double dose for her ( usually takes 20mg)                Memantine 10 mg, which is a double dose for her ( usually takes 5mg)  Metoprolol 25 mg (already too 50 mg this am)    She also took the following drugs which she is not prescribed:  Metformin 500 mg  Seroquel 25 mg  Atorvastatin 10 mg        Her baseline vitals are T 98.0, /49, HR 83, R 20.  And she is currently having no adverse reaction to the above listed medications.    Protocols used: ST MEDICATION QUESTION CALL-A-

## 2018-05-28 RX ORDER — LEVALBUTEROL 1.25 MG/.5ML
1 SOLUTION, CONCENTRATE RESPIRATORY (INHALATION) EVERY 8 HOURS
Qty: 180 EACH | Refills: 3 | Status: SHIPPED | OUTPATIENT
Start: 2018-05-28 | End: 2018-09-11

## 2018-05-28 NOTE — TELEPHONE ENCOUNTER
----- Message from Katy Brower sent at 5/28/2018  9:09 AM CDT -----  Contact: Vanessa escamilla/ [facility] [phone]  Pharmacy is requesting a refill authorization for levalbuterol (XOPENEX) 1.25 mg/0.5 mL nebulizer solution. Patient is completely out of medication. Please advise.

## 2018-05-29 NOTE — TELEPHONE ENCOUNTER
Spoke to pt's daughter, Joy and states that pt is feeling better. Daughter states that it must have been the UTI that had her mom disoriented.

## 2018-06-11 ENCOUNTER — OFFICE VISIT (OUTPATIENT)
Dept: FAMILY MEDICINE | Facility: CLINIC | Age: 83
End: 2018-06-11
Attending: FAMILY MEDICINE
Payer: MEDICARE

## 2018-06-11 VITALS
HEIGHT: 65 IN | SYSTOLIC BLOOD PRESSURE: 102 MMHG | HEART RATE: 90 BPM | WEIGHT: 103.19 LBS | DIASTOLIC BLOOD PRESSURE: 64 MMHG | TEMPERATURE: 99 F | BODY MASS INDEX: 17.19 KG/M2 | OXYGEN SATURATION: 82 %

## 2018-06-11 DIAGNOSIS — J84.9 ILD (INTERSTITIAL LUNG DISEASE): ICD-10-CM

## 2018-06-11 DIAGNOSIS — E78.5 HYPERLIPIDEMIA, UNSPECIFIED HYPERLIPIDEMIA TYPE: ICD-10-CM

## 2018-06-11 DIAGNOSIS — F41.9 ANXIETY AND DEPRESSION: ICD-10-CM

## 2018-06-11 DIAGNOSIS — F32.A ANXIETY AND DEPRESSION: ICD-10-CM

## 2018-06-11 DIAGNOSIS — R63.0 LOSS OF APPETITE: ICD-10-CM

## 2018-06-11 DIAGNOSIS — I10 ESSENTIAL HYPERTENSION: Primary | ICD-10-CM

## 2018-06-11 DIAGNOSIS — I25.10 CORONARY ARTERY DISEASE INVOLVING NATIVE CORONARY ARTERY OF NATIVE HEART WITHOUT ANGINA PECTORIS: ICD-10-CM

## 2018-06-11 DIAGNOSIS — M80.00XD OSTEOPOROSIS WITH CURRENT PATHOLOGICAL FRACTURE WITH ROUTINE HEALING, UNSPECIFIED OSTEOPOROSIS TYPE, SUBSEQUENT ENCOUNTER: ICD-10-CM

## 2018-06-11 DIAGNOSIS — I48.0 PAROXYSMAL ATRIAL FIBRILLATION: ICD-10-CM

## 2018-06-11 PROBLEM — L89.601 DECUBITUS ULCER OF HEEL, STAGE 1: Status: RESOLVED | Noted: 2017-10-01 | Resolved: 2018-06-11

## 2018-06-11 PROBLEM — L98.429 STAGE 2 SKIN ULCER OF SACRAL REGION: Status: RESOLVED | Noted: 2017-06-23 | Resolved: 2018-06-11

## 2018-06-11 PROCEDURE — 3074F SYST BP LT 130 MM HG: CPT | Mod: CPTII,S$GLB,, | Performed by: FAMILY MEDICINE

## 2018-06-11 PROCEDURE — 3078F DIAST BP <80 MM HG: CPT | Mod: CPTII,S$GLB,, | Performed by: FAMILY MEDICINE

## 2018-06-11 PROCEDURE — 99999 PR PBB SHADOW E&M-EST. PATIENT-LVL III: CPT | Mod: PBBFAC,,, | Performed by: FAMILY MEDICINE

## 2018-06-11 PROCEDURE — 99499 UNLISTED E&M SERVICE: CPT | Mod: S$PBB,,, | Performed by: FAMILY MEDICINE

## 2018-06-11 PROCEDURE — 99214 OFFICE O/P EST MOD 30 MIN: CPT | Mod: S$GLB,,, | Performed by: FAMILY MEDICINE

## 2018-06-11 RX ORDER — PREDNISONE 10 MG/1
10 TABLET ORAL DAILY
Qty: 10 TABLET | Refills: 0 | Status: ON HOLD | OUTPATIENT
Start: 2018-06-11 | End: 2018-07-20 | Stop reason: HOSPADM

## 2018-06-11 NOTE — LETTER
June 11, 2018    Kaity Rebolledo  [address]  [place] [address]             [facility]  [id number]  [place] [address]  Phone: [phone]  Fax: [phone] Dear Kaity Rebolledo      Please find enclosed copies of your results. If you have any questions or concerns, please don't hesitate to call.    Lab Visit on 06/06/2018   Component Date Value Ref Range Status    WBC 06/06/2018 8.84  3.90 - 12.70 K/uL Final    RBC 06/06/2018 3.37* 4.00 - 5.40 M/uL Final    Hemoglobin 06/06/2018 9.8* 12.0 - 16.0 g/dL Final    Hematocrit 06/06/2018 32.8* 37.0 - 48.5 % Final    MCV 06/06/2018 97  82 - 98 fL Final    MCH 06/06/2018 29.1  27.0 - 31.0 pg Final    MCHC 06/06/2018 29.9* 32.0 - 36.0 g/dL Final    RDW 06/06/2018 13.5  11.5 - 14.5 % Final    Platelets 06/06/2018 359* 150 - 350 K/uL Final    MPV 06/06/2018 10.0  9.2 - 12.9 fL Final    Gran # (ANC) 06/06/2018 6.1  1.8 - 7.7 K/uL Final    Lymph # 06/06/2018 1.6  1.0 - 4.8 K/uL Final    Mono # 06/06/2018 0.9  0.3 - 1.0 K/uL Final    Eos # 06/06/2018 0.2  0.0 - 0.5 K/uL Final    Baso # 06/06/2018 0.03  0.00 - 0.20 K/uL Final    Gran% 06/06/2018 69.5  38.0 - 73.0 % Final    Lymph% 06/06/2018 17.6* 18.0 - 48.0 % Final    Mono% 06/06/2018 10.5  4.0 - 15.0 % Final    Eosinophil% 06/06/2018 2.1  0.0 - 8.0 % Final    Basophil% 06/06/2018 0.3  0.0 - 1.9 % Final    Differential Method 06/06/2018 Automated   Final    Sodium 06/06/2018 140  136 - 145 mmol/L Final    Potassium 06/06/2018 3.9  3.5 - 5.1 mmol/L Final    Chloride 06/06/2018 94* 95 - 110 mmol/L Final    CO2 06/06/2018 40* 23 - 29 mmol/L Final    Glucose 06/06/2018 95  70 - 110 mg/dL Final    BUN, Bld 06/06/2018 13  7 - 17 mg/dL Final    Creatinine 06/06/2018 0.87  0.50 - 1.40 mg/dL Final    Calcium 06/06/2018 9.7  8.7 - 10.5 mg/dL Final    Total Protein 06/06/2018 7.5  6.0 - 8.4 g/dL Final    Albumin 06/06/2018 4.0  3.5 - 5.2 g/dL  Final    Total Bilirubin 06/06/2018 0.3  0.1 - 1.0 mg/dL Final    Comment: For infants and newborns, interpretation of results should be based  on gestational age, weight and in agreement with clinical  observations.  Premature Infant recommended reference ranges:  Up to 24 hours.............<8.0 mg/dL  Up to 48 hours............<12.0 mg/dL  3-5 days..................<15.0 mg/dL  6-29 days.................<15.0 mg/dL      Alkaline Phosphatase 06/06/2018 89  38 - 126 U/L Final    AST 06/06/2018 27  15 - 46 U/L Final    ALT 06/06/2018 19  10 - 44 U/L Final    Anion Gap 06/06/2018 6* 8 - 16 mmol/L Final    eGFR if African American 06/06/2018 >60.0  >60 mL/min/1.73 m^2 Final    eGFR if non African American 06/06/2018 >60.0  >60 mL/min/1.73 m^2 Final    Comment: Calculation used to obtain the estimated glomerular filtration  rate (eGFR) is the CKD-EPI equation.       Iron 06/06/2018 41  30 - 160 ug/dL Final    Transferrin 06/06/2018 188* 200 - 375 mg/dL Final    TIBC 06/06/2018 278  250 - 450 ug/dL Final    Saturated Iron 06/06/2018 15* 20 - 50 % Final    Ferritin 06/06/2018 82  20.0 - 300.0 ng/mL Final    Vitamin B-12 06/06/2018 872  210 - 950 pg/mL Final    Folate 06/06/2018 17.0  4.0 - 24.0 ng/mL Final    Cholesterol 06/06/2018 257* 120 - 199 mg/dL Final    Comment: The National Cholesterol Education Program (NCEP) has set the  following guidelines (reference ranges) for Cholesterol:  Optimal.....................<200 mg/dL  Borderline High.............200-239 mg/dL  High........................> or = 240 mg/dL      Triglycerides 06/06/2018 93  30 - 150 mg/dL Final    Comment: The National Cholesterol Education Program (NCEP) has set the  following guidelines (reference values) for triglycerides:  Normal......................<150 mg/dL  Borderline High.............150-199 mg/dL  High........................200-499 mg/dL      HDL 06/06/2018 82* 40 - 75 mg/dL Final    Comment: The National Cholesterol  Education Program (NCEP) has set the  following guidelines (reference values) for HDL Cholesterol:  Low...............<40 mg/dL  Optimal...........>60 mg/dL      LDL Cholesterol 06/06/2018 156.4  63.0 - 159.0 mg/dL Final    Comment: The National Cholesterol Education Program (NCEP) has set the  following guidelines (reference values) for LDL Cholesterol:  Optimal.......................<130 mg/dL  Borderline High...............130-159 mg/dL  High..........................160-189 mg/dL  Very High.....................>190 mg/dL      HDL/Chol Ratio 06/06/2018 31.9  20.0 - 50.0 % Final    Total Cholesterol/HDL Ratio 06/06/2018 3.1  2.0 - 5.0 Final    Non-HDL Cholesterol 06/06/2018 175  mg/dL Final    Comment: Risk category and Non-HDL cholesterol goals:  Coronary heart disease (CHD)or equivalent (10-year risk of CHD >20%):  Non-HDL cholesterol goal     <130 mg/dL  Two or more CHD risk factors and 10-year risk of CHD <= 20%:  Non-HDL cholesterol goal     <160 mg/dL  0 to 1 CHD risk factor:  Non-HDL cholesterol goal     <190 mg/dL           Sincerely,        Samuel Soriano MD

## 2018-06-11 NOTE — PROGRESS NOTES
Subjective:       Patient ID: Kaity Rebolledo is a 84 y.o. female.    Chief Complaint: Leg Pain (Both legs)    84 yr old pleasant white female with paroxysmal AFIB,  COPD (oxygen dependent), HTN, HLD, Anemia, Osteoporosis, h/o pneumonia, OA knee B/L, presents today for her 3 month follow up. She has concerns regarding some pain in her legs and appetite problem. She had side effects from aricept and want to try something else. She is also doing ensure and glucerna.      Paroxysmal afib - RHYTHM CONTROLLED - ON ELIQUIS FOR ANTICOAGULATION      1. COPD/SOB/Fatigue - she has chronic COPD - and she has multiple hospitalizations - she is currently on ambulatory oxygen. She had pneumonia which is complicated by pleural effusion and she was out in 03/15. She had follow up chest x ray last visit and it was normal.       2. Anemia - chronic - intermittent - her H/H varying from 8-12/24-36 since last 8 years.  she was never told she is anemic and she only takes multivitamin - she is not bleeding from anywhere else and she denies any rectal bleed or melena. c/o fatigue and tiredness - seen Hematology/Oncology - no intervention necessary      3. Weight loss/lack of appetite - since last 3-4 months - tried boost  And ensure recently and it is helping - no other symptoms except as above - of note she used to weigh 136 lb 3-4 years ago and now she is 104 lb      4. B/L knee pain - chronic - follows orthopedics - she had knee injections 6 months ago and she will make another appointment soon - on tramadol once a day as needed      5. HTN - controlled - on ACE - - controlled - watch BP      6. HLD - controlled- on diet alone - no new issues    7/. CHF - controlled - EF 50 - improving    8. Low back pain/buttock pain - onset 3-4 days ago and gradually worsening - no fall or trauma - she had some coughing spell few days ago prior to onset and this one started - tried some muscle relaxant and it did not help - has osteoporosis and was  on reclast and she stopped few years ago      History as below - reviewed           Shortness of Breath   This is a chronic problem. The current episode started more than 1 year ago. The problem occurs constantly. The problem has been unchanged. Pertinent negatives include no chest pain, coryza, ear pain, fever, headaches, leg swelling, neck pain, orthopnea, rash, rhinorrhea, sore throat, swollen glands, vomiting or wheezing. Nothing aggravates the symptoms. She has tried beta agonist inhalers and steroid inhalers for the symptoms. The treatment provided mild relief. Her past medical history is significant for allergies, chronic lung disease, COPD and pneumonia. There is no history of aspirin allergies, asthma, bronchiolitis, CAD, DVT, a heart failure, PE or a recent surgery.   Fatigue   This is a chronic problem. The current episode started more than 1 year ago. The problem occurs constantly. The problem has been gradually worsening. Associated symptoms include arthralgias, coughing and myalgias. Pertinent negatives include no chest pain, chills, congestion, diaphoresis, fatigue, fever, headaches, joint swelling, nausea, neck pain, rash, sore throat, swollen glands, vomiting or weakness. Nothing aggravates the symptoms. Treatments tried: boost or ensure. The treatment provided mild relief.   Hypertension   This is a chronic problem. The current episode started more than 1 year ago. The problem has been gradually improving since onset. The problem is controlled. Associated symptoms include anxiety and shortness of breath. Pertinent negatives include no chest pain, headaches, neck pain, orthopnea or palpitations. There are no associated agents to hypertension. Risk factors for coronary artery disease include post-menopausal state. Past treatments include ACE inhibitors. The current treatment provides significant improvement. There are no compliance problems.  There is no history of angina, CAD/MI, heart failure, PVD  or retinopathy. There is no history of chronic renal disease, hypercortisolism, hyperparathyroidism, pheochromocytoma, renovascular disease or a thyroid problem.   Hyperlipidemia   This is a chronic problem. The current episode started more than 1 year ago. The problem is controlled. Recent lipid tests were reviewed and are normal. She has no history of chronic renal disease, hypothyroidism, liver disease or obesity. There are no known factors aggravating her hyperlipidemia. Associated symptoms include myalgias and shortness of breath. Pertinent negatives include no chest pain. She is currently on no antihyperlipidemic treatment. The current treatment provides moderate improvement of lipids. There are no compliance problems.  Risk factors for coronary artery disease include dyslipidemia, hypertension and post-menopausal.   Anemia   Presents for follow-up visit. There has been no bruising/bleeding easily, confusion, fever, light-headedness, pallor, palpitations or pica. Signs of blood loss that are not present include hematochezia and melena. Past treatments include changes in diet. There is no history of chronic renal disease, heart failure, hypothyroidism or recent surgery. There are no compliance problems.    Anxiety   Presents for initial visit. Onset was 1 to 4 weeks ago. The problem has been unchanged. Symptoms include shortness of breath. Patient reports no chest pain, confusion, decreased concentration, dizziness, nausea, nervous/anxious behavior, palpitations or suicidal ideas. Symptoms occur constantly. The severity of symptoms is moderate. The symptoms are aggravated by family issues. The quality of sleep is poor. Nighttime awakenings: occasional.     Risk factors include a major life event and recent illness. Her past medical history is significant for anemia, anxiety/panic attacks and chronic lung disease. There is no history of asthma or CAD. Past treatments include nothing. The treatment provided no  relief.     Review of Systems   Constitutional: Negative.  Negative for activity change, chills, diaphoresis, fatigue, fever and unexpected weight change.   HENT: Negative.  Negative for congestion, ear discharge, ear pain, hearing loss, rhinorrhea, sore throat and voice change.    Eyes: Negative.  Negative for pain, discharge and visual disturbance.   Respiratory: Positive for cough and shortness of breath. Negative for chest tightness and wheezing.    Cardiovascular: Negative.  Negative for chest pain, palpitations, orthopnea and leg swelling.   Gastrointestinal: Negative.  Negative for abdominal distention, anal bleeding, constipation, hematochezia, melena, nausea and vomiting.   Endocrine: Negative.  Negative for cold intolerance, polydipsia and polyuria.   Genitourinary: Negative.  Negative for decreased urine volume, difficulty urinating, dysuria, frequency, menstrual problem and vaginal pain.   Musculoskeletal: Positive for arthralgias and myalgias. Negative for gait problem, joint swelling and neck pain.   Skin: Negative.  Negative for color change, pallor, rash and wound.   Allergic/Immunologic: Negative.  Negative for environmental allergies and immunocompromised state.   Neurological: Negative.  Negative for dizziness, tremors, seizures, speech difficulty, weakness, light-headedness and headaches.   Hematological: Negative.  Negative for adenopathy. Does not bruise/bleed easily.   Psychiatric/Behavioral: Negative.  Negative for agitation, confusion, decreased concentration, hallucinations, self-injury and suicidal ideas. The patient is not nervous/anxious.        PMH/PSH/FH/SH/MED/ALLERGY reviewed    Objective:       Vitals:    06/11/18 1134   BP: 102/64   Pulse: 90   Temp: 98.6 °F (37 °C)       Physical Exam   Constitutional: She is oriented to person, place, and time. She appears well-developed and well-nourished. No distress.   HENT:   Head: Normocephalic and atraumatic.   Eyes: EOM are normal. Pupils  are equal, round, and reactive to light.   Neck: Normal range of motion. Neck supple.   Cardiovascular: Normal rate, regular rhythm, normal heart sounds and intact distal pulses.  Exam reveals no gallop and no friction rub.    No murmur heard.  Pulmonary/Chest: Effort normal. She has wheezes (very mild wheezing).   Coarse B/L breath sounds   Abdominal: Soft. Bowel sounds are normal. She exhibits no distension. There is no tenderness. No hernia.   Musculoskeletal: She exhibits no edema. Tenderness: B/L knee TTP and restricted ROM.   TTP left gluteal area and ischium. No TTp left hip or SLRT neg   Neurological: She is alert and oriented to person, place, and time. She has normal reflexes. She displays normal reflexes. No cranial nerve deficit. She exhibits normal muscle tone. Coordination normal.   Skin: Skin is warm and dry. She is not diaphoretic.   Psychiatric: She has a normal mood and affect. Her behavior is normal. Judgment and thought content normal.       Assessment:       1. Essential hypertension    2. Hyperlipidemia, unspecified hyperlipidemia type    3. Osteoporosis with current pathological fracture with routine healing, unspecified osteoporosis type, subsequent encounter    4. Paroxysmal atrial fibrillation    5. ILD (interstitial lung disease)    6. Anxiety and depression    7. Coronary artery disease involving native coronary artery of native heart without angina pectoris    8. Loss of appetite        Plan:       Kaity was seen today for leg pain.    Diagnoses and all orders for this visit:    Essential hypertension    Hyperlipidemia, unspecified hyperlipidemia type    Osteoporosis with current pathological fracture with routine healing, unspecified osteoporosis type, subsequent encounter    Paroxysmal atrial fibrillation    ILD (interstitial lung disease)  -     predniSONE (DELTASONE) 10 MG tablet; Take 1 tablet (10 mg total) by mouth once daily.    Anxiety and depression    Coronary artery disease  involving native coronary artery of native heart without angina pectoris    Loss of appetite  -     predniSONE (DELTASONE) 10 MG tablet; Take 1 tablet (10 mg total) by mouth once daily.      paroxysmal AFIB  -rhythm controlled  -on Eliquis daily    Loss of appetite  -trial of prednisone x 7-10 days    OP  -resume reclast  -dexa    chronic anemia  -improving  -continue iron      COPD/SOB  -stable  -continue current management with portable O2    Fatigue/weight loss/anemia  -likely multifactorial  -DD Anemia, Vit D deficiency, debility  -replace vitamins, labs  -ensure and boost daily  -ER precautions given for fluids      Depression  -improving  -continue celexa 20 mg/day    OA B/L knee  -follows orthopedics and gets knee injections  -tylenol for mild pain and tramadol for severe pain  -avoid NSAIDS    HTN  -controlled    HLD  -controlled    Spent adequate time in obtaining history and explaining differentials    40 minutes spent during this visit of which greater than 50% devoted to face-face counseling and coordination of care regarding diagnosis and management plan      Follow-up in about 3 months (around 9/11/2018), or if symptoms worsen or fail to improve.

## 2018-06-13 DIAGNOSIS — D53.9 MACROCYTIC ANEMIA: ICD-10-CM

## 2018-06-13 DIAGNOSIS — R09.3 THICK SPUTUM: ICD-10-CM

## 2018-06-13 RX ORDER — POTASSIUM CHLORIDE 750 MG/1
TABLET, EXTENDED RELEASE ORAL
Qty: 30 TABLET | Refills: 0 | Status: SHIPPED | OUTPATIENT
Start: 2018-06-13 | End: 2018-07-13 | Stop reason: SDUPTHER

## 2018-06-13 RX ORDER — MENTHOL 5.8 MG/1
LOZENGE ORAL
Qty: 30 TABLET | Refills: 0 | Status: SHIPPED | OUTPATIENT
Start: 2018-06-13 | End: 2018-07-13 | Stop reason: SDUPTHER

## 2018-06-13 RX ORDER — GUAIFENESIN 600 MG/1
TABLET, EXTENDED RELEASE ORAL
Qty: 120 TABLET | Refills: 0 | Status: SHIPPED | OUTPATIENT
Start: 2018-06-13 | End: 2018-07-13 | Stop reason: SDUPTHER

## 2018-06-22 ENCOUNTER — TELEPHONE (OUTPATIENT)
Dept: FAMILY MEDICINE | Facility: CLINIC | Age: 83
End: 2018-06-22

## 2018-06-22 NOTE — TELEPHONE ENCOUNTER
----- Message from Katy Brower sent at 6/22/2018  2:57 PM CDT -----  Contact: Genia escamilla/ [facility] [phone]  Genia is requesting to speak with you regarding home health orders for patient. Please call and advise.

## 2018-06-22 NOTE — TELEPHONE ENCOUNTER
Spoke to Genia at [facility] and informed her that the Home Health orders was received. Dr. Soriano is out of the office until next and once completed will be faxed back to her. Genia verbalized understanding.

## 2018-06-28 ENCOUNTER — TELEPHONE (OUTPATIENT)
Dept: FAMILY MEDICINE | Facility: CLINIC | Age: 83
End: 2018-06-28

## 2018-06-28 NOTE — TELEPHONE ENCOUNTER
----- Message from Celine Frankel sent at 6/28/2018  9:47 AM CDT -----  Contact: [phone]/Desire with [facility]   Desire its requesting to speak with the nurse in regarding pt's orders. Please advise

## 2018-07-13 DIAGNOSIS — I50.33 ACUTE ON CHRONIC DIASTOLIC HEART FAILURE: ICD-10-CM

## 2018-07-13 DIAGNOSIS — D53.9 MACROCYTIC ANEMIA: ICD-10-CM

## 2018-07-13 DIAGNOSIS — R09.3 THICK SPUTUM: ICD-10-CM

## 2018-07-13 RX ORDER — GUAIFENESIN 600 MG/1
TABLET, EXTENDED RELEASE ORAL
Qty: 120 TABLET | Refills: 0 | Status: SHIPPED | OUTPATIENT
Start: 2018-07-13 | End: 2018-08-15 | Stop reason: SDUPTHER

## 2018-07-13 RX ORDER — FUROSEMIDE 20 MG/1
TABLET ORAL
Qty: 60 TABLET | Refills: 0 | Status: SHIPPED | OUTPATIENT
Start: 2018-07-13 | End: 2018-08-15 | Stop reason: SDUPTHER

## 2018-07-13 RX ORDER — POTASSIUM CHLORIDE 750 MG/1
TABLET, EXTENDED RELEASE ORAL
Qty: 30 TABLET | Refills: 0 | Status: SHIPPED | OUTPATIENT
Start: 2018-07-13

## 2018-07-13 RX ORDER — MENTHOL 5.8 MG/1
LOZENGE ORAL
Qty: 30 TABLET | Refills: 0 | Status: SHIPPED | OUTPATIENT
Start: 2018-07-13 | End: 2018-08-15 | Stop reason: SDUPTHER

## 2018-07-19 ENCOUNTER — HOSPITAL ENCOUNTER (OUTPATIENT)
Facility: HOSPITAL | Age: 83
LOS: 1 days | Discharge: HOME | End: 2018-07-20
Admitting: INTERNAL MEDICINE
Payer: MEDICARE

## 2018-07-19 ENCOUNTER — ANESTHESIA (OUTPATIENT)
Dept: ENDOSCOPY | Facility: HOSPITAL | Age: 83
End: 2018-07-19
Payer: MEDICARE

## 2018-07-19 ENCOUNTER — ANESTHESIA EVENT (OUTPATIENT)
Dept: ENDOSCOPY | Facility: HOSPITAL | Age: 83
End: 2018-07-19
Payer: MEDICARE

## 2018-07-19 DIAGNOSIS — I25.10 CORONARY ARTERY DISEASE INVOLVING NATIVE CORONARY ARTERY OF NATIVE HEART WITHOUT ANGINA PECTORIS: Chronic | ICD-10-CM

## 2018-07-19 DIAGNOSIS — Z79.01 ANTICOAGULANT LONG-TERM USE: ICD-10-CM

## 2018-07-19 DIAGNOSIS — E78.00 HYPERCHOLESTEROLEMIA: Chronic | ICD-10-CM

## 2018-07-19 DIAGNOSIS — D62 ACUTE BLOOD LOSS ANEMIA: ICD-10-CM

## 2018-07-19 DIAGNOSIS — I10 ESSENTIAL HYPERTENSION: Chronic | ICD-10-CM

## 2018-07-19 DIAGNOSIS — K92.0 GASTROINTESTINAL HEMORRHAGE WITH HEMATEMESIS: ICD-10-CM

## 2018-07-19 DIAGNOSIS — I50.32 CHRONIC DIASTOLIC CONGESTIVE HEART FAILURE: Chronic | ICD-10-CM

## 2018-07-19 DIAGNOSIS — I48.0 PAROXYSMAL ATRIAL FIBRILLATION: ICD-10-CM

## 2018-07-19 DIAGNOSIS — K92.2 GASTROINTESTINAL HEMORRHAGE, UNSPECIFIED GASTROINTESTINAL HEMORRHAGE TYPE: Primary | ICD-10-CM

## 2018-07-19 DIAGNOSIS — J43.2 CENTRILOBULAR EMPHYSEMA: Chronic | ICD-10-CM

## 2018-07-19 DIAGNOSIS — D50.0 IRON DEFICIENCY ANEMIA DUE TO CHRONIC BLOOD LOSS: ICD-10-CM

## 2018-07-19 PROBLEM — J44.9 COPD (CHRONIC OBSTRUCTIVE PULMONARY DISEASE): Chronic | Status: ACTIVE | Noted: 2017-09-28

## 2018-07-19 PROBLEM — J44.9 COPD (CHRONIC OBSTRUCTIVE PULMONARY DISEASE): Status: ACTIVE | Noted: 2017-09-28

## 2018-07-19 LAB
ABO + RH BLD: NORMAL
ABO GROUP BLD: NORMAL
ALBUMIN SERPL BCP-MCNC: 3.2 G/DL
ALP SERPL-CCNC: 52 U/L
ALT SERPL W/O P-5'-P-CCNC: 9 U/L
ANION GAP SERPL CALC-SCNC: 2 MMOL/L
APTT BLDCRRT: 25 SEC
AST SERPL-CCNC: 19 U/L
BASOPHILS # BLD AUTO: 0.02 K/UL
BASOPHILS # BLD AUTO: 0.02 K/UL
BASOPHILS NFR BLD: 0.2 %
BASOPHILS NFR BLD: 0.3 %
BILIRUB SERPL-MCNC: 0.2 MG/DL
BILIRUB UR QL STRIP: NEGATIVE
BLD GP AB SCN CELLS X3 SERPL QL: NORMAL
BLD PROD TYP BPU: NORMAL
BLOOD UNIT EXPIRATION DATE: NORMAL
BLOOD UNIT TYPE CODE: 6200
BLOOD UNIT TYPE: NORMAL
BUN SERPL-MCNC: 25 MG/DL
CALCIUM SERPL-MCNC: 9.3 MG/DL
CHLORIDE SERPL-SCNC: 94 MMOL/L
CLARITY UR: CLEAR
CO2 SERPL-SCNC: 46 MMOL/L
CODING SYSTEM: NORMAL
COLOR UR: YELLOW
CREAT SERPL-MCNC: 1.1 MG/DL
DIFFERENTIAL METHOD: ABNORMAL
DIFFERENTIAL METHOD: ABNORMAL
DISPENSE STATUS: NORMAL
EOSINOPHIL # BLD AUTO: 0.1 K/UL
EOSINOPHIL # BLD AUTO: 0.2 K/UL
EOSINOPHIL NFR BLD: 0.8 %
EOSINOPHIL NFR BLD: 2 %
ERYTHROCYTE [DISTWIDTH] IN BLOOD BY AUTOMATED COUNT: 13 %
ERYTHROCYTE [DISTWIDTH] IN BLOOD BY AUTOMATED COUNT: 13.3 %
EST. GFR  (AFRICAN AMERICAN): 53 ML/MIN/1.73 M^2
EST. GFR  (NON AFRICAN AMERICAN): 46 ML/MIN/1.73 M^2
FERRITIN SERPL-MCNC: 29 NG/ML
FOLATE SERPL-MCNC: 17.3 NG/ML
GLUCOSE SERPL-MCNC: 100 MG/DL
GLUCOSE UR QL STRIP: NEGATIVE
HCT VFR BLD AUTO: 22.6 %
HCT VFR BLD AUTO: 27.1 %
HGB BLD-MCNC: 6.8 G/DL
HGB BLD-MCNC: 8.1 G/DL
HGB UR QL STRIP: NEGATIVE
INR PPP: 1
IRON SERPL-MCNC: 104 UG/DL
KETONES UR QL STRIP: NEGATIVE
LACTATE SERPL-SCNC: 0.5 MMOL/L
LEUKOCYTE ESTERASE UR QL STRIP: NEGATIVE
LIPASE SERPL-CCNC: 25 U/L
LYMPHOCYTES # BLD AUTO: 1.7 K/UL
LYMPHOCYTES # BLD AUTO: 1.9 K/UL
LYMPHOCYTES NFR BLD: 20.6 %
LYMPHOCYTES NFR BLD: 24.2 %
MCH RBC QN AUTO: 29.3 PG
MCH RBC QN AUTO: 29.8 PG
MCHC RBC AUTO-ENTMCNC: 29.9 G/DL
MCHC RBC AUTO-ENTMCNC: 30.1 G/DL
MCV RBC AUTO: 100 FL
MCV RBC AUTO: 97 FL
MONOCYTES # BLD AUTO: 0.8 K/UL
MONOCYTES # BLD AUTO: 0.9 K/UL
MONOCYTES NFR BLD: 11.2 %
MONOCYTES NFR BLD: 9.8 %
NEUTROPHILS # BLD AUTO: 4.9 K/UL
NEUTROPHILS # BLD AUTO: 5.5 K/UL
NEUTROPHILS NFR BLD: 63.4 %
NEUTROPHILS NFR BLD: 67.4 %
NITRITE UR QL STRIP: NEGATIVE
OB PNL STL: POSITIVE
PH UR STRIP: 6 [PH] (ref 5–8)
PLATELET # BLD AUTO: 217 K/UL
PLATELET # BLD AUTO: 247 K/UL
PMV BLD AUTO: 10.4 FL
PMV BLD AUTO: 10.7 FL
POTASSIUM SERPL-SCNC: 4.5 MMOL/L
PROT SERPL-MCNC: 6 G/DL
PROT UR QL STRIP: NEGATIVE
PROTHROMBIN TIME: 10.8 SEC
RBC # BLD AUTO: 2.32 M/UL
RBC # BLD AUTO: 2.72 M/UL
SATURATED IRON: 47 %
SODIUM SERPL-SCNC: 142 MMOL/L
SP GR UR STRIP: 1.01 (ref 1–1.03)
TOTAL IRON BINDING CAPACITY: 223 UG/DL
TRANS ERYTHROCYTES VOL PATIENT: NORMAL ML
TRANSFERRIN SERPL-MCNC: 151 MG/DL
TROPONIN I SERPL DL<=0.01 NG/ML-MCNC: <0.006 NG/ML
URN SPEC COLLECT METH UR: NORMAL
UROBILINOGEN UR STRIP-ACNC: NEGATIVE EU/DL
VIT B12 SERPL-MCNC: 605 PG/ML
WBC # BLD AUTO: 7.65 K/UL
WBC # BLD AUTO: 8.16 K/UL

## 2018-07-19 PROCEDURE — 27000221 HC OXYGEN, UP TO 24 HOURS

## 2018-07-19 PROCEDURE — 85730 THROMBOPLASTIN TIME PARTIAL: CPT

## 2018-07-19 PROCEDURE — 81003 URINALYSIS AUTO W/O SCOPE: CPT

## 2018-07-19 PROCEDURE — 43235 EGD DIAGNOSTIC BRUSH WASH: CPT | Performed by: INTERNAL MEDICINE

## 2018-07-19 PROCEDURE — 96375 TX/PRO/DX INJ NEW DRUG ADDON: CPT

## 2018-07-19 PROCEDURE — 82728 ASSAY OF FERRITIN: CPT

## 2018-07-19 PROCEDURE — G0378 HOSPITAL OBSERVATION PER HR: HCPCS

## 2018-07-19 PROCEDURE — 37000008 HC ANESTHESIA 1ST 15 MINUTES: Performed by: INTERNAL MEDICINE

## 2018-07-19 PROCEDURE — 37000009 HC ANESTHESIA EA ADD 15 MINS: Performed by: INTERNAL MEDICINE

## 2018-07-19 PROCEDURE — 80053 COMPREHEN METABOLIC PANEL: CPT

## 2018-07-19 PROCEDURE — 83540 ASSAY OF IRON: CPT

## 2018-07-19 PROCEDURE — P9021 RED BLOOD CELLS UNIT: HCPCS

## 2018-07-19 PROCEDURE — 86901 BLOOD TYPING SEROLOGIC RH(D): CPT

## 2018-07-19 PROCEDURE — 25000003 PHARM REV CODE 250: Performed by: STUDENT IN AN ORGANIZED HEALTH CARE EDUCATION/TRAINING PROGRAM

## 2018-07-19 PROCEDURE — 82607 VITAMIN B-12: CPT

## 2018-07-19 PROCEDURE — 63600175 PHARM REV CODE 636 W HCPCS: Performed by: NURSE ANESTHETIST, CERTIFIED REGISTERED

## 2018-07-19 PROCEDURE — 94761 N-INVAS EAR/PLS OXIMETRY MLT: CPT

## 2018-07-19 PROCEDURE — 96361 HYDRATE IV INFUSION ADD-ON: CPT

## 2018-07-19 PROCEDURE — 63600175 PHARM REV CODE 636 W HCPCS

## 2018-07-19 PROCEDURE — 83605 ASSAY OF LACTIC ACID: CPT

## 2018-07-19 PROCEDURE — 86920 COMPATIBILITY TEST SPIN: CPT

## 2018-07-19 PROCEDURE — 25000003 PHARM REV CODE 250: Performed by: INTERNAL MEDICINE

## 2018-07-19 PROCEDURE — 25000003 PHARM REV CODE 250

## 2018-07-19 PROCEDURE — 86900 BLOOD TYPING SEROLOGIC ABO: CPT

## 2018-07-19 PROCEDURE — 25000003 PHARM REV CODE 250: Performed by: NURSE ANESTHETIST, CERTIFIED REGISTERED

## 2018-07-19 PROCEDURE — 63600175 PHARM REV CODE 636 W HCPCS: Performed by: STUDENT IN AN ORGANIZED HEALTH CARE EDUCATION/TRAINING PROGRAM

## 2018-07-19 PROCEDURE — C9113 INJ PANTOPRAZOLE SODIUM, VIA: HCPCS | Performed by: STUDENT IN AN ORGANIZED HEALTH CARE EDUCATION/TRAINING PROGRAM

## 2018-07-19 PROCEDURE — 82272 OCCULT BLD FECES 1-3 TESTS: CPT

## 2018-07-19 PROCEDURE — 99291 CRITICAL CARE FIRST HOUR: CPT | Mod: 25

## 2018-07-19 PROCEDURE — 84484 ASSAY OF TROPONIN QUANT: CPT

## 2018-07-19 PROCEDURE — 96365 THER/PROPH/DIAG IV INF INIT: CPT | Mod: 59

## 2018-07-19 PROCEDURE — 85610 PROTHROMBIN TIME: CPT

## 2018-07-19 PROCEDURE — 36415 COLL VENOUS BLD VENIPUNCTURE: CPT

## 2018-07-19 PROCEDURE — 82746 ASSAY OF FOLIC ACID SERUM: CPT

## 2018-07-19 PROCEDURE — 96366 THER/PROPH/DIAG IV INF ADDON: CPT | Mod: 59

## 2018-07-19 PROCEDURE — 85025 COMPLETE CBC W/AUTO DIFF WBC: CPT

## 2018-07-19 PROCEDURE — 83690 ASSAY OF LIPASE: CPT

## 2018-07-19 PROCEDURE — C9113 INJ PANTOPRAZOLE SODIUM, VIA: HCPCS

## 2018-07-19 RX ORDER — PROPOFOL 10 MG/ML
VIAL (ML) INTRAVENOUS CONTINUOUS PRN
Status: DISCONTINUED | OUTPATIENT
Start: 2018-07-19 | End: 2018-07-19

## 2018-07-19 RX ORDER — MEMANTINE HYDROCHLORIDE 5 MG/1
5 TABLET ORAL 2 TIMES DAILY
Status: DISCONTINUED | OUTPATIENT
Start: 2018-07-19 | End: 2018-07-20 | Stop reason: HOSPADM

## 2018-07-19 RX ORDER — PROPOFOL 10 MG/ML
VIAL (ML) INTRAVENOUS
Status: DISCONTINUED | OUTPATIENT
Start: 2018-07-19 | End: 2018-07-19

## 2018-07-19 RX ORDER — SERTRALINE HYDROCHLORIDE 50 MG/1
50 TABLET, FILM COATED ORAL DAILY
Status: DISCONTINUED | OUTPATIENT
Start: 2018-07-19 | End: 2018-07-20 | Stop reason: HOSPADM

## 2018-07-19 RX ORDER — PHENYLEPHRINE HYDROCHLORIDE 10 MG/ML
INJECTION INTRAVENOUS
Status: DISCONTINUED | OUTPATIENT
Start: 2018-07-19 | End: 2018-07-19

## 2018-07-19 RX ORDER — PANTOPRAZOLE SODIUM 40 MG/10ML
80 INJECTION, POWDER, LYOPHILIZED, FOR SOLUTION INTRAVENOUS 2 TIMES DAILY
Status: DISCONTINUED | OUTPATIENT
Start: 2018-07-19 | End: 2018-07-19

## 2018-07-19 RX ORDER — PANTOPRAZOLE SODIUM 40 MG/1
40 TABLET, DELAYED RELEASE ORAL DAILY
Status: DISCONTINUED | OUTPATIENT
Start: 2018-07-19 | End: 2018-07-20 | Stop reason: HOSPADM

## 2018-07-19 RX ORDER — SODIUM CHLORIDE 9 MG/ML
1000 INJECTION, SOLUTION INTRAVENOUS
Status: COMPLETED | OUTPATIENT
Start: 2018-07-19 | End: 2018-07-19

## 2018-07-19 RX ORDER — PANTOPRAZOLE SODIUM 40 MG/10ML
40 INJECTION, POWDER, LYOPHILIZED, FOR SOLUTION INTRAVENOUS 2 TIMES DAILY
Status: DISCONTINUED | OUTPATIENT
Start: 2018-07-19 | End: 2018-07-19

## 2018-07-19 RX ORDER — SODIUM CHLORIDE 9 MG/ML
INJECTION, SOLUTION INTRAVENOUS ONCE
Status: COMPLETED | OUTPATIENT
Start: 2018-07-19 | End: 2018-07-19

## 2018-07-19 RX ORDER — METOPROLOL SUCCINATE 50 MG/1
100 TABLET, EXTENDED RELEASE ORAL DAILY
Status: DISCONTINUED | OUTPATIENT
Start: 2018-07-19 | End: 2018-07-20 | Stop reason: HOSPADM

## 2018-07-19 RX ORDER — DONEPEZIL HYDROCHLORIDE 5 MG/1
5 TABLET, FILM COATED ORAL NIGHTLY
Status: DISCONTINUED | OUTPATIENT
Start: 2018-07-19 | End: 2018-07-20 | Stop reason: HOSPADM

## 2018-07-19 RX ORDER — HYDROCODONE BITARTRATE AND ACETAMINOPHEN 500; 5 MG/1; MG/1
TABLET ORAL
Status: DISCONTINUED | OUTPATIENT
Start: 2018-07-19 | End: 2018-07-20 | Stop reason: HOSPADM

## 2018-07-19 RX ORDER — SODIUM CHLORIDE 9 MG/ML
INJECTION, SOLUTION INTRAVENOUS CONTINUOUS PRN
Status: DISCONTINUED | OUTPATIENT
Start: 2018-07-19 | End: 2018-07-19

## 2018-07-19 RX ADMIN — DONEPEZIL HYDROCHLORIDE 5 MG: 5 TABLET, FILM COATED ORAL at 09:07

## 2018-07-19 RX ADMIN — SERTRALINE 50 MG: 50 TABLET, FILM COATED ORAL at 01:07

## 2018-07-19 RX ADMIN — MEMANTINE HYDROCHLORIDE 5 MG: 5 TABLET ORAL at 09:07

## 2018-07-19 RX ADMIN — PANTOPRAZOLE SODIUM 40 MG: 40 TABLET, DELAYED RELEASE ORAL at 01:07

## 2018-07-19 RX ADMIN — DEXTROSE 8 MG/HR: 50 INJECTION, SOLUTION INTRAVENOUS at 05:07

## 2018-07-19 RX ADMIN — DEXTROSE 8 MG/HR: 50 INJECTION, SOLUTION INTRAVENOUS at 11:07

## 2018-07-19 RX ADMIN — METOPROLOL SUCCINATE 100 MG: 50 TABLET, EXTENDED RELEASE ORAL at 01:07

## 2018-07-19 RX ADMIN — PROPOFOL 75 MCG/KG/MIN: 10 INJECTION, EMULSION INTRAVENOUS at 12:07

## 2018-07-19 RX ADMIN — SODIUM CHLORIDE 1000 ML: 0.9 INJECTION, SOLUTION INTRAVENOUS at 04:07

## 2018-07-19 RX ADMIN — PROPOFOL 25 MG: 10 INJECTION, EMULSION INTRAVENOUS at 12:07

## 2018-07-19 RX ADMIN — SODIUM CHLORIDE: 0.9 INJECTION, SOLUTION INTRAVENOUS at 09:07

## 2018-07-19 RX ADMIN — SODIUM CHLORIDE: 9 INJECTION, SOLUTION INTRAVENOUS at 12:07

## 2018-07-19 RX ADMIN — PHENYLEPHRINE HYDROCHLORIDE 100 MCG: 10 INJECTION INTRAVENOUS at 12:07

## 2018-07-19 NOTE — TRANSFER OF CARE
"Anesthesia Transfer of Care Note    Patient: Kaity Rebolledo    Procedure(s) Performed: Procedure(s) (LRB):  EGD (ESOPHAGOGASTRODUODENOSCOPY) (N/A)    Patient location: GI    Anesthesia Type: MAC    Transport from OR: Transported from OR on room air with adequate spontaneous ventilation    Post pain: adequate analgesia    Post assessment: no apparent anesthetic complications    Post vital signs: stable    Level of consciousness: awake    Nausea/Vomiting: no nausea/vomiting    Complications: none    Transfer of care protocol was followed      Last vitals:   Visit Vitals  BP (!) 112/55   Pulse 74   Temp 36.7 °C (98 °F)   Resp 18   Ht 5' 5" (1.651 m)   Wt 50.8 kg (112 lb)   LMP  (LMP Unknown)   SpO2 100%   Breastfeeding? No   BMI 18.64 kg/m²     "

## 2018-07-19 NOTE — CONSULTS
[facility]    CC: black emesis    HPI 85 y.o. female with pertinent medical history including Afib, COPD (on 3L home O2), who presents from detention for 2-3 episodes of black emesis, starting this morning at about 0330, associated with anticoagulation use (Eliquis with last dose approx 6pm yesterday), dizziness, and weakness early this morning by detention report. This was associated with some abdominal pain yesterday but she denies current abdominal pain. She is accompanied by two of her daughters who also corroborate reported history. They deny association of the black emesis with dysphagia, odynophagia, constipation, diarrhea, melena, hematochezia, hematuria, hemoptysis, or other observed bleeding. Last BM was last night after dinner and last PO intake was yesterday evening at dinner.    She reports never having similar episodes to this in the past. She denies history of PUD. She denies alcohol use and known NSAID use other than Tylenol or aspirin 81 mg daily; however family qualifies that since meds administered by [facility] it is not impossible that she could receive NSAIDs for aches and pains. By chart she is on Reclast as outpatient for OP but no known oral bisphosphonates. No known pepto-bismol use.    Prior endoscopies include a colonoscopy greater than 10 years ago (benign as far as they recall), never EGD.    Past Medical History:   Diagnosis Date    Anticoagulant long-term use     Anxiety and depression     Chronic diastolic heart failure 9/2/2016    Chronic hypoxemic respiratory failure 2/6/2016    Chronic obstructive pulmonary disease 6/16/2016    Closed bilateral fracture of pubic rami 3/18/2016    Closed Colles' fracture of right radius 4/22/2017    Closed displaced fracture of neck of right femur s/p hemiarthroplasty on 4/23/2017 4/22/2017    COPD (chronic obstructive pulmonary disease)     Coronary artery disease involving native coronary artery without angina pectoris 6/16/2016    Displaced  "fracture of right femoral neck s/p hemiarthroplasty on 4/23/2017 4/22/2017    Essential hypertension 8/6/2013    Fall     patient  in  wheel  chair    Hyperlipidemia     Osteoporosis 3/18/2016    Paroxysmal atrial fibrillation     Pneumonia     Pulmonary hypertension due to lung disease 3/30/2017    Rotator cuff injury     R s/p therapy    Vitamin D deficiency disease 4/25/2017     Past Surgical History:   Procedure Laterality Date    CATARACT EXTRACTION BILATERAL W/ ANTERIOR VITRECTOMY      EYE SURGERY      HIP FRACTURE SURGERY Right 04/23/2017    Hemiarthroplasty for femoral neck fracture    LEG SURGERY       Social History  - Reports over 50 years of smoking cigarettes but quit about 10 years ago.  - EtOH: denies    Family History: patient/family deny family history of colorectal cancer or peptic ulcer disease.    Review of Systems  General ROS: negative for chills or fever  Psychological ROS: negative for hallucination or pressured speech  Ophthalmic ROS: negative for blurry vision, photophobia or eye pain  ENT ROS: negative for epistaxis or sore throat  Respiratory ROS: +home oxygen use; no cough shortness of breath on O2  Cardiovascular ROS: no chest pain or dyspnea on exertion  Gastrointestinal ROS: +black emesis. No current abdominal pain, change in bowel habits, or black/ bloody stools  Genito-Urinary ROS: no dysuria or hematuria  Musculoskeletal ROS: negative for gait disturbance or muscular weakness  Neurological ROS: no syncope or seizures  Dermatological ROS: negative for rash or jaundice    Physical Examination  BP (!) 104/51   Pulse 66   Temp 98.2 °F (36.8 °C) (Oral)   Resp 16   Ht 5' 5" (1.651 m)   Wt 50.8 kg (112 lb)   LMP  (LMP Unknown)   SpO2 100%   Breastfeeding? No   BMI 18.64 kg/m²   General appearance: alert, cooperative, no distress. Thin.  HENT: No visualized hematin residue or bleeding lesions in oropharynx visualized on bedside exam. Normocephalic, atraumatic, neck " symmetrical.   Eyes: Mild degree of subconjunctival pallor. Conjunctivae/corneas otherwise clear, PERRL, EOM's intact.  Lungs: soft end-expiratory wheezing to auscultation bilaterally, symmetric but slightly reduced chest expansion.  Heart: regular rate and rhythm without rub; no displacement of the PMI   Abdomen: soft, non-tender; bowel sounds normoactive; no rebound, rigidity, or guarding.  Extremities: extremities symmetric; no cyanosis or edema  Integument: Scattered ecchymoses. Skin turgor normal.  Neurologic: Alert and oriented X 3, normal strength.  Psychiatric: no pressured speech; pleasant affect. Remote recall diminished but short-term intact and responds appropriately to questions.    Labs:  CBC w/ Hgb 8.1, Hct 27.1, , RDW 13.0.  BMP w/ bicarb 46, BUN 25, Cl 94  Coags: INR 1.0, PT 10.8    Imaging:  Chart reviewed, no imaging available.    Assessment:   85 y.o. lady with anticoagulation use (Eliquis) for stroke prophylaxis in Afib and aspirin use (81 mg daily) who presents from [facility] after multiple episodes of dark emesis associated with abdominal discomfort yesterday though not currently. She has a mild degree of acute blood loss anemia on chronic multifactorial anemia compared to her prior baseline hemoglobin, with clinical history suspicious for upper GI etiology of bleeding such as PUD, angioectasias, or other. She is currently hemodynamically stable and otherwise feeling well. Hypochloremic Metabolic alkalosis likely secondary to episodes of emesis.    Plan:   - Plan for EGD today, consent obtained, case requested.  - maintain NPO status, continue IVF as needed and transfuse for Hgb < 7 as needed  - anti-emetics PRN  - continue PPI gtt for now  - hold Eliquis until procedure (daughters confirmed with [facility] last night), may continue ASA from GI perspective if needed  - monitor H/H  - consider updated iron studies  - will continue to follow and update plan pending results of endoscopic  evaluation.    Paxton Foster MD PGY-4  [facility] Fellow

## 2018-07-19 NOTE — [employer] PATIENT INSTRUCTIONS
Discharge Summary/Instructions after an Endoscopic Procedure  Patient Name: Kaity Rebolledo  Patient MRN: [id number]  Patient YOB: 1933 Thursday, July 19, 2018  Jovany Hernandez MD  RESTRICTIONS:  During your procedure today, you received medications for sedation.  These   medications may affect your judgment, balance and coordination.  Therefore,   for 24 hours, you have the following restrictions:   - DO NOT drive a car, operate machinery, make legal/financial decisions,   sign important papers or drink alcohol.    ACTIVITY:  Today: no heavy lifting, straining or running due to procedural   sedation/anesthesia.  The following day: return to full activity including work.  DIET:  Eat and drink normally unless instructed otherwise.     TREATMENT FOR COMMON SIDE EFFECTS:  - Mild abdominal pain, nausea, belching, bloating or excessive gas:  rest,   eat lightly and use a heating pad.  - Sore Throat: treat with throat lozenges and/or gargle with warm salt   water.  - Because air was used during the procedure, expelling large amounts of air   from your rectum or belching is normal.  - If a bowel prep was taken, you may not have a bowel movement for 1-3 days.    This is normal.  SYMPTOMS TO WATCH FOR AND REPORT TO YOUR PHYSICIAN:  1. Abdominal pain or bloating, other than gas cramps.  2. Chest pain.  3. Back pain.  4. Signs of infection such as: chills or fever occurring within 24 hours   after the procedure.  5. Rectal bleeding, which would show as bright red, maroon, or black stools.   (A tablespoon of blood from the rectum is not serious, especially if   hemorrhoids are present.)  6. Vomiting.  7. Weakness or dizziness.  GO DIRECTLY TO THE NEAREST EMERGENCY ROOM IF YOU HAVE ANY OF THE FOLLOWING:      Difficulty breathing              Chills and/or fever over 101 F   Persistent vomiting and/or vomiting blood   Severe abdominal pain   Severe chest pain   Black, tarry stools   Bleeding- more than one  tablespoon   Any other symptom or condition that you feel may need urgent attention  Your doctor recommends these additional instructions:  If any biopsies were taken, your doctors clinic will contact you in 1 to 2   weeks with any results.  - Daily PPI for six weeks  - Trend H/H  - Check H pylori stool Ag and treat if positive  - Full liquid diet and monitor overnight.   - If no further bleeding or change in H/H, ok to resume Eliquis tomorrow.   - Return patient to hospital le for ongoing care.  For questions, problems or results please call your physician - Jovany Hernandez MD at Work:  [phone].  EMERGENCY PHONE NUMBER: [phone],  LAB RESULTS: [phone]  IF A COMPLICATION OR EMERGENCY SITUATION ARISES AND YOU ARE UNABLE TO REACH   YOUR PHYSICIAN - GO DIRECTLY TO THE EMERGENCY ROOM.  MD Jovany Machado MD  7/19/2018 12:52:23 PM  This report has been verified and signed electronically.  [employer]

## 2018-07-19 NOTE — ANESTHESIA PREPROCEDURE EVALUATION
"                                                                                                             07/19/2018  Kaity Rebolledo is a 85 y.o., female in ED admitted with hematemesis (last episode 330am)  and anemia for EGD under MAC/[facility]    Past Medical History:   Diagnosis Date    Anticoagulant long-term use     Anxiety and depression     Chronic diastolic heart failure 9/2/2016    Chronic hypoxemic respiratory failure 2/6/2016    Chronic obstructive pulmonary disease 6/16/2016    Closed bilateral fracture of pubic rami 3/18/2016    Closed Colles' fracture of right radius 4/22/2017    Closed displaced fracture of neck of right femur s/p hemiarthroplasty on 4/23/2017 4/22/2017    COPD (chronic obstructive pulmonary disease)     Coronary artery disease involving native coronary artery without angina pectoris 6/16/2016    Displaced fracture of right femoral neck s/p hemiarthroplasty on 4/23/2017 4/22/2017    Essential hypertension 8/6/2013    Fall     patient  in  wheel  chair    Hyperlipidemia     Osteoporosis 3/18/2016    Paroxysmal atrial fibrillation     Pneumonia     Pulmonary hypertension due to lung disease 3/30/2017    Rotator cuff injury     R s/p therapy    Vitamin D deficiency disease 4/25/2017   mild aortic stenosis      Anesthesia Evaluation    I have reviewed the Patient Summary Reports.     I have reviewed the Medications.     Review of Systems  Anesthesia Hx:  Personal Hx of Anesthesia complications ("sensitive" to anesthesia per daughter)   Social:  Former Smoker, No Alcohol Use    Hematology/Oncology:         -- Anemia:   Cardiovascular:   Exercise tolerance: poor Hypertension CAD   CHF    Pulmonary:   Pneumonia COPD Shortness of breath    Hepatic/GI:   GIB   Psych:   Psychiatric History anxiety depression          Physical Exam  General:  Cachexia       Chest/Lungs:  Chest/Lungs Findings: Normal Respiratory Rate, Decreased Breathe Sounds Left, Decreased Breathe Sounds Right "     Heart/Vascular:  Heart Findings: Normal          Lab Results   Component Value Date    WBC 8.16 07/19/2018    HGB 8.1 (L) 07/19/2018    HCT 27.1 (L) 07/19/2018     07/19/2018    CHOL 257 (H) 06/06/2018    TRIG 93 06/06/2018    HDL 82 (H) 06/06/2018    ALT 9 (L) 07/19/2018    AST 19 07/19/2018     07/19/2018    K 4.5 07/19/2018    CL 94 (L) 07/19/2018    CREATININE 1.1 07/19/2018    BUN 25 (H) 07/19/2018    CO2 46 (HH) 07/19/2018    TSH 0.992 06/20/2017    INR 1.0 07/19/2018    HGBA1C 5.1 03/19/2018     CONCLUSIONS     1 - Normal left ventricular systolic function (EF 55-60%).     2 - Left ventricular diastolic dysfunction.     3 - Low normal right ventricular systolic function .     4 - Mild aortic stenosis, VIRI = 1.9 cm2, mean gradient = 8.7 mmHg.     5 - Mild aortic regurgitation.     6 - Mild mitral regurgitation.     7 - Moderate tricuspid regurgitation.     8 - Increased central venous pressure.     9 - Pulmonary hypertension. The estimated PA systolic pressure is 51 mmHg.     This document has been electronically    SIGNED BY: Micah Muniz MD On: 03/03/2017 16:48    Anesthesia Plan  Type of Anesthesia, risks & benefits discussed:  Anesthesia Type:  MAC, general  Patient's Preference:   Intra-op Monitoring Plan:   Intra-op Monitoring Plan Comments:   Post Op Pain Control Plan:   Post Op Pain Control Plan Comments:   Induction:    Beta Blocker:  Patient is on a Beta-Blocker and has not received dose within the past 24 hours due to non-compliance or for other reasons (Patient should receive a perioperative dose or document why it is withheld). Perioperative Beta Blocker not given due to: Other (see comments)    Beta Blocker Comments: GIBleed  Informed Consent: Patient understands risks and agrees with Anesthesia plan.  Questions answered. Anesthesia consent signed with patient.  ASA Score: 3     Day of Surgery Review of History & Physical:            Ready For Surgery From Anesthesia  Perspective.

## 2018-07-19 NOTE — ED TRIAGE NOTES
Pt. To the ER with c/o vomiting dark emesis and feeling tired. Pt. Is awake, alert and oriented. Pt. Placed on cardiac, BP and continuous pulse oximetry monitoring. Skin is PWD. Abdomen is soft and flat. Denies c/o nausea or pain at this time. Bed in the low position, side rails elevated x 2 and call light at the bedside.

## 2018-07-19 NOTE — ED PROVIDER NOTES
Encounter Date: 2018    SCRIBE #1 NOTE: I, Jonah Mckenzie, am scribing for, and in the presence of,  Dr. Curtis. I have scribed the entire note.       History     Chief Complaint   Patient presents with    Extremity Weakness     awoke with feeling weak and 3 episodes of vomiting. pt. states emesis was black. denies abdominal pain. denies black stool     Kaity Rebolledo is a 85 y.o. female who  has a past medical history of Anticoagulant long-term use; Anxiety and depression; Chronic diastolic heart failure (2016); Chronic hypoxemic respiratory failure (2016); Chronic obstructive pulmonary disease (2016); Closed bilateral fracture of pubic rami (3/18/2016); Closed Colles' fracture of right radius (2017); Closed displaced fracture of neck of right femur s/p hemiarthroplasty on 2017 (2017); COPD (chronic obstructive pulmonary disease); Coronary artery disease involving native coronary artery without angina pectoris (2016); Displaced fracture of right femoral neck s/p hemiarthroplasty on 2017 (2017); Essential hypertension (2013); Fall; Hyperlipidemia; Osteoporosis (3/18/2016); Paroxysmal atrial fibrillation; Pneumonia; Pulmonary hypertension due to lung disease (3/30/2017); Rotator cuff injury; and Vitamin D deficiency disease (2017).    Patient presents to the ED due to weakness and 3 episodes of black vomiting. Pt denies associated abdominal pain, cramping, diarrhea, SOB, fever, but was nauseated just before vomiting. Last episode of vomiting couple hours prior. Pt feels better since vomiting. She lives at an assisted living facility. Pt   couple months prior, has been causing stress in her life. Appears alert and oriented, talkative.            Review of patient's allergies indicates:   Allergen Reactions    Advair diskus  [fluticasone-salmeterol]      Other reaction(s): Nausea    Morphine Hallucinations    Pravastatin      Other reaction(s):  Muscle pain     Past Medical History:   Diagnosis Date    Anticoagulant long-term use     Anxiety and depression     Chronic diastolic heart failure 9/2/2016    Chronic hypoxemic respiratory failure 2/6/2016    Chronic obstructive pulmonary disease 6/16/2016    Closed bilateral fracture of pubic rami 3/18/2016    Closed Colles' fracture of right radius 4/22/2017    Closed displaced fracture of neck of right femur s/p hemiarthroplasty on 4/23/2017 4/22/2017    COPD (chronic obstructive pulmonary disease)     Coronary artery disease involving native coronary artery without angina pectoris 6/16/2016    Displaced fracture of right femoral neck s/p hemiarthroplasty on 4/23/2017 4/22/2017    Essential hypertension 8/6/2013    Fall     patient  in  wheel  chair    Hyperlipidemia     Osteoporosis 3/18/2016    Paroxysmal atrial fibrillation     Pneumonia     Pulmonary hypertension due to lung disease 3/30/2017    Rotator cuff injury     R s/p therapy    Vitamin D deficiency disease 4/25/2017     Past Surgical History:   Procedure Laterality Date    CATARACT EXTRACTION BILATERAL W/ ANTERIOR VITRECTOMY      EYE SURGERY      HIP FRACTURE SURGERY Right 04/23/2017    Hemiarthroplasty for femoral neck fracture    LEG SURGERY       History reviewed. No pertinent family history.  Social History   Substance Use Topics    Smoking status: Former Smoker     Packs/day: 1.00     Years: 50.00     Types: Cigarettes     Quit date: 3/13/2003    Smokeless tobacco: Never Used    Alcohol use No     Review of Systems   All other systems reviewed and are negative.      Physical Exam     Initial Vitals   BP Pulse Resp Temp SpO2   07/19/18 0350 07/19/18 0350 07/19/18 0358 07/19/18 0358 07/19/18 0350   133/63 83 (!) 22 98 °F (36.7 °C) 100 %      MAP       --                Physical Exam    Nursing note and vitals reviewed.  Constitutional: She appears well-developed and well-nourished.   HENT:   Head: Normocephalic.   Eyes:  EOM are normal.   Neck: Normal range of motion. Neck supple.   Pulmonary/Chest: No respiratory distress.   Abdominal: Soft. She exhibits no distension. There is no tenderness. There is no rigidity, no rebound, no guarding, no CVA tenderness, no tenderness at McBurney's point and negative Ness's sign.   Musculoskeletal: Normal range of motion.   Neurological: She is alert and oriented to person, place, and time.   Skin: Skin is warm and dry.   Psychiatric: She has a normal mood and affect.         ED Course   Critical Care  Date/Time: 7/19/2018 6:04 AM  Performed by: DC HECTOR  Authorized by: DC HECTOR   Total critical care time (exclusive of procedural time) : 30 minutes        Labs Reviewed   CBC W/ AUTO DIFFERENTIAL - Abnormal; Notable for the following:        Result Value    RBC 2.72 (*)     Hemoglobin 8.1 (*)     Hematocrit 27.1 (*)      (*)     MCHC 29.9 (*)     All other components within normal limits   COMPREHENSIVE METABOLIC PANEL - Abnormal; Notable for the following:     Chloride 94 (*)     CO2 46 (*)     BUN, Bld 25 (*)     Albumin 3.2 (*)     Alkaline Phosphatase 52 (*)     ALT 9 (*)     Anion Gap 2 (*)     eGFR if  53 (*)     eGFR if non  46 (*)     All other components within normal limits    Narrative:      CO2 critical result(s) called and verbal readback obtained from   Kaity Mckenzie RN, 07/19/2018 05:08   OCCULT BLOOD X 1, STOOL - Abnormal; Notable for the following:     Occult Blood Positive (*)     All other components within normal limits   LIPASE   URINALYSIS          Imaging Results    None          Medical Decision Making:   ED Management:  5:42 AM  Hemoccult noted, anemia noted below pt baseline, uremia noted. Suspicion for upper GI bleed, especially given pt hematemesis. Protonix initiated, GI paged.     Discussed case with Dr.Stibbe GI, who recommends admission to hospitalist.  His service will consult.  Discussed with family and  pt; pt is comfortable and nontoxic.                      Clinical Impression:   The encounter diagnosis was Gastrointestinal hemorrhage, unspecified gastrointestinal hemorrhage type.            I, Adan Curtis, personally performed the services described in this documentation. All medical record entries made by the scribe were at my direction and in my presence.  I have reviewed the chart and agree that the record reflects my personal performance and is accurate and complete within the limitations of emergency medical charting. Adan Curtis M.D.             Adan Curtis MD  07/19/18 0664

## 2018-07-19 NOTE — ANESTHESIA POSTPROCEDURE EVALUATION
"Anesthesia Post Evaluation    Patient: Kaity Rebolledo    Procedure(s) Performed: Procedure(s) (LRB):  EGD (ESOPHAGOGASTRODUODENOSCOPY) (N/A)    Final Anesthesia Type: MAC  Patient location during evaluation: GI PACU  Patient participation: Yes- Able to Participate  Level of consciousness: awake and alert and oriented  Post-procedure vital signs: reviewed and stable  Pain management: adequate  Airway patency: patent  PONV status at discharge: No PONV  Anesthetic complications: no      Cardiovascular status: blood pressure returned to baseline and hemodynamically stable  Respiratory status: unassisted, spontaneous ventilation and room air  Hydration status: euvolemic  Follow-up not needed.        Visit Vitals  BP (!) 112/55   Pulse 74   Temp 36.7 °C (98 °F)   Resp 18   Ht 5' 5" (1.651 m)   Wt 50.8 kg (112 lb)   LMP  (LMP Unknown)   SpO2 100%   Breastfeeding? No   BMI 18.64 kg/m²       Pain/Tommy Score: No Data Recorded      "

## 2018-07-20 ENCOUNTER — TELEPHONE (OUTPATIENT)
Dept: FAMILY MEDICINE | Facility: CLINIC | Age: 83
End: 2018-07-20

## 2018-07-20 VITALS
BODY MASS INDEX: 18.66 KG/M2 | DIASTOLIC BLOOD PRESSURE: 55 MMHG | SYSTOLIC BLOOD PRESSURE: 101 MMHG | HEIGHT: 65 IN | HEART RATE: 65 BPM | RESPIRATION RATE: 16 BRPM | OXYGEN SATURATION: 98 % | TEMPERATURE: 98 F | WEIGHT: 112 LBS

## 2018-07-20 LAB
ALBUMIN SERPL BCP-MCNC: 2.8 G/DL
ALP SERPL-CCNC: 43 U/L
ALT SERPL W/O P-5'-P-CCNC: 10 U/L
ANION GAP SERPL CALC-SCNC: 3 MMOL/L
AST SERPL-CCNC: 20 U/L
BASOPHILS # BLD AUTO: 0.02 K/UL
BASOPHILS # BLD AUTO: 0.03 K/UL
BASOPHILS NFR BLD: 0.3 %
BASOPHILS NFR BLD: 0.3 %
BILIRUB SERPL-MCNC: 0.5 MG/DL
BUN SERPL-MCNC: 38 MG/DL
CALCIUM SERPL-MCNC: 9 MG/DL
CHLORIDE SERPL-SCNC: 99 MMOL/L
CO2 SERPL-SCNC: 41 MMOL/L
CREAT SERPL-MCNC: 0.8 MG/DL
DIFFERENTIAL METHOD: ABNORMAL
DIFFERENTIAL METHOD: ABNORMAL
EOSINOPHIL # BLD AUTO: 0.1 K/UL
EOSINOPHIL # BLD AUTO: 0.2 K/UL
EOSINOPHIL NFR BLD: 1.6 %
EOSINOPHIL NFR BLD: 1.9 %
ERYTHROCYTE [DISTWIDTH] IN BLOOD BY AUTOMATED COUNT: 14.7 %
ERYTHROCYTE [DISTWIDTH] IN BLOOD BY AUTOMATED COUNT: 15 %
EST. GFR  (AFRICAN AMERICAN): >60 ML/MIN/1.73 M^2
EST. GFR  (NON AFRICAN AMERICAN): >60 ML/MIN/1.73 M^2
GLUCOSE SERPL-MCNC: 82 MG/DL
HCT VFR BLD AUTO: 23.8 %
HCT VFR BLD AUTO: 25.2 %
HGB BLD-MCNC: 7.2 G/DL
HGB BLD-MCNC: 7.6 G/DL
INR PPP: 1
LYMPHOCYTES # BLD AUTO: 1.4 K/UL
LYMPHOCYTES # BLD AUTO: 1.7 K/UL
LYMPHOCYTES NFR BLD: 15.4 %
LYMPHOCYTES NFR BLD: 24.7 %
MCH RBC QN AUTO: 28.1 PG
MCH RBC QN AUTO: 28.6 PG
MCHC RBC AUTO-ENTMCNC: 30.2 G/DL
MCHC RBC AUTO-ENTMCNC: 30.3 G/DL
MCV RBC AUTO: 93 FL
MCV RBC AUTO: 95 FL
MONOCYTES # BLD AUTO: 0.6 K/UL
MONOCYTES # BLD AUTO: 0.7 K/UL
MONOCYTES NFR BLD: 10.2 %
MONOCYTES NFR BLD: 6.4 %
NEUTROPHILS # BLD AUTO: 3.6 K/UL
NEUTROPHILS # BLD AUTO: 8.3 K/UL
NEUTROPHILS NFR BLD: 62.7 %
NEUTROPHILS NFR BLD: 76 %
PLATELET # BLD AUTO: 185 K/UL
PLATELET # BLD AUTO: 193 K/UL
PMV BLD AUTO: 10.1 FL
PMV BLD AUTO: 9.8 FL
POTASSIUM SERPL-SCNC: 4 MMOL/L
PROT SERPL-MCNC: 5 G/DL
PROTHROMBIN TIME: 10.5 SEC
RBC # BLD AUTO: 2.56 M/UL
RBC # BLD AUTO: 2.66 M/UL
SODIUM SERPL-SCNC: 143 MMOL/L
WBC # BLD AUTO: 10.93 K/UL
WBC # BLD AUTO: 5.79 K/UL

## 2018-07-20 PROCEDURE — 85610 PROTHROMBIN TIME: CPT

## 2018-07-20 PROCEDURE — 25000003 PHARM REV CODE 250: Performed by: INTERNAL MEDICINE

## 2018-07-20 PROCEDURE — 27000221 HC OXYGEN, UP TO 24 HOURS

## 2018-07-20 PROCEDURE — 80053 COMPREHEN METABOLIC PANEL: CPT

## 2018-07-20 PROCEDURE — 36415 COLL VENOUS BLD VENIPUNCTURE: CPT

## 2018-07-20 PROCEDURE — G0378 HOSPITAL OBSERVATION PER HR: HCPCS

## 2018-07-20 PROCEDURE — 85025 COMPLETE CBC W/AUTO DIFF WBC: CPT

## 2018-07-20 PROCEDURE — 25000003 PHARM REV CODE 250: Performed by: STUDENT IN AN ORGANIZED HEALTH CARE EDUCATION/TRAINING PROGRAM

## 2018-07-20 PROCEDURE — 94761 N-INVAS EAR/PLS OXIMETRY MLT: CPT

## 2018-07-20 RX ORDER — FERROUS SULFATE 325(65) MG
325 TABLET ORAL 2 TIMES DAILY
Qty: 60 TABLET | Refills: 6 | Status: SHIPPED | OUTPATIENT
Start: 2018-07-20

## 2018-07-20 RX ORDER — PANTOPRAZOLE SODIUM 40 MG/1
40 TABLET, DELAYED RELEASE ORAL DAILY
Qty: 30 TABLET | Refills: 11 | Status: SHIPPED | OUTPATIENT
Start: 2018-07-21 | End: 2018-07-20

## 2018-07-20 RX ORDER — DOCUSATE SODIUM 100 MG/1
100 CAPSULE, LIQUID FILLED ORAL 2 TIMES DAILY
Qty: 60 CAPSULE | Refills: 6 | COMMUNITY
Start: 2018-07-20 | End: 2018-07-20

## 2018-07-20 RX ORDER — FERROUS SULFATE 325(65) MG
325 TABLET ORAL 2 TIMES DAILY
Qty: 60 TABLET | Refills: 6 | Status: SHIPPED | OUTPATIENT
Start: 2018-07-20 | End: 2018-07-20

## 2018-07-20 RX ORDER — DOCUSATE SODIUM 100 MG/1
100 CAPSULE, LIQUID FILLED ORAL 2 TIMES DAILY
Qty: 60 CAPSULE | Refills: 6 | Status: SHIPPED | OUTPATIENT
Start: 2018-07-20

## 2018-07-20 RX ORDER — PANTOPRAZOLE SODIUM 40 MG/1
40 TABLET, DELAYED RELEASE ORAL DAILY
Qty: 30 TABLET | Refills: 11 | Status: SHIPPED | OUTPATIENT
Start: 2018-07-21 | End: 2019-07-21

## 2018-07-20 RX ADMIN — METOPROLOL SUCCINATE 100 MG: 50 TABLET, EXTENDED RELEASE ORAL at 08:07

## 2018-07-20 RX ADMIN — MEMANTINE HYDROCHLORIDE 5 MG: 5 TABLET ORAL at 08:07

## 2018-07-20 RX ADMIN — SERTRALINE 50 MG: 50 TABLET, FILM COATED ORAL at 08:07

## 2018-07-20 RX ADMIN — PANTOPRAZOLE SODIUM 40 MG: 40 TABLET, DELAYED RELEASE ORAL at 08:07

## 2018-07-20 NOTE — PROGRESS NOTES
"[facility] Hospital Medicine Progress Note    Admitting Team: [facility] Hospitalist Team A  Attending Physician: Little Encarnacion MD  Resident: Lew  Intern: Chris    Date of Admit: 2018    Chief Complaint     Abdominal pain and vomiting for 1 day    Subjective:      Yesterday, patient went had EGD with GI. Tolerated well. Has been on full liquid diet since then.   Hgb 6.8 yesterday afternoon. Transfused 1U pRBC, repeat hgb 7.2 last night.   Patient's daughter in room this morning, reports that patient slept well. Patient sleeping comfortably but arousable this morning. No complaints.     Objective:   Last 24 Hour Vital Signs:  BP  Min: 91/50  Max: 144/64  Temp  Av.9 °F (36.6 °C)  Min: 96.6 °F (35.9 °C)  Max: 98.4 °F (36.9 °C)  Pulse  Av.1  Min: 58  Max: 96  Resp  Av.1  Min: 15  Max: 20  SpO2  Av %  Min: 96 %  Max: 100 %  Body mass index is 18.64 kg/m².  I/O last 3 completed shifts:  In: 1620 [P.O.:520; I.V.:1100]  Out: 250 [Urine:250]    Physical Examination:    BP (!) 144/64 (BP Location: Right arm, Patient Position: Lying)   Pulse 62   Temp 96.6 °F (35.9 °C) (Oral)   Resp 18   Ht 5' 5" (1.651 m)   Wt 50.8 kg (112 lb)   LMP  (LMP Unknown)   SpO2 98%   Breastfeeding? No   BMI 18.64 kg/m²      Orthostatics:  Laying down /53 with HR 65, Sitting up /65 with HR 72    General Appearance:    Alert, cooperative, no distress, appears stated age   Head:    Normocephalic, without obvious abnormality, atraumatic   Eyes:    PERRL, conjunctiva/corneas clear, EOM's intact   Nose:   Nares normal, NC in place to 3L   Throat:   Lips, mucosa, and tongue normal; teeth and gums normal without signs of bleeding           Lungs:     Clear to auscultation bilaterally, respirations unlabored        Heart:    Regular rate and rhythm, S1 and S2 normal, no murmur, rub   or gallop   Abdomen:     Soft, NDNT, + bowel sounds       Extremities:   Extremities normal, atraumatic trace edema b/l LEs   Pulses:   " 2+ and symmetric all extremities   Skin:   Skin color, texture, turgor normal, no rashes or lesions   Neurologic:   Grossly normal          Laboratory:  Most Recent Data:  CBC:   Lab Results   Component Value Date    WBC 10.93 07/19/2018    HGB 7.2 (L) 07/19/2018    HCT 23.8 (L) 07/19/2018     07/19/2018    MCV 93 07/19/2018    RDW 14.7 (H) 07/19/2018     WBC Differential: 67.4 % N, 0 % Bands, 20.6 % L, 9.8 % M, 2 % Eo, 0. % Baso, no additional cells seen  BMP:   Lab Results   Component Value Date     07/20/2018    K 4.0 07/20/2018    CL 99 07/20/2018    CO2 41 (HH) 07/20/2018    BUN 38 (H) 07/20/2018    CREATININE 0.8 07/20/2018    GLU 82 07/20/2018    CALCIUM 9.0 07/20/2018    MG 2.1 12/28/2017    PHOS 4.4 06/20/2017     LFTs:   Lab Results   Component Value Date    PROT 5.0 (L) 07/20/2018    ALBUMIN 2.8 (L) 07/20/2018    BILITOT 0.5 07/20/2018    AST 20 07/20/2018    ALKPHOS 43 (L) 07/20/2018    ALT 10 07/20/2018     Coags:   Lab Results   Component Value Date    INR 1.0 07/20/2018     FLP:   Lab Results   Component Value Date    CHOL 257 (H) 06/06/2018    HDL 82 (H) 06/06/2018    LDLCALC 156.4 06/06/2018    TRIG 93 06/06/2018    CHOLHDL 31.9 06/06/2018     DM:   Lab Results   Component Value Date    HGBA1C 5.1 03/19/2018    HGBA1C 5.3 03/28/2017    HGBA1C 5.5 02/28/2015    LDLCALC 156.4 06/06/2018    CREATININE 0.8 07/20/2018     Thyroid:   Lab Results   Component Value Date    TSH 0.992 06/20/2017     Anemia:   Lab Results   Component Value Date    IRON 104 07/19/2018    TIBC 223 (L) 07/19/2018    FERRITIN 29 07/19/2018    MENFBTGE49 605 07/19/2018    FOLATE 17.3 07/19/2018     Cardiac:   Lab Results   Component Value Date    TROPONINI <0.006 07/19/2018     (H) 03/27/2017     Urinalysis:   Lab Results   Component Value Date    LABURIN  04/12/2018     ENTEROCOCCUS FAECIUM  >100,000 cfu/ml  No other significant isolate      COLORU Yellow 07/19/2018    SPECGRAV 1.010 07/19/2018    NITRITE  Negative 07/19/2018    KETONESU Negative 07/19/2018    UROBILINOGEN Negative 07/19/2018    WBCUA >100 (H) 04/12/2018       Trended Lab Data:    Recent Labs  Lab 07/19/18  0422 07/19/18  1435 07/19/18  2357 07/20/18  0253   WBC 8.16 7.65 10.93  --    HGB 8.1* 6.8* 7.2*  --    HCT 27.1* 22.6* 23.8*  --     217 193  --    * 97 93  --    RDW 13.0 13.3 14.7*  --      --   --  143   K 4.5  --   --  4.0   CL 94*  --   --  99   CO2 46*  --   --  41*   BUN 25*  --   --  38*   CREATININE 1.1  --   --  0.8     --   --  82   PROT 6.0  --   --  5.0*   ALBUMIN 3.2*  --   --  2.8*   BILITOT 0.2  --   --  0.5   AST 19  --   --  20   ALKPHOS 52*  --   --  43*   ALT 9*  --   --  10       Trended Cardiac Data:    Recent Labs  Lab 07/19/18  0724   TROPONINI <0.006       Microbiology Data:  none    Other Results:  Radiology:  Imaging Results    None          Assessment:     Kaity Rebolledo is a 85 y.o. female with:  Patient Active Problem List    Diagnosis Date Noted    Gastrointestinal hemorrhage 07/19/2018    Shortness of breath 03/16/2018    Thrombocytosis 03/14/2018    COPD (chronic obstructive pulmonary disease) 09/28/2017    ILD (interstitial lung disease) 06/21/2017    Osteoporosis with current pathological fracture with routine healing     Vitamin D deficiency disease 04/25/2017    Acute blood loss anemia 04/24/2017    Debility 04/06/2017    Anticoagulant long-term use     Pulmonary hypertension due to lung disease 03/30/2017    Urinary retention 03/28/2017    Paroxysmal atrial fibrillation     Anxiety and depression     Iron deficiency anemia due to chronic blood loss 10/10/2016    Diastolic CHF 09/02/2016    Coronary artery disease involving native coronary artery without angina pectoris 06/16/2016    Hypercholesterolemia 03/17/2016    Uterine prolapse 03/16/2016    Anemia 12/16/2015    Physical deconditioning 03/05/2015    Leukocytosis 03/02/2015    Cystocele 08/06/2013     Essential hypertension 08/06/2013        Plan:     UGIB  - weak, dizzy, black thick vomit w/clots x 2, frequent NSAID use, on eliquis and daily ASA at home  - normotensive, no orthostasis on admit  - baseline H/H > 9.2/30.4 with H/H on admit 8.1/27.1, FOBT+  - started on pantoprazole 80 mg IV BID  - close monitoring with telemetry  - inpatient consult to [facility]; appreciate recs. S/p EGD with old blood in stomach with no active bleeding/clear source, suspect punctate gastric hemorrhage. Con't daily PPI x 6 weeks, stool H Pylori antigen, if no further bleeding/change H/H can resume eliquis   - Patient with drop in Hgb to 6.8 yesterday afternoon, given 1U pRBC with increase to 7.2  - H/H this AM pending  - follow up with GI, PCP on discharge    Anemia, Macrocytic, hx Normocytic  - baseline H/H 9.2-9.8/30.4-32.8, MCV 95-97  - recent workup with iron studies from 6/6/18 with low iron sat, low transferrin  - H/H on admit 8.1/27.1, , FOBT+, PT 10.8, INR 1  - home supplements include B12  - f/u with PCP as outpatient, will d/c on iron/colace    COPD  - On 3L O2 at home  - home meds include Breo and spiriva inhaler + rescue inhalers  - asymptomatic and satting 100% on 3L O2 NC    Paroxysmal Atrial Fibrillation, on anticoagulation (holding)  - home meds include Toprol 50 mg and Eliquis  - holding anticoagulation in setting of GIB pending H/H this AM     Diastolic CHF  - currently asymptomatic  - last TTE 3/3/17 with EF 55%, LV diastolic dysfunction, mild MR/AR, moderate TR, pulm artery HTN 51  - home regimen includes Toprol 50 mg    CAD  - denies hx L heart cath or stent placement  - takes ASA at home, no statin 2/2 rxn  - holding ASA in setting of likely GIB    HTN  - normotensive on admit, holding home meds  - will monitor BP closely    Hypercholesterolemia  - recent panel 6/6/18, chol 257  - not on statin due to allergic rxn, f/u as outpt with PCP      Fluids & Electrolytes: replete as needed  Diet: full liquid  diet  DVT PPX: holding chemical ppx in setting of GIB  Precautions: fall precautions    Dispo: pending repeat H/H, hemodynamic stability    Code Status:     DNR  *note: pt is ok with transfusion if necessary for current admission    Sunitha Perez DO  [facility] -II    [facility] Medicine Hospitalist Pager numbers:   [facility] Hospitalist Medicine Team A (Man/Shashank): [phone]  [facility] Hospitalist Medicine Team B (Zak/[place]):  [phone]

## 2018-07-20 NOTE — PLAN OF CARE
[facility]    FACILITY TRANSFER ORDERS      Patient Name: Kaity Rebolledo  YOB: 1933    PCP: Samuel Soriano MD   PCP Address: [address] / [place] [address]  PCP Phone Number: [phone]  PCP Fax: [phone]    Encounter Date: 07/20/2018    Admit to: [facility]    Vital Signs:  Routine    Diagnoses:   Active Hospital Problems    Diagnosis  POA    *Gastrointestinal hemorrhage [K92.2]  Yes    COPD (chronic obstructive pulmonary disease) [J44.9]  Yes     Chronic    Anticoagulant long-term use [Z79.01]  Not Applicable    Paroxysmal atrial fibrillation [I48.0]  Yes    Iron deficiency anemia due to chronic blood loss [D50.0]  Yes    Diastolic CHF [I50.30]  Yes     Chronic    Coronary artery disease involving native coronary artery without angina pectoris [I25.10]  Yes     Chronic    Hypercholesterolemia [E78.00]  Yes     Chronic    Anemia [D64.9]  Yes     Chronic    Essential hypertension [I10]  Yes     Chronic      Resolved Hospital Problems    Diagnosis Date Resolved POA   No resolved problems to display.       Allergies:  Review of patient's allergies indicates:   Allergen Reactions    Advair diskus  [fluticasone-salmeterol]      Other reaction(s): Nausea    Morphine Hallucinations    Pravastatin      Other reaction(s): Muscle pain       Diet: regular diet    Activities: Activity as tolerated    Nursing: N/A     Labs: per PCP and facility physician prn     CONSULTS:    Physical Therapy to evaluate and treat.  and Occupational Therapy to evaluate and treat.    MISCELLANEOUS CARE:  N/A    WOUND CARE ORDERS  None    Medications: Review discharge medications with patient and family and provide education.      Current Discharge Medication List      START taking these medications    Details   docusate sodium (COLACE) 100 MG capsule Take 1 capsule (100 mg total) by mouth 2 (two) times daily.  Qty: 60 capsule, Refills: 6      ferrous sulfate 325  (65 FE) MG EC tablet Take 1 tablet (325 mg total) by mouth 2 (two) times daily.  Qty: 60 tablet, Refills: 6      pantoprazole (PROTONIX) 40 MG tablet Take 1 tablet (40 mg total) by mouth once daily.  Qty: 30 tablet, Refills: 11         CONTINUE these medications which have NOT CHANGED    Details   acetaminophen (TYLENOL) 325 MG tablet Take 2 tablets (650 mg total) by mouth every 6 (six) hours as needed (Mild pain).  Refills: 0      apixaban 2.5 mg Tab Take 1 tablet (2.5 mg total) by mouth 2 (two) times daily.  Qty: 180 tablet, Refills: 5      ascorbic acid (VITAMIN C) 500 MG tablet Take 500 mg by mouth 2 (two) times daily.      aspirin (ECOTRIN) 81 MG EC tablet Take 1 tablet (81 mg total) by mouth once daily.  Refills: 0    Associated Diagnoses: Chronic systolic heart failure      cyproheptadine (PERIACTIN) 4 mg tablet Please give the patient 4 mg 3 times daily for appetite stimulation, nasal congestion, and rhinorrhea.  Qty: 90 tablet, Refills: 5    Comments: This prescription was filled today. Any refills authorized will be placed on file.  Associated Diagnoses: Poor appetite; Abnormal weight loss; Rhinorrhea      donepezil (ARICEPT) 5 MG tablet Take 5 mg by mouth every evening.      fluticasone-vilanterol (BREO) 100-25 mcg/dose diskus inhaler Inhale 1 puff into the lungs once daily.  Qty: 60 each, Refills: 12    Associated Diagnoses: Chronic obstructive pulmonary disease, unspecified COPD type      furosemide (LASIX) 20 MG tablet TAKE 1 TABLET BY MOUTH TWICE A DAY.  Qty: 60 tablet, Refills: 0    Associated Diagnoses: Acute on chronic diastolic heart failure      ipratropium (ATROVENT) 0.02 % nebulizer solution Take 2.5 mLs (500 mcg total) by nebulization every 6 (six) hours as needed for Wheezing.  Qty: 120 vial, Refills: 3      levalbuterol (XOPENEX) 1.25 mg/0.5 mL nebulizer solution Take 0.5 mLs (1.25 mg total) by nebulization every 8 (eight) hours.  Qty: 180 each, Refills: 3      memantine (NAMENDA) 5 MG Tab  Take 5 mg by mouth 2 (two) times daily.      metoprolol succinate (TOPROL-XL) 50 MG 24 hr tablet Take 2 tablets (100 mg total) by mouth once daily.  Qty: 60 tablet, Refills: 11    Associated Diagnoses: Chronic systolic heart failure      MUCINEX 600 mg 12 hr tablet TAKE 2 TABLETS BY MOUTH TWICE DAILY.  Qty: 120 tablet, Refills: 0    Associated Diagnoses: Thick sputum      multivitamin (ONE DAILY MULTIVITAMIN) per tablet Take 1 tablet by mouth once daily.      nitroGLYCERIN (NITROSTAT) 0.4 MG SL tablet Place 1 tablet (0.4 mg total) under the tongue every 5 (five) minutes as needed for Chest pain.  Qty: 30 tablet, Refills: 1      potassium chloride (KLOR-CON) 10 MEQ TbSR TAKE 1 TABLET BY MOUTH ONCE DAILY.  Qty: 30 tablet, Refills: 0      senna-docusate 8.6-50 mg (PERICOLACE) 8.6-50 mg per tablet Take 1 tablet by mouth 2 (two) times daily as needed.      sertraline (ZOLOFT) 50 MG tablet Take 50 mg by mouth once daily.      tiotropium (SPIRIVA WITH HANDIHALER) 18 mcg inhalation capsule Inhale 1 capsule (18 mcg total) into the lungs every morning. Controller  Qty: 90 capsule, Refills: 4    Associated Diagnoses: Chronic obstructive pulmonary disease, unspecified COPD type      tramadol (ULTRAM) 50 mg tablet Take 1 tablet (50 mg total) by mouth every 24 hours as needed for Pain.  Qty: 30 tablet, Refills: 2    Associated Diagnoses: Closed displaced fracture of neck of right femur; Chronic pain of both lower extremities      VITAMIN B-12 100 MCG tablet TAKE 1 TABLET BY MOUTH ONCE DAILY.  Qty: 30 tablet, Refills: 0    Associated Diagnoses: Macrocytic anemia         STOP taking these medications       hydrOXYzine pamoate (VISTARIL) 50 MG Cap Comments:   Reason for Stopping:         potassium chloride (MICRO-K) 10 MEQ CpSR Comments:   Reason for Stopping:         predniSONE (DELTASONE) 10 MG tablet Comments:   Reason for Stopping:                    _________________________________  Sunitha Perez DO  07/20/2018

## 2018-07-20 NOTE — PLAN OF CARE
Problem: Patient Care Overview  Goal: Plan of Care Review  Outcome: Ongoing (interventions implemented as appropriate)  Patient on oxygen with documented flow. Will attempt to wean per protocol.  Will continue to monitor.

## 2018-07-20 NOTE — TELEPHONE ENCOUNTER
Spoke to , Heather and informed her that pt has an appt on 9/11. Heather stated that that appt was fine.

## 2018-07-20 NOTE — TELEPHONE ENCOUNTER
----- Message from Heather March RN sent at 7/20/2018 10:19 AM CDT -----  Patient getting discharged from [facility] today. Please schedule hospital discharge followup 1-2 weeks and place in [facility].    Thanks,  Heather March, RN, CCM, CMSRN  RN Transition Navigator  [phone]

## 2018-07-20 NOTE — PLAN OF CARE
Patient to dc to [facility] assisted living facility  Daughter to transport patient with her own portable oxygen.    Faxed medication list with prescriptions to nikolay at [facility] phone [phone]/ fax [phone].    Gave nurse phone number to call report to [facility]    Future Appointments  Date Time Provider Department Center   9/11/2018 11:00 AM Samuel Soriano MD [facility] Clini        07/20/18 1140   Final Note   Assessment Type Final Discharge Note   Discharge Disposition Home   Hospital Follow Up  Appt(s) scheduled? Yes   Discharge plans and expectations educations in teach back method with documentation complete? Yes   Right Care Referral Info   Post Acute Recommendation Other     Heather March, RN, CCM, CMSRN  RN Transition Navigator  [phone]

## 2018-07-20 NOTE — NURSING
Plan of care reviewed with patient, understanding verbalized. Pt remains NSR on tele. Daughter remains at the bedside. Bed alarm on, call light in reach, fall precautions in place. No distress noted. Will continue to monitor

## 2018-07-20 NOTE — DISCHARGE INSTRUCTIONS
Pantoprazole tablets (English) View Edit Remove  Iron tablets, capsules, extended-release tablets (English) View Edit Remove  Docusate capsules (English) View Edit Remove

## 2018-07-20 NOTE — PLAN OF CARE
Patient lives at [facility] assisted Living  Wears home oxygen- portable oxygen at bedside  Daughter at bedside and will transport patient to [facility]    Spoke to Nikolay at [facility] and they have medication nurse to gives patient her meds.    Will fax discharge med list with prescriptions to nikolay at [facility] and give prescriptons to daughter    Future Appointments  Date Time Provider Department Center   9/11/2018 11:00 AM Samuel Soriano MD [facility] Clini        07/20/18 1108   Discharge Assessment   Assessment Type Discharge Planning Assessment   Confirmed/corrected address and phone number on facesheet? Yes   Assessment information obtained from? Patient   Communicated expected length of stay with patient/caregiver yes   Prior to hospitilization cognitive status: Alert/Oriented   Prior to hospitalization functional status: Independent;Assistive Equipment;Needs Assistance   Current cognitive status: Alert/Oriented   Current Functional Status: Independent;Assistive Equipment;Needs Assistance   Lives With facility resident  ([facility])   Able to Return to Prior Arrangements yes   Is patient able to care for self after discharge? Yes   Patient's perception of discharge disposition home health   Readmission Within The Last 30 Days no previous admission in last 30 days   Patient currently being followed by outpatient case management? Yes   Equipment Currently Used at Home oxygen;walker, rolling   Do you have any problems affording any of your prescribed medications? No   Is the patient taking medications as prescribed? yes   Transportation Available family or friend will provide   Discharge Plan A Assisted Living   Patient/Family In Agreement With Plan yes     Heather March RN, CCM, CMSRN  RN Transition Navigator  [phone]

## 2018-07-22 DIAGNOSIS — K92.2 UPPER GI BLEED: Primary | ICD-10-CM

## 2018-07-22 NOTE — DISCHARGE SUMMARY
[facility] Discharge Summary    Primary Team: [facility] Team A  Attending Physician: Paulette att. providers found  Resident: Lew  Intern: Chris    Date of Admit: 7/19/2018  Date of Discharge: 7/20/2018    Discharge to: [facility]  Condition: Stable    Discharge Diagnoses     Patient Active Problem List   Diagnosis    Cystocele    Essential hypertension    Leukocytosis    Physical deconditioning    Anemia    Uterine prolapse    Hypercholesterolemia    Coronary artery disease involving native coronary artery without angina pectoris    Diastolic CHF    Iron deficiency anemia due to chronic blood loss    Paroxysmal atrial fibrillation    Anxiety and depression    Urinary retention    Pulmonary hypertension due to lung disease    Debility    Anticoagulant long-term use    Acute blood loss anemia    Vitamin D deficiency disease    Osteoporosis with current pathological fracture with routine healing    ILD (interstitial lung disease)    COPD (chronic obstructive pulmonary disease)    Thrombocytosis    Shortness of breath    Gastrointestinal hemorrhage       Consultants and Procedures     Consultants:  [facility]    Procedures:   EGD    Brief History of Present Illness      Kaity Reboleldo is a 85 y.o. female who  has a past medical history of Anticoagulant long-term use; Anxiety and depression; Chronic diastolic heart failure (9/2/2016); Chronic hypoxemic respiratory failure (2/6/2016); Chronic obstructive pulmonary disease (6/16/2016); Closed bilateral fracture of pubic rami (3/18/2016); Closed Colles' fracture of right radius (4/22/2017); Closed displaced fracture of neck of right femur s/p hemiarthroplasty on 4/23/2017 (4/22/2017); COPD (chronic obstructive pulmonary disease); Coronary artery disease involving native coronary artery without angina pectoris (6/16/2016); Displaced fracture of right femoral neck s/p hemiarthroplasty on 4/23/2017 (4/22/2017); Essential  hypertension (8/6/2013); Fall; Hyperlipidemia; Osteoporosis (3/18/2016); Paroxysmal atrial fibrillation; Pneumonia; Pulmonary hypertension due to lung disease (3/30/2017); Rotator cuff injury; and Vitamin D deficiency disease (4/25/2017).  The patient presented to [facility] on 7/19/2018 with a primary complaint of Extremity Weakness (awoke with feeling weak and 3 episodes of vomiting. pt. states emesis was black. denies abdominal pain. denies black stool)    Presented with sharp abdominal pain, weakness, dizziness, cold sweats, 3x large-volume black emesis with clots.     For the full HPI please refer to the History & Physical from this admission.    Hospital Course By Problem with Pertinent Findings     UGIB  - weak, dizzy, black thick vomit w/clots x 2, frequent NSAID use, on eliquis and daily ASA at home  - normotensive, no orthostasis on admit  - baseline H/H > 9.2/30.4 with H/H on admit 8.1/27.1, FOBT+  - started on pantoprazole 80 mg IV BID  - monitored on tele  - inpatient consult to [facility]; appreciate recs. S/p EGD with old blood in stomach with no active bleeding/clear source, suspect punctate gastric hemorrhage. Con't daily PPI x 6 weeks, stool H Pylori antigen, resume eliquis if no further bleeding  - Patient with drop in Hgb to 6.8 following procedure, transfused 1U pRBC with good reponse  - referred to follow up with PCP with PPI, iron supplementation, colace  - ordered repeat H. Pylori antigen for outpatient as inpatient order was cancelled on discharge     Anemia, Macrocytic, hx Normocytic  - baseline H/H 9.2-9.8/30.4-32.8, MCV 95-97  - recent workup with iron studies from 6/6/18 with low iron sat, low transferrin  - H/H on admit 8.1/27.1, , FOBT+, PT 10.8, INR 1  - home supplements include B12  - f/u with PCP as outpatient, discharged on iron/colace     COPD  - On 3L O2 at home  - home meds include Breo and spiriva inhaler + rescue inhalers  - asymptomatic this admission  and satted 100% on 3L O2 NC     Paroxysmal Atrial Fibrillation, on anticoagulation (holding)  - home meds include Toprol 50 mg and Eliquis  - held AC during inpatient  - instructed to restart Eliquis on discharge     Diastolic CHF  - currently asymptomatic  - last TTE 3/3/17 with EF 55%, LV diastolic dysfunction, mild MR/AR, moderate TR, pulm artery HTN 51  - home regimen includes Toprol 50 mg     CAD  - denies hx L heart cath or stent placement  - takes ASA at home, no statin 2/2 rxn  - held ASA in setting of GIB  - instructed to resume on discharge     HTN  - normotensive on admit, held home meds  - instructed to resume on discharge     Hypercholesterolemia  - recent panel 6/6/18, chol 257  - not on statin due to allergic rxn, f/u as outpt with PCP       Discharge Medications        Medication List      START taking these medications    docusate sodium 100 MG capsule  Commonly known as:  COLACE  Take 1 capsule (100 mg total) by mouth 2 (two) times daily.     ferrous sulfate 325 mg (65 mg iron) Tab tablet  Take 1 tablet (325 mg total) by mouth 2 (two) times daily.     pantoprazole 40 MG tablet  Commonly known as:  PROTONIX  Take 1 tablet (40 mg total) by mouth once daily.        CHANGE how you take these medications    potassium chloride 10 MEQ Tbsr  Commonly known as:  KLOR-CON  TAKE 1 TABLET BY MOUTH ONCE DAILY.  What changed:  Another medication with the same name was removed. Continue taking this medication, and follow the directions you see here.        CONTINUE taking these medications    acetaminophen 325 MG tablet  Commonly known as:  TYLENOL  Take 2 tablets (650 mg total) by mouth every 6 (six) hours as needed (Mild pain).     apixaban 2.5 mg Tab  Take 1 tablet (2.5 mg total) by mouth 2 (two) times daily.     aspirin 81 MG EC tablet  Commonly known as:  ECOTRIN  Take 1 tablet (81 mg total) by mouth once daily.     cyproheptadine 4 mg tablet  Commonly known as:  PERIACTIN  Please give the patient 4 mg 3  times daily for appetite stimulation, nasal congestion, and rhinorrhea.     donepezil 5 MG tablet  Commonly known as:  ARICEPT     fluticasone-vilanterol 100-25 mcg/dose diskus inhaler  Commonly known as:  BREO  Inhale 1 puff into the lungs once daily.     furosemide 20 MG tablet  Commonly known as:  LASIX  TAKE 1 TABLET BY MOUTH TWICE A DAY.     ipratropium 0.02 % nebulizer solution  Commonly known as:  ATROVENT  Take 2.5 mLs (500 mcg total) by nebulization every 6 (six) hours as needed for Wheezing.     levalbuterol 1.25 mg/0.5 mL nebulizer solution  Commonly known as:  XOPENEX  Take 0.5 mLs (1.25 mg total) by nebulization every 8 (eight) hours.     memantine 5 MG Tab  Commonly known as:  NAMENDA     metoprolol succinate 50 MG 24 hr tablet  Commonly known as:  TOPROL-XL  Take 2 tablets (100 mg total) by mouth once daily.     MUCINEX 600 mg 12 hr tablet  Generic drug:  guaiFENesin  TAKE 2 TABLETS BY MOUTH TWICE DAILY.     nitroGLYCERIN 0.4 MG SL tablet  Commonly known as:  NITROSTAT  Place 1 tablet (0.4 mg total) under the tongue every 5 (five) minutes as needed for Chest pain.     ONE DAILY MULTIVITAMIN per tablet  Generic drug:  multivitamin     senna-docusate 8.6-50 mg 8.6-50 mg per tablet  Commonly known as:  PERICOLACE  Take 1 tablet by mouth 2 (two) times daily as needed.     sertraline 50 MG tablet  Commonly known as:  ZOLOFT     tiotropium 18 mcg inhalation capsule  Commonly known as:  SPIRIVA WITH HANDIHALER  Inhale 1 capsule (18 mcg total) into the lungs every morning. Controller     traMADol 50 mg tablet  Commonly known as:  ULTRAM  Take 1 tablet (50 mg total) by mouth every 24 hours as needed for Pain.     VITAMIN B-12 100 MCG tablet  Generic drug:  cyanocobalamin  TAKE 1 TABLET BY MOUTH ONCE DAILY.     VITAMIN C 500 MG tablet  Generic drug:  ascorbic acid (vitamin C)        STOP taking these medications    hydrOXYzine pamoate 50 MG Cap  Commonly known as:  VISTARIL     predniSONE 10 MG tablet  Commonly  known as:  DELTASONE           Where to Get Your Medications      You can get these medications from any pharmacy    Bring a paper prescription for each of these medications  · docusate sodium 100 MG capsule  · ferrous sulfate 325 mg (65 mg iron) Tab tablet  · pantoprazole 40 MG tablet         Discharge Information:   Diet:  Cardiac    Physical Activity:  As tolerated    Instructions:  1. Take all medications as prescribed  2. Keep all follow-up appointments  3. Return to the hospital or call your primary care physicians if any worsening symptoms such as temp >100.4, abdominal pain/n/v, any blood in emesis or stool, increased dizziness/weakness/confusion/lethargy occur.      Follow-Up Appointments:  Follow-up Information     Samuel Soriano MD On 9/11/2018.    Specialty:  Internal Medicine  Why:  11am  Contact information:  [address]  [address]  [place] [address] [phone]                     Sunitha Perez  [facility] Internal Medicine, [facility]

## 2018-07-24 ENCOUNTER — TELEPHONE (OUTPATIENT)
Dept: FAMILY MEDICINE | Facility: CLINIC | Age: 83
End: 2018-07-24

## 2018-07-24 ENCOUNTER — OUTPATIENT CASE MANAGEMENT (OUTPATIENT)
Dept: ADMINISTRATIVE | Facility: OTHER | Age: 83
End: 2018-07-24

## 2018-07-24 DIAGNOSIS — F41.9 ANXIETY AND DEPRESSION: Primary | ICD-10-CM

## 2018-07-24 DIAGNOSIS — F32.A ANXIETY AND DEPRESSION: Primary | ICD-10-CM

## 2018-07-24 RX ORDER — ALPRAZOLAM 0.5 MG/1
0.5 TABLET ORAL NIGHTLY PRN
Qty: 20 TABLET | Refills: 0 | Status: SHIPPED | OUTPATIENT
Start: 2018-07-24 | End: 2018-09-11

## 2018-07-24 NOTE — PROGRESS NOTES
The following patient has been assigned to Zehra Wall RN with Outpatient Complex Care Management for high risk screening.    Reason: High Risk Recently Discharged    Please contact OPCM at [phone] with any questions.    Thank you,    Meghana Boles    Outpatient Case Management

## 2018-07-24 NOTE — TELEPHONE ENCOUNTER
Spoke to pt's daughter, Maryjane and states that pt has a history of panic attacks. Pt has started having panic attacks again on yesterday. Pt has been previously treated for the panic attacks. Pls advise.

## 2018-07-24 NOTE — TELEPHONE ENCOUNTER
Informed pt's daughter, Maryjane that pt can try Xanax as needed. Pt may take half of the dosage given if it is strong. Pt daughter verbalized understanding.

## 2018-07-24 NOTE — TELEPHONE ENCOUNTER
----- Message from Candi Saul sent at 7/24/2018 10:57 AM CDT -----  Contact: [phone] pt's daughter Maryam  Patient's daughter  requesting to speak with you regarding pt's experiencing some anxiety. Please advise.

## 2018-07-25 ENCOUNTER — OUTPATIENT CASE MANAGEMENT (OUTPATIENT)
Dept: ADMINISTRATIVE | Facility: OTHER | Age: 83
End: 2018-07-25

## 2018-07-25 NOTE — PROGRESS NOTES
Talked to Maryjane Sharma, daughter of patient, and Maryjane has declined OPCM services at this time.  Maryjane stated that all of her mother's needs are met at [facility] in room 206 in [place], [place].  Encouraged Maryjane to call this RN if having any questions/concerns.  Will dis-enroll at this time.  Case closed.  CARLYN Wall, OPCM-RN

## 2018-08-02 DIAGNOSIS — J44.9 CHRONIC OBSTRUCTIVE PULMONARY DISEASE, UNSPECIFIED COPD TYPE: ICD-10-CM

## 2018-08-02 RX ORDER — FLUTICASONE FUROATE AND VILANTEROL TRIFENATATE 100; 25 UG/1; UG/1
POWDER RESPIRATORY (INHALATION)
Qty: 60 EACH | Refills: 0 | Status: SHIPPED | OUTPATIENT
Start: 2018-08-02 | End: 2018-08-07 | Stop reason: SDUPTHER

## 2018-08-07 DIAGNOSIS — J44.9 CHRONIC OBSTRUCTIVE PULMONARY DISEASE, UNSPECIFIED COPD TYPE: ICD-10-CM

## 2018-08-07 RX ORDER — FLUTICASONE FUROATE AND VILANTEROL TRIFENATATE 100; 25 UG/1; UG/1
POWDER RESPIRATORY (INHALATION)
Qty: 60 EACH | Refills: 0 | Status: SHIPPED | OUTPATIENT
Start: 2018-08-07

## 2018-08-13 ENCOUNTER — PATIENT MESSAGE (OUTPATIENT)
Dept: FAMILY MEDICINE | Facility: CLINIC | Age: 83
End: 2018-08-13

## 2018-08-14 RX ORDER — LEVALBUTEROL INHALATION SOLUTION 1.25 MG/3ML
SOLUTION RESPIRATORY (INHALATION)
Qty: 216 ML | Refills: 0 | Status: SHIPPED | OUTPATIENT
Start: 2018-08-14 | End: 2018-09-06 | Stop reason: SDUPTHER

## 2018-08-15 DIAGNOSIS — R09.3 THICK SPUTUM: ICD-10-CM

## 2018-08-15 DIAGNOSIS — I50.33 ACUTE ON CHRONIC DIASTOLIC HEART FAILURE: ICD-10-CM

## 2018-08-15 DIAGNOSIS — D53.9 MACROCYTIC ANEMIA: ICD-10-CM

## 2018-08-15 RX ORDER — GUAIFENESIN 600 MG/1
TABLET, EXTENDED RELEASE ORAL
Qty: 120 TABLET | Refills: 0 | Status: SHIPPED | OUTPATIENT
Start: 2018-08-15 | End: 2018-09-17 | Stop reason: SDUPTHER

## 2018-08-15 RX ORDER — FUROSEMIDE 20 MG/1
TABLET ORAL
Qty: 60 TABLET | Refills: 0 | Status: SHIPPED | OUTPATIENT
Start: 2018-08-15 | End: 2018-09-11

## 2018-08-15 RX ORDER — MENTHOL 5.8 MG/1
LOZENGE ORAL
Qty: 30 TABLET | Refills: 0 | Status: SHIPPED | OUTPATIENT
Start: 2018-08-15 | End: 2018-09-17 | Stop reason: SDUPTHER

## 2018-08-17 ENCOUNTER — TELEPHONE (OUTPATIENT)
Dept: FAMILY MEDICINE | Facility: CLINIC | Age: 83
End: 2018-08-17

## 2018-08-17 DIAGNOSIS — J44.9 CHRONIC OBSTRUCTIVE PULMONARY DISEASE, UNSPECIFIED COPD TYPE: ICD-10-CM

## 2018-08-17 RX ORDER — TIOTROPIUM BROMIDE 18 UG/1
18 CAPSULE ORAL; RESPIRATORY (INHALATION) EVERY MORNING
Qty: 90 CAPSULE | Refills: 4 | Status: SHIPPED | OUTPATIENT
Start: 2018-08-17

## 2018-08-17 NOTE — TELEPHONE ENCOUNTER
----- Message from Maria Del Carmen Hdoge sent at 8/17/2018  2:50 PM CDT -----  Contact: Daughter-Maryjane Chan  .Rx Refill/Request     Is this a Refill or New Rx:   Refill  Rx Name and Strength:  tiotropium (SPIRIVA WITH HANDIHALER) 18 mcg inhalation capsule  Preferred Pharmacy with phone number: [facility], [phone]   Fax: [phone]  Communication Preference:  Additional Information:

## 2018-08-17 NOTE — TELEPHONE ENCOUNTER
----- Message from Charlene Gutiérrez sent at 8/17/2018  2:52 PM CDT -----  Contact: Daughter [phone]  Patient would like to speak with you about a personal matter. Please advise

## 2018-08-17 NOTE — TELEPHONE ENCOUNTER
Spoke pt's daughter, Maryjane and stated that she did not know what medication her mom needed refills on. Maryjane was informed to call [facility] and have them submit the medication request to Dr. Soriano.

## 2018-09-06 RX ORDER — LEVALBUTEROL INHALATION SOLUTION 1.25 MG/3ML
SOLUTION RESPIRATORY (INHALATION)
Qty: 216 ML | Refills: 0 | Status: SHIPPED | OUTPATIENT
Start: 2018-09-06 | End: 2018-09-26 | Stop reason: SDUPTHER

## 2018-09-11 ENCOUNTER — OFFICE VISIT (OUTPATIENT)
Dept: FAMILY MEDICINE | Facility: CLINIC | Age: 83
End: 2018-09-11
Attending: FAMILY MEDICINE
Payer: MEDICARE

## 2018-09-11 ENCOUNTER — LAB VISIT (OUTPATIENT)
Dept: LAB | Facility: HOSPITAL | Age: 83
End: 2018-09-11
Attending: FAMILY MEDICINE
Payer: MEDICARE

## 2018-09-11 VITALS
BODY MASS INDEX: 15.39 KG/M2 | HEART RATE: 100 BPM | HEIGHT: 65 IN | WEIGHT: 92.38 LBS | DIASTOLIC BLOOD PRESSURE: 71 MMHG | OXYGEN SATURATION: 89 % | SYSTOLIC BLOOD PRESSURE: 97 MMHG

## 2018-09-11 DIAGNOSIS — Z23 IMMUNIZATION DUE: ICD-10-CM

## 2018-09-11 DIAGNOSIS — D50.0 IRON DEFICIENCY ANEMIA DUE TO CHRONIC BLOOD LOSS: ICD-10-CM

## 2018-09-11 DIAGNOSIS — M80.00XD OSTEOPOROSIS WITH CURRENT PATHOLOGICAL FRACTURE WITH ROUTINE HEALING, UNSPECIFIED OSTEOPOROSIS TYPE, SUBSEQUENT ENCOUNTER: ICD-10-CM

## 2018-09-11 DIAGNOSIS — J43.2 CENTRILOBULAR EMPHYSEMA: Chronic | ICD-10-CM

## 2018-09-11 DIAGNOSIS — I10 ESSENTIAL HYPERTENSION: Chronic | ICD-10-CM

## 2018-09-11 DIAGNOSIS — I25.10 CORONARY ARTERY DISEASE INVOLVING NATIVE CORONARY ARTERY OF NATIVE HEART WITHOUT ANGINA PECTORIS: Chronic | ICD-10-CM

## 2018-09-11 DIAGNOSIS — I50.32 CHRONIC DIASTOLIC CONGESTIVE HEART FAILURE: Chronic | ICD-10-CM

## 2018-09-11 DIAGNOSIS — I50.33 ACUTE ON CHRONIC DIASTOLIC HEART FAILURE: ICD-10-CM

## 2018-09-11 DIAGNOSIS — I48.0 PAROXYSMAL ATRIAL FIBRILLATION: ICD-10-CM

## 2018-09-11 DIAGNOSIS — I10 ESSENTIAL HYPERTENSION: Primary | Chronic | ICD-10-CM

## 2018-09-11 DIAGNOSIS — I27.23 PULMONARY HYPERTENSION DUE TO LUNG DISEASE: ICD-10-CM

## 2018-09-11 LAB
ALBUMIN SERPL BCP-MCNC: 3.6 G/DL
ALP SERPL-CCNC: 76 U/L
ALT SERPL W/O P-5'-P-CCNC: 16 U/L
ANION GAP SERPL CALC-SCNC: 11 MMOL/L
AST SERPL-CCNC: 21 U/L
BASOPHILS # BLD AUTO: 0.03 K/UL
BASOPHILS NFR BLD: 0.4 %
BILIRUB SERPL-MCNC: 0.2 MG/DL
BUN SERPL-MCNC: 13 MG/DL
CALCIUM SERPL-MCNC: 10.2 MG/DL
CHLORIDE SERPL-SCNC: 91 MMOL/L
CO2 SERPL-SCNC: 37 MMOL/L
CREAT SERPL-MCNC: 1.2 MG/DL
DIFFERENTIAL METHOD: ABNORMAL
EOSINOPHIL # BLD AUTO: 0.1 K/UL
EOSINOPHIL NFR BLD: 0.8 %
ERYTHROCYTE [DISTWIDTH] IN BLOOD BY AUTOMATED COUNT: 13.6 %
EST. GFR  (AFRICAN AMERICAN): 48 ML/MIN/1.73 M^2
EST. GFR  (NON AFRICAN AMERICAN): 41 ML/MIN/1.73 M^2
FERRITIN SERPL-MCNC: 82 NG/ML
GLUCOSE SERPL-MCNC: 98 MG/DL
HCT VFR BLD AUTO: 33.3 %
HGB BLD-MCNC: 10.3 G/DL
IRON SERPL-MCNC: 71 UG/DL
LYMPHOCYTES # BLD AUTO: 1.8 K/UL
LYMPHOCYTES NFR BLD: 21.5 %
MCH RBC QN AUTO: 29.3 PG
MCHC RBC AUTO-ENTMCNC: 30.9 G/DL
MCV RBC AUTO: 95 FL
MONOCYTES # BLD AUTO: 0.8 K/UL
MONOCYTES NFR BLD: 9.4 %
NEUTROPHILS # BLD AUTO: 5.8 K/UL
NEUTROPHILS NFR BLD: 67.8 %
PLATELET # BLD AUTO: 342 K/UL
PMV BLD AUTO: 10.7 FL
POTASSIUM SERPL-SCNC: 3.4 MMOL/L
PROT SERPL-MCNC: 6.9 G/DL
RBC # BLD AUTO: 3.51 M/UL
SATURATED IRON: 27 %
SODIUM SERPL-SCNC: 139 MMOL/L
TOTAL IRON BINDING CAPACITY: 262 UG/DL
TRANSFERRIN SERPL-MCNC: 177 MG/DL
WBC # BLD AUTO: 8.55 K/UL

## 2018-09-11 PROCEDURE — 82728 ASSAY OF FERRITIN: CPT

## 2018-09-11 PROCEDURE — 99214 OFFICE O/P EST MOD 30 MIN: CPT | Mod: 25,S$PBB,, | Performed by: FAMILY MEDICINE

## 2018-09-11 PROCEDURE — 3074F SYST BP LT 130 MM HG: CPT | Mod: CPTII,,, | Performed by: FAMILY MEDICINE

## 2018-09-11 PROCEDURE — 3078F DIAST BP <80 MM HG: CPT | Mod: CPTII,,, | Performed by: FAMILY MEDICINE

## 2018-09-11 PROCEDURE — 99499 UNLISTED E&M SERVICE: CPT | Mod: S$GLB,,, | Performed by: FAMILY MEDICINE

## 2018-09-11 PROCEDURE — 83540 ASSAY OF IRON: CPT

## 2018-09-11 PROCEDURE — 1101F PT FALLS ASSESS-DOCD LE1/YR: CPT | Mod: CPTII,,, | Performed by: FAMILY MEDICINE

## 2018-09-11 PROCEDURE — 90662 IIV NO PRSV INCREASED AG IM: CPT | Mod: PBBFAC,PO

## 2018-09-11 PROCEDURE — 36415 COLL VENOUS BLD VENIPUNCTURE: CPT

## 2018-09-11 PROCEDURE — 99215 OFFICE O/P EST HI 40 MIN: CPT | Mod: PBBFAC,PO | Performed by: FAMILY MEDICINE

## 2018-09-11 PROCEDURE — 99999 PR PBB SHADOW E&M-EST. PATIENT-LVL V: CPT | Mod: PBBFAC,,, | Performed by: FAMILY MEDICINE

## 2018-09-11 PROCEDURE — 85025 COMPLETE CBC W/AUTO DIFF WBC: CPT

## 2018-09-11 PROCEDURE — 80053 COMPREHEN METABOLIC PANEL: CPT

## 2018-09-11 RX ORDER — SODIUM CHLORIDE 0.9 % (FLUSH) 0.9 %
10 SYRINGE (ML) INJECTION
Status: CANCELLED | OUTPATIENT
Start: 2018-09-11

## 2018-09-11 RX ORDER — FUROSEMIDE 20 MG/1
20 TABLET ORAL DAILY
Qty: 90 TABLET | Refills: 3 | Status: SHIPPED | OUTPATIENT
Start: 2018-09-11

## 2018-09-11 RX ORDER — HEPARIN 100 UNIT/ML
500 SYRINGE INTRAVENOUS
Status: CANCELLED | OUTPATIENT
Start: 2018-09-11

## 2018-09-11 NOTE — PROGRESS NOTES
Subjective:       Patient ID: Kaity Rebolledo is a 85 y.o. female.    Chief Complaint: Follow-up (3 month)    85 yr old pleasant white female with paroxysmal AFIB,  COPD (oxygen dependent), HTN, HLD, Anemia, Osteoporosis, h/o pneumonia, OA knee B/L, presents today for her post hospital discharge follow up. She was admitted in July for acute blood loss anemia from GI bleed - she was given transfusion as well.  She still feels fatigue and tired and EASON.       Paroxysmal afib - RHYTHM CONTROLLED - ON ELIQUIS FOR ANTICOAGULATION      1. COPD/SOB/Fatigue - she has chronic COPD - and she has multiple hospitalizations - she is currently on ambulatory oxygen. She had pneumonia which is complicated by pleural effusion and she was out in 03/15. She had follow up chest x ray last visit and it was normal.       2. Anemia - chronic - intermittent - her H/H varying from 8-12/24-36 since last 8 years.  she was never told she is anemic and she only takes multivitamin - she is not bleeding from anywhere else and she denies any rectal bleed or melena. c/o fatigue and tiredness - seen Hematology/Oncology - no intervention necessary      3. Weight loss/lack of appetite - since last 3-4 months - tried boost  And ensure recently and it is helping - no other symptoms except as above - of note she used to weigh 136 lb 3-4 years ago and now she is 104 lb      4. B/L knee pain - chronic - follows orthopedics - she had knee injections 6 months ago and she will make another appointment soon - on tramadol once a day as needed      5. HTN - controlled - on ACE - - controlled - watch BP      6. HLD - controlled- on diet alone - no new issues    7/. CHF - controlled - EF 50 - improving    8. Low back pain/buttock pain - onset 3-4 days ago and gradually worsening - no fall or trauma - she had some coughing spell few days ago prior to onset and this one started - tried some muscle relaxant and it did not help - has osteoporosis and was on reclast  and she stopped few years ago      History as below - reviewed           Shortness of Breath   This is a chronic problem. The current episode started more than 1 year ago. The problem occurs constantly. The problem has been unchanged. Pertinent negatives include no chest pain, coryza, ear pain, fever, headaches, leg swelling, neck pain, orthopnea, rash, rhinorrhea, sore throat, swollen glands, vomiting or wheezing. Nothing aggravates the symptoms. She has tried beta agonist inhalers and steroid inhalers for the symptoms. The treatment provided mild relief. Her past medical history is significant for allergies, chronic lung disease, COPD and pneumonia. There is no history of aspirin allergies, asthma, bronchiolitis, CAD, DVT, a heart failure, PE or a recent surgery.   Fatigue   This is a chronic problem. The current episode started more than 1 year ago. The problem occurs constantly. The problem has been gradually worsening. Associated symptoms include arthralgias, coughing, fatigue and myalgias. Pertinent negatives include no chest pain, chills, congestion, diaphoresis, fever, headaches, joint swelling, nausea, neck pain, rash, sore throat, swollen glands, vomiting or weakness. Nothing aggravates the symptoms. Treatments tried: boost or ensure. The treatment provided mild relief.   Hypertension   This is a chronic problem. The current episode started more than 1 year ago. The problem has been gradually improving since onset. The problem is controlled. Associated symptoms include anxiety and shortness of breath. Pertinent negatives include no chest pain, headaches, neck pain, orthopnea or palpitations. There are no associated agents to hypertension. Risk factors for coronary artery disease include post-menopausal state. Past treatments include ACE inhibitors. The current treatment provides significant improvement. There are no compliance problems.  There is no history of angina, CAD/MI, heart failure, PVD or  retinopathy. There is no history of chronic renal disease, hypercortisolism, hyperparathyroidism, pheochromocytoma, renovascular disease or a thyroid problem.   Hyperlipidemia   This is a chronic problem. The current episode started more than 1 year ago. The problem is controlled. Recent lipid tests were reviewed and are normal. She has no history of chronic renal disease, hypothyroidism, liver disease or obesity. There are no known factors aggravating her hyperlipidemia. Associated symptoms include myalgias and shortness of breath. Pertinent negatives include no chest pain. She is currently on no antihyperlipidemic treatment. The current treatment provides moderate improvement of lipids. There are no compliance problems.  Risk factors for coronary artery disease include dyslipidemia, hypertension and post-menopausal.   Anemia   Presents for follow-up visit. Symptoms include pallor. There has been no bruising/bleeding easily, confusion, fever, light-headedness, palpitations or pica. Signs of blood loss that are not present include hematochezia and melena. Past treatments include changes in diet. There is no history of chronic renal disease, heart failure, hypothyroidism or recent surgery. There are no compliance problems.    Anxiety   Presents for initial visit. Onset was 1 to 4 weeks ago. The problem has been unchanged. Symptoms include shortness of breath. Patient reports no chest pain, confusion, decreased concentration, dizziness, nausea, nervous/anxious behavior, palpitations or suicidal ideas. Symptoms occur constantly. The severity of symptoms is moderate. The symptoms are aggravated by family issues. The quality of sleep is poor. Nighttime awakenings: occasional.     Risk factors include a major life event and recent illness. Her past medical history is significant for anemia, anxiety/panic attacks and chronic lung disease. There is no history of asthma or CAD. Past treatments include nothing. The treatment  provided no relief.     Review of Systems   Constitutional: Positive for fatigue. Negative for activity change, chills, diaphoresis, fever and unexpected weight change.   HENT: Negative.  Negative for congestion, ear discharge, ear pain, hearing loss, rhinorrhea, sore throat and voice change.    Eyes: Negative.  Negative for pain, discharge and visual disturbance.   Respiratory: Positive for cough and shortness of breath. Negative for chest tightness and wheezing.    Cardiovascular: Negative.  Negative for chest pain, palpitations, orthopnea and leg swelling.   Gastrointestinal: Negative.  Negative for abdominal distention, anal bleeding, constipation, hematochezia, melena, nausea and vomiting.   Endocrine: Negative.  Negative for cold intolerance, polydipsia and polyuria.   Genitourinary: Negative.  Negative for decreased urine volume, difficulty urinating, dysuria, frequency, menstrual problem and vaginal pain.   Musculoskeletal: Positive for arthralgias and myalgias. Negative for gait problem, joint swelling and neck pain.   Skin: Positive for pallor. Negative for color change, rash and wound.   Allergic/Immunologic: Negative.  Negative for environmental allergies and immunocompromised state.   Neurological: Negative.  Negative for dizziness, tremors, seizures, speech difficulty, weakness, light-headedness and headaches.   Hematological: Negative.  Negative for adenopathy. Does not bruise/bleed easily.   Psychiatric/Behavioral: Negative.  Negative for agitation, confusion, decreased concentration, hallucinations, self-injury and suicidal ideas. The patient is not nervous/anxious.        PMH/PSH/FH/SH/MED/ALLERGY reviewed    Objective:       Vitals:    09/11/18 1107   BP: 97/71   Pulse: 100       Physical Exam   Constitutional: She is oriented to person, place, and time. She appears well-developed and well-nourished. No distress.   HENT:   Head: Normocephalic and atraumatic.   Eyes: EOM are normal. Pupils are  equal, round, and reactive to light.   Neck: Normal range of motion. Neck supple.   Cardiovascular: Normal rate, regular rhythm, normal heart sounds and intact distal pulses. Exam reveals no gallop and no friction rub.   No murmur heard.  Pulmonary/Chest: Effort normal. She has wheezes (very mild wheezing).   Coarse B/L breath sounds   Abdominal: Soft. Bowel sounds are normal. She exhibits no distension. There is no tenderness. No hernia.   Musculoskeletal: She exhibits no edema. Tenderness: B/L knee TTP and restricted ROM.   TTP left gluteal area and ischium. No TTp left hip or SLRT neg   Neurological: She is alert and oriented to person, place, and time. She has normal reflexes. She displays normal reflexes. No cranial nerve deficit. She exhibits normal muscle tone. Coordination normal.   Skin: Skin is warm and dry. She is not diaphoretic.   Psychiatric: She has a normal mood and affect. Her behavior is normal. Judgment and thought content normal.       Assessment:       1. Essential hypertension    2. Acute on chronic diastolic heart failure    3. Centrilobular emphysema    4. 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 Coronary artery disease involving native coronary artery of native heart without angina pectoris    6. Paroxysmal atrial fibrillation    7. Iron deficiency anemia due to chronic blood loss    8. Osteoporosis with current pathological fracture with routine healing, unspecified osteoporosis type, subsequent encounter    9. Immunization due    10. Chronic diastolic congestive heart failure        Plan:       Kaity was seen today for follow-up.    Diagnoses and all orders for this visit:    Essential hypertension  -     furosemide (LASIX) 20 MG tablet; Take 1 tablet (20 mg total) by mouth once daily.  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  -     Comprehensive metabolic panel; Future    Paroxysmal atrial fibrillation    Iron deficiency anemia due to chronic blood loss  -     CBC auto differential; Future  -     Ferritin; Future  -     Iron and TIBC; Future    Osteoporosis with current pathological fracture with routine healing, unspecified osteoporosis type, subsequent encounter    Immunization due  -     Influenza - High Dose (65+) (PF) (IM)    Chronic diastolic congestive heart failure    Other orders  -     ferric gluconate (FERRLECIT) 125 mg in sodium chloride 0.9% 100 mL IVPB; Inject 125 mg into the vein one time.  -     heparin, porcine (PF) 100 unit/mL injection flush 500 Units; Inject 5 mLs (500 Units total) into the vein as needed (Flush lines as needed.).  -     sodium chloride 0.9% flush 10 mL; Inject 10 mLs into the vein as needed for Line Care.      Anemia  -lab repeat  -iron infusion    paroxysmal AFIB  -rhythm controlled  -on Eliquis daily      OP  -resume reclast  -dexa    chronic anemia  -improving  -continue iron      COPD/SOB  -stable  -continue current management with portable O2    Fatigue/weight loss/anemia  -likely multifactorial  -DD Anemia, Vit D deficiency, debility  -replace vitamins, labs  -ensure and boost daily  -ER precautions given for fluids      Depression  -improving  -continue celexa 20 mg/day    OA B/L knee  -follows orthopedics and gets knee injections  -tylenol for mild pain and tramadol for severe pain  -avoid NSAIDS    HTN  -controlled  -decrease lasix to 20 mg daily and avoid if systolic less than 100    HLD  -controlled    Spent adequate time in obtaining history and explaining differentials    40 minutes spent during this visit of which greater than 50% devoted to face-face counseling and coordination of care regarding diagnosis and management  plan      Follow-up in about 4 weeks (around 10/9/2018), or if symptoms worsen or fail to improve.

## 2018-09-17 ENCOUNTER — PATIENT MESSAGE (OUTPATIENT)
Dept: FAMILY MEDICINE | Facility: CLINIC | Age: 83
End: 2018-09-17

## 2018-09-17 ENCOUNTER — TELEPHONE (OUTPATIENT)
Dept: FAMILY MEDICINE | Facility: CLINIC | Age: 83
End: 2018-09-17

## 2018-09-17 DIAGNOSIS — D53.9 MACROCYTIC ANEMIA: ICD-10-CM

## 2018-09-17 DIAGNOSIS — R09.3 THICK SPUTUM: ICD-10-CM

## 2018-09-17 DIAGNOSIS — I50.22 CHRONIC SYSTOLIC HEART FAILURE: Chronic | ICD-10-CM

## 2018-09-17 RX ORDER — GUAIFENESIN 600 MG/1
TABLET, EXTENDED RELEASE ORAL
Qty: 120 TABLET | Refills: 0 | Status: SHIPPED | OUTPATIENT
Start: 2018-09-17 | End: 2018-10-12 | Stop reason: SDUPTHER

## 2018-09-17 RX ORDER — MENTHOL 5.8 MG/1
LOZENGE ORAL
Qty: 30 TABLET | Refills: 0 | Status: SHIPPED | OUTPATIENT
Start: 2018-09-17 | End: 2018-10-12 | Stop reason: SDUPTHER

## 2018-09-17 RX ORDER — DOCUSATE SODIUM 50MG AND SENNOSIDES 8.6MG 8.6; 5 MG/1; MG/1
TABLET, FILM COATED ORAL
Qty: 60 TABLET | Refills: 0 | Status: SHIPPED | OUTPATIENT
Start: 2018-09-17 | End: 2018-10-12 | Stop reason: SDUPTHER

## 2018-09-17 RX ORDER — BLACK COHOSH ROOT 200 MG
CAPSULE ORAL
Qty: 60 TABLET | Refills: 0 | Status: SHIPPED | OUTPATIENT
Start: 2018-09-17 | End: 2018-10-12 | Stop reason: SDUPTHER

## 2018-09-17 RX ORDER — ASPIRIN 81 MG
TABLET, DELAYED RELEASE (ENTERIC COATED) ORAL
Qty: 30 TABLET | Refills: 0 | Status: SHIPPED | OUTPATIENT
Start: 2018-09-17 | End: 2018-10-12 | Stop reason: SDUPTHER

## 2018-09-17 NOTE — TELEPHONE ENCOUNTER
Informed pt's daugther, Bri that Dr. Soriano is sorry to hear, but if the family is unable to care for the patient and issues with appetite. Pt may need to be evaluated in ER and if get admitted. They will advise her for long term care options. Dr. Soriano may not be able to offer as outpatient basis. They can give all the symptoms to the ER physician that family is unable to provide care such as picking her up, ADL and also feeding wise since loss of appetite. Daughter, Bri verbalized understanding.

## 2018-09-17 NOTE — TELEPHONE ENCOUNTER
Sorry to hear - if the family is unable to care for the patient and issues with appetite - she need to be evaluated in ER and if gets admitted - they will advise her for long term care options - we may not be able to offer as outpatient bassis - they can give all the symptoms to the ER physician that family is unable to provide care such as picking her up, ADL and also feeding wise since loss of appetite

## 2018-09-17 NOTE — TELEPHONE ENCOUNTER
----- Message from Candi Saul sent at 9/17/2018 10:46 AM CDT -----  Contact: [phone]/pt's daughter Maryjane   Patient's daughter called in requesting to speak with you. Patient prefers to speak with a nurse. Please advise.

## 2018-09-17 NOTE — TELEPHONE ENCOUNTER
Left msg for CB from Bri. Sorry to hear - if the family is unable to care for the patient and issues with appetite - she need to be evaluated in ER and if gets admitted - they will advise her for long term care options - we may not be able to offer as outpatient bassis - they can give all the symptoms to the ER physician that family is unable to provide care such as picking her up, ADL and also feeding wise since loss of appetite

## 2018-09-17 NOTE — TELEPHONE ENCOUNTER
Spoke to pt's daughter, Bri and states that pt is unable to get her out of bed. Pt is very fatigue and having a lot of appetite. Bri would like to know what's the next step in pt's care. Pls advise.

## 2018-09-22 NOTE — PLAN OF CARE
Pt. Is scheduled for ORIF R distal radius tomorrow. Several SNF consults sent. Pt. Is not ready for d/c @ this time. SW/CM will continue to follow and facilitate needs once pt. Is medically stable. TIFFANIE Madera RN/CM   Viral upper respiratory illness

## 2018-09-26 RX ORDER — LEVALBUTEROL INHALATION SOLUTION 1.25 MG/3ML
SOLUTION RESPIRATORY (INHALATION)
Qty: 216 ML | Refills: 0 | Status: SHIPPED | OUTPATIENT
Start: 2018-09-26 | End: 2018-10-16 | Stop reason: SDUPTHER

## 2018-10-08 ENCOUNTER — TELEPHONE (OUTPATIENT)
Dept: FAMILY MEDICINE | Facility: CLINIC | Age: 83
End: 2018-10-08

## 2018-10-08 NOTE — TELEPHONE ENCOUNTER
Patient has appt scheduled Wednesday, but has not had an Iron Transfusion yet. Should she keep this appointment, and how does she go about scheduling this transfusion?

## 2018-10-08 NOTE — TELEPHONE ENCOUNTER
----- Message from Maryjane Forretser sent at 10/8/2018 10:59 AM CDT -----  Contact: Daughter Joy - [phone]  Patient daughter is requesting a call back regarding, she has questions about her mothers  iron. Please advise

## 2018-10-09 NOTE — TELEPHONE ENCOUNTER
Yes I can see her - if she feels fine and want to cancel - thatz ok too and I can see her in next 2 months

## 2018-10-09 NOTE — TELEPHONE ENCOUNTER
Rescheduled patient's appointment for 2 months.  Patient's daughter, Maryjane, verbalized understanding.

## 2018-10-12 DIAGNOSIS — R09.3 THICK SPUTUM: ICD-10-CM

## 2018-10-12 DIAGNOSIS — I50.22 CHRONIC SYSTOLIC HEART FAILURE: Chronic | ICD-10-CM

## 2018-10-12 DIAGNOSIS — D53.9 MACROCYTIC ANEMIA: ICD-10-CM

## 2018-10-12 RX ORDER — NICOTINE POLACRILEX 4 MG
LOZENGE BUCCAL
Qty: 60 TABLET | Refills: 0 | Status: SHIPPED | OUTPATIENT
Start: 2018-10-12 | End: 2018-11-13 | Stop reason: SDUPTHER

## 2018-10-12 RX ORDER — GUAIFENESIN 600 MG/1
TABLET, EXTENDED RELEASE ORAL
Qty: 120 TABLET | Refills: 0 | Status: SHIPPED | OUTPATIENT
Start: 2018-10-12

## 2018-10-12 RX ORDER — ASPIRIN 81 MG/1
TABLET ORAL
Qty: 30 TABLET | Refills: 0 | Status: SHIPPED | OUTPATIENT
Start: 2018-10-12 | End: 2018-11-13 | Stop reason: SDUPTHER

## 2018-10-12 RX ORDER — BLACK COHOSH ROOT 200 MG
CAPSULE ORAL
Qty: 60 TABLET | Refills: 0 | Status: SHIPPED | OUTPATIENT
Start: 2018-10-12 | End: 2018-11-13 | Stop reason: SDUPTHER

## 2018-10-12 RX ORDER — MENTHOL 5.8 MG/1
LOZENGE ORAL
Qty: 30 TABLET | Refills: 0 | Status: SHIPPED | OUTPATIENT
Start: 2018-10-12 | End: 2018-11-13 | Stop reason: SDUPTHER

## 2018-10-16 RX ORDER — LEVALBUTEROL INHALATION SOLUTION 1.25 MG/3ML
SOLUTION RESPIRATORY (INHALATION)
Qty: 216 ML | Refills: 0 | Status: ON HOLD | OUTPATIENT
Start: 2018-10-16 | End: 2018-12-29 | Stop reason: SDUPTHER

## 2018-11-13 DIAGNOSIS — D53.9 MACROCYTIC ANEMIA: ICD-10-CM

## 2018-11-13 DIAGNOSIS — I50.22 CHRONIC SYSTOLIC HEART FAILURE: Chronic | ICD-10-CM

## 2018-11-13 RX ORDER — NICOTINE POLACRILEX 4 MG
LOZENGE BUCCAL
Qty: 60 TABLET | Refills: 0 | Status: SHIPPED | OUTPATIENT
Start: 2018-11-13 | End: 2018-12-08 | Stop reason: SDUPTHER

## 2018-11-13 RX ORDER — ASPIRIN 81 MG/1
TABLET ORAL
Qty: 30 TABLET | Refills: 0 | Status: SHIPPED | OUTPATIENT
Start: 2018-11-13 | End: 2018-12-08 | Stop reason: SDUPTHER

## 2018-11-13 RX ORDER — MENTHOL 5.8 MG/1
LOZENGE ORAL
Qty: 30 TABLET | Refills: 0 | Status: SHIPPED | OUTPATIENT
Start: 2018-11-13 | End: 2018-12-08 | Stop reason: SDUPTHER

## 2018-11-13 RX ORDER — BLACK COHOSH ROOT 200 MG
CAPSULE ORAL
Qty: 60 TABLET | Refills: 0 | Status: SHIPPED | OUTPATIENT
Start: 2018-11-13 | End: 2018-12-08 | Stop reason: SDUPTHER

## 2018-11-19 ENCOUNTER — TELEPHONE (OUTPATIENT)
Dept: FAMILY MEDICINE | Facility: CLINIC | Age: 83
End: 2018-11-19

## 2018-11-19 RX ORDER — PROMETHAZINE HYDROCHLORIDE AND DEXTROMETHORPHAN HYDROBROMIDE 6.25; 15 MG/5ML; MG/5ML
5 SYRUP ORAL 2 TIMES DAILY PRN
Qty: 180 ML | Refills: 1 | Status: SHIPPED | OUTPATIENT
Start: 2018-11-19 | End: 2018-11-29

## 2018-11-19 NOTE — TELEPHONE ENCOUNTER
Informed pt's daughter, Maryjane that Dr. Soriano sent in cough med to pharmacy and cautioned that it may cause drowsiness

## 2018-11-19 NOTE — TELEPHONE ENCOUNTER
----- Message from Rosanna Jain sent at 11/19/2018  3:44 PM CST -----  Contact: [phone]/Maryjane/daughter  Patient's daughter is requesting a rx sent in to the pharmacy for patient. She has a wet cough but it is not bringing the mucus up. Thanks

## 2018-11-19 NOTE — TELEPHONE ENCOUNTER
Spoke to pt's daughter, Maryjane and states that she has a wet cough with chest congestion and daughter is requesting either a OTC cough medication or a prescription. Pls advise.

## 2018-11-20 ENCOUNTER — TELEPHONE (OUTPATIENT)
Dept: FAMILY MEDICINE | Facility: CLINIC | Age: 83
End: 2018-11-20

## 2018-11-20 NOTE — TELEPHONE ENCOUNTER
----- Message from Cassandra Vickers sent at 11/20/2018  9:49 AM CST -----  Contact: [phone] mike  Patient daughter requested to speak with the nurse about the patient getting a antibiotic sent to [facility] because her oxygen level is low and the patient is congested. Please call.

## 2018-11-20 NOTE — TELEPHONE ENCOUNTER
----- Message from Kia Horton sent at 11/20/2018  1:23 PM CST -----  Contact: Self [phone]  Patient Returning Your Phone Call

## 2018-11-20 NOTE — TELEPHONE ENCOUNTER
Spoke to pt's daughter, Joy and states that pt was put back on the big oxygen. Daughter, Joy request antibiotics for pt's congestion. Pt was scheduled an appt for tomorrow with Dr. Cardona.

## 2018-11-21 ENCOUNTER — OFFICE VISIT (OUTPATIENT)
Dept: FAMILY MEDICINE | Facility: CLINIC | Age: 83
End: 2018-11-21
Payer: MEDICARE

## 2018-11-21 ENCOUNTER — HOSPITAL ENCOUNTER (OUTPATIENT)
Dept: RADIOLOGY | Facility: HOSPITAL | Age: 83
Discharge: HOME | End: 2018-11-21
Attending: FAMILY MEDICINE
Payer: MEDICARE

## 2018-11-21 ENCOUNTER — TELEPHONE (OUTPATIENT)
Dept: FAMILY MEDICINE | Facility: CLINIC | Age: 83
End: 2018-11-21

## 2018-11-21 VITALS
TEMPERATURE: 99 F | HEIGHT: 65 IN | DIASTOLIC BLOOD PRESSURE: 60 MMHG | OXYGEN SATURATION: 94 % | WEIGHT: 94.38 LBS | HEART RATE: 70 BPM | SYSTOLIC BLOOD PRESSURE: 96 MMHG | BODY MASS INDEX: 15.72 KG/M2

## 2018-11-21 DIAGNOSIS — J44.1 COPD EXACERBATION: Primary | ICD-10-CM

## 2018-11-21 DIAGNOSIS — J44.1 COPD EXACERBATION: ICD-10-CM

## 2018-11-21 DIAGNOSIS — R93.89 ABNORMAL CXR: Primary | ICD-10-CM

## 2018-11-21 DIAGNOSIS — R09.89 ABNORMAL LUNG SOUNDS: ICD-10-CM

## 2018-11-21 PROCEDURE — 71046 X-RAY EXAM CHEST 2 VIEWS: CPT | Mod: TC,FY,HCNC

## 2018-11-21 PROCEDURE — 99999 PR PBB SHADOW E&M-EST. PATIENT-LVL V: CPT | Mod: PBBFAC,HCNC,, | Performed by: FAMILY MEDICINE

## 2018-11-21 PROCEDURE — 96372 THER/PROPH/DIAG INJ SC/IM: CPT | Mod: HCNC,S$GLB,, | Performed by: FAMILY MEDICINE

## 2018-11-21 PROCEDURE — 3074F SYST BP LT 130 MM HG: CPT | Mod: CPTII,HCNC,S$GLB, | Performed by: FAMILY MEDICINE

## 2018-11-21 PROCEDURE — 3078F DIAST BP <80 MM HG: CPT | Mod: CPTII,HCNC,S$GLB, | Performed by: FAMILY MEDICINE

## 2018-11-21 PROCEDURE — 71046 X-RAY EXAM CHEST 2 VIEWS: CPT | Mod: 26,HCNC,, | Performed by: RADIOLOGY

## 2018-11-21 PROCEDURE — 99215 OFFICE O/P EST HI 40 MIN: CPT | Mod: 25,HCNC,S$GLB, | Performed by: FAMILY MEDICINE

## 2018-11-21 PROCEDURE — 1101F PT FALLS ASSESS-DOCD LE1/YR: CPT | Mod: CPTII,HCNC,S$GLB, | Performed by: FAMILY MEDICINE

## 2018-11-21 RX ORDER — PREDNISONE 20 MG/1
20 TABLET ORAL 2 TIMES DAILY
Qty: 10 TABLET | Refills: 0 | Status: SHIPPED | OUTPATIENT
Start: 2018-11-21 | End: 2018-11-26

## 2018-11-21 RX ORDER — CEFTRIAXONE 1 G/1
1 INJECTION, POWDER, FOR SOLUTION INTRAMUSCULAR; INTRAVENOUS
Status: COMPLETED | OUTPATIENT
Start: 2018-11-21 | End: 2018-11-21

## 2018-11-21 RX ORDER — AZITHROMYCIN 250 MG/1
TABLET, FILM COATED ORAL
Qty: 6 TABLET | Refills: 0 | Status: ON HOLD | OUTPATIENT
Start: 2018-11-21 | End: 2019-01-01 | Stop reason: HOSPADM

## 2018-11-21 RX ADMIN — CEFTRIAXONE 1 G: 1 INJECTION, POWDER, FOR SOLUTION INTRAMUSCULAR; INTRAVENOUS at 10:11

## 2018-11-21 NOTE — LETTER
November 21, 2018      Other  [address]  [place]  [place] [address]           [address]  [place] [address]  Phone: [phone]  Fax: [phone]          Patient: Kaity Rebolledo   MR Number: [id number]   YOB: 1933   Date of Visit: 11/21/2018       Dear Other:    Thank you for referring Kaity Rebolledo to me for evaluation. Attached you will find relevant portions of my assessment and plan of care.    If you have questions, please do not hesitate to call me. I look forward to following Kaity Rebolledo along with you.    Sincerely,    Espinoza Cardona MD    Enclosure  CC:  No Recipients    If you would like to receive this communication electronically, please contact [email or web address] or [phone] to request more information on [employer] Link access.    For providers and/or their staff who would like to refer a patient to [facility], please contact us through our one-stop-shop provider referral line, [facility] , at [phone].    If you feel you have received this communication in error or would no longer like to receive these types of communications, please e-mail [email or web address]

## 2018-11-21 NOTE — TELEPHONE ENCOUNTER
Informed pt's daughter Bri that pt's chest x-ray did not show any obvious pneumonia, but there are some mild abnormalities that may require repeat chest x-ray in 6 weeks. Bri verbalized understanding.

## 2018-11-21 NOTE — PROGRESS NOTES
(Portions of this note were dictated using voice recognition software and may contain dictation related errors in spelling/grammar/syntax not found on text review)    CC:   Chief Complaint   Patient presents with    Chest Congestion     x1 week.  also states oxygen level has been low.    Cough     coughing up green mucus       HPI: 85 y.o. female pt of Samuel Soriano MD, new to me.  Medical history below including COPD on triple therapy with LAMA/LABA/ICS (Breo and Spiriva), oxygen dependent, CHF (echo 2017 EF of 55-60, diastolic dysfunction, mild aortic stenosis/mild MR/moderate TR/pulmonary hypertension estimated PA pressure of 51--on Lasix 20 mg daily plus potassium 10 mEq daily/metoprolol 100 mg daily), atrial fibrillation is on Eliquis    Presents for coughing/chest congestion. Family had called in a couple of days ago and prescription of promethazine DM was called out.  Per records review, she does have a past history of pneumonia and pleural effusion, and she has had multiple admits for COPD/pneumonia over the last couple of years.  Most recent admit in July was for GI bleed.  Had upper endoscopy done which demonstrated normal esophagus, hematin in the stomach, suspected punctate gastric hemorrhage although no active bleeding or active ulcerations visualized.  H pylori was recommended but not done as resulted in the chart.  She takes Protonix    Symptoms started about 3-4 days ago with some coughing congestion.  Low-grade fever of  at the assisted living facility.  Chronically short of breath the patient herself does not recall any significant worsening of her dyspnea.  No vomiting or diarrhea.  No weakness, abdominal pain.  Had some peripheral edema couple weeks ago but this has improved significantly.  No new peripheral edema. Weight is down.      Past Medical History:   Diagnosis Date    Anticoagulant long-term use     Anxiety and depression     Chronic diastolic heart failure 9/2/2016     Chronic hypoxemic respiratory failure 2/6/2016    Chronic obstructive pulmonary disease 6/16/2016    Closed bilateral fracture of pubic rami 3/18/2016    Closed Colles' fracture of right radius 4/22/2017    Closed displaced fracture of neck of right femur s/p hemiarthroplasty on 4/23/2017 4/22/2017    COPD (chronic obstructive pulmonary disease)     Coronary artery disease involving native coronary artery without angina pectoris 6/16/2016    Displaced fracture of right femoral neck s/p hemiarthroplasty on 4/23/2017 4/22/2017    Essential hypertension 8/6/2013    Fall     patient  in  wheel  chair    Hyperlipidemia     Osteoporosis 3/18/2016    Paroxysmal atrial fibrillation     Pneumonia     Pulmonary hypertension due to lung disease 3/30/2017    Rotator cuff injury     R s/p therapy    Vitamin D deficiency disease 4/25/2017       Past Surgical History:   Procedure Laterality Date    CATARACT EXTRACTION BILATERAL W/ ANTERIOR VITRECTOMY      EGD (ESOPHAGOGASTRODUODENOSCOPY) N/A 7/19/2018    Performed by Jovany Hernandez MD at [facility] ENDO    ESOPHAGOGASTRODUODENOSCOPY N/A 7/19/2018    Procedure: EGD (ESOPHAGOGASTRODUODENOSCOPY);  Surgeon: Jovany Hernandez MD;  Location: [facility];  Service: Endoscopy;  Laterality: N/A;    EYE SURGERY      HEART CATH-LEFT Left 10/14/2015    Performed by Dk Kearns MD at [facility] CATH LAB    HEMIARTHROPLASTY RIGHT HIP Right 4/23/2017    Performed by Justin Freeman MD at [facility] OR 2ND FLR    HIP FRACTURE SURGERY Right 04/23/2017    Hemiarthroplasty for femoral neck fracture    LEG SURGERY         No family history on file.    Social History     Socioeconomic History    Marital status:      Spouse name: Not on file    Number of children: Not on file    Years of education: Not on file    Highest education level: Not on file   Social Needs    Financial resource strain: Not on file    Food insecurity - worry: Not on file    Food insecurity - inability:  Not on file    Transportation needs - medical: Not on file    Transportation needs - non-medical: Not on file   Occupational History    Not on file   Tobacco Use    Smoking status: Former Smoker     Packs/day: 1.00     Years: 50.00     Pack years: 50.00     Types: Cigarettes     Last attempt to quit: 3/13/2003     Years since quitting: 15.7    Smokeless tobacco: Never Used   Substance and Sexual Activity    Alcohol use: No    Drug use: No    Sexual activity: Not Currently     Birth control/protection: Post-menopausal   Other Topics Concern    Not on file   Social History Narrative    Not on file     Lab Results   Component Value Date    WBC 8.55 09/11/2018    HGB 10.3 (L) 09/11/2018    HCT 33.3 (L) 09/11/2018     09/11/2018    CHOL 257 (H) 06/06/2018    TRIG 93 06/06/2018    HDL 82 (H) 06/06/2018    ALT 16 09/11/2018    AST 21 09/11/2018     09/11/2018    K 3.4 (L) 09/11/2018    CL 91 (L) 09/11/2018    CREATININE 1.2 09/11/2018    CALCIUM 10.2 09/11/2018    BUN 13 09/11/2018    CO2 37 (H) 09/11/2018    TSH 0.992 06/20/2017    INR 1.0 07/20/2018    HGBA1C 5.1 03/19/2018    LDLCALC 156.4 06/06/2018    GLU 98 09/11/2018           ROS:  GENERAL: No fever, chills, fatigability or weight loss.  SKIN: No rashes, no itching.  HEAD: No headaches.  EYES: No visual changes  EARS: No ear pain or changes in hearing.  NOSE:  Above get MOUTH & THROAT: No hoarseness, change in voice, or sore throat.  NODES: Denies swollen glands.  CHEST:  Above.  CARDIOVASCULAR: Denies chest pain, PND, orthopnea.  ABDOMEN: No nausea, vomiting, or changes in bowel function.  URINARY: No flank pain, dysuria or hematuria.  PERIPHERAL VASCULAR:  Above      Vital signs reviewed  PE:   APPEARANCE: Well nourished, well developed, in no acute distress.    HEAD: Normocephalic, atraumatic.  EYES: PERRL. EOMI.   Conjunctivae noninjected.  EARS: TM's intact. Light reflex normal. No retraction or perforation  NOSE: Mucosa pink. Airway  clear.  MOUTH & THROAT: No tonsillar enlargement. No pharyngeal erythema or exudate.   NECK: Supple with no cervical lymphadenopathy.  No carotid bruits.  No thyromegaly  CHEST:  Fair inspiratory effort.  Breath sounds globally decreased with some sporadic expiratory wheezing more on the left upper posterior lung field, along with some inspiratory crackles noted right posterior base  CARDIOVASCULAR:  Soft heart sounds, normal S1-S2, no appreciable murmurs  ABDOMEN: Bowel sounds normal. Not distended. Soft. No tenderness or masses. No organomegaly.  EXTREMITIES:  Trace ankle edema bilaterally      IMPRESSION  1. COPD exacerbation    2. Abnormal lung sounds            PLAN  Concern for COPD exacerbation but given abnormal lung findings, could consider pneumonia and differential as well given her prior history.  She was given 1 g of Rocephin IM in the office today, prescribe 5 day azithromycin course outpatient, and prescribed prednisone 20 mg b.i.d. for 5 days.  In addition, she will continue her triple prophylactic therapy for COPD.  Additionally her daughters state that she is taking albuterol and Atrovent nebulizers fairly consistently 2 to 3 times a day at the assisted living facility.  She can continue on this regimen for now.  Continue home oxygen.  Will send for chest x-ray.  Did discuss with family that if significant concern for multilobar pneumonia or significant deterioration of clinical status, she will need likely admission given age and comorbidities.    Answers for HPI/ROS submitted by the patient on 11/21/2018   Fever  Chronicity: recurrent  Onset: in the past 7 days  Frequency: intermittently  Progression since onset: gradually improving  Max temp prior to arrival: 100 to 100.9 F  Temperature source: a tympanic thermometer  congestion: Yes  cough: Yes  wheezing: Yes  Treatments tried: acetaminophen  Improvement on treatment: mild

## 2018-11-21 NOTE — TELEPHONE ENCOUNTER
----- Message from Rosanna Jain sent at 11/21/2018  1:23 PM CST -----  Contact: [phone]/Maryjane  Patient called in returning your call. Please advise.

## 2018-11-21 NOTE — TELEPHONE ENCOUNTER
Called her daughter Bri but her voicemail was full.  Called daughter Sheela on her contact list and left message to let patient and Bri know that her chest x-ray did not show any obvious pneumonia but there are some mild abnormalities that may require repeat chest x-ray in 6 weeks.  Sheela expressed understanding and states that she will pass this message along to Bri

## 2018-12-08 DIAGNOSIS — I50.22 CHRONIC SYSTOLIC HEART FAILURE: Chronic | ICD-10-CM

## 2018-12-08 DIAGNOSIS — D53.9 MACROCYTIC ANEMIA: ICD-10-CM

## 2018-12-10 RX ORDER — BLACK COHOSH ROOT 200 MG
CAPSULE ORAL
Qty: 60 TABLET | Refills: 0 | Status: SHIPPED | OUTPATIENT
Start: 2018-12-10

## 2018-12-10 RX ORDER — MENTHOL 5.8 MG/1
LOZENGE ORAL
Qty: 30 TABLET | Refills: 0 | Status: SHIPPED | OUTPATIENT
Start: 2018-12-10

## 2018-12-10 RX ORDER — ASPIRIN 81 MG/1
TABLET ORAL
Qty: 30 TABLET | Refills: 0 | Status: SHIPPED | OUTPATIENT
Start: 2018-12-10

## 2018-12-10 RX ORDER — NICOTINE POLACRILEX 4 MG
LOZENGE BUCCAL
Qty: 60 TABLET | Refills: 0 | Status: SHIPPED | OUTPATIENT
Start: 2018-12-10

## 2018-12-29 PROBLEM — F02.80 LATE ONSET ALZHEIMER'S DISEASE WITHOUT BEHAVIORAL DISTURBANCE: Status: ACTIVE | Noted: 2018-12-29

## 2018-12-29 PROBLEM — R33.9 URINARY RETENTION: Status: RESOLVED | Noted: 2017-03-28 | Resolved: 2018-12-29

## 2018-12-29 PROBLEM — K92.2 GASTROINTESTINAL HEMORRHAGE: Status: RESOLVED | Noted: 2018-07-19 | Resolved: 2018-12-29

## 2018-12-29 PROBLEM — D75.839 THROMBOCYTOSIS: Status: RESOLVED | Noted: 2018-03-14 | Resolved: 2018-12-29

## 2018-12-29 PROBLEM — D62 ACUTE BLOOD LOSS ANEMIA: Status: RESOLVED | Noted: 2017-04-24 | Resolved: 2018-12-29

## 2018-12-29 PROBLEM — R06.02 SOB (SHORTNESS OF BREATH): Status: ACTIVE | Noted: 2018-12-29

## 2018-12-29 PROBLEM — R91.1 SOLITARY PULMONARY NODULE ON LUNG CT: Status: ACTIVE | Noted: 2018-12-29

## 2018-12-29 PROBLEM — G30.1 LATE ONSET ALZHEIMER'S DISEASE WITHOUT BEHAVIORAL DISTURBANCE: Status: ACTIVE | Noted: 2018-12-29

## 2018-12-29 RX ORDER — LEVALBUTEROL INHALATION SOLUTION 1.25 MG/3ML
SOLUTION RESPIRATORY (INHALATION)
Qty: 216 ML | Refills: 0 | Status: SHIPPED | OUTPATIENT
Start: 2018-12-29

## 2018-12-30 PROBLEM — R62.7 FAILURE TO THRIVE IN ADULT: Status: ACTIVE | Noted: 2018-12-30

## 2018-12-31 PROBLEM — E87.6 HYPOKALEMIA: Status: ACTIVE | Noted: 2018-12-31

## 2019-01-01 PROBLEM — Z51.5 HOSPICE CARE: Status: ACTIVE | Noted: 2019-01-01

## 2019-01-02 ENCOUNTER — OUTPATIENT CASE MANAGEMENT (OUTPATIENT)
Dept: ADMINISTRATIVE | Facility: OTHER | Age: 84
End: 2019-01-02

## 2019-01-03 NOTE — PROGRESS NOTES
Thank you for the referral. The following patient has been assigned to Genia Guadarrama RN with Outpatient Complex Care Management for high risk screening.    Reason for referral: Chronic obstructive pulmonary disease, unspecified COPD type    Please contact [facility] at [phone] with any questions.    Thank you,    Jing Urban, [facility]  Outpatient Care Mgmt.  [phone]

## 2019-01-08 ENCOUNTER — OUTPATIENT CASE MANAGEMENT (OUTPATIENT)
Dept: ADMINISTRATIVE | Facility: OTHER | Age: 84
End: 2019-01-08

## 2019-01-08 NOTE — PROGRESS NOTES
In chart review noted pt was discharged on 19 to hospice and noted today that pt is .  Case closed

## 2019-09-15 NOTE — PLAN OF CARE
Problem: Patient Care Overview  Goal: Plan of Care Review  Outcome: Ongoing (interventions implemented as appropriate)  Pt arrived from endo via stretcher. Ambulated with assist to bed. Up with assist to bedside commode. Daughter at bedside. Pt on full liquid diet - tolerating well. Per MD, do not advance diet; pt to be on full diet throughout night. Tolerates PO meds whole. Patient on continuous tele monitoring; SR; HR 60s-70s. Blood consent signed and placed in chart. 1 unit PRBCs ordered to transfuse. Pt remains on 3L NC. Bed alarm set, bed in lowest position, call bell in reach. Will continue to monitor.        101

## 2020-05-19 NOTE — PLAN OF CARE
Problem: Occupational Therapy Goal  Goal: Occupational Therapy Goal  Goals to be met by: 2 weeks     Patient will increase functional independence with ADLs by performing:    Feeding with Set-up Assistance.  UE Dressing with Set-up Assistance.  LE Dressing with Minimal Assistance.  Grooming while seated with Set-up Assistance.  Toileting from bedside commode with SBA for hygiene and clothing management.   Bathing from shower chair/bench with Minimal Assistance.  Supine to sit with Modified Brooke.  Stand pivot transfers with Supervision.  Toilet transfer to bedside commode with Supervision.  Pt. To demonstrate understanding of energy conservation techniques with ADL task performance.     Outcome: Ongoing (interventions implemented as appropriate)  Goals remain appropriate.           c/w flomax and finasteride

## 2021-08-06 NOTE — ASSESSMENT & PLAN NOTE
Chronic hypoxemic respiratory failure  Patient at baseline and currently on 2 liters of oxygen as is on home oxygen prior to admit. Pulse ox % on 2 liters and goal is to keep oxygen > 88%. Plan to continue Breo MDI 1 puff daily + Spiriva 1 puff daily to treat chronic COPD and patient to continue on discharge and discharge on oxygen at 2 liters continuous to [facility].   [FreeTextEntry1] : I reviewed the MRI results with him.  I had a discussion regarding today's visit, the diagnosis and treatment recommendations and options.  We also discussed changes since the last visit.\par \par . At this time, I recommended that he get a repeat MRI to evaluate the left hand to rule out index finger MCP joint collateral ligament injuries and rule out injury to the sagittal band of the extensor tendon. He will follow up after his MRI to review the results and discuss treatment recommendations.		 \par \par The patient has agreed to the above plan of management and has expressed full understanding.  All questions were fully answered to the patient's satisfaction.\par \par My cumulative time spent on today's visit was greater than 30 minutes and included: Preparation for the visit, review of the medical records, review of pertinent diagnostic studies, examination and counseling of the patient on the above diagnosis, treatment plan and prognosis, orders of diagnostic tests, medications and/or appropriate procedures and documentation in the medical records of today's visit.

## 2024-01-08 NOTE — PROGRESS NOTES
[facility]    CC: Black Emesis     HPI 85 y.o. female with pertinent medical history including Afib, COPD (on 3L home O2), who presents from snf for 2-3 episodes of black emesis, starting this morning at about 0330, associated with anticoagulation use (Eliquis with last dose approx 6pm yesterday), dizziness, and weakness early this morning by snf report. This was associated with some abdominal pain yesterday but she denies current abdominal pain. She is accompanied by two of her daughters who also corroborate reported history. They deny association of the black emesis with dysphagia, odynophagia, constipation, diarrhea, melena, hematochezia, hematuria, hemoptysis, or other observed bleeding. Last BM was last night after dinner and last PO intake was yesterday evening at dinner.     She reports never having similar episodes to this in the past. She denies history of PUD. She denies alcohol use and known NSAID use other than Tylenol or aspirin 81 mg daily; however family qualifies that since meds administered by [facility] it is not impossible that she could receive NSAIDs for aches and pains. By chart she is on Reclast as outpatient for OP but no known oral bisphosphonates. No known pepto-bismol use.     Interval History   Patient state she feels well this AM, had an uneventful night. Patient denies any hematemesis, melena or hematochezia. Patient is tolerating breakfast this AM. No BM overnight. H/H stable overnight.      Past Medical History:   Diagnosis Date    Anticoagulant long-term use     Anxiety and depression     Chronic diastolic heart failure 9/2/2016    Chronic hypoxemic respiratory failure 2/6/2016    Chronic obstructive pulmonary disease 6/16/2016    Closed bilateral fracture of pubic rami 3/18/2016    Closed Colles' fracture of right radius 4/22/2017    Closed displaced fracture of neck of right femur s/p hemiarthroplasty on 4/23/2017 4/22/2017    COPD (chronic obstructive pulmonary  "disease)     Coronary artery disease involving native coronary artery without angina pectoris 6/16/2016    Displaced fracture of right femoral neck s/p hemiarthroplasty on 4/23/2017 4/22/2017    Essential hypertension 8/6/2013    Fall     patient  in  wheel  chair    Hyperlipidemia     Osteoporosis 3/18/2016    Paroxysmal atrial fibrillation     Pneumonia     Pulmonary hypertension due to lung disease 3/30/2017    Rotator cuff injury     R s/p therapy    Vitamin D deficiency disease 4/25/2017         Review of Systems  General ROS: negative for chills, fever or weight loss, no fatigue  Cardiovascular ROS: no chest pain or dyspnea on exertion  Gastrointestinal ROS: no abdominal pain, change in bowel habits, or black/ bloody stools    Physical Examination  BP (!) 101/55   Pulse 65   Temp 97.8 °F (36.6 °C)   Resp 16   Ht 5' 5" (1.651 m)   Wt 50.8 kg (112 lb)   LMP  (LMP Unknown)   SpO2 98%   Breastfeeding? No   BMI 18.64 kg/m²   General appearance: alert, cooperative, no distress  HENT: Normocephalic, atraumatic, neck symmetrical, no nasal discharge   Lungs: clear to auscultation bilaterally, no dullness to percussion bilaterally  Heart: regular rate and rhythm without rub; no displacement of the PMI   Abdomen: soft, non-tender; bowel sounds normoactive; no organomegaly  Extremities: extremities symmetric; no clubbing, cyanosis, or edema  Neurologic: Alert and oriented X 3, normal strength, normal coordination and gait    Labs:  H/H 7.6/25.2      Imaging:  3/16/18 CXR: (i personally reviewed imaging results) hyper-expanded lungs, no opacities or effusions    Collateral history obtained from daughters at bedside    Assessment:   85F with hx of Afib on Eliquis 2.5mg BID and Aspirin 81 mg daily presents from senior living after multiple episodes of dark emesis associated with abdominal discomfort. Patient required 1U PRBC transfusion. EGD revealed blood in stomach, however no obvious source of bleed, " bleeding may have been from punctate hemorrhage. H/H stable overnight with no sign of GI bleed overnight.    Plan:  - No bleeding overnight. Continue to monitor for signs of bleeding  - H/H stable overnight, Transfusion goal Hb > 7  - Continue PPI Daily x6wks  - Restart Eliquis as per primary team    Jeremiah Romero MD  [facility]  [facility]       LEONILA

## 2025-02-16 NOTE — PROGRESS NOTES
Daily Orthopaedic Progress Note    Kaity Rebolledo is a 83 y.o. female admitted on 4/21/2017  Hospital Day: 7  Post Op Day: 4 Days Post-Op      The patient was seen and examined this morning at the bedside. Worked with PT and ambulated several steps, tolerated well.    PHYSICAL EXAM:  Awake/alert/oriented x3, No acute distress, Afebrile, Vital signs stable  Good inspiratory effort with unlaboured breathing  Dressings Clean,Dry,Intact right hip  Palpable distal pulses  Neurovascularly intact  sugartong splint right radius, neurovascularly intact. Some bruising and swelling       Vitals:    04/28/17 2357 04/29/17 0000 04/29/17 0344 04/29/17 0352   BP:  (!) 118/50     BP Location:  Right arm     Patient Position:  Lying     BP Method:  Automatic     Pulse: 88 86 75 68   Resp: 18 18 16    Temp:  98.8 °F (37.1 °C)     TempSrc:       SpO2: 95% 95% 97%    Weight:       Height:         I/O last 3 completed shifts:  In: 1480 [P.O.:480; I.V.:1000]  Out: 1300 [Urine:1300]    Recent Labs  Lab 04/27/17  0603 04/28/17  0402   CALCIUM 8.6* 8.5*   * 135*   K 4.7 5.1   CO2 29 34*   CL 95 96   BUN 10 7*   CREATININE 0.7 0.6       Recent Labs  Lab 04/27/17  0603   HGB 9.1*   HCT 28.5*     No results for input(s): INR, APTT in the last 72 hours.    Invalid input(s): PT    A/P: 83 y.o. female 6 Days Post-Op s/p right hip hemiarthroplasty, right distal radius fracture  -Continue with current pain control regimen  -Continue with current physical therapy plan, WBAT RLE with posterior hip precautions. NWB RUE. Platform walker  -Continue with DVT prophylaxis, Lovenox      Dispo: will plan for ORIF distal radius at a later date once she is more comfortable on the walker. Can also treat non-op if necessary knowing she is at risk for malunion     SNF Monday     HTN (hypertension)

## (undated) DEVICE — Device

## (undated) DEVICE — SUT VICRYL PLUS 0 CT1 18IN

## (undated) DEVICE — TIP IRR FEM CANAL CO-AXIAL

## (undated) DEVICE — SUTURE STRATAFIX PGA PCL 3-0

## (undated) DEVICE — SUT ETHIBOND XTRA 1 OS-6

## (undated) DEVICE — SOL IRR NACL .9% 3000ML

## (undated) DEVICE — SEE L#156916

## (undated) DEVICE — TUBE SUCTION KAMVAC MINI 20/BX

## (undated) DEVICE — SET CEMENT (SCULP)

## (undated) DEVICE — HOOD T-5 TEAR AWAY STERILE

## (undated) DEVICE — SEE MEDLINE ITEM 157131

## (undated) DEVICE — IMMOB KNEE UNIV TRI PANEL 19IN

## (undated) DEVICE — BLADE RECP OFFSET 77.5X11X1.23

## (undated) DEVICE — ELECTRODE REM PLYHSV RETURN 9

## (undated) DEVICE — SUT VICRYL BR 1 GEN 27 CT-1

## (undated) DEVICE — PRESSURIZER HI VAC HIP KIT

## (undated) DEVICE — BRUSH INTRAMEDULLARY

## (undated) DEVICE — KIT IRR SUCTION HND PIECE

## (undated) DEVICE — COVER MAYO STAND REINFRCD 30

## (undated) DEVICE — NDL 18GA X1 1/2 REG BEVEL

## (undated) DEVICE — ADHESIVE DERMABOND ADVANCED

## (undated) DEVICE — DRESSING AQUACEL AG ADV 3.5X12

## (undated) DEVICE — SUT VICRYL+ 1 CT1 18IN

## (undated) DEVICE — SUT CTD VICRYL UND BR CP-1

## (undated) DEVICE — SUT VICRYL PLUS 2-0 CT1 18